# Patient Record
Sex: MALE | Race: WHITE | NOT HISPANIC OR LATINO | Employment: FULL TIME | URBAN - METROPOLITAN AREA
[De-identification: names, ages, dates, MRNs, and addresses within clinical notes are randomized per-mention and may not be internally consistent; named-entity substitution may affect disease eponyms.]

---

## 2017-01-02 PROBLEM — F41.1 GENERALIZED ANXIETY DISORDER: Status: ACTIVE | Noted: 2017-01-02

## 2017-01-24 ENCOUNTER — OFFICE VISIT (OUTPATIENT)
Dept: DERMATOLOGY | Facility: CLINIC | Age: 47
End: 2017-01-24
Payer: COMMERCIAL

## 2017-01-24 DIAGNOSIS — L64.9 MALE PATTERN ALOPECIA: ICD-10-CM

## 2017-01-24 DIAGNOSIS — L85.1 ACQUIRED PALMAR AND PLANTAR HYPERKERATOSIS: Primary | ICD-10-CM

## 2017-01-24 PROCEDURE — 1159F MED LIST DOCD IN RCRD: CPT | Mod: S$GLB,,, | Performed by: DERMATOLOGY

## 2017-01-24 PROCEDURE — 99999 PR PBB SHADOW E&M-EST. PATIENT-LVL I: CPT | Mod: PBBFAC,,, | Performed by: DERMATOLOGY

## 2017-01-24 PROCEDURE — 99214 OFFICE O/P EST MOD 30 MIN: CPT | Mod: S$GLB,,, | Performed by: DERMATOLOGY

## 2017-01-24 NOTE — PROGRESS NOTES
Subjective:       Patient ID:  Abhi Pompa is a 46 y.o. male who presents for   Chief Complaint   Patient presents with    Hair Loss    Follow-up    Lesion     HPI Comments: Pt here for follow up visit. Last seen on 3-3-2016.    Lesions on heel of both feet for several years, asymptomatic, not treated. He does routine foot exams every 6 months with Dr. Richey    Tinea versicolor using ketoconazole (NIZORAL) 2 % shampoo; Wash beard and trunk when flared, pt states much improved he is happy with results. He did not use the prescribed Diflucan     Hair loss for 6 months not treated  Washing hair daily with Suave shampoo   Maternal uncles and cousins with hair loss and thinning hair  He is unsure about paternal fhx   Diagnosed with Type 2 diabetes 3 years ago    History testosterone deficiency. Family history hair loss.   Testosterone injections, recently decreased dosage.     Significant weight loss last year.     Review of Systems   Skin: Positive for activity-related sunscreen use. Negative for itching, rash and daily sunscreen use.        Objective:    Physical Exam   Constitutional: He appears well-developed and well-nourished. He is obese.  No distress.   HENT:   Mouth/Throat: Lips normal.    Eyes: Lids are normal.  No conjunctival no injection.   Cardiovascular: There is no local extremity swelling and no dependent edema.     Neurological: He is alert and oriented to person, place, and time. He is not disoriented.   Psychiatric: He has a normal mood and affect.   Skin:   Areas Examined (abnormalities noted in diagram):   Scalp / Hair Palpated and Inspected  Head / Face Inspection Performed  Neck Inspection Performed  RLE Inspected  LLE Inspection Performed  Nails and Digits Inspection Performed                  Diagram Legend     Erythematous scaling macule/papule c/w actinic keratosis       Vascular papule c/w angioma      Pigmented verrucoid papule/plaque c/w seborrheic keratosis      Yellow  umbilicated papule c/w sebaceous hyperplasia      Irregularly shaped tan macule c/w lentigo     1-2 mm smooth white papules consistent with Milia      Movable subcutaneous cyst with punctum c/w epidermal inclusion cyst      Subcutaneous movable cyst c/w pilar cyst      Firm pink to brown papule c/w dermatofibroma      Pedunculated fleshy papule(s) c/w skin tag(s)      Evenly pigmented macule c/w junctional nevus     Mildly variegated pigmented, slightly irregular-bordered macule c/w mildly atypical nevus      Flesh colored to evenly pigmented papule c/w intradermal nevus       Pink pearly papule/plaque c/w basal cell carcinoma      Erythematous hyperkeratotic cursted plaque c/w SCC      Surgical scar with no sign of skin cancer recurrence      Open and closed comedones      Inflammatory papules and pustules      Verrucoid papule consistent consistent with wart     Erythematous eczematous patches and plaques     Dystrophic onycholytic nail with subungual debris c/w onychomycosis     Umbilicated papule    Erythematous-base heme-crusted tan verrucoid plaque consistent with inflamed seborrheic keratosis     Erythematous Silvery Scaling Plaque c/w Psoriasis     See annotation      Assessment / Plan:        Acquired  plantar hyperkeratosis  rec otc excipial 20% urea cream after soak- available OTC    Male pattern alopecia  Family history   Likely exacerbated/unmasked by hormonal therapies  Minoxidil 5% foam bid , apply 1/2 capful to scalp , avoid use on areas outside of scalp, warned that apparent increase shedding may occur initially and will treat for a minimum of 4 months prior to discontinuing.   Consider finasteride in future if ok with endocrine  Discussed the following potential side effects of finasteride : decreased libido, erectile and ejaculatory dysfunction, decreased volume of ejaculate, gynecomastia, myopathy and decreased PSA  Blood donation also contraindicated during treatment and for 6 months following  treatment.                  Return in about 6 months (around 7/24/2017).

## 2017-02-14 DIAGNOSIS — M25.512 ACUTE PAIN OF LEFT SHOULDER: Primary | ICD-10-CM

## 2017-02-16 ENCOUNTER — OFFICE VISIT (OUTPATIENT)
Dept: ORTHOPEDICS | Facility: CLINIC | Age: 47
End: 2017-02-16
Payer: COMMERCIAL

## 2017-02-16 ENCOUNTER — HOSPITAL ENCOUNTER (OUTPATIENT)
Dept: RADIOLOGY | Facility: HOSPITAL | Age: 47
Discharge: HOME | End: 2017-02-16
Attending: ORTHOPAEDIC SURGERY
Payer: COMMERCIAL

## 2017-02-16 VITALS — BODY MASS INDEX: 36.8 KG/M2 | HEIGHT: 66 IN | WEIGHT: 229 LBS

## 2017-02-16 DIAGNOSIS — M25.512 ACUTE PAIN OF LEFT SHOULDER: ICD-10-CM

## 2017-02-16 DIAGNOSIS — S40.022A CONTUSION SHOULDER/ARM, LEFT, INITIAL ENCOUNTER: Primary | ICD-10-CM

## 2017-02-16 DIAGNOSIS — S40.012A CONTUSION SHOULDER/ARM, LEFT, INITIAL ENCOUNTER: Primary | ICD-10-CM

## 2017-02-16 PROCEDURE — 73030 X-RAY EXAM OF SHOULDER: CPT | Mod: 26,LT,, | Performed by: RADIOLOGY

## 2017-02-16 PROCEDURE — 99999 PR PBB SHADOW E&M-EST. PATIENT-LVL II: CPT | Mod: PBBFAC,,, | Performed by: ORTHOPAEDIC SURGERY

## 2017-02-16 PROCEDURE — 99203 OFFICE O/P NEW LOW 30 MIN: CPT | Mod: S$GLB,,, | Performed by: ORTHOPAEDIC SURGERY

## 2017-02-16 PROCEDURE — 73030 X-RAY EXAM OF SHOULDER: CPT | Mod: TC,PO,LT
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
## 2017-02-16 NOTE — LETTER
February 16, 2017      Celeste Bergman MD  [address] [address]           [place] [facility] [address] [facility] [address] [place] [address]  Phone: [phone]          Patient: Abhi Pompa   MR Number: [id number]   YOB: 1970   Date of Visit: 2/16/2017       Dear Dr. Celeste Bergman:    Thank you for referring Abhi Pompa to me for evaluation. Attached you will find relevant portions of my assessment and plan of care.    If you have questions, please do not hesitate to call me. I look forward to following Abhi Pompa along with you.    Sincerely,    Jonathan Vela MD    Enclosure  CC:  No Recipients    If you would like to receive this communication electronically, please contact [email or web address] or [phone] to request more information on [employer] access.    For providers and/or their staff who would like to refer a patient to [facility], please contact us through our one-stop-shop provider referral line, [facility] , at [phone].    If you feel you have received this communication in error or would no longer like to receive these types of communications, please e-mail [email or web address]

## 2017-02-23 RX ORDER — SIMVASTATIN 10 MG/1
TABLET, FILM COATED ORAL
Qty: 30 TABLET | Refills: 0 | Status: SHIPPED | OUTPATIENT
Start: 2017-02-23 | End: 2017-04-26 | Stop reason: SDUPTHER

## 2017-02-23 RX ORDER — METFORMIN HYDROCHLORIDE 500 MG/1
TABLET, EXTENDED RELEASE ORAL
Qty: 60 TABLET | Refills: 0 | Status: SHIPPED | OUTPATIENT
Start: 2017-02-23 | End: 2017-04-13 | Stop reason: SDUPTHER

## 2017-03-14 ENCOUNTER — PATIENT MESSAGE (OUTPATIENT)
Dept: ENDOCRINOLOGY | Facility: CLINIC | Age: 47
End: 2017-03-14

## 2017-03-24 ENCOUNTER — LAB VISIT (OUTPATIENT)
Dept: LAB | Facility: HOSPITAL | Age: 47
End: 2017-03-24
Attending: INTERNAL MEDICINE
Payer: COMMERCIAL

## 2017-03-24 DIAGNOSIS — E29.1 MALE HYPOGONADISM: ICD-10-CM

## 2017-03-24 DIAGNOSIS — E11.9 TYPE 2 DIABETES MELLITUS WITHOUT COMPLICATION, WITHOUT LONG-TERM CURRENT USE OF INSULIN: ICD-10-CM

## 2017-03-24 DIAGNOSIS — E78.5 HYPERLIPIDEMIA, UNSPECIFIED HYPERLIPIDEMIA TYPE: ICD-10-CM

## 2017-03-24 LAB
ALBUMIN SERPL BCP-MCNC: 3.7 G/DL
ALP SERPL-CCNC: 62 U/L
ALT SERPL W/O P-5'-P-CCNC: 38 U/L
ANION GAP SERPL CALC-SCNC: 8 MMOL/L
AST SERPL-CCNC: 23 U/L
BASOPHILS # BLD AUTO: 0.02 K/UL
BASOPHILS NFR BLD: 0.3 %
BILIRUB SERPL-MCNC: 0.5 MG/DL
BUN SERPL-MCNC: 17 MG/DL
CALCIUM SERPL-MCNC: 8.5 MG/DL
CHLORIDE SERPL-SCNC: 110 MMOL/L
CHOLEST/HDLC SERPL: 5.1 {RATIO}
CO2 SERPL-SCNC: 23 MMOL/L
CREAT SERPL-MCNC: 0.8 MG/DL
DIFFERENTIAL METHOD: ABNORMAL
EOSINOPHIL # BLD AUTO: 0.2 K/UL
EOSINOPHIL NFR BLD: 3 %
ERYTHROCYTE [DISTWIDTH] IN BLOOD BY AUTOMATED COUNT: 12.7 %
EST. GFR  (AFRICAN AMERICAN): >60 ML/MIN/1.73 M^2
EST. GFR  (NON AFRICAN AMERICAN): >60 ML/MIN/1.73 M^2
ESTIMATED AVG GLUCOSE: 154 MG/DL
GLUCOSE SERPL-MCNC: 131 MG/DL
HBA1C MFR BLD HPLC: 7 %
HCT VFR BLD AUTO: 45.5 %
HDL/CHOLESTEROL RATIO: 19.7 %
HDLC SERPL-MCNC: 147 MG/DL
HDLC SERPL-MCNC: 29 MG/DL
HGB BLD-MCNC: 15.5 G/DL
LDLC SERPL CALC-MCNC: 81.2 MG/DL
LYMPHOCYTES # BLD AUTO: 2.1 K/UL
LYMPHOCYTES NFR BLD: 32.5 %
MCH RBC QN AUTO: 31.3 PG
MCHC RBC AUTO-ENTMCNC: 34.1 %
MCV RBC AUTO: 92 FL
MONOCYTES # BLD AUTO: 0.7 K/UL
MONOCYTES NFR BLD: 10.3 %
NEUTROPHILS # BLD AUTO: 3.4 K/UL
NEUTROPHILS NFR BLD: 53.7 %
NONHDLC SERPL-MCNC: 118 MG/DL
PLATELET # BLD AUTO: 288 K/UL
PMV BLD AUTO: 11.2 FL
POTASSIUM SERPL-SCNC: 4.4 MMOL/L
PROT SERPL-MCNC: 6.5 G/DL
RBC # BLD AUTO: 4.96 M/UL
SODIUM SERPL-SCNC: 141 MMOL/L
TESTOST SERPL-MCNC: 956 NG/DL
TRIGL SERPL-MCNC: 184 MG/DL
WBC # BLD AUTO: 6.4 K/UL

## 2017-03-24 PROCEDURE — 80061 LIPID PANEL: CPT

## 2017-03-24 PROCEDURE — 80053 COMPREHEN METABOLIC PANEL: CPT

## 2017-03-24 PROCEDURE — 84403 ASSAY OF TOTAL TESTOSTERONE: CPT

## 2017-03-24 PROCEDURE — 83036 HEMOGLOBIN GLYCOSYLATED A1C: CPT

## 2017-03-24 PROCEDURE — 85025 COMPLETE CBC W/AUTO DIFF WBC: CPT

## 2017-03-24 PROCEDURE — 36415 COLL VENOUS BLD VENIPUNCTURE: CPT | Mod: PO

## 2017-03-31 ENCOUNTER — OFFICE VISIT (OUTPATIENT)
Dept: ENDOCRINOLOGY | Facility: CLINIC | Age: 47
End: 2017-03-31
Payer: COMMERCIAL

## 2017-03-31 ENCOUNTER — PATIENT MESSAGE (OUTPATIENT)
Dept: ENDOCRINOLOGY | Facility: CLINIC | Age: 47
End: 2017-03-31

## 2017-03-31 ENCOUNTER — PATIENT MESSAGE (OUTPATIENT)
Dept: ORTHOPEDICS | Facility: CLINIC | Age: 47
End: 2017-03-31

## 2017-03-31 VITALS
DIASTOLIC BLOOD PRESSURE: 70 MMHG | HEART RATE: 75 BPM | WEIGHT: 230 LBS | HEIGHT: 66 IN | SYSTOLIC BLOOD PRESSURE: 112 MMHG | BODY MASS INDEX: 36.96 KG/M2

## 2017-03-31 DIAGNOSIS — F41.9 ANXIETY: ICD-10-CM

## 2017-03-31 DIAGNOSIS — E11.9 TYPE 2 DIABETES MELLITUS WITHOUT COMPLICATION, WITHOUT LONG-TERM CURRENT USE OF INSULIN: Primary | ICD-10-CM

## 2017-03-31 DIAGNOSIS — E29.1 MALE HYPOGONADISM: ICD-10-CM

## 2017-03-31 DIAGNOSIS — E78.5 HYPERLIPIDEMIA, UNSPECIFIED HYPERLIPIDEMIA TYPE: ICD-10-CM

## 2017-03-31 PROCEDURE — 99214 OFFICE O/P EST MOD 30 MIN: CPT | Mod: S$GLB,,, | Performed by: INTERNAL MEDICINE

## 2017-03-31 PROCEDURE — 99999 PR PBB SHADOW E&M-EST. PATIENT-LVL III: CPT | Mod: PBBFAC,,, | Performed by: INTERNAL MEDICINE

## 2017-03-31 PROCEDURE — 3045F PR MOST RECENT HEMOGLOBIN A1C LEVEL 7.0-9.0%: CPT | Mod: S$GLB,,, | Performed by: INTERNAL MEDICINE

## 2017-03-31 PROCEDURE — 3060F POS MICROALBUMINURIA REV: CPT | Mod: S$GLB,,, | Performed by: INTERNAL MEDICINE

## 2017-03-31 PROCEDURE — 1160F RVW MEDS BY RX/DR IN RCRD: CPT | Mod: S$GLB,,, | Performed by: INTERNAL MEDICINE

## 2017-03-31 RX ORDER — SERTRALINE HYDROCHLORIDE 100 MG/1
100 TABLET, FILM COATED ORAL DAILY
Refills: 1 | COMMUNITY
Start: 2017-03-16 | End: 2018-01-08

## 2017-03-31 RX ORDER — TESTOSTERONE CYPIONATE 1000 MG/10ML
50 INJECTION, SOLUTION INTRAMUSCULAR
Qty: 10 ML | Refills: 3 | Status: SHIPPED | OUTPATIENT
Start: 2017-03-31 | End: 2017-07-04 | Stop reason: SDUPTHER
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
## 2017-03-31 NOTE — MR AVS SNAPSHOT
[facility]  [address] [facility] [address]  [place] [address]  Phone: [phone]  Fax: [phone]                  Abhi Pompa   3/31/2017 8:30 AM   Office Visit    Description:  Male : 1970   Provider:  Kameron Richey DO   Department:  [facility]           Diagnoses this Visit        Comments    Type 2 diabetes mellitus without complication, without long-term current use of insulin    -  Primary     Hyperlipidemia, unspecified hyperlipidemia type         Male hypogonadism         Anxiety                To Do List           Goals (5 Years of Data)     None       These Medications        Disp Refills Start End    testosterone cypionate (DEPOTESTOTERONE CYPIONATE) 100 mg/mL injection 10 mL 3 3/31/2017     Inject 0.5 mLs (50 mg total) into the muscle every 14 (fourteen) days. - Intramuscular    Pharmacy: [facility]/pharmacy #[id number] - [place] - [address] Ph #: [phone]         [facility]     [facility] Nurse Care Line -  Assistance  Unless otherwise directed by your provider, please contact [facility], our nurse care line that is available for  assistance.     Registered nurses in the [facility] provide: appointment scheduling, clinical advisement, health education, and other advisory services.  Call: [phone] (toll free)               Medications           Message regarding Medications     Verify the changes and/or additions to your medication regime listed below are the same as discussed with your clinician today.  If any of these changes or additions are incorrect, please notify your healthcare provider.        CHANGE how you are taking these medications     Start Taking Instead of    testosterone cypionate (DEPOTESTOTERONE CYPIONATE) 100 mg/mL injection testosterone cypionate (DEPOTESTOTERONE CYPIONATE) 100 mg/mL injection    Dosage:  Inject 0.5 mLs (50 mg total) into the muscle every 14 (fourteen) days. Dosage:  Inject 1  "mL (100 mg total) into the muscle every 14 (fourteen) days.    Reason for Change:  Reorder            Verify that the below list of medications is an accurate representation of the medications you are currently taking.  If none reported, the list may be blank. If incorrect, please contact your healthcare provider. Carry this list with you in case of emergency.           Current Medications     calcium carbonate (CALCIUM ANTACID) 300 mg (750 mg) Chew Take by mouth.    fish oil-omega-3 fatty acids 300-1,000 mg capsule Take 1 capsule by mouth once daily.    FLAXSEED OIL ORAL Take 1 capsule by mouth once daily.    metformin (GLUCOPHAGE-XR) 500 MG 24 hr tablet Take 2 tablets (1000mg) po daily with breakfast    omeprazole (PRILOSEC) 40 MG capsule Take 40 mg by mouth once daily.    ONETOUCH DELICA LANCETS 33 gauge Misc TEST EVERY DAY    ranitidine (ZANTAC) 300 MG tablet Take 1 tablet (300 mg total) by mouth nightly.    sertraline (ZOLOFT) 100 MG tablet Take 100 mg by mouth once daily.    sertraline (ZOLOFT) 50 MG tablet 1/2 po daily x one week then 1 po daily    simvastatin (ZOCOR) 10 MG tablet TAKE 1 TABLET (10 MG TOTAL) BY MOUTH EVERY EVENING.    tamsulosin (FLOMAX) 0.4 mg Cp24 Take 1 capsule (0.4 mg total) by mouth daily as needed.    testosterone cypionate (DEPOTESTOTERONE CYPIONATE) 100 mg/mL injection Inject 0.5 mLs (50 mg total) into the muscle every 14 (fourteen) days.    VITAMIN B COMPLEX ORAL Take 1 tablet by mouth once daily.           Clinical Reference Information           Your Vitals Were     BP Pulse Height Weight BMI    112/70 (BP Method: Manual) 75 5' 6" (1.676 m) 104.3 kg (230 lb) 37.12 kg/m2      Blood Pressure          Most Recent Value    BP  112/70      Allergies as of 3/31/2017     No Known Allergies      Immunizations Administered on Date of Encounter - 3/31/2017     None      Orders Placed During Today's Visit     Future Labs/Procedures Expected by Expires    CBC auto differential  3/31/2017 " 5/30/2018    Comprehensive metabolic panel  3/31/2017 4/1/2018    Hemoglobin A1c  3/31/2017 4/1/2018    Lipid panel  3/31/2017 3/31/2018    Microalbumin/creatinine urine ratio  3/31/2017 4/1/2018    Testosterone  3/31/2017 5/30/2018      Language Assistance Services     ATTENTION: Language assistance services are available, free of charge. Please call [phone].      ATENCIÓN: Si habla español, tiene a madrid disposición servicios gratuitos de asistencia lingüística. Llame al [phone].     CHÚ Ý: N?u b?n nói Ti?ng Vi?t, có các d?ch v? h? tr? ngôn ng? mi?n phí dành cho b?n. G?i s? [phone].         [facility] complies with applicable Federal civil rights laws and does not discriminate on the basis of race, color, national origin, age, disability, or sex.

## 2017-04-06 ENCOUNTER — OFFICE VISIT (OUTPATIENT)
Dept: ORTHOPEDICS | Facility: CLINIC | Age: 47
End: 2017-04-06
Payer: COMMERCIAL

## 2017-04-06 ENCOUNTER — HOSPITAL ENCOUNTER (OUTPATIENT)
Dept: RADIOLOGY | Facility: HOSPITAL | Age: 47
Discharge: HOME | End: 2017-04-06
Attending: ORTHOPAEDIC SURGERY
Payer: COMMERCIAL

## 2017-04-06 VITALS — WEIGHT: 237 LBS | HEIGHT: 65 IN | BODY MASS INDEX: 39.49 KG/M2

## 2017-04-06 DIAGNOSIS — M79.644 THUMB PAIN, RIGHT: ICD-10-CM

## 2017-04-06 DIAGNOSIS — M79.644 THUMB PAIN, RIGHT: Primary | ICD-10-CM

## 2017-04-06 DIAGNOSIS — S63.104A THUMB DISLOCATION, RIGHT, INITIAL ENCOUNTER: Primary | ICD-10-CM

## 2017-04-06 DIAGNOSIS — S63.054A CMC (CARPOMETACARPAL JOINT) DISLOCATION, RIGHT, INITIAL ENCOUNTER: ICD-10-CM

## 2017-04-06 PROCEDURE — 99999 PR PBB SHADOW E&M-EST. PATIENT-LVL III: CPT | Mod: PBBFAC,,, | Performed by: ORTHOPAEDIC SURGERY

## 2017-04-06 PROCEDURE — 73140 X-RAY EXAM OF FINGER(S): CPT | Mod: 26,,, | Performed by: RADIOLOGY

## 2017-04-06 PROCEDURE — 99214 OFFICE O/P EST MOD 30 MIN: CPT | Mod: S$GLB,,, | Performed by: ORTHOPAEDIC SURGERY

## 2017-04-06 PROCEDURE — 1160F RVW MEDS BY RX/DR IN RCRD: CPT | Mod: S$GLB,,, | Performed by: ORTHOPAEDIC SURGERY

## 2017-04-06 PROCEDURE — 73140 X-RAY EXAM OF FINGER(S): CPT | Mod: TC,PO

## 2017-04-06 RX ORDER — HYDROCODONE BITARTRATE AND ACETAMINOPHEN 5; 325 MG/1; MG/1
1 TABLET ORAL
Qty: 30 TABLET | Refills: 0 | Status: ON HOLD | OUTPATIENT
Start: 2017-04-06 | End: 2017-04-11 | Stop reason: HOSPADM

## 2017-04-06 NOTE — MR AVS SNAPSHOT
[facility]  [address] [facility] [address]  [place] [address]  Phone: [phone]                  Abhi Pompa   2017 11:30 AM   Office Visit    Description:  Male : 1970   Provider:  Jonathan Vela MD   Department:  [facility]           Reason for Visit     Right Hand - Injury, Pain           Diagnoses this Visit        Comments    Thumb dislocation, right, initial encounter    -  Primary     Thumb pain, right         CMC (carpometacarpal joint) dislocation, right, initial encounter                To Do List           Future Appointments        Provider Department Dept Phone    2017 10:45 AM [facility] CT1 LIMIT [address] [facility] [phone]    4/10/2017 11:15 AM Jonathan Vela MD [facility] [phone]    2017 8:00 AM LAB, [place] [facility] [phone]    2017 8:20 AM URINE [facility] [phone]    9/15/2017 9:30 AM Kameron Richey DO [facility] [phone]      Goals (5 Years of Data)     None      Follow-Up and Disposition     Return for Test results.       These Medications        Disp Refills Start End    hydrocodone-acetaminophen 5-325mg (NORCO) 5-325 mg per tablet 30 tablet 0 2017     Take 1 tablet by mouth every 4 to 6 hours as needed for Pain. - Oral    Pharmacy: [facility]/pharmacy #[id number] - [place] - [address] Ph #: [phone]         [facility]     [facility] Care [facility] -  Assistance  Unless otherwise directed by your provider, please contact [facility], our nurse care line that is available for  assistance.     Registered nurses in the [facility] provide: appointment scheduling, clinical advisement, health education, and other advisory services.  Call: [phone] (toll free)               Medications           Message regarding Medications     Verify the changes and/or additions to your  medication regime listed below are the same as discussed with your clinician today.  If any of these changes or additions are incorrect, please notify your healthcare provider.        CHANGE how you are taking these medications     Start Taking Instead of    hydrocodone-acetaminophen 5-325mg (NORCO) 5-325 mg per tablet hydrocodone-acetaminophen 5-325mg (NORCO) 5-325 mg per tablet    Dosage:  Take 1 tablet by mouth every 4 to 6 hours as needed for Pain. Dosage:  Take 1 tablet by mouth every 4 (four) hours as needed for Pain.    Reason for Change:  Reorder            Verify that the below list of medications is an accurate representation of the medications you are currently taking.  If none reported, the list may be blank. If incorrect, please contact your healthcare provider. Carry this list with you in case of emergency.           Current Medications     calcium carbonate (CALCIUM ANTACID) 300 mg (750 mg) Chew Take by mouth.    fish oil-omega-3 fatty acids 300-1,000 mg capsule Take 1 capsule by mouth once daily.    FLAXSEED OIL ORAL Take 1 capsule by mouth once daily.    hydrocodone-acetaminophen 5-325mg (NORCO) 5-325 mg per tablet Take 1 tablet by mouth every 4 to 6 hours as needed for Pain.    metformin (GLUCOPHAGE-XR) 500 MG 24 hr tablet Take 2 tablets (1000mg) po daily with breakfast    omeprazole (PRILOSEC) 40 MG capsule Take 40 mg by mouth once daily.    ONETOUCH DELICA LANCETS 33 gauge Misc TEST EVERY DAY    ranitidine (ZANTAC) 300 MG tablet Take 1 tablet (300 mg total) by mouth nightly.    sertraline (ZOLOFT) 100 MG tablet Take 100 mg by mouth once daily.    sertraline (ZOLOFT) 50 MG tablet 1/2 po daily x one week then 1 po daily    simvastatin (ZOCOR) 10 MG tablet TAKE 1 TABLET (10 MG TOTAL) BY MOUTH EVERY EVENING.    tamsulosin (FLOMAX) 0.4 mg Cp24 Take 1 capsule (0.4 mg total) by mouth daily as needed.    testosterone cypionate (DEPOTESTOTERONE CYPIONATE) 100 mg/mL injection Inject 0.5 mLs (50 mg total)  "into the muscle every 14 (fourteen) days.    VITAMIN B COMPLEX ORAL Take 1 tablet by mouth once daily.           Clinical Reference Information           Your Vitals Were     Height Weight BMI          5' 5" (1.651 m) 107.5 kg (237 lb) 39.44 kg/m2        Allergies as of 4/6/2017     No Known Allergies      Immunizations Administered on Date of Encounter - 4/6/2017     None      Orders Placed During Today's Visit     Future Labs/Procedures Expected by Expires    CT Upper Extremity Wo Contrast Right  4/6/2017 4/6/2018      Language Assistance Services     ATTENTION: Language assistance services are available, free of charge. Please call [phone].      ATENCIÓN: Si habla español, tiene a madrid disposición servicios gratuitos de asistencia lingüística. Llame al [phone].     JACEK Ý: N?u b?n nói Ti?ng Vi?t, có các d?ch v? h? tr? ngôn ng? mi?n phí dành cho b?n. G?i s? [phone].         [facility] complies with applicable Federal civil rights laws and does not discriminate on the basis of race, color, national origin, age, disability, or sex.        "

## 2017-04-07 ENCOUNTER — HOSPITAL ENCOUNTER (OUTPATIENT)
Dept: RADIOLOGY | Facility: HOSPITAL | Age: 47
Discharge: HOME | End: 2017-04-07
Attending: ORTHOPAEDIC SURGERY
Payer: COMMERCIAL

## 2017-04-07 DIAGNOSIS — M79.644 THUMB PAIN, RIGHT: ICD-10-CM

## 2017-04-07 DIAGNOSIS — S63.104A THUMB DISLOCATION, RIGHT, INITIAL ENCOUNTER: ICD-10-CM

## 2017-04-07 DIAGNOSIS — S63.054A CMC (CARPOMETACARPAL JOINT) DISLOCATION, RIGHT, INITIAL ENCOUNTER: ICD-10-CM

## 2017-04-07 PROCEDURE — 73200 CT UPPER EXTREMITY W/O DYE: CPT | Mod: TC,PO,RT

## 2017-04-07 PROCEDURE — 73200 CT UPPER EXTREMITY W/O DYE: CPT | Mod: 26,RT,, | Performed by: RADIOLOGY
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
## 2017-04-10 ENCOUNTER — OFFICE VISIT (OUTPATIENT)
Dept: ORTHOPEDICS | Facility: CLINIC | Age: 47
End: 2017-04-10
Payer: COMMERCIAL

## 2017-04-10 VITALS — BODY MASS INDEX: 39.49 KG/M2 | HEIGHT: 65 IN | WEIGHT: 237 LBS

## 2017-04-10 DIAGNOSIS — S63.054D CMC (CARPOMETACARPAL JOINT) DISLOCATION, RIGHT, SUBSEQUENT ENCOUNTER: Primary | ICD-10-CM

## 2017-04-10 PROCEDURE — 1160F RVW MEDS BY RX/DR IN RCRD: CPT | Mod: S$GLB,,, | Performed by: ORTHOPAEDIC SURGERY

## 2017-04-10 PROCEDURE — 99214 OFFICE O/P EST MOD 30 MIN: CPT | Mod: 57,S$GLB,, | Performed by: ORTHOPAEDIC SURGERY

## 2017-04-10 PROCEDURE — 99999 PR PBB SHADOW E&M-EST. PATIENT-LVL II: CPT | Mod: PBBFAC,,, | Performed by: ORTHOPAEDIC SURGERY

## 2017-04-10 RX ORDER — MUPIROCIN 20 MG/G
1 OINTMENT TOPICAL
Status: CANCELLED | OUTPATIENT
Start: 2017-04-10

## 2017-04-10 NOTE — PROGRESS NOTES
"DATE: 4/10/2017  PATIENT: Abhi Pompa    Attending Physician: Jonathan Vela M.D.    HISTORY:  Abhi Pompa is a 46 y.o. male who returns for follow up evaluation of  his right first carpometacarpal joint fracture dislocation.  He did undergo the CT scan which confirmed persistent subluxation.  He now presents discuss further treatment recommendations.    PMH/PSH/FamHx/SocHx:  Reviewed and unchanged from prior visit    ROS:  Constitution: Negative for chills, fever, and sweats. Negative for unexplained weight loss.  HENT: Negative for headaches and blurry vision.   Cardiovascular: Negative for chest pain, irregular heartbeat, leg swelling and palpitations.   Respiratory: Negative for cough and shortness of breath.   Gastrointestinal: Negative for abdominal pain, heartburn, nausea and vomiting.   Genitourinary: Negative for bladder incontinence and dysuria.   Musculoskeletal: Negative for systemic arthritis, joint swelling, muscle weakness and myalgias.   Neurological: Negative for numbness.   Psychiatric/Behavioral: Negative for depression.   Endocrine: Negative for polyuria.   Hematologic/Lymphatic: Negative for bleeding disorders.  Skin: Negative for poor wound healing.       EXAM:  Ht 5' 5" (1.651 m)  Wt 107.5 kg (237 lb)  BMI 39.44 kg/m2  Healthy-appearing male in no acute distress.  There is mild swelling about the base of the right thumb. Skin is intact. There is pain with range of motion. Tenderness palpation at the base of the thumb. Skin is intact. Sensation is intact to the thumb.       IMAGING:   CT scan is personally reviewed and consistent with a report.  Fracture dislocation/subluxation of the first carpometacarpal joint of the right hand noted.    ASSESSMENT:  Fraire's fracture dislocation right thumb    PLAN:  The implications of the patient's evolution of symptoms and findings were explained to the patient in detail.  I recommended closed reduction percutaneous pinning versus ORIF " right first carpometacarpal fracture dislocation.The surgical procedure including potential risks and benefits was discussed in detail. Specific risks discussed included but were not limited to: infection, failure to relieve pain, malunion, nonunion, failure to relieve symptoms, recurrence, nerve injury resulting in permanent numbness or weakness, blood vessel damage, permanent stiffness, failure to achieve full joint mobility, permanent weakness, extensive and time consuming therapy, deep venous thrombosis and pulmonary embolism, and anesthesia related risks to be discussed with the anesthesiologist. All questions answered and the patient wishes to proceed with surgical treatment.  Informed consent obtained and paperwork signed.  Plan surgery for tomorrow as an outpatient procedure..          This note was dictated using voice recognition software and may contain grammatical errors

## 2017-04-11 ENCOUNTER — HOSPITAL ENCOUNTER (OUTPATIENT)
Facility: HOSPITAL | Age: 47
Discharge: HOME | End: 2017-04-11
Attending: ORTHOPAEDIC SURGERY | Admitting: ORTHOPAEDIC SURGERY
Payer: COMMERCIAL

## 2017-04-11 ENCOUNTER — SURGERY (OUTPATIENT)
Age: 47
End: 2017-04-11

## 2017-04-11 ENCOUNTER — ANESTHESIA (OUTPATIENT)
Dept: SURGERY | Facility: HOSPITAL | Age: 47
End: 2017-04-11
Payer: COMMERCIAL

## 2017-04-11 ENCOUNTER — ANESTHESIA EVENT (OUTPATIENT)
Dept: SURGERY | Facility: HOSPITAL | Age: 47
End: 2017-04-11
Payer: COMMERCIAL

## 2017-04-11 VITALS
OXYGEN SATURATION: 98 % | HEIGHT: 66 IN | BODY MASS INDEX: 36.16 KG/M2 | HEART RATE: 65 BPM | SYSTOLIC BLOOD PRESSURE: 114 MMHG | WEIGHT: 225 LBS | DIASTOLIC BLOOD PRESSURE: 73 MMHG | TEMPERATURE: 98 F | RESPIRATION RATE: 16 BRPM

## 2017-04-11 DIAGNOSIS — S63.054D CMC (CARPOMETACARPAL JOINT) DISLOCATION, RIGHT, SUBSEQUENT ENCOUNTER: ICD-10-CM

## 2017-04-11 PROBLEM — S63.056A CMC (CARPOMETACARPAL JOINT) DISLOCATION: Status: ACTIVE | Noted: 2017-04-11

## 2017-04-11 LAB — GLUCOSE SERPL-MCNC: 172 MG/DL (ref 70–110)

## 2017-04-11 PROCEDURE — 25000003 PHARM REV CODE 250: Mod: PO | Performed by: ANESTHESIOLOGY

## 2017-04-11 PROCEDURE — D9220A PRA ANESTHESIA: Mod: CRNA,,, | Performed by: NURSE ANESTHETIST, CERTIFIED REGISTERED

## 2017-04-11 PROCEDURE — 37000008 HC ANESTHESIA 1ST 15 MINUTES: Mod: PO | Performed by: ORTHOPAEDIC SURGERY

## 2017-04-11 PROCEDURE — 63600175 PHARM REV CODE 636 W HCPCS: Mod: PO | Performed by: ANESTHESIOLOGY

## 2017-04-11 PROCEDURE — 26650 TREAT THUMB FRACTURE: CPT | Mod: F5,,, | Performed by: ORTHOPAEDIC SURGERY

## 2017-04-11 PROCEDURE — 36000707: Mod: PO | Performed by: ORTHOPAEDIC SURGERY

## 2017-04-11 PROCEDURE — 63600175 PHARM REV CODE 636 W HCPCS: Mod: PO | Performed by: ORTHOPAEDIC SURGERY

## 2017-04-11 PROCEDURE — 63600175 PHARM REV CODE 636 W HCPCS: Mod: PO | Performed by: NURSE ANESTHETIST, CERTIFIED REGISTERED

## 2017-04-11 PROCEDURE — 36000706: Mod: PO | Performed by: ORTHOPAEDIC SURGERY

## 2017-04-11 PROCEDURE — 64415 NJX AA&/STRD BRCH PLXS IMG: CPT | Mod: 59,,, | Performed by: ANESTHESIOLOGY

## 2017-04-11 PROCEDURE — 25000003 PHARM REV CODE 250: Mod: PO | Performed by: NURSE ANESTHETIST, CERTIFIED REGISTERED

## 2017-04-11 PROCEDURE — 27200651 HC AIRWAY, LMA: Mod: PO | Performed by: NURSE ANESTHETIST, CERTIFIED REGISTERED

## 2017-04-11 PROCEDURE — 25000003 PHARM REV CODE 250: Mod: PO | Performed by: ORTHOPAEDIC SURGERY

## 2017-04-11 PROCEDURE — D9220A PRA ANESTHESIA: Mod: ANES,,, | Performed by: ANESTHESIOLOGY

## 2017-04-11 PROCEDURE — 37000009 HC ANESTHESIA EA ADD 15 MINS: Mod: PO | Performed by: ORTHOPAEDIC SURGERY

## 2017-04-11 PROCEDURE — C1769 GUIDE WIRE: HCPCS | Mod: PO | Performed by: ORTHOPAEDIC SURGERY

## 2017-04-11 PROCEDURE — 71000033 HC RECOVERY, INTIAL HOUR: Mod: PO | Performed by: ORTHOPAEDIC SURGERY

## 2017-04-11 PROCEDURE — 64415 NJX AA&/STRD BRCH PLXS IMG: CPT | Mod: PO | Performed by: ANESTHESIOLOGY

## 2017-04-11 PROCEDURE — 82962 GLUCOSE BLOOD TEST: CPT | Mod: PO | Performed by: ORTHOPAEDIC SURGERY

## 2017-04-11 DEVICE — K-WIRE .062: Type: IMPLANTABLE DEVICE | Site: HAND | Status: FUNCTIONAL

## 2017-04-11 RX ORDER — FENTANYL CITRATE 50 UG/ML
100 INJECTION, SOLUTION INTRAMUSCULAR; INTRAVENOUS ONCE
Status: COMPLETED | OUTPATIENT
Start: 2017-04-11 | End: 2017-04-11

## 2017-04-11 RX ORDER — KETAMINE HYDROCHLORIDE 100 MG/ML
INJECTION, SOLUTION INTRAMUSCULAR; INTRAVENOUS
Status: DISCONTINUED | OUTPATIENT
Start: 2017-04-11 | End: 2017-04-11

## 2017-04-11 RX ORDER — ONDANSETRON 2 MG/ML
4 INJECTION INTRAMUSCULAR; INTRAVENOUS ONCE
Status: DISCONTINUED | OUTPATIENT
Start: 2017-04-11 | End: 2017-04-11 | Stop reason: HOSPADM

## 2017-04-11 RX ORDER — HYDROCODONE BITARTRATE AND ACETAMINOPHEN 10; 325 MG/1; MG/1
1 TABLET ORAL EVERY 4 HOURS PRN
Qty: 40 TABLET | Refills: 0 | Status: SHIPPED | OUTPATIENT
Start: 2017-04-11 | End: 2017-04-24 | Stop reason: SDUPTHER

## 2017-04-11 RX ORDER — LIDOCAINE HYDROCHLORIDE 10 MG/ML
1 INJECTION, SOLUTION EPIDURAL; INFILTRATION; INTRACAUDAL; PERINEURAL ONCE
Status: COMPLETED | OUTPATIENT
Start: 2017-04-11 | End: 2017-04-11

## 2017-04-11 RX ORDER — HYDROCODONE BITARTRATE AND ACETAMINOPHEN 5; 325 MG/1; MG/1
1 TABLET ORAL EVERY 4 HOURS PRN
Status: DISCONTINUED | OUTPATIENT
Start: 2017-04-11 | End: 2017-04-11 | Stop reason: HOSPADM

## 2017-04-11 RX ORDER — FENTANYL CITRATE 50 UG/ML
25 INJECTION, SOLUTION INTRAMUSCULAR; INTRAVENOUS EVERY 5 MIN PRN
Status: DISCONTINUED | OUTPATIENT
Start: 2017-04-11 | End: 2017-04-11 | Stop reason: HOSPADM

## 2017-04-11 RX ORDER — LIDOCAINE HCL/PF 100 MG/5ML
SYRINGE (ML) INTRAVENOUS
Status: DISCONTINUED | OUTPATIENT
Start: 2017-04-11 | End: 2017-04-11

## 2017-04-11 RX ORDER — MIDAZOLAM HYDROCHLORIDE 5 MG/ML
2 INJECTION INTRAMUSCULAR; INTRAVENOUS ONCE
Status: COMPLETED | OUTPATIENT
Start: 2017-04-11 | End: 2017-04-11

## 2017-04-11 RX ORDER — ACETAMINOPHEN 10 MG/ML
INJECTION, SOLUTION INTRAVENOUS
Status: DISCONTINUED | OUTPATIENT
Start: 2017-04-11 | End: 2017-04-11

## 2017-04-11 RX ORDER — MIDAZOLAM HYDROCHLORIDE 1 MG/ML
INJECTION, SOLUTION INTRAMUSCULAR; INTRAVENOUS
Status: DISCONTINUED | OUTPATIENT
Start: 2017-04-11 | End: 2017-04-11

## 2017-04-11 RX ORDER — OXYCODONE HYDROCHLORIDE 5 MG/1
5 TABLET ORAL ONCE AS NEEDED
Status: DISCONTINUED | OUTPATIENT
Start: 2017-04-11 | End: 2017-04-11 | Stop reason: HOSPADM

## 2017-04-11 RX ORDER — FENTANYL CITRATE 50 UG/ML
INJECTION, SOLUTION INTRAMUSCULAR; INTRAVENOUS
Status: DISCONTINUED | OUTPATIENT
Start: 2017-04-11 | End: 2017-04-11

## 2017-04-11 RX ORDER — PROPOFOL 10 MG/ML
VIAL (ML) INTRAVENOUS
Status: DISCONTINUED | OUTPATIENT
Start: 2017-04-11 | End: 2017-04-11

## 2017-04-11 RX ORDER — MUPIROCIN 20 MG/G
1 OINTMENT TOPICAL
Status: DISCONTINUED | OUTPATIENT
Start: 2017-04-11 | End: 2017-04-11 | Stop reason: HOSPADM

## 2017-04-11 RX ORDER — ONDANSETRON 2 MG/ML
INJECTION INTRAMUSCULAR; INTRAVENOUS
Status: DISCONTINUED | OUTPATIENT
Start: 2017-04-11 | End: 2017-04-11

## 2017-04-11 RX ORDER — SODIUM CHLORIDE, SODIUM LACTATE, POTASSIUM CHLORIDE, CALCIUM CHLORIDE 600; 310; 30; 20 MG/100ML; MG/100ML; MG/100ML; MG/100ML
INJECTION, SOLUTION INTRAVENOUS CONTINUOUS
Status: DISCONTINUED | OUTPATIENT
Start: 2017-04-11 | End: 2017-04-11 | Stop reason: HOSPADM

## 2017-04-11 RX ADMIN — FENTANYL CITRATE 50 MCG: 50 INJECTION, SOLUTION INTRAMUSCULAR; INTRAVENOUS at 11:04

## 2017-04-11 RX ADMIN — FENTANYL CITRATE 100 MCG: 50 INJECTION INTRAMUSCULAR; INTRAVENOUS at 11:04

## 2017-04-11 RX ADMIN — LIDOCAINE HYDROCHLORIDE 100 MG: 20 INJECTION PARENTERAL at 11:04

## 2017-04-11 RX ADMIN — SODIUM CHLORIDE, SODIUM LACTATE, POTASSIUM CHLORIDE, AND CALCIUM CHLORIDE: .6; .31; .03; .02 INJECTION, SOLUTION INTRAVENOUS at 10:04

## 2017-04-11 RX ADMIN — SODIUM CHLORIDE, SODIUM LACTATE, POTASSIUM CHLORIDE, AND CALCIUM CHLORIDE: .6; .31; .03; .02 INJECTION, SOLUTION INTRAVENOUS at 11:04

## 2017-04-11 RX ADMIN — PROPOFOL 300 MG: 10 INJECTION, EMULSION INTRAVENOUS at 11:04

## 2017-04-11 RX ADMIN — ACETAMINOPHEN 1000 MG: 10 INJECTION, SOLUTION INTRAVENOUS at 11:04

## 2017-04-11 RX ADMIN — ONDANSETRON 4 MG: 2 INJECTION, SOLUTION INTRAMUSCULAR; INTRAVENOUS at 11:04

## 2017-04-11 RX ADMIN — MIDAZOLAM HYDROCHLORIDE 2 MG: 5 INJECTION, SOLUTION INTRAMUSCULAR; INTRAVENOUS at 11:04

## 2017-04-11 RX ADMIN — KETAMINE HYDROCHLORIDE 25 MG: 100 INJECTION, SOLUTION, CONCENTRATE INTRAMUSCULAR; INTRAVENOUS at 11:04

## 2017-04-11 RX ADMIN — DEXTROSE 2 G: 50 INJECTION, SOLUTION INTRAVENOUS at 11:04

## 2017-04-11 RX ADMIN — MIDAZOLAM HYDROCHLORIDE 1 MG: 1 INJECTION, SOLUTION INTRAMUSCULAR; INTRAVENOUS at 11:04

## 2017-04-11 RX ADMIN — LIDOCAINE HYDROCHLORIDE 1 MG: 10 INJECTION, SOLUTION EPIDURAL; INFILTRATION; INTRACAUDAL; PERINEURAL at 10:04

## 2017-04-11 NOTE — TRANSFER OF CARE
"Anesthesia Transfer of Care Note    Patient: Abhi Pompa    Procedure(s) Performed: Procedure(s) (LRB):  PINNING-CLOSED-HAND- 1st CMC fx dislocation (Right)    Patient location: PACU    Anesthesia Type: general    Transport from OR: Transported from OR on room air with adequate spontaneous ventilation    Post pain: adequate analgesia    Post assessment: no apparent anesthetic complications and tolerated procedure well    Post vital signs: stable    Level of consciousness: awake and sedated    Nausea/Vomiting: no nausea/vomiting    Complications: none          Last vitals:   Visit Vitals    /76 (BP Location: Left arm, Patient Position: Lying, BP Method: Automatic)    Pulse 69    Temp 36.5 °C (97.7 °F) (Skin)    Resp 20    Ht 5' 6" (1.676 m)    Wt 102.1 kg (225 lb)    BMI 36.32 kg/m2     "

## 2017-04-11 NOTE — INTERVAL H&P NOTE
The patient has been examined and the H&P has been reviewed:    I concur with the findings and no changes have occurred since H&P was written.    Anesthesia/Surgery risks, benefits and alternative options discussed and understood by patient/family.          Active Hospital Problems    Diagnosis  POA    CMC (carpometacarpal joint) dislocation [S63.056A]  Yes      Resolved Hospital Problems    Diagnosis Date Resolved POA   No resolved problems to display.

## 2017-04-11 NOTE — ANESTHESIA POSTPROCEDURE EVALUATION
"Anesthesia Post Evaluation    Patient: Abhi Pompa    Procedure(s) Performed: Procedure(s) (LRB):  PINNING-CLOSED-HAND- 1st CMC fx dislocation (Right)    Final Anesthesia Type: general  Patient location during evaluation: PACU  Patient participation: Yes- Able to Participate  Level of consciousness: awake and alert  Post-procedure vital signs: reviewed and stable  Pain management: adequate  Airway patency: patent  PONV status at discharge: No PONV  Anesthetic complications: no      Cardiovascular status: blood pressure returned to baseline and hemodynamically stable  Respiratory status: unassisted  Hydration status: euvolemic  Follow-up not needed.        Visit Vitals    BP 99/69 (BP Location: Left arm, Patient Position: Lying, BP Method: Automatic)    Pulse 63    Temp 36.6 °C (97.9 °F) (Temporal)    Resp 16    Ht 5' 6" (1.676 m)    Wt 102.1 kg (225 lb)    SpO2 (!) 93%    BMI 36.32 kg/m2       Pain/Moi Score: Pain Assessment Performed: Yes (4/11/2017 11:15 AM)  Presence of Pain: denies (4/11/2017 11:15 AM)  Pain Rating Prior to Med Admin: 6 (4/11/2017 11:08 AM)  Moi Score: 9 (4/11/2017 11:15 AM)      "

## 2017-04-11 NOTE — DISCHARGE INSTRUCTIONS
[facility]  [address] [facility] [place] [address]  [phone]                   Dr. Vela's Postoperative Instructions   for Shoulder and Upper Extremity Surgery                 Your Surgery Included:   Open               Arthroscopic [] Instability Repair         [] Diagnostic   [] Rotator Cuff Repair      [] Lysis of Adhesions / Manipulation [] Distal Clavicle Resection      [] Debridement [] Biceps Tenodesis              [] Labrum  [] Ant [] Post [] Sup [] Inferior          [] Rotator Cuff [] Subscap[] Articular [] Bursal           [] Articular Surface   [] Glenoid [] Humeral Head   [] Contracture Release      [] Labral Repair [] Superior/SLAP [] Anterior  [] Posterior [x] Fracture Fixation-1st carpometacarpal Fx-dislocation      [] Instability Repair/ Anterior Capsular Shift   [] Ant [] Post  [] AC Joint Reconstruction       [] Rotator Cuff Repair [] Joint Replacement      [] Subacromial Decompression /Acromioplasty             [] Hemiarthroplasty  [] Total Shoulder      [] Biceps Tenodesis      [] Biceps Tenotomy            [] Reverse Total Shoulder         [] Distal Clavicle Resection                [] Contracture/Capsular Release                    Call our office [phone] or [phone] if you experience any of the following or have urgent questions:       Excessive bleeding or pus like drainage at the incision site       Uncontrollable pain not relieved by pain medication       Excessive swelling or redness at the incision site       Fever above 101.5 degrees not controlled with Tylenol or Motrin       Shortness of Breath       Any foul odor or blistering from the surgery site        1.   Pain Management: A cold therapy cuff, pain medications, local injections, and in some cases, regional anesthesia injections are used to manage your post-operative pain. The decision to use each of these options is based on their risks and benefits.     Medications: You were given  one or more of the following medication prescriptions before leaving the hospital. Have the prescriptions filled at a pharmacy on your way home and follow the instructions on the bottles. If you need a refill, please call your pharmacy.      Narcotic Medication (usually Vicodin ES, Lortab, Percocet or Nucynta): Begin taking the medication before your shoulder starts to hurt. Some patients do not like to take any medication, but if you wait until your pain is severe before taking, you will be very uncomfortable for several hours waiting for the narcotic to work. Always take with food.     Nausea / Vomiting: If you develop post-operative nausea and vomiting, please contact the office and an anti-emetic, such as Zofran can be prescribed. Use this medication as directed.     Cold Therapy: You may have been sent home with a cold therapy unit and wrap for your shoulder. Fill with ice and water to the indicated fill line and use throughout the day for the first two days and then as needed to help relieve pain and control swelling. However, never place the cold pad directly against your skin. Place over the dressing or after the first dressing change over a T-shirt.     Regional Anesthesia Injections (Blocks): You may have been given a regional nerve block either before or after surgery. This may make your entire shoulder and upper extremity numb for 24-36 hours. You may have also had a catheter placed which will provide regional anesthesia for 48 hours. If after 36 hours (48 hours if you have had a catheter placed), you still do not have feeling in your shoulder, please contact the office.                   2.   Diet: Start with clear liquids and then eat a bland diet for the first day after surgery. Progress your diet as tolerated. Constipation may occur with Narcotic usage, contact our office if you are experiencing constipation.    3.   Activity: After you arrive at home, spend most of the first 24 hours resting in bed,  "on the couch, or in a reclining chair. Many patients feel more comfortable in a reclining chair or propped up in bed.  After the first 48 hours at home, slowly increase your activity level based upon your symptoms.    4.   Dressing Change: Do not remove the splint dressing until you are seen in clinic for your first post-operative visit.    5.   Showering: You may shower only if you can keep the splint dressing completely dry. A garbage bag, towels and rubber band work well.    6.   Shoulder Sling (with/without immobilizer strap): You may have been sent home with a sling/immobilizer. You may remove the sling when changing clothes or bathing. Make sure to wear the sling while sleeping unless instructed otherwise. You may remove at rest or for exercises.            [] You need to wear the sling/ immobilizer at all times except for dressing changes and               showers until your post-operative visit.          [] You may use the sling/immobilizer for comfort only and may remove it as tolerated once                 the "block" wears off.    7.  Shoulder Exercises: Begin these exercises the first day after surgery in order to help you                 regain your motion and strength. You may do the following marked exercises for 5            minutes at least 3-5 times/day:     [] Shoulder shrugs - Shrug your shoulders up and down.     [] Pendulums - Bend forward allowing your arm to hang down in front of you. Gently swing your arm side-to-side and front to back. Also, move your arm in a circular motion, both clockwise and counter-clockwise.                                                                                                                                [] Passive abduction - Have a family member gently lift your arm away from your body bringing your elbow up to the level of your shoulder.                                                                                                                     " [] Shoulder rotation - With your arm at your side, have a family member gently rotate your arm internally and externally.                                                                                                                    [] Scapular retractions - (Squeeze shoulder blades together): Squeeze shoulder blades together while slightly pulling them down (do not shrug your shoulders upward); You can perform 10-15 reps, several times throughout the day, when seated at your desk, driving in the car, etc.                                                                                                                       [] Pulley exercises - Put a towel or rope across the top of a door. Stand facing the edge of the door. With the aid of the towel/rope, use your good arm to gently pull your operative arm up above shoulder height.     [] Elbow motion - Straighten and bend your elbow with your arm at your side. Try to fully extend as well as touch your shoulder.     [] Ball squeezes - use tennis ball or soft (nerf) ball for  strengthening                                                                                                                  8.  Physical Therapy: Physical therapy is an essential component to your recovery from surgery. Your physical therapy plan will be discussed at your first post-operative visit.    9.Work Status: [] Out of work/gym until follow up appointment     [] May return to work light duty.     [] May return to work/gym without restrictions.      FIRST POSTOPERATIVE VISIT:  As scheduled. However, if you don't have a scheduled appointment or can't remember when it is, please contact the office.    Best wishes for a successful recovery!      Jonathan Vela MD

## 2017-04-11 NOTE — OP NOTE
DATE OF PROCEDURE:  04/11/2017.    PREOPERATIVE DIAGNOSIS:  Right first carpometacarpal fracture dislocation.    POSTOPERATIVE DIAGNOSIS:  Right first carpometacarpal fracture dislocation.    PROCEDURE PERFORMED:  Closed reduction and percutaneous pinning of right first   carpometacarpal fracture dislocation.    SURGEON:  Jonathan Vela M.D.    FIRST ASSISTANT:  EDWARD Estrada    ANESTHESIA:  General with a regional block.    ESTIMATED BLOOD LOSS:  Minimal.    FLUIDS:  Per Anesthesia record.    TOURNIQUET TIME:  None.    SPECIMENS:  None.    DRAINS:  None.    COMPLICATIONS:  None.    DESCRIPTION OF THE OPERATION:  After regional anesthesia was placed in the   holding area, the patient was brought to the Operating Room, placed supine on   the operating room table.  After successful induction of general anesthesia, the   splint was removed from the right upper extremity.  A tourniquet was placed   high on the right upper extremity.  Right upper extremity was then sterilely   prepped and draped in the usual fashion.  After appropriate timeout, fluoroscopy   was brought in, which confirmed the first carpometacarpal fracture dislocation   in the right hand.  There was a small avulsion off the ulnar border of the base   of the first metacarpal.  This was felt not to be able to hold any type of   fixation and it was elected to do a closed reduction and percutaneous pinning.    Under fluoroscopic guidance, using AP, lateral, and a hyperpronated thumb view,   the first carpometacarpal joint was held in a reduced position and two 1.6   Jorge wires were passed from the radial side of the base of the first   metacarpal, one into the base of the second metacarpal and a second into the   trapezium.  Fluoroscopic guidance confirmed adequate position of both pins as   well as reduction of the carpometacarpal joint in AP and lateral views.  Once   completed, final fluoroscopic views were obtained.  Pins were cut  short just   beneath the skin.  A sterile dressing was applied followed by a thumb spica   splint.  The patient was then successfully awoken from general anesthesia and   taken to Recovery Room in stable condition.  Sponge and needle counts were   reported as correct.  No complications.      ZIGGY  dd: 04/11/2017 12:13:26 (CDT)  td: 04/11/2017 14:04:22 (CDT)  Doc ID   #[id number]  Job ID #[id number]    CC:

## 2017-04-11 NOTE — IP AVS SNAPSHOT
[facility]  [address]  [place] [address]  Phone: [phone]           Patient Discharge Instructions   Our goal is to set you up for success. This packet includes information on your condition, medications, and your home care.  It will help you care for yourself to prevent having to return to the hospital.     Please ask your nurse if you have any questions.      There are many details to remember when preparing to leave the hospital. Here is what you will need to do:    1. Take your medicine. If you are prescribed medications, review your Medication List on the following pages. You may have new medications to  at the pharmacy and others that you'll need to stop taking. Review the instructions for how and when to take your medications. Talk with your doctor or nurses if you are unsure of what to do.     2. Go to your follow-up appointments. Specific follow-up information is listed in the following pages. Your may be contacted by a nurse or clinical provider about future appointments. Be sure we have all of the phone numbers to reach you. Please contact your provider's office if you are unable to make an appointment.     3. Watch for warning signs. Your doctor or nurse will give you detailed warning signs to watch for and when to call for assistance. These instructions may also include educational information about your condition. If you experience any of warning signs to your health, call your doctor.           [facility]  Unless otherwise directed by your provider, please   contact [facility], our nurse care line   that is available for 24/7 assistance.     [phone] (toll-free)     Registered nurses in the [facility]   provide: appointment scheduling, clinical advisement, health education, and other advisory services.                  ** Verify the list of medication(s) below is accurate and up to date. Carry this with you in case of  emergency. If your medications have changed, please notify your healthcare provider.             Medication List      START taking these medications        Additional Info                      hydrocodone-acetaminophen 10-325mg  mg Tab   Commonly known as:  NORCO   Quantity:  40 tablet   Refills:  0   Dose:  1 tablet   Replaces:  hydrocodone-acetaminophen 5-325mg 5-325 mg per tablet    Instructions:  Take 1 tablet by mouth every 4 (four) hours as needed for Pain.     Begin Date    AM    Noon    PM    Bedtime         CONTINUE taking these medications        Additional Info                      CALCIUM ANTACID 300 mg (750 mg) Chew   Refills:  0   Generic drug:  calcium carbonate    Instructions:  Take by mouth.     Begin Date    AM    Noon    PM    Bedtime       fish oil-omega-3 fatty acids 300-1,000 mg capsule   Refills:  0   Dose:  1 capsule    Instructions:  Take 1 capsule by mouth once daily.     Begin Date    AM    Noon    PM    Bedtime       FLAXSEED OIL ORAL   Refills:  0   Dose:  1 capsule    Instructions:  Take 1 capsule by mouth once daily.     Begin Date    AM    Noon    PM    Bedtime       metformin 500 MG 24 hr tablet   Commonly known as:  GLUCOPHAGE-XR   Quantity:  60 tablet   Refills:  0    Instructions:  Take 2 tablets (1000mg) po daily with breakfast     Begin Date    AM    Noon    PM    Bedtime       omeprazole 40 MG capsule   Commonly known as:  PRILOSEC   Refills:  11   Dose:  40 mg    Instructions:  Take 40 mg by mouth once daily.     Begin Date    AM    Noon    PM    Bedtime       ONETOUCH DELICA LANCETS 33 gauge Misc   Refills:  5   Generic drug:  lancets    Instructions:  TEST EVERY DAY     Begin Date    AM    Noon    PM    Bedtime       ranitidine 300 MG tablet   Commonly known as:  ZANTAC   Quantity:  30 tablet   Refills:  6   Dose:  300 mg    Instructions:  Take 1 tablet (300 mg total) by mouth nightly.     Begin Date    AM    Noon    PM    Bedtime       * sertraline 50 MG tablet    Commonly known as:  ZOLOFT   Quantity:  30 tablet   Refills:  0    Instructions:  1/2 po daily x one week then 1 po daily     Begin Date    AM    Noon    PM    Bedtime       * sertraline 100 MG tablet   Commonly known as:  ZOLOFT   Refills:  1   Dose:  100 mg    Instructions:  Take 100 mg by mouth once daily.     Begin Date    AM    Noon    PM    Bedtime       simvastatin 10 MG tablet   Commonly known as:  ZOCOR   Quantity:  30 tablet   Refills:  0    Instructions:  TAKE 1 TABLET (10 MG TOTAL) BY MOUTH EVERY EVENING.     Begin Date    AM    Noon    PM    Bedtime       tamsulosin 0.4 mg Cp24   Commonly known as:  FLOMAX   Quantity:  30 capsule   Refills:  1   Dose:  0.4 mg    Instructions:  Take 1 capsule (0.4 mg total) by mouth daily as needed.     Begin Date    AM    Noon    PM    Bedtime       testosterone cypionate 100 mg/mL injection   Commonly known as:  DEPOTESTOTERONE CYPIONATE   Quantity:  10 mL   Refills:  3   Dose:  50 mg    Instructions:  Inject 0.5 mLs (50 mg total) into the muscle every 14 (fourteen) days.     Begin Date    AM    Noon    PM    Bedtime       VITAMIN B COMPLEX ORAL   Refills:  0   Dose:  1 tablet    Instructions:  Take 1 tablet by mouth once daily.     Begin Date    AM    Noon    PM    Bedtime       * Notice:  This list has 2 medication(s) that are the same as other medications prescribed for you. Read the directions carefully, and ask your doctor or other care provider to review them with you.      STOP taking these medications     hydrocodone-acetaminophen 5-325mg 5-325 mg per tablet   Commonly known as:  NORCO   Replaced by:  hydrocodone-acetaminophen 10-325mg  mg Tab            Where to Get Your Medications      These medications were sent to [facility] [place], [place] - [address]  [address], [place] [address]     Phone:  [phone]     hydrocodone-acetaminophen 10-325mg  mg Tab                  Please bring to all follow up  appointments:    1. A copy of your discharge instructions.  2. All medicines you are currently taking in their original bottles.  3. Identification and insurance card.    Please arrive 15 minutes ahead of scheduled appointment time.    Please call 24 hours in advance if you must reschedule your appointment and/or time.        Your Scheduled Appointments     Apr 20, 2017  3:15 PM CDT   Post OP with Jonathan Vela MD   [place] [facility] ([facility] [place])    [address]  [place] [address]   [phone]            Sep 08, 2017  8:00 AM CDT   Fasting Lab with [facility], [place] [facility] ([facility] [place]    [address]  [place] [address]   [phone]            Sep 08, 2017  8:20 AM CDT   Urine with URINE   [facility] ([facility] [place])    [address] [place] [address]   [phone]            Sep 15, 2017  9:30 AM CDT   Established Patient with Kameron Richey DO   [place] [facility] ([facility] [place])    [address]  [place] [address]   [phone]              Follow-up Information     Follow up with Jonathan Vela MD. Schedule an appointment as soon as possible for a visit on 4/20/2017.    Specialties:  Sports Medicine, Orthopedic Surgery    Why:  For wound re-check    Contact information:    [address] [address] [facility] [address]  [phone]          Discharge Instructions     Future Orders    Call MD for:  difficulty breathing, headache or visual disturbances     Call MD for:  extreme fatigue     Call MD for:  hives     Call MD for:  persistent dizziness or light-headedness     Call MD for:  persistent nausea and vomiting     Call MD for:  redness, tenderness, or signs of infection (pain, swelling, redness, odor or green/yellow discharge around incision site)     Call MD for:  severe uncontrolled pain     Call MD for:  temperature >100.4     Diet general     Questions:    Total calories:      Fat  restriction, if any:      Protein restriction, if any:      Na restriction, if any:      Fluid restriction:      Additional restrictions:      Leave dressing on - Keep it clean, dry, and intact until clinic visit     Lifting restrictions     No driving, operating heavy equipment or signing legal documents while taking pain medication     Shower on day dressing removed (No bath)     [facility] [employer] [facility] FOR HOME USE     Questions:    Vendor:  Other (use comments) Comment - [facility]    Expected Date of Delivery:  4/11/2017    Expected Time of Delivery:          Discharge Instructions            [facility] [address]   [place] [address]  [phone]                   Dr. Vela's Postoperative Instructions   for Shoulder and Upper Extremity Surgery                 Your Surgery Included:   Open               Arthroscopic [] Instability Repair         [] Diagnostic   [] Rotator Cuff Repair      [] Lysis of Adhesions / Manipulation [] Distal Clavicle Resection      [] Debridement [] Biceps Tenodesis              [] Labrum  [] Ant [] Post [] Sup [] Inferior          [] Rotator Cuff [] Subscap[] Articular [] Bursal           [] Articular Surface   [] Glenoid [] Humeral Head   [] Contracture Release      [] Labral Repair [] Superior/SLAP [] Anterior  [] Posterior [x] Fracture Fixation-1st carpometacarpal Fx-dislocation      [] Instability Repair/ Anterior Capsular Shift   [] Ant [] Post  [] AC Joint Reconstruction       [] Rotator Cuff Repair [] Joint Replacement      [] Subacromial Decompression /Acromioplasty             [] Hemiarthroplasty  [] Total Shoulder      [] Biceps Tenodesis      [] Biceps Tenotomy            [] Reverse Total Shoulder         [] Distal Clavicle Resection                [] Contracture/Capsular Release                    Call our office [phone] or [phone] if you experience any of the following or have urgent questions:       Excessive bleeding or pus like  drainage at the incision site       Uncontrollable pain not relieved by pain medication       Excessive swelling or redness at the incision site       Fever above 101.5 degrees not controlled with Tylenol or Motrin       Shortness of Breath       Any foul odor or blistering from the surgery site        1.   Pain Management: A cold therapy cuff, pain medications, local injections, and in some cases, regional anesthesia injections are used to manage your post-operative pain. The decision to use each of these options is based on their risks and benefits.     Medications: You were given one or more of the following medication prescriptions before leaving the hospital. Have the prescriptions filled at a pharmacy on your way home and follow the instructions on the bottles. If you need a refill, please call your pharmacy.      Narcotic Medication (usually Vicodin ES, Lortab, Percocet or Nucynta): Begin taking the medication before your shoulder starts to hurt. Some patients do not like to take any medication, but if you wait until your pain is severe before taking, you will be very uncomfortable for several hours waiting for the narcotic to work. Always take with food.     Nausea / Vomiting: If you develop post-operative nausea and vomiting, please contact the office and an anti-emetic, such as Zofran can be prescribed. Use this medication as directed.     Cold Therapy: You may have been sent home with a cold therapy unit and wrap for your shoulder. Fill with ice and water to the indicated fill line and use throughout the day for the first two days and then as needed to help relieve pain and control swelling. However, never place the cold pad directly against your skin. Place over the dressing or after the first dressing change over a T-shirt.     Regional Anesthesia Injections (Blocks): You may have been given a regional nerve block either before or after surgery. This may make your entire shoulder and upper  "extremity numb for 24-36 hours. You may have also had a catheter placed which will provide regional anesthesia for 48 hours. If after 36 hours (48 hours if you have had a catheter placed), you still do not have feeling in your shoulder, please contact the office.                   2.   Diet: Start with clear liquids and then eat a bland diet for the first day after surgery. Progress your diet as tolerated. Constipation may occur with Narcotic usage, contact our office if you are experiencing constipation.    3.   Activity: After you arrive at home, spend most of the first 24 hours resting in bed, on the couch, or in a reclining chair. Many patients feel more comfortable in a reclining chair or propped up in bed.  After the first 48 hours at home, slowly increase your activity level based upon your symptoms.    4.   Dressing Change: Do not remove the splint dressing until you are seen in clinic for your first post-operative visit.    5.   Showering: You may shower only if you can keep the splint dressing completely dry. A garbage bag, towels and rubber band work well.    6.   Shoulder Sling (with/without immobilizer strap): You may have been sent home with a sling/immobilizer. You may remove the sling when changing clothes or bathing. Make sure to wear the sling while sleeping unless instructed otherwise. You may remove at rest or for exercises.            [] You need to wear the sling/ immobilizer at all times except for dressing changes and               showers until your post-operative visit.          [] You may use the sling/immobilizer for comfort only and may remove it as tolerated once                 the "block" wears off.    7.  Shoulder Exercises: Begin these exercises the first day after surgery in order to help you                 regain your motion and strength. You may do the following marked exercises for 5            minutes at least 3-5 times/day:     [] Shoulder shrugs - Shrug your shoulders up and " down.     [] Pendulums - Bend forward allowing your arm to hang down in front of you. Gently swing your arm side-to-side and front to back. Also, move your arm in a circular motion, both clockwise and counter-clockwise.                                                                                                                                [] Passive abduction - Have a family member gently lift your arm away from your body bringing your elbow up to the level of your shoulder.                                                                                                                     [] Shoulder rotation - With your arm at your side, have a family member gently rotate your arm internally and externally.                                                                                                                    [] Scapular retractions - (Squeeze shoulder blades together): Squeeze shoulder blades together while slightly pulling them down (do not shrug your shoulders upward); You can perform 10-15 reps, several times throughout the day, when seated at your desk, driving in the car, etc.                                                                                                                       [] Pulley exercises - Put a towel or rope across the top of a door. Stand facing the edge of the door. With the aid of the towel/rope, use your good arm to gently pull your operative arm up above shoulder height.     [] Elbow motion - Straighten and bend your elbow with your arm at your side. Try to fully extend as well as touch your shoulder.     [] Ball squeezes - use tennis ball or soft (nerf) ball for  strengthening                                                                                                                  8.  Physical Therapy: Physical therapy is an essential component to your recovery from surgery. Your physical therapy plan will be discussed at your first post-operative  "visit.    9.Work Status: [] Out of work/gym until follow up appointment     [] May return to work light duty.     [] May return to work/gym without restrictions.      FIRST POSTOPERATIVE VISIT:  As scheduled. However, if you don't have a scheduled appointment or can't remember when it is, please contact the office.    Best wishes for a successful recovery!      Jonathan Vela MD        Primary Diagnosis     Your primary diagnosis was:  Dislocation Of Wrist      Admission Information     Date & Time Provider Department CSN    4/11/2017  8:06 AM Jonathan Vela MD [facility] [id number]      Care Providers     Provider Role Specialty Primary office phone    Jonathan Vela MD Attending Provider Sports Medicine [phone]    Jonathan Vela MD Surgeon  Sports Medicine [phone]      Your Vitals Were     BP Pulse Temp Resp Height Weight    99/69 (BP Location: Left arm, Patient Position: Lying, BP Method: Automatic) 63 97.9 °F (36.6 °C) (Temporal) 16 5' 6" (1.676 m) 102.1 kg (225 lb)    SpO2 BMI             93% 36.32 kg/m2         Recent Lab Values        1/29/2016 6/11/2016 9/30/2016 3/24/2017                  7:32 AM  8:19 AM  7:31 AM  7:20 AM        A1C 7.8 (H) 7.2 (H) 6.4 (H) 7.0 (H)        Comment for A1C at  7:31 AM on 9/30/2016:  According to ADA guidelines, hemoglobin A1C <7.0% represents  optimal control in non-pregnant diabetic patients.  Different  metrics may apply to specific populations.   Standards of Medical Care in Diabetes - 2016.  For the purpose of screening for the presence of diabetes:  <5.7%     Consistent with the absence of diabetes  5.7-6.4%  Consistent with increasing risk for diabetes   (prediabetes)  >or=6.5%  Consistent with diabetes  Currently no consensus exists for use of hemoglobin A1C  for diagnosis of diabetes for children.      Comment for A1C at  7:20 AM on 3/24/2017:  According to ADA guidelines, hemoglobin A1C <7.0% represents  optimal control in " non-pregnant diabetic patients.  Different  metrics may apply to specific populations.   Standards of Medical Care in Diabetes - 2016.  For the purpose of screening for the presence of diabetes:  <5.7%     Consistent with the absence of diabetes  5.7-6.4%  Consistent with increasing risk for diabetes   (prediabetes)  >or=6.5%  Consistent with diabetes  Currently no consensus exists for use of hemoglobin A1C  for diagnosis of diabetes for children.        Allergies as of 4/11/2017     No Known Allergies      Advance Directives     An advance directive is a document which, in the event you are no longer able to make decisions for yourself, tells your healthcare team what kind of treatment you do or do not want to receive, or who you would like to make those decisions for you.  If you do not currently have an advance directive, [facility] encourages you to create one.  For more information call:  [phone]), [phone] ([phone]),  or log on to [email or web address].        Language Assistance Services     ATTENTION: Language assistance services are available, free of charge. Please call [phone].      ATENCIÓN: Si habla español, tiene a madrid disposición servicios gratuitos de asistencia lingüística. Llame al [phone].     CHÚ Ý: N?u b?n nói Ti?ng Vi?t, có các d?ch v? h? tr? ngôn ng? mi?n phí dành cho b?n. G?i s? [phone].         [facility] complies with applicable Federal civil rights laws and does not discriminate on the basis of race, color, national origin, age, disability, or sex.

## 2017-04-11 NOTE — BRIEF OP NOTE
[facility]  Brief Operative Note     SUMMARY     Surgery Date: 4/11/2017     Surgeon(s) and Role:     * Jonathan Vela MD - Primary    First Assistant: DERECK Antonio    Pre-op Diagnosis:  CMC (carpometacarpal joint) dislocation, right, subsequent encounter [S63.054D]    Post-op Diagnosis:  CMC (carpometacarpal joint) dislocation, right, subsequent encounter [S63.054D]    Procedure(s) (LRB):  PINNING-CLOSED-HAND- 1st CMC fx dislocation (Right)    Anesthesia: Choice    Description of the findings of the procedure: See dictated Op Note.    Findings/Key Components: See dictated Op Note.    Estimated Blood Loss: * No values recorded between 4/11/2017 12:00 AM and 4/11/2017 12:09 PM *         Specimens:   Specimen     None          Discharge Note    SUMMARY     Admit Date: 4/11/2017    Discharge Date and Time: No discharge date for patient encounter.    Attending Physician: Jonathan Vela MD     Discharge Provider: Jonathan Vela    Final Diagnosis: CMC (carpometacarpal joint) dislocation, right, subsequent encounter [S63.054D]    Outcome of Hospitalization, Treatment, Procedure, or Surgery:    Patient admitted for outpatient procedure, procedure tolerated well, patient discharged home.    Disposition: Home or Self Care    Follow Up/Patient Instructions:   Follow-up Information     Follow up with Jonathan Vela MD. Schedule an appointment as soon as possible for a visit on 4/20/2017.    Specialties:  Sports Medicine, Orthopedic Surgery    Why:  For wound re-check    Contact information:    [address] [place] [address]  [phone]            Medications:  Reconciled Home Medications:   Current Discharge Medication List      START taking these medications    Details   hydrocodone-acetaminophen 10-325mg (NORCO)  mg Tab Take 1 tablet by mouth every 4 (four) hours as needed for Pain.  Qty: 40 tablet, Refills: 0         CONTINUE these medications which have NOT CHANGED     Details   calcium carbonate (CALCIUM ANTACID) 300 mg (750 mg) Chew Take by mouth.      fish oil-omega-3 fatty acids 300-1,000 mg capsule Take 1 capsule by mouth once daily.      FLAXSEED OIL ORAL Take 1 capsule by mouth once daily.      metformin (GLUCOPHAGE-XR) 500 MG 24 hr tablet Take 2 tablets (1000mg) po daily with breakfast  Qty: 60 tablet, Refills: 0      omeprazole (PRILOSEC) 40 MG capsule Take 40 mg by mouth once daily.  Refills: 11      ONETOUCH DELICA LANCETS 33 gauge Misc TEST EVERY DAY  Refills: 5      ranitidine (ZANTAC) 300 MG tablet Take 1 tablet (300 mg total) by mouth nightly.  Qty: 30 tablet, Refills: 6    Associated Diagnoses: Heartburn      !! sertraline (ZOLOFT) 100 MG tablet Take 100 mg by mouth once daily.  Refills: 1      simvastatin (ZOCOR) 10 MG tablet TAKE 1 TABLET (10 MG TOTAL) BY MOUTH EVERY EVENING.  Qty: 30 tablet, Refills: 0      tamsulosin (FLOMAX) 0.4 mg Cp24 Take 1 capsule (0.4 mg total) by mouth daily as needed.  Qty: 30 capsule, Refills: 1      testosterone cypionate (DEPOTESTOTERONE CYPIONATE) 100 mg/mL injection Inject 0.5 mLs (50 mg total) into the muscle every 14 (fourteen) days.  Qty: 10 mL, Refills: 3      VITAMIN B COMPLEX ORAL Take 1 tablet by mouth once daily.      !! sertraline (ZOLOFT) 50 MG tablet 1/2 po daily x one week then 1 po daily  Qty: 30 tablet, Refills: 0       !! - Potential duplicate medications found. Please discuss with provider.      STOP taking these medications       hydrocodone-acetaminophen 5-325mg (NORCO) 5-325 mg per tablet Comments:   Reason for Stopping:               Discharge Procedure Orders  [employer] LARGE FOR HOME USE     Diet general     Shower on day dressing removed (No bath)     Ice to affected area     Lifting restrictions     No driving, operating heavy equipment or signing legal documents while taking pain medication     Call MD for:  temperature >100.4     Call MD for:  persistent nausea and vomiting     Call MD for:  severe  uncontrolled pain     Call MD for:  difficulty breathing, headache or visual disturbances     Call MD for:  redness, tenderness, or signs of infection (pain, swelling, redness, odor or green/yellow discharge around incision site)     Call MD for:  hives     Call MD for:  persistent dizziness or light-headedness     Call MD for:  extreme fatigue     Leave dressing on - Keep it clean, dry, and intact until clinic visit

## 2017-04-11 NOTE — ANESTHESIA PROCEDURE NOTES
Peripheral    Patient location during procedure: pre-op   Block not for primary anesthetic.  Reason for block: at surgeon's request and post-op pain management   Post-op Pain Location: Left hand pain  Start time: 4/11/2017 10:01 AM  Timeout: 4/11/2017 11:00 AM   End time: 4/11/2017 11:20 AM  Staffing  Anesthesiologist: AKASH YOUNGER  Performed by: anesthesiologist   Preanesthetic Checklist  Completed: patient identified, site marked, surgical consent, pre-op evaluation, timeout performed, IV checked, risks and benefits discussed and monitors and equipment checked  Peripheral Block  Patient position: supine  Prep: ChloraPrep  Patient monitoring: continuous pulse ox  Block type: supraclavicular  Laterality: right  Injection technique: single shot  Needle  Needle type: Short-bevel   Needle gauge: 22 G  Needle length: 3.5 in  Needle localization: ultrasound guidance  Catheter type: non-stimulating     Assessment  Injection assessment: negative aspiration, negative parasthesia and local visualized surrounding nerve  Paresthesia pain: none  Heart rate change: no  Slow fractionated injection: no  Medications:  Bolus administered: 30 mL of 0.25 bupivacaine  Additional Notes  Single shot supraclavicular block placed with 30 cc 0.25% bupivicaine with 8 mg Decadron using ultrasound guidance.  Pt tolerated well.

## 2017-04-11 NOTE — ANESTHESIA PREPROCEDURE EVALUATION
04/11/2017  Abhi Pompa is a 46 y.o., male.    [facility] Anesthesia Evaluation    I have reviewed the Patient Summary Reports.    I have reviewed the Nursing Notes.      Review of Systems  Anesthesia Hx:  No problems with previous Anesthesia    Pulmonary:   Sleep Apnea, CPAP    Hepatic/GI:   GERD, well controlled    Endocrine:   Diabetes, well controlled, type 2        Physical Exam  General:  Obesity                 Anesthesia Plan  Type of Anesthesia, risks & benefits discussed:  Anesthesia Type:  general  Patient's Preference:   Intra-op Monitoring Plan:   Intra-op Monitoring Plan Comments:   Post Op Pain Control Plan:   Post Op Pain Control Plan Comments:   Induction:   IV  Beta Blocker:  Patient is not currently on a Beta-Blocker (No further documentation required).       Informed Consent: Patient understands risks and agrees with Anesthesia plan.  Questions answered. Anesthesia consent signed with patient.  ASA Score: 2     Day of Surgery Review of History & Physical:    H&P update referred to the surgeon.         Ready For Surgery From Anesthesia Perspective.

## 2017-04-12 ENCOUNTER — PATIENT MESSAGE (OUTPATIENT)
Dept: ORTHOPEDICS | Facility: CLINIC | Age: 47
End: 2017-04-12

## 2017-04-13 RX ORDER — METFORMIN HYDROCHLORIDE 500 MG/1
TABLET, EXTENDED RELEASE ORAL
Qty: 60 TABLET | Refills: 0 | Status: SHIPPED | OUTPATIENT
Start: 2017-04-13 | End: 2017-06-12 | Stop reason: SDUPTHER

## 2017-04-19 DIAGNOSIS — M79.641 RIGHT HAND PAIN: Primary | ICD-10-CM

## 2017-04-20 ENCOUNTER — HOSPITAL ENCOUNTER (OUTPATIENT)
Dept: RADIOLOGY | Facility: HOSPITAL | Age: 47
Discharge: HOME | End: 2017-04-20
Attending: ORTHOPAEDIC SURGERY
Payer: COMMERCIAL

## 2017-04-20 ENCOUNTER — OFFICE VISIT (OUTPATIENT)
Dept: ORTHOPEDICS | Facility: CLINIC | Age: 47
End: 2017-04-20
Payer: COMMERCIAL

## 2017-04-20 VITALS — WEIGHT: 225 LBS | HEIGHT: 66 IN | BODY MASS INDEX: 36.16 KG/M2

## 2017-04-20 DIAGNOSIS — S63.054D CMC (CARPOMETACARPAL JOINT) DISLOCATION, RIGHT, SUBSEQUENT ENCOUNTER: Primary | ICD-10-CM

## 2017-04-20 DIAGNOSIS — M79.641 RIGHT HAND PAIN: ICD-10-CM

## 2017-04-20 PROCEDURE — 99024 POSTOP FOLLOW-UP VISIT: CPT | Mod: S$GLB,,, | Performed by: ORTHOPAEDIC SURGERY

## 2017-04-20 PROCEDURE — 73130 X-RAY EXAM OF HAND: CPT | Mod: 26,RT,, | Performed by: RADIOLOGY

## 2017-04-20 PROCEDURE — 99999 PR PBB SHADOW E&M-EST. PATIENT-LVL II: CPT | Mod: PBBFAC,,, | Performed by: ORTHOPAEDIC SURGERY

## 2017-04-20 PROCEDURE — 73130 X-RAY EXAM OF HAND: CPT | Mod: TC,PO,RT

## 2017-04-20 NOTE — PROGRESS NOTES
"DATE: 4/20/2017  PATIENT: Abhi Pompa    Attending Physician: Jonathan Vela M.D.    HISTORY:  Abhi Pompa is a 46 y.o. male who returns for follow up evaluation of  his right thumb.  He is status post closed reduction pinning of a first carpometacarpal fracture dislocation, 4/11/17.  His been in a splint.  He does note some pain and rates his pain at 5/10.  Denies any fevers or chills.    PMH/PSH/FamHx/SocHx:  Reviewed and unchanged from prior visit    ROS:  Constitution: Negative for chills, fever, and sweats. Negative for unexplained weight loss.  HENT: Negative for headaches and blurry vision.   Cardiovascular: Negative for chest pain, irregular heartbeat, leg swelling and palpitations.   Respiratory: Negative for cough and shortness of breath.   Gastrointestinal: Negative for abdominal pain, heartburn, nausea and vomiting.   Genitourinary: Negative for bladder incontinence and dysuria.   Musculoskeletal: Negative for systemic arthritis, joint swelling, muscle weakness and myalgias.   Neurological: Negative for numbness.   Psychiatric/Behavioral: Negative for depression.   Endocrine: Negative for polyuria.   Hematologic/Lymphatic: Negative for bleeding disorders.  Skin: Negative for poor wound healing.       EXAM:  Ht 5' 6" (1.676 m)  Wt 102.1 kg (225 lb)  BMI 36.32 kg/m2  Healthy-appearing male in no acute distress.  Splint is been removed.  Moderate swelling about the base of the thumb.  Pin sites clean and dry.  Range of motion thumb not tested sensation    IMAGING:   X-rays of the right thumb are performed and reviewed.  Status post closed reduction and pinning.  Carpometacarpal joint appears in satisfactory alignment.    ASSESSMENT:  Status post closed reduction and percutaneous pinning right first carpometacarpal fracture dislocation, 4/11/17.    PLAN:  The implications of the patient's evolution of symptoms and findings were explained to the patient in detail.  Susan is doing well " postoperatively.  Recommendations Place him in a short arm thumb spica cast for 4 weeks.  See him back in 4 weeks' time for reexam and x-rays out of the cast.    This note was dictated using voice recognition software and may contain grammatical errors

## 2017-04-23 ENCOUNTER — PATIENT MESSAGE (OUTPATIENT)
Dept: ORTHOPEDICS | Facility: CLINIC | Age: 47
End: 2017-04-23

## 2017-04-23 DIAGNOSIS — M79.641 RIGHT HAND PAIN: Primary | ICD-10-CM

## 2017-04-24 RX ORDER — HYDROCODONE BITARTRATE AND ACETAMINOPHEN 10; 325 MG/1; MG/1
1 TABLET ORAL EVERY 4 HOURS PRN
Qty: 40 TABLET | Refills: 0 | Status: SHIPPED | OUTPATIENT
Start: 2017-04-24 | End: 2017-05-26

## 2017-04-24 NOTE — TELEPHONE ENCOUNTER
Refill request. Last refill 4/11/17. 40 tablets. Sx 4/11/17 right hand pinning 1st [facility] fx

## 2017-04-26 ENCOUNTER — TELEPHONE (OUTPATIENT)
Dept: ORTHOPEDICS | Facility: CLINIC | Age: 47
End: 2017-04-26

## 2017-04-26 NOTE — TELEPHONE ENCOUNTER
Attempted to contact pt, no answer.Left voicemail informing pt to call the clinic if he would like an appointment.

## 2017-04-27 RX ORDER — SIMVASTATIN 10 MG/1
TABLET, FILM COATED ORAL
Qty: 30 TABLET | Refills: 12 | Status: SHIPPED | OUTPATIENT
Start: 2017-04-27 | End: 2018-07-15 | Stop reason: SDUPTHER

## 2017-05-19 DIAGNOSIS — M79.641 RIGHT HAND PAIN: Primary | ICD-10-CM

## 2017-05-25 ENCOUNTER — OFFICE VISIT (OUTPATIENT)
Dept: ORTHOPEDICS | Facility: CLINIC | Age: 47
End: 2017-05-25
Payer: COMMERCIAL

## 2017-05-25 ENCOUNTER — HOSPITAL ENCOUNTER (OUTPATIENT)
Dept: RADIOLOGY | Facility: HOSPITAL | Age: 47
Discharge: HOME | End: 2017-05-25
Attending: ORTHOPAEDIC SURGERY
Payer: COMMERCIAL

## 2017-05-25 DIAGNOSIS — S63.054D CMC (CARPOMETACARPAL JOINT) DISLOCATION, RIGHT, SUBSEQUENT ENCOUNTER: Primary | ICD-10-CM

## 2017-05-25 DIAGNOSIS — M79.641 RIGHT HAND PAIN: ICD-10-CM

## 2017-05-25 PROCEDURE — 73130 X-RAY EXAM OF HAND: CPT | Mod: 26,RT,, | Performed by: RADIOLOGY

## 2017-05-25 PROCEDURE — 99024 POSTOP FOLLOW-UP VISIT: CPT | Mod: S$GLB,,, | Performed by: ORTHOPAEDIC SURGERY

## 2017-05-25 PROCEDURE — 97760 ORTHOTIC MGMT&TRAING 1ST ENC: CPT | Mod: S$GLB,,, | Performed by: ORTHOPAEDIC SURGERY

## 2017-05-25 PROCEDURE — 73130 X-RAY EXAM OF HAND: CPT | Mod: TC,PO,RT

## 2017-05-25 PROCEDURE — 99999 PR PBB SHADOW E&M-EST. PATIENT-LVL I: CPT | Mod: PBBFAC,,, | Performed by: ORTHOPAEDIC SURGERY

## 2017-05-25 NOTE — PROGRESS NOTES
DATE: 5/25/2017  PATIENT: Abhi Pompa    Attending Physician: Jonathan Vela M.D.    HISTORY:  Abhi Pompa is a 46 y.o. male who returns for follow up evaluation of  his right first carpometacarpal fracture dislocation.  He underwent closed reduction.  Today's pinning on 4/11/17.  He presents today for x-ray examination out of the cast.  He denies any significant pain.  Pain is reported at 0/10 today.    PMH/PSH/FamHx/SocHx:  Reviewed and unchanged from prior visit    ROS:  Constitution: Negative for chills, fever, and sweats. Negative for unexplained weight loss.  HENT: Negative for headaches and blurry vision.   Cardiovascular: Negative for chest pain, irregular heartbeat, leg swelling and palpitations.   Respiratory: Negative for cough and shortness of breath.   Gastrointestinal: Negative for abdominal pain, heartburn, nausea and vomiting.   Genitourinary: Negative for bladder incontinence and dysuria.   Musculoskeletal: Negative for systemic arthritis, joint swelling, muscle weakness and myalgias.   Neurological: Negative for numbness.   Psychiatric/Behavioral: Negative for depression.   Endocrine: Negative for polyuria.   Hematologic/Lymphatic: Negative for bleeding disorders.  Skin: Negative for poor wound healing.       EXAM:  There were no vitals taken for this visit.  Healthy-appearing male in no acute distress.   Consitiutional: Well developed, well nourished male in no acute distress.  HEENT: Normocephalic, atraumatic.   Neck: Supple.  Chest: Breath sounds equal.  Cor: Regular, rate and rhythm.   Abdomen: Soft, nontender, nondistended. Without rebound or guarding.  Neuro: Alert, oriented x3. Nonfocal.   Rectal/GYN: Deferred.   Exam initial right hand reveals the cast is been removed.  Around the distal pain there appears to be some granulation tissue.  No signs of infection.  Sensation is intact to the thumb.  Range of motion thumb is limited.    IMAGING:   X-rays of the right thumb and  hand show a reduced first carpometacarpal joint.  2 pins in acceptable position.    ASSESSMENT:  Status post closed reduction and percutaneous pinning right first carpometacarpal fracture dislocation, 4/11/17.    PLAN:  The implications of the patient's evolution of symptoms and findings were explained to the patient in detail. I recommended pin removal next week.  He is placed in a thumb spica brace which he'll use nearly full time. Jayne Garrett and I, performed a custom orthotic /brace adjustment, fitting and training with the patient. The patient demonstrated understanding and proper care. This was performed for 15 minutes. Surgical procedure, risk and benefits were explained in detail.  All questions answered and informed consent obtained and paperwork signed.  Follow-up next week for pin removal.          This note was dictated using voice recognition software and may contain grammatical errors

## 2017-05-26 DIAGNOSIS — S63.054D CMC (CARPOMETACARPAL JOINT) DISLOCATION, RIGHT, SUBSEQUENT ENCOUNTER: Primary | ICD-10-CM

## 2017-05-26 RX ORDER — MUPIROCIN 20 MG/G
1 OINTMENT TOPICAL
Status: CANCELLED | OUTPATIENT
Start: 2017-05-26

## 2017-05-29 ENCOUNTER — ANESTHESIA EVENT (OUTPATIENT)
Dept: SURGERY | Facility: HOSPITAL | Age: 47
End: 2017-05-29
Payer: COMMERCIAL

## 2017-05-30 ENCOUNTER — HOSPITAL ENCOUNTER (OUTPATIENT)
Facility: HOSPITAL | Age: 47
Discharge: HOME | End: 2017-05-30
Attending: ORTHOPAEDIC SURGERY | Admitting: ORTHOPAEDIC SURGERY
Payer: COMMERCIAL

## 2017-05-30 ENCOUNTER — ANESTHESIA (OUTPATIENT)
Dept: SURGERY | Facility: HOSPITAL | Age: 47
End: 2017-05-30
Payer: COMMERCIAL

## 2017-05-30 ENCOUNTER — SURGERY (OUTPATIENT)
Age: 47
End: 2017-05-30

## 2017-05-30 DIAGNOSIS — L03.113 CELLULITIS OF RIGHT HAND: ICD-10-CM

## 2017-05-30 DIAGNOSIS — Z96.9 RETAINED ORTHOPEDIC HARDWARE: Primary | ICD-10-CM

## 2017-05-30 DIAGNOSIS — S63.054D CMC (CARPOMETACARPAL JOINT) DISLOCATION, RIGHT, SUBSEQUENT ENCOUNTER: ICD-10-CM

## 2017-05-30 LAB
GLUCOSE SERPL-MCNC: 209 MG/DL (ref 70–110)
GRAM STN SPEC: NORMAL
GRAM STN SPEC: NORMAL

## 2017-05-30 PROCEDURE — 63600175 PHARM REV CODE 636 W HCPCS: Mod: PO | Performed by: ORTHOPAEDIC SURGERY

## 2017-05-30 PROCEDURE — 25000003 PHARM REV CODE 250: Mod: PO | Performed by: ANESTHESIOLOGY

## 2017-05-30 PROCEDURE — 36000707: Mod: PO | Performed by: ORTHOPAEDIC SURGERY

## 2017-05-30 PROCEDURE — 63600175 PHARM REV CODE 636 W HCPCS: Mod: PO | Performed by: NURSE ANESTHETIST, CERTIFIED REGISTERED

## 2017-05-30 PROCEDURE — 36000706: Mod: PO | Performed by: ORTHOPAEDIC SURGERY

## 2017-05-30 PROCEDURE — 71000033 HC RECOVERY, INTIAL HOUR: Mod: PO | Performed by: ORTHOPAEDIC SURGERY

## 2017-05-30 PROCEDURE — D9220A PRA ANESTHESIA: Mod: ANES,,, | Performed by: ANESTHESIOLOGY

## 2017-05-30 PROCEDURE — 87070 CULTURE OTHR SPECIMN AEROBIC: CPT

## 2017-05-30 PROCEDURE — 25000003 PHARM REV CODE 250: Mod: PO | Performed by: ORTHOPAEDIC SURGERY

## 2017-05-30 PROCEDURE — 37000008 HC ANESTHESIA 1ST 15 MINUTES: Mod: PO | Performed by: ORTHOPAEDIC SURGERY

## 2017-05-30 PROCEDURE — 87205 SMEAR GRAM STAIN: CPT

## 2017-05-30 PROCEDURE — 10180 I&D COMPLEX PO WOUND INFCTJ: CPT | Mod: 78,RT,, | Performed by: ORTHOPAEDIC SURGERY

## 2017-05-30 PROCEDURE — 37000009 HC ANESTHESIA EA ADD 15 MINS: Mod: PO | Performed by: ORTHOPAEDIC SURGERY

## 2017-05-30 PROCEDURE — 87102 FUNGUS ISOLATION CULTURE: CPT

## 2017-05-30 PROCEDURE — 20670 REMOVAL IMPLANT SUPERFICIAL: CPT | Mod: 78,51,, | Performed by: ORTHOPAEDIC SURGERY

## 2017-05-30 PROCEDURE — 25000003 PHARM REV CODE 250: Mod: PO | Performed by: NURSE ANESTHETIST, CERTIFIED REGISTERED

## 2017-05-30 PROCEDURE — 63600175 PHARM REV CODE 636 W HCPCS: Mod: PO | Performed by: ANESTHESIOLOGY

## 2017-05-30 PROCEDURE — D9220A PRA ANESTHESIA: Mod: CRNA,,, | Performed by: NURSE ANESTHETIST, CERTIFIED REGISTERED

## 2017-05-30 RX ORDER — FENTANYL CITRATE 50 UG/ML
25 INJECTION, SOLUTION INTRAMUSCULAR; INTRAVENOUS EVERY 5 MIN PRN
Status: DISCONTINUED | OUTPATIENT
Start: 2017-05-30 | End: 2017-05-30 | Stop reason: HOSPADM

## 2017-05-30 RX ORDER — ONDANSETRON 2 MG/ML
INJECTION INTRAMUSCULAR; INTRAVENOUS
Status: DISCONTINUED | OUTPATIENT
Start: 2017-05-30 | End: 2017-05-30

## 2017-05-30 RX ORDER — MUPIROCIN 20 MG/G
1 OINTMENT TOPICAL
Status: COMPLETED | OUTPATIENT
Start: 2017-05-30 | End: 2017-05-30

## 2017-05-30 RX ORDER — MIDAZOLAM HYDROCHLORIDE 1 MG/ML
INJECTION, SOLUTION INTRAMUSCULAR; INTRAVENOUS
Status: DISCONTINUED | OUTPATIENT
Start: 2017-05-30 | End: 2017-05-30

## 2017-05-30 RX ORDER — BACITRACIN 50000 [IU]/1
INJECTION, POWDER, FOR SOLUTION INTRAMUSCULAR
Status: DISCONTINUED | OUTPATIENT
Start: 2017-05-30 | End: 2017-05-30 | Stop reason: HOSPADM

## 2017-05-30 RX ORDER — OXYCODONE AND ACETAMINOPHEN 10; 325 MG/1; MG/1
1 TABLET ORAL
Qty: 40 TABLET | Refills: 0 | Status: SHIPPED | OUTPATIENT
Start: 2017-05-30 | End: 2017-06-05 | Stop reason: DRUGHIGH

## 2017-05-30 RX ORDER — SODIUM CHLORIDE, SODIUM LACTATE, POTASSIUM CHLORIDE, CALCIUM CHLORIDE 600; 310; 30; 20 MG/100ML; MG/100ML; MG/100ML; MG/100ML
INJECTION, SOLUTION INTRAVENOUS CONTINUOUS
Status: DISCONTINUED | OUTPATIENT
Start: 2017-05-30 | End: 2017-05-30 | Stop reason: HOSPADM

## 2017-05-30 RX ORDER — FENTANYL CITRATE 50 UG/ML
INJECTION, SOLUTION INTRAMUSCULAR; INTRAVENOUS
Status: DISCONTINUED | OUTPATIENT
Start: 2017-05-30 | End: 2017-05-30

## 2017-05-30 RX ORDER — LIDOCAINE HYDROCHLORIDE 5 MG/ML
INJECTION, SOLUTION INFILTRATION; INTRAVENOUS
Status: DISCONTINUED | OUTPATIENT
Start: 2017-05-30 | End: 2017-05-30

## 2017-05-30 RX ORDER — CEPHALEXIN 500 MG/1
500 CAPSULE ORAL EVERY 6 HOURS
Qty: 28 CAPSULE | Refills: 0 | Status: SHIPPED | OUTPATIENT
Start: 2017-05-30 | End: 2017-06-06

## 2017-05-30 RX ORDER — LIDOCAINE HCL/PF 100 MG/5ML
SYRINGE (ML) INTRAVENOUS
Status: DISCONTINUED | OUTPATIENT
Start: 2017-05-30 | End: 2017-05-30

## 2017-05-30 RX ORDER — OXYCODONE HYDROCHLORIDE 5 MG/1
5 TABLET ORAL ONCE AS NEEDED
Status: COMPLETED | OUTPATIENT
Start: 2017-05-30 | End: 2017-05-30

## 2017-05-30 RX ORDER — KETAMINE HYDROCHLORIDE 100 MG/ML
INJECTION, SOLUTION INTRAMUSCULAR; INTRAVENOUS
Status: DISCONTINUED | OUTPATIENT
Start: 2017-05-30 | End: 2017-05-30

## 2017-05-30 RX ORDER — OXYCODONE HYDROCHLORIDE 5 MG/1
10 TABLET ORAL EVERY 4 HOURS PRN
Status: DISCONTINUED | OUTPATIENT
Start: 2017-05-30 | End: 2017-05-30 | Stop reason: HOSPADM

## 2017-05-30 RX ORDER — PROPOFOL 10 MG/ML
VIAL (ML) INTRAVENOUS CONTINUOUS PRN
Status: DISCONTINUED | OUTPATIENT
Start: 2017-05-30 | End: 2017-05-30

## 2017-05-30 RX ORDER — PROPOFOL 10 MG/ML
VIAL (ML) INTRAVENOUS
Status: DISCONTINUED | OUTPATIENT
Start: 2017-05-30 | End: 2017-05-30

## 2017-05-30 RX ORDER — OXYCODONE HYDROCHLORIDE 5 MG/1
5 TABLET ORAL EVERY 4 HOURS PRN
Status: DISCONTINUED | OUTPATIENT
Start: 2017-05-30 | End: 2017-05-30 | Stop reason: HOSPADM

## 2017-05-30 RX ORDER — LIDOCAINE HYDROCHLORIDE 10 MG/ML
1 INJECTION, SOLUTION EPIDURAL; INFILTRATION; INTRACAUDAL; PERINEURAL ONCE
Status: DISCONTINUED | OUTPATIENT
Start: 2017-05-30 | End: 2017-05-30 | Stop reason: HOSPADM

## 2017-05-30 RX ADMIN — SODIUM CHLORIDE, SODIUM LACTATE, POTASSIUM CHLORIDE, AND CALCIUM CHLORIDE: .6; .31; .03; .02 INJECTION, SOLUTION INTRAVENOUS at 06:05

## 2017-05-30 RX ADMIN — OXYCODONE HYDROCHLORIDE 5 MG: 5 TABLET ORAL at 07:05

## 2017-05-30 RX ADMIN — DEXTROSE 2 G: 50 INJECTION, SOLUTION INTRAVENOUS at 07:05

## 2017-05-30 RX ADMIN — LIDOCAINE HYDROCHLORIDE 50 MG: 20 INJECTION PARENTERAL at 07:05

## 2017-05-30 RX ADMIN — ONDANSETRON 4 MG: 2 INJECTION, SOLUTION INTRAMUSCULAR; INTRAVENOUS at 07:05

## 2017-05-30 RX ADMIN — MIDAZOLAM HYDROCHLORIDE 2 MG: 1 INJECTION, SOLUTION INTRAMUSCULAR; INTRAVENOUS at 07:05

## 2017-05-30 RX ADMIN — PROPOFOL 25 MG: 10 INJECTION, EMULSION INTRAVENOUS at 07:05

## 2017-05-30 RX ADMIN — BACITRACIN 50000 UNITS: 5000 INJECTION, POWDER, FOR SOLUTION INTRAMUSCULAR at 07:05

## 2017-05-30 RX ADMIN — FENTANYL CITRATE 25 MCG: 50 INJECTION INTRAMUSCULAR; INTRAVENOUS at 08:05

## 2017-05-30 RX ADMIN — PROPOFOL 100 MCG/KG/MIN: 10 INJECTION, EMULSION INTRAVENOUS at 07:05

## 2017-05-30 RX ADMIN — MUPIROCIN 1 G: 20 OINTMENT TOPICAL at 06:05

## 2017-05-30 RX ADMIN — KETAMINE HYDROCHLORIDE 25 MG: 100 INJECTION, SOLUTION, CONCENTRATE INTRAMUSCULAR; INTRAVENOUS at 07:05

## 2017-05-30 RX ADMIN — FENTANYL CITRATE 100 MCG: 50 INJECTION, SOLUTION INTRAMUSCULAR; INTRAVENOUS at 07:05

## 2017-05-30 RX ADMIN — LIDOCAINE HYDROCHLORIDE 50 ML: 5 INJECTION, SOLUTION INFILTRATION; INTRAVENOUS at 07:05

## 2017-05-30 NOTE — BRIEF OP NOTE
[facility]  Brief Operative Note     SUMMARY     Surgery Date: 5/30/2017     Surgeon(s) and Role:     * Jonathan Vela MD - Primary    First Assistant: DERECK Antonio    Pre-op Diagnosis:  CMC (carpometacarpal joint) dislocation, right, subsequent encounter [S63.054D]    Post-op Diagnosis:  CMC (carpometacarpal joint) dislocation, right, subsequent encounter [S63.054D]    Procedure(s) (LRB):  REMOVAL-HARDWARE-HAND (Right)  INCISION AND DRAINAGE (I&D), HAND (Right)  With layered wound closure right hand  Anesthesia: Alex Block    Description of the findings of the procedure: See dictated Op Note.    Findings/Key Components: See dictated Op Note.    Estimated Blood Loss: * No values recorded between 5/30/2017  7:18 AM and 5/30/2017  7:44 AM *         Specimens:   Specimen (12h ago through future)    None          Discharge Note    SUMMARY     Admit Date: 5/30/2017    Discharge Date and Time: No discharge date for patient encounter.    Attending Physician: Jonathan Vela MD     Discharge Provider: Jonathan Vela    Final Diagnosis: CMC (carpometacarpal joint) dislocation, right, subsequent encounter [S63.054D]    Outcome of Hospitalization, Treatment, Procedure, or Surgery:    Patient admitted for outpatient procedure, procedure tolerated well, patient discharged home.    Disposition: Home or Self Care    Follow Up/Patient Instructions:   Follow-up Information     Jonathan Vela MD. Schedule an appointment as soon as possible for a visit on 6/5/2017.    Specialties:  Sports Medicine, Orthopedic Surgery  Why:  For suture removal, For wound re-check  Contact information:  [address] [place] [address]  [phone]                   Medications:  Reconciled Home Medications:   Current Discharge Medication List      START taking these medications    Details   cephALEXin (KEFLEX) 500 MG capsule Take 1 capsule (500 mg total) by mouth every 6 (six) hours.  Qty: 28 capsule, Refills: 0       oxycodone-acetaminophen (PERCOCET)  mg per tablet Take 1 tablet by mouth every 4 to 6 hours as needed for Pain.  Qty: 40 tablet, Refills: 0         CONTINUE these medications which have NOT CHANGED    Details   calcium carbonate (CALCIUM ANTACID) 300 mg (750 mg) Chew Take by mouth.      metformin (GLUCOPHAGE-XR) 500 MG 24 hr tablet TAKE 2 TABLETS BY MOUTH EVERY DAY WITH BREAKFAST  Qty: 60 tablet, Refills: 0      omeprazole (PRILOSEC) 40 MG capsule Take 40 mg by mouth once daily.  Refills: 11      ranitidine (ZANTAC) 300 MG tablet Take 1 tablet (300 mg total) by mouth nightly.  Qty: 30 tablet, Refills: 6    Associated Diagnoses: Heartburn      !! sertraline (ZOLOFT) 100 MG tablet Take 100 mg by mouth once daily.  Refills: 1      !! sertraline (ZOLOFT) 50 MG tablet 1/2 po daily x one week then 1 po daily  Qty: 30 tablet, Refills: 0      simvastatin (ZOCOR) 10 MG tablet TAKE 1 TABLET (10 MG TOTAL) BY MOUTH EVERY EVENING.  Qty: 30 tablet, Refills: 12      tamsulosin (FLOMAX) 0.4 mg Cp24 Take 1 capsule (0.4 mg total) by mouth daily as needed.  Qty: 30 capsule, Refills: 1      testosterone cypionate (DEPOTESTOTERONE CYPIONATE) 100 mg/mL injection Inject 0.5 mLs (50 mg total) into the muscle every 14 (fourteen) days.  Qty: 10 mL, Refills: 3      VITAMIN B COMPLEX ORAL Take 1 tablet by mouth once daily.      fish oil-omega-3 fatty acids 300-1,000 mg capsule Take 1 capsule by mouth once daily.      FLAXSEED OIL ORAL Take 1 capsule by mouth once daily.      ONETOUCH DELICA LANCETS 33 gauge Misc TEST EVERY DAY  Refills: 5       !! - Potential duplicate medications found. Please discuss with provider.          Discharge Procedure Orders  Diet general     Shower on day dressing removed (No bath)     Ice to affected area     Lifting restrictions     No driving, operating heavy equipment or signing legal documents while taking pain medication     Call MD for:  temperature >100.4     Call MD for:  persistent nausea and  vomiting     Call MD for:  severe uncontrolled pain     Call MD for:  difficulty breathing, headache or visual disturbances     Call MD for:  redness, tenderness, or signs of infection (pain, swelling, redness, odor or green/yellow discharge around incision site)     Call MD for:  hives     Call MD for:  persistent dizziness or light-headedness     Call MD for:  extreme fatigue     Remove dressing in 48 hours

## 2017-05-30 NOTE — OP NOTE
DATE OF PROCEDURE:  05/30/2017    PREOPERATIVE DIAGNOSES:  1.  Retained hardware, right carpometacarpal joint.  2.  Cellulitis, right hand.    POSTOPERATIVE DIAGNOSES:  1.  Retained hardware, right carpometacarpal joint.  2.  Cellulitis, right hand.    PROCEDURE:  Irrigation and debridement with hardware removal and layered wound   closure, right hand.    SURGEON:  Jonathan Vela M.D.    FIRST ASSISTANT:  DERECK Estrada.    ANESTHESIA:  Alex block.    ESTIMATED BLOOD LOSS:  Minimal.    FLUIDS:  Per Anesthesia record.    TOURNIQUET TIME:  28 minutes.    SPECIMENS:  Culture from right hand wound.    DRAINS:  None.    COMPLICATIONS:  None.    DESCRIPTION OF THE OPERATION:  The patient was brought to the operating room,   placed supine on the operating room table.  After successful induction of a Enterprise   block with tourniquet inflated to 250 mmHg, the right hand was sterilely   prepped and draped in the usual fashion.  After appropriate timeout, the   procedure was begun.  Inspection revealed that there was an area of skin   breakdown over the distal pin with granulation tissue and surrounding   cellulitis.  Because of this, it was elected to perform an elliptical incision   around both pin sites to remove the granulation tissue with the open wound skin.    Skin and subcutaneous tissues were gently excised taking care to avoid any   superficial veins and sensory branches of the radial nerve.  The pins were then   exposed and easily removed.  Mini C-arm fluoroscopy was then brought in to   confirm adequate pin removal.  The first carpometacarpal joint was determined to   be stable with stress placed across it.  At this point, cultures were taken of   the wound and antibiotics were given.  Hemostasis was obtained with the   electrocautery.  At this point, the wound was irrigated with 1000 mL of   bacitracin-containing antibiotic.  Gentle dorsal and volar flaps were raised to   allow for primary closure.   Subcutaneous tissues were then closed using 3-0   Vicryl in simple interrupted fashion.  Skin was closed using 3-0 Prolene in   running subcuticular fashion.  Steri-Strips were applied to the incision,   followed by a sterile dressing.  A removable thumb spica orthosis, which the   patient had brought, was then reapplied.  The tourniquet was then deflated.  The   patient was then transferred from the operating room table to a stretcher and   taken to the recovery room in stable condition.  Sponge and needle counts were   reported as correct.  No complications.      JULIA/FRANKLIN  dd: 05/30/2017 08:06:43 (CDT)  td: 05/30/2017 08:50:34 (CDT)  Doc ID   #[id number]  Job ID #[id number]    CC:

## 2017-05-30 NOTE — ANESTHESIA POSTPROCEDURE EVALUATION
"Anesthesia Post Evaluation    Patient: Abhi Pompa    Procedure(s) Performed: Procedure(s) (LRB):  REMOVAL-HARDWARE-HAND (Right)  INCISION AND DRAINAGE (I&D), HAND (Right)    Final Anesthesia Type: regional  Patient location during evaluation: PACU  Patient participation: Yes- Able to Participate  Level of consciousness: awake and alert and oriented  Post-procedure vital signs: reviewed and stable  Pain management: adequate  Airway patency: patent  PONV status at discharge: No PONV  Anesthetic complications: no      Cardiovascular status: hemodynamically stable and blood pressure returned to baseline  Respiratory status: unassisted, spontaneous ventilation and room air  Hydration status: euvolemic  Follow-up not needed.        Visit Vitals  /83   Pulse 65   Temp 36.5 °C (97.7 °F) (Temporal)   Resp 18   Ht 5' 6" (1.676 m)   Wt 99.8 kg (220 lb)   SpO2 97%   BMI 35.51 kg/m²       Pain/Moi Score: Pain Assessment Performed: Yes (5/30/2017  8:29 AM)  Presence of Pain: complains of pain/discomfort (5/30/2017  7:45 AM)  Pain Rating Prior to Med Admin: 5 (5/30/2017  8:20 AM)  Pain Rating Post Med Admin: 3 (tolerable) (5/30/2017  8:29 AM)  Moi Score: 10 (5/30/2017  8:29 AM)      "

## 2017-05-30 NOTE — DISCHARGE INSTRUCTIONS
[facility]  [address] [place] [address]  [phone]                   Dr. Vela's Postoperative Instructions   for Shoulder Surgery                 Your Surgery Included:   Open               Arthroscopic [] Instability Repair         [] Diagnostic   [] Rotator Cuff Repair      [] Lysis of Adhesions / Manipulation [] Distal Clavicle Resection      [] Debridement [] Biceps Tenodesis              [] Labrum  [] Ant [] Post [] Sup [] Inferior          [] Rotator Cuff [] Subscap[] Articular [] Bursal           [] Articular Surface   [] Glenoid [] Humeral Head   [x] Irrigation and Debridement and Pin Removal Right Hand      [] Labral Repair [] Superior/SLAP [] Anterior  [] Posterior [] Fracture Fixation      [] Instability Repair/ Anterior Capsular Shift   [] Ant [] Post  [] AC Joint Reconstruction       [] Rotator Cuff Repair [] Joint Replacement      [] Subacromial Decompression /Acromioplasty             [] Hemiarthroplasty  [] Total Shoulder      [] Biceps Tenodesis      [] Biceps Tenotomy            [] Reverse Total Shoulder         [] Distal Clavicle Resection                [] Contracture/Capsular Release                    Call our office [phone] or [phone] if you experience any of the following or have urgent questions:       Excessive bleeding or pus like drainage at the incision site       Uncontrollable pain not relieved by pain medication       Excessive swelling or redness at the incision site       Fever above 101.5 degrees not controlled with Tylenol or Motrin       Shortness of Breath       Any foul odor or blistering from the surgery site        1.   Pain Management: A cold therapy cuff, pain medications, local injections, and in some cases, regional anesthesia injections are used to manage your post-operative pain. The decision to use each of these options is based on their risks and benefits.     Medications: You were given one or more of the  following medication prescriptions before leaving the hospital. Have the prescriptions filled at a pharmacy on your way home and follow the instructions on the bottles. If you need a refill, please call your pharmacy.      Narcotic Medication (usually Vicodin ES, Lortab, Percocet or Nucynta): Begin taking the medication before your shoulder starts to hurt. Some patients do not like to take any medication, but if you wait until your pain is severe before taking, you will be very uncomfortable for several hours waiting for the narcotic to work. Always take with food.     Nausea / Vomiting: If you develop post-operative nausea and vomiting, please contact the office and an anti-emetic, such as Zofran can be prescribed. Use this medication as directed.     Cold Therapy: You may have been sent home with a cold therapy unit and wrap. Fill with ice and water to the indicated fill line and use throughout the day for the first two days and then as needed to help relieve pain and control swelling. However, never place the cold pad directly against your skin. Place over the dressing or after the first dressing change over a T-shirt.     Regional Anesthesia Injections (Blocks): You may have been given a regional nerve block either before or after surgery. This may make your entire shoulder and upper extremity numb for 24-36 hours. You may have also had a catheter placed which will provide regional anesthesia for 48 hours. If after 36 hours (48 hours if you have had a catheter placed), you still do not have feeling in your shoulder, please contact the office.                   2.   Diet: Start with clear liquids and then eat a bland diet for the first day after surgery. Progress your diet as tolerated. Constipation may occur with Narcotic usage, contact our office if you are experiencing constipation.    3.   Activity: After you arrive at home, spend most of the first 24 hours resting in bed, on the couch, or in a reclining  "chair. Many patients feel more comfortable in a reclining chair or propped up in bed.  After the first 48 hours at home, slowly increase your activity level based upon your symptoms.    4.   Dressing Change: Remove the dressing on the 2nd post-operative day unless specifically instructed not to do so. It is normal for some blood to be seen on the dressings. It is also normal for you to see apparent bruising on the skin around your hand when you remove the dressing. Please place a bandaid over each portal site (i.e. over each suture). If present, leave the steri-strip tape across the incisions. If they fall off, it is OK, but do not peel them off. Let them fall off on their own. If you are concerned by the drainage or the appearance of your shoulder, please call the office.    5.   Showering: You may shower after the first dressing change if the wound is clean and dry. Please place a sheet of Saran wrap over the incisions to keep them dry. It is OK if a small amount of water gets on the incisions. However, do not let the wound soak/submerge in water until the sutures are removed.     6.   Shoulder Sling (with/without immobilizer strap): You may have been sent home with a sling/immobilizer. You may remove the sling when changing clothes or bathing. Make sure to wear the sling while sleeping unless instructed otherwise. You may remove at rest or for exercises.            [] You need to wear the sling/ immobilizer at all times except for dressing changes and               showers until your post-operative visit.          [] You may use the sling/immobilizer for comfort only and may remove it as tolerated once                 the "block" wears off.    7.  Shoulder Exercises: Begin these exercises the first day after surgery in order to help you                 regain your motion and strength. You may do the following marked exercises for 5            minutes at least 3-5 times/day:     [] Shoulder shrugs - Shrug your " shoulders up and down.     [] Pendulums - Bend forward allowing your arm to hang down in front of you. Gently swing your arm side-to-side and front to back. Also, move your arm in a circular motion, both clockwise and counter-clockwise.                                                                                                                                [] Passive abduction - Have a family member gently lift your arm away from your body bringing your elbow up to the level of your shoulder.                                                                                                                     [] Shoulder rotation - With your arm at your side, have a family member gently rotate your arm internally and externally.                                                                                                                    [] Scapular retractions - (Squeeze shoulder blades together): Squeeze shoulder blades together while slightly pulling them down (do not shrug your shoulders upward); You can perform 10-15 reps, several times throughout the day, when seated at your desk, driving in the car, etc.                                                                                                                       [] Pulley exercises - Put a towel or rope across the top of a door. Stand facing the edge of the door. With the aid of the towel/rope, use your good arm to gently pull your operative arm up above shoulder height.     [] Elbow motion - Straighten and bend your elbow with your arm at your side. Try to fully extend as well as touch your shoulder.     [] Ball squeezes - use tennis ball or soft (nerf) ball for  strengthening                                                                                                                  8.  Physical Therapy: Physical therapy is an essential component to your recovery from surgery. Your physical therapy plan will be discussed at your first  post-operative visit.    9.Work Status: [x] Out of work/gym until follow up appointment     [] May return to work light duty.     [] May return to work/gym without restrictions.      FIRST POSTOPERATIVE VISIT:  As scheduled. However, if you don't have a scheduled appointment or can't remember when it is, please contact the office.    Best wishes for a successful recovery!      Jonathan Vela MD

## 2017-05-30 NOTE — PLAN OF CARE
Patient tolerating oral liquids without difficulty. No apparent s&s of distress noted at this time, no complaints voiced at this time. Discharge instructions reviewed with patient/sister with good verbal feedback received. Patient ready for discharge

## 2017-05-30 NOTE — ANESTHESIA PREPROCEDURE EVALUATION
05/30/2017  Abhi Pompa is a 46 y.o., male.    Anesthesia Evaluation      I have reviewed the Medications.     Review of Systems  Anesthesia Hx:  No problems with previous Anesthesia   Social:  Non-Smoker, No Alcohol Use    Cardiovascular:   hyperlipidemia    Pulmonary:   Sleep Apnea    Renal/:  Renal/ Normal     Hepatic/GI:   GERD    Neurological:  Neurology Normal    Endocrine:   Diabetes    Psych:   anxiety          Physical Exam  General:  Obesity    Airway/Jaw/Neck:  Airway Findings: Mouth Opening: Normal Tongue: Normal  General Airway Assessment: Adult, Average  Mallampati: II  Jaw/Neck Findings:  Neck ROM: Normal ROM       Chest/Lungs:  Chest/Lungs Findings: Clear to auscultation, Normal Respiratory Rate     Heart/Vascular:  Heart Findings: Rate: Normal  Rhythm: Regular Rhythm  Sounds: Normal  Heart murmur: negative       Mental Status:  Mental Status Findings:  Cooperative, Alert and Oriented         Anesthesia Plan  Type of Anesthesia, risks & benefits discussed:  Anesthesia Type:  regional  Patient's Preference: chelo block  Intra-op Monitoring Plan:   Intra-op Monitoring Plan Comments:   Post Op Pain Control Plan:   Post Op Pain Control Plan Comments:   Induction:    Beta Blocker:  Patient is not currently on a Beta-Blocker (No further documentation required).       Informed Consent: Patient understands risks and agrees with Anesthesia plan.  Questions answered. Anesthesia consent signed with patient.  ASA Score: 2     Day of Surgery Review of History & Physical:            Ready For Surgery From Anesthesia Perspective.

## 2017-05-30 NOTE — TRANSFER OF CARE
"Anesthesia Transfer of Care Note    Patient: Abhi Pompa    Procedure(s) Performed: Procedure(s) (LRB):  REMOVAL-HARDWARE-HAND (Right)  INCISION AND DRAINAGE (I&D), HAND (Right)    Patient location: PACU    Anesthesia Type: MAC    Transport from OR: Transported from OR on room air with adequate spontaneous ventilation    Post pain: adequate analgesia    Post assessment: no apparent anesthetic complications    Post vital signs: stable    Level of consciousness: awake    Nausea/Vomiting: no nausea/vomiting    Complications: none    Transfer of care protocol was followed      Last vitals:   Visit Vitals  /67 (BP Location: Left arm, Patient Position: Sitting, BP Method: Automatic)   Pulse 69   Temp 36.9 °C (98.4 °F) (Skin)   Resp 18   Ht 5' 6" (1.676 m)   Wt 99.8 kg (220 lb)   SpO2 97%   BMI 35.51 kg/m²     "

## 2017-05-30 NOTE — PROGRESS NOTES
Dr Vela at  changing pt post op appt from Thursday to Monday next week: appt time 0815. Pt verbalized understanding.

## 2017-05-30 NOTE — OR NURSING
Unable to start IV left hand x1 attempt pre op for chelo block. Dr. Cazares cancels order and changes anesthesia plan from chelo block to neuromuscular block due to right hand swollen, red, and warm to touch pre op. Dr. Vela notified.

## 2017-05-31 VITALS
BODY MASS INDEX: 35.36 KG/M2 | TEMPERATURE: 98 F | OXYGEN SATURATION: 97 % | WEIGHT: 220 LBS | HEART RATE: 65 BPM | SYSTOLIC BLOOD PRESSURE: 114 MMHG | HEIGHT: 66 IN | DIASTOLIC BLOOD PRESSURE: 83 MMHG | RESPIRATION RATE: 18 BRPM

## 2017-06-01 LAB — BACTERIA SPEC AEROBE CULT: NORMAL

## 2017-06-02 DIAGNOSIS — Z98.890 S/P HARDWARE REMOVAL: Primary | ICD-10-CM

## 2017-06-05 ENCOUNTER — OFFICE VISIT (OUTPATIENT)
Dept: ORTHOPEDICS | Facility: CLINIC | Age: 47
End: 2017-06-05
Payer: COMMERCIAL

## 2017-06-05 ENCOUNTER — HOSPITAL ENCOUNTER (OUTPATIENT)
Dept: RADIOLOGY | Facility: HOSPITAL | Age: 47
Discharge: HOME | End: 2017-06-05
Attending: ORTHOPAEDIC SURGERY
Payer: COMMERCIAL

## 2017-06-05 VITALS — WEIGHT: 220 LBS | BODY MASS INDEX: 35.36 KG/M2 | HEIGHT: 66 IN

## 2017-06-05 DIAGNOSIS — Z98.890 S/P HARDWARE REMOVAL: Primary | ICD-10-CM

## 2017-06-05 DIAGNOSIS — Z98.890 S/P HARDWARE REMOVAL: ICD-10-CM

## 2017-06-05 DIAGNOSIS — S63.054D CMC (CARPOMETACARPAL JOINT) DISLOCATION, RIGHT, SUBSEQUENT ENCOUNTER: ICD-10-CM

## 2017-06-05 PROCEDURE — 73130 X-RAY EXAM OF HAND: CPT | Mod: TC,PO,RT

## 2017-06-05 PROCEDURE — 73130 X-RAY EXAM OF HAND: CPT | Mod: 26,RT,, | Performed by: RADIOLOGY

## 2017-06-05 PROCEDURE — 99024 POSTOP FOLLOW-UP VISIT: CPT | Mod: S$GLB,,, | Performed by: ORTHOPAEDIC SURGERY

## 2017-06-05 PROCEDURE — 99999 PR PBB SHADOW E&M-EST. PATIENT-LVL III: CPT | Mod: PBBFAC,,, | Performed by: ORTHOPAEDIC SURGERY

## 2017-06-05 RX ORDER — OXYCODONE AND ACETAMINOPHEN 5; 325 MG/1; MG/1
1 TABLET ORAL EVERY 4 HOURS PRN
Status: CANCELLED | OUTPATIENT
Start: 2017-06-05

## 2017-06-05 RX ORDER — OXYCODONE AND ACETAMINOPHEN 5; 325 MG/1; MG/1
1 TABLET ORAL EVERY 4 HOURS PRN
Qty: 40 TABLET | Refills: 0 | Status: SHIPPED | OUTPATIENT
Start: 2017-06-05 | End: 2017-06-20 | Stop reason: SDUPTHER

## 2017-06-05 NOTE — PROGRESS NOTES
"DATE: 6/5/2017  PATIENT: Abhi Pompa    Attending Physician: Jonathan Vela M.D.    HISTORY:  Abhi Pompa is a 46 y.o. male who returns for follow up evaluation of  his right hand.  He is status post irrigation debridement and hardware removal of a right carpometacarpal fracture dislocation, 5/30/17.  He underwent closed reduction pinning on 4/11/17.  He is to take the Keflex postoperatively.  He denies any chills.  Denies any drainage from the wound.  Pain is reported at 5/10 today    PMH/PSH/FamHx/SocHx:  Reviewed and unchanged from prior visit    ROS:  Constitution: Negative for chills, fever, and sweats. Negative for unexplained weight loss.  HENT: Negative for headaches and blurry vision.   Cardiovascular: Negative for chest pain, irregular heartbeat, leg swelling and palpitations.   Respiratory: Negative for cough and shortness of breath.   Gastrointestinal: Negative for abdominal pain, heartburn, nausea and vomiting.   Genitourinary: Negative for bladder incontinence and dysuria.   Musculoskeletal: Negative for systemic arthritis, joint swelling, muscle weakness and myalgias.   Neurological: Negative for numbness.   Psychiatric/Behavioral: Negative for depression.   Endocrine: Negative for polyuria.   Hematologic/Lymphatic: Negative for bleeding disorders.  Skin: Negative for poor wound healing.       EXAM:  Ht 5' 6" (1.676 m)   Wt 99.8 kg (220 lb)   BMI 35.51 kg/m²   Healthy-appearing male in no acute distress.  Examination of right thumb reveals the incision is clean and dry.  Sensation is intact to the radial, ulnar and dorsal border of the thumb.  Range of motion thumb at the carpometacarpal joint does show limitation.  Mild pain with range of motion.  No gross instability noted    IMAGING:   X-rays of the right hand first carpometacarpal joint show that the carpometacarpal joint is reduced.  Pins have been removed.    ASSESSMENT:  Status post revision debridement and pin removal right " carpometacarpal joint, 5/30/17.  Status post closed reduction and percutaneous pinning right first carpometacarpal fracture dislocation, 4/11/17.    PLAN:  The implications of the patient's evolution of symptoms and findings were explained to the patient in detail. I have explained to Gennaro that the x-rays do look good.  Sutures are removed today.  We'll start formal therapy for range of motion strengthening.  I will see him back in 4 week's time for reexam and x-rays of the right first carpometacarpal joint.        This note was dictated using voice recognition software and may contain grammatical errors

## 2017-06-12 RX ORDER — METFORMIN HYDROCHLORIDE 500 MG/1
TABLET, EXTENDED RELEASE ORAL
Qty: 60 TABLET | Refills: 6 | Status: SHIPPED | OUTPATIENT
Start: 2017-06-12 | End: 2017-09-15 | Stop reason: SDUPTHER

## 2017-06-14 ENCOUNTER — CLINICAL SUPPORT (OUTPATIENT)
Dept: REHABILITATION | Facility: HOSPITAL | Age: 47
End: 2017-06-14
Attending: ORTHOPAEDIC SURGERY
Payer: COMMERCIAL

## 2017-06-14 DIAGNOSIS — M79.644 PAIN IN FINGER OF RIGHT HAND: Primary | ICD-10-CM

## 2017-06-14 DIAGNOSIS — M25.60 DECREASED RANGE OF MOTION: ICD-10-CM

## 2017-06-14 DIAGNOSIS — Z74.09 DECREASED FUNCTIONAL MOBILITY AND ENDURANCE: ICD-10-CM

## 2017-06-14 DIAGNOSIS — M25.441 SWELLING OF FINGER JOINT OF RIGHT HAND: ICD-10-CM

## 2017-06-14 PROCEDURE — G8984 CARRY CURRENT STATUS: HCPCS | Mod: CK,PN

## 2017-06-14 PROCEDURE — 97165 OT EVAL LOW COMPLEX 30 MIN: CPT | Mod: PN

## 2017-06-14 PROCEDURE — 97140 MANUAL THERAPY 1/> REGIONS: CPT | Mod: 59,PN

## 2017-06-14 PROCEDURE — G8985 CARRY GOAL STATUS: HCPCS | Mod: CJ,PN

## 2017-06-14 PROCEDURE — 97110 THERAPEUTIC EXERCISES: CPT | Mod: 59,PN
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
## 2017-06-15 PROBLEM — M25.441 SWELLING OF FINGER JOINT OF RIGHT HAND: Status: ACTIVE | Noted: 2017-06-15

## 2017-06-15 PROBLEM — M79.644 PAIN IN FINGER OF RIGHT HAND: Status: ACTIVE | Noted: 2017-06-15

## 2017-06-15 PROBLEM — Z74.09 DECREASED FUNCTIONAL MOBILITY AND ENDURANCE: Status: ACTIVE | Noted: 2017-06-15
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
## 2017-06-20 ENCOUNTER — CLINICAL SUPPORT (OUTPATIENT)
Dept: REHABILITATION | Facility: HOSPITAL | Age: 47
End: 2017-06-20
Attending: ORTHOPAEDIC SURGERY
Payer: COMMERCIAL

## 2017-06-20 ENCOUNTER — PATIENT MESSAGE (OUTPATIENT)
Dept: ORTHOPEDICS | Facility: CLINIC | Age: 47
End: 2017-06-20

## 2017-06-20 DIAGNOSIS — Z98.890 S/P HARDWARE REMOVAL: Primary | ICD-10-CM

## 2017-06-20 DIAGNOSIS — M25.60 DECREASED RANGE OF MOTION: Primary | ICD-10-CM

## 2017-06-20 DIAGNOSIS — M79.644 PAIN IN FINGER OF RIGHT HAND: ICD-10-CM

## 2017-06-20 DIAGNOSIS — Z74.09 DECREASED FUNCTIONAL MOBILITY AND ENDURANCE: ICD-10-CM

## 2017-06-20 DIAGNOSIS — M25.441 SWELLING OF FINGER JOINT OF RIGHT HAND: ICD-10-CM

## 2017-06-20 PROCEDURE — 97530 THERAPEUTIC ACTIVITIES: CPT | Mod: 59,PN

## 2017-06-20 PROCEDURE — 97018 PARAFFIN BATH THERAPY: CPT | Mod: PN

## 2017-06-20 PROCEDURE — 97110 THERAPEUTIC EXERCISES: CPT | Mod: PN

## 2017-06-20 PROCEDURE — 97140 MANUAL THERAPY 1/> REGIONS: CPT | Mod: PN

## 2017-06-20 RX ORDER — OXYCODONE AND ACETAMINOPHEN 5; 325 MG/1; MG/1
1 TABLET ORAL EVERY 4 HOURS PRN
Qty: 40 TABLET | Refills: 0 | Status: SHIPPED | OUTPATIENT
Start: 2017-06-20 | End: 2017-07-19 | Stop reason: SDUPTHER
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
## 2017-06-22 ENCOUNTER — CLINICAL SUPPORT (OUTPATIENT)
Dept: REHABILITATION | Facility: HOSPITAL | Age: 47
End: 2017-06-22
Attending: ORTHOPAEDIC SURGERY
Payer: COMMERCIAL

## 2017-06-22 DIAGNOSIS — M79.644 PAIN IN FINGER OF RIGHT HAND: ICD-10-CM

## 2017-06-22 DIAGNOSIS — M25.60 DECREASED RANGE OF MOTION: Primary | ICD-10-CM

## 2017-06-22 DIAGNOSIS — M25.441 SWELLING OF FINGER JOINT OF RIGHT HAND: ICD-10-CM

## 2017-06-22 DIAGNOSIS — Z74.09 DECREASED FUNCTIONAL MOBILITY AND ENDURANCE: ICD-10-CM

## 2017-06-22 PROCEDURE — 97110 THERAPEUTIC EXERCISES: CPT | Mod: PN

## 2017-06-22 PROCEDURE — 97140 MANUAL THERAPY 1/> REGIONS: CPT | Mod: PN

## 2017-06-22 PROCEDURE — 97530 THERAPEUTIC ACTIVITIES: CPT | Mod: PN
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
## 2017-06-26 ENCOUNTER — CLINICAL SUPPORT (OUTPATIENT)
Dept: REHABILITATION | Facility: HOSPITAL | Age: 47
End: 2017-06-26
Attending: ORTHOPAEDIC SURGERY
Payer: COMMERCIAL

## 2017-06-26 DIAGNOSIS — Z74.09 DECREASED FUNCTIONAL MOBILITY AND ENDURANCE: ICD-10-CM

## 2017-06-26 DIAGNOSIS — M25.441 SWELLING OF FINGER JOINT OF RIGHT HAND: ICD-10-CM

## 2017-06-26 DIAGNOSIS — M79.644 PAIN IN FINGER OF RIGHT HAND: Primary | ICD-10-CM

## 2017-06-26 DIAGNOSIS — M25.60 DECREASED RANGE OF MOTION: ICD-10-CM

## 2017-06-26 PROCEDURE — 97110 THERAPEUTIC EXERCISES: CPT | Mod: PN

## 2017-06-26 PROCEDURE — 97140 MANUAL THERAPY 1/> REGIONS: CPT | Mod: PN

## 2017-06-26 PROCEDURE — 97018 PARAFFIN BATH THERAPY: CPT | Mod: PN

## 2017-06-26 PROCEDURE — 97530 THERAPEUTIC ACTIVITIES: CPT | Mod: PN
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
## 2017-06-29 ENCOUNTER — CLINICAL SUPPORT (OUTPATIENT)
Dept: REHABILITATION | Facility: HOSPITAL | Age: 47
End: 2017-06-29
Attending: ORTHOPAEDIC SURGERY
Payer: COMMERCIAL

## 2017-06-29 DIAGNOSIS — M25.60 DECREASED RANGE OF MOTION: Primary | ICD-10-CM

## 2017-06-29 DIAGNOSIS — Z74.09 DECREASED FUNCTIONAL MOBILITY AND ENDURANCE: ICD-10-CM

## 2017-06-29 DIAGNOSIS — M79.644 PAIN IN FINGER OF RIGHT HAND: ICD-10-CM

## 2017-06-29 DIAGNOSIS — M25.441 SWELLING OF FINGER JOINT OF RIGHT HAND: ICD-10-CM

## 2017-06-29 PROCEDURE — 97110 THERAPEUTIC EXERCISES: CPT | Mod: PN

## 2017-06-29 PROCEDURE — 97530 THERAPEUTIC ACTIVITIES: CPT | Mod: 59,PN

## 2017-06-29 PROCEDURE — 97018 PARAFFIN BATH THERAPY: CPT | Mod: PN
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
## 2017-07-03 ENCOUNTER — CLINICAL SUPPORT (OUTPATIENT)
Dept: REHABILITATION | Facility: HOSPITAL | Age: 47
End: 2017-07-03
Attending: ORTHOPAEDIC SURGERY
Payer: COMMERCIAL

## 2017-07-03 DIAGNOSIS — M25.441 SWELLING OF FINGER JOINT OF RIGHT HAND: ICD-10-CM

## 2017-07-03 DIAGNOSIS — M25.60 DECREASED RANGE OF MOTION: Primary | ICD-10-CM

## 2017-07-03 DIAGNOSIS — Z74.09 DECREASED FUNCTIONAL MOBILITY AND ENDURANCE: ICD-10-CM

## 2017-07-03 DIAGNOSIS — M79.644 PAIN IN FINGER OF RIGHT HAND: ICD-10-CM

## 2017-07-03 PROCEDURE — 97018 PARAFFIN BATH THERAPY: CPT | Mod: PN

## 2017-07-03 PROCEDURE — 97110 THERAPEUTIC EXERCISES: CPT | Mod: PN

## 2017-07-03 PROCEDURE — 97530 THERAPEUTIC ACTIVITIES: CPT | Mod: PN
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
## 2017-07-04 LAB — FUNGUS SPEC CULT: NORMAL

## 2017-07-05 ENCOUNTER — CLINICAL SUPPORT (OUTPATIENT)
Dept: REHABILITATION | Facility: HOSPITAL | Age: 47
End: 2017-07-05
Attending: ORTHOPAEDIC SURGERY
Payer: COMMERCIAL

## 2017-07-05 DIAGNOSIS — M79.644 PAIN IN FINGER OF RIGHT HAND: ICD-10-CM

## 2017-07-05 DIAGNOSIS — M25.441 SWELLING OF FINGER JOINT OF RIGHT HAND: ICD-10-CM

## 2017-07-05 DIAGNOSIS — Z74.09 DECREASED FUNCTIONAL MOBILITY AND ENDURANCE: ICD-10-CM

## 2017-07-05 DIAGNOSIS — M25.60 DECREASED RANGE OF MOTION: Primary | ICD-10-CM

## 2017-07-05 PROCEDURE — 97530 THERAPEUTIC ACTIVITIES: CPT | Mod: PN

## 2017-07-05 PROCEDURE — 97110 THERAPEUTIC EXERCISES: CPT | Mod: PN

## 2017-07-05 PROCEDURE — 97018 PARAFFIN BATH THERAPY: CPT | Mod: PN

## 2017-07-05 PROCEDURE — 97035 APP MDLTY 1+ULTRASOUND EA 15: CPT | Mod: PN

## 2017-07-05 PROCEDURE — 97140 MANUAL THERAPY 1/> REGIONS: CPT | Mod: PN

## 2017-07-05 RX ORDER — TESTOSTERONE CYPIONATE 1000 MG/10ML
INJECTION, SOLUTION INTRAMUSCULAR
Qty: 10 ML | Refills: 2 | Status: SHIPPED | OUTPATIENT
Start: 2017-07-05 | End: 2018-04-11 | Stop reason: SDUPTHER

## 2017-07-06 DIAGNOSIS — Z98.890 S/P HARDWARE REMOVAL: Primary | ICD-10-CM
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
## 2017-07-10 ENCOUNTER — HOSPITAL ENCOUNTER (OUTPATIENT)
Dept: RADIOLOGY | Facility: HOSPITAL | Age: 47
Discharge: HOME | End: 2017-07-10
Attending: ORTHOPAEDIC SURGERY
Payer: COMMERCIAL

## 2017-07-10 ENCOUNTER — OFFICE VISIT (OUTPATIENT)
Dept: ORTHOPEDICS | Facility: CLINIC | Age: 47
End: 2017-07-10
Payer: COMMERCIAL

## 2017-07-10 VITALS — BODY MASS INDEX: 35.36 KG/M2 | HEIGHT: 66 IN | WEIGHT: 220 LBS

## 2017-07-10 DIAGNOSIS — Z98.890 S/P HARDWARE REMOVAL: ICD-10-CM

## 2017-07-10 DIAGNOSIS — S63.054D CMC (CARPOMETACARPAL JOINT) DISLOCATION, RIGHT, SUBSEQUENT ENCOUNTER: ICD-10-CM

## 2017-07-10 DIAGNOSIS — Z98.890 S/P HARDWARE REMOVAL: Primary | ICD-10-CM

## 2017-07-10 PROCEDURE — 99999 PR PBB SHADOW E&M-EST. PATIENT-LVL II: CPT | Mod: PBBFAC,,, | Performed by: ORTHOPAEDIC SURGERY

## 2017-07-10 PROCEDURE — 99024 POSTOP FOLLOW-UP VISIT: CPT | Mod: S$GLB,,, | Performed by: ORTHOPAEDIC SURGERY

## 2017-07-10 PROCEDURE — 73130 X-RAY EXAM OF HAND: CPT | Mod: TC,PO,RT

## 2017-07-10 PROCEDURE — 73130 X-RAY EXAM OF HAND: CPT | Mod: 26,RT,, | Performed by: RADIOLOGY

## 2017-07-11 ENCOUNTER — CLINICAL SUPPORT (OUTPATIENT)
Dept: REHABILITATION | Facility: HOSPITAL | Age: 47
End: 2017-07-11
Attending: ORTHOPAEDIC SURGERY
Payer: COMMERCIAL

## 2017-07-11 DIAGNOSIS — M79.644 PAIN IN FINGER OF RIGHT HAND: ICD-10-CM

## 2017-07-11 DIAGNOSIS — M25.60 DECREASED RANGE OF MOTION: Primary | ICD-10-CM

## 2017-07-11 DIAGNOSIS — M25.441 SWELLING OF FINGER JOINT OF RIGHT HAND: ICD-10-CM

## 2017-07-11 DIAGNOSIS — Z74.09 DECREASED FUNCTIONAL MOBILITY AND ENDURANCE: ICD-10-CM

## 2017-07-11 PROCEDURE — 97018 PARAFFIN BATH THERAPY: CPT | Mod: PN

## 2017-07-11 PROCEDURE — 97110 THERAPEUTIC EXERCISES: CPT | Mod: PN

## 2017-07-11 PROCEDURE — 97530 THERAPEUTIC ACTIVITIES: CPT | Mod: PN

## 2017-07-11 PROCEDURE — 97035 APP MDLTY 1+ULTRASOUND EA 15: CPT | Mod: PN

## 2017-07-11 PROCEDURE — 97140 MANUAL THERAPY 1/> REGIONS: CPT | Mod: PN

## 2017-07-11 NOTE — PROGRESS NOTES
"DATE: 7/10/2017  PATIENT: Abhi Pompa    Attending Physician: Jonathan Vela M.D.    HISTORY:  Abhi Pompa is a 46 y.o. male who returns for follow up evaluation of  his right thumb.  He is status post pin removal, 5/30/17 and closed reduction and percutaneous pinning of a first carpometacarpal fracture dislocation 4/11/17.  He has been going to hand therapy does note improvement in his motion.  He still notes some pain which is rated at 5/10 but less than at his last visit.  He presents for routine follow-up exam.    PMH/PSH/FamHx/SocHx:  Reviewed and unchanged from prior visit    ROS:  Constitution: Negative for chills, fever, and sweats. Negative for unexplained weight loss.  HENT: Negative for headaches and blurry vision.   Cardiovascular: Negative for chest pain, irregular heartbeat, leg swelling and palpitations.   Respiratory: Negative for cough and shortness of breath.   Gastrointestinal: Negative for abdominal pain, heartburn, nausea and vomiting.   Genitourinary: Negative for bladder incontinence and dysuria.   Musculoskeletal: Negative for systemic arthritis, joint swelling, muscle weakness and myalgias.   Neurological: Negative for numbness.   Psychiatric/Behavioral: Negative for depression.   Endocrine: Negative for polyuria.   Hematologic/Lymphatic: Negative for bleeding disorders.  Skin: Negative for poor wound healing.       EXAM:  Ht 5' 6" (1.676 m)   Wt 99.8 kg (220 lb)   BMI 35.51 kg/m²   Healthy-appearing male in no acute distress.  Examination of the right thumb reveals the swelling is decreased.  Range of motion especially to abduction is much improved.  There is mild discomfort with circumduction maneuvers at the carpometacarpal joint.  Sensation is intact.  No kolton crepitus identified.    IMAGING:   X-rays of the right hand with attention to the first carpometacarpal joint show that the joint remains reduced.  Early degenerative changes noted.    ASSESSMENT:  Status post " revision debridement and pin removal right carpometacarpal joint, 5/30/17.  Status post closed reduction and percutaneous pinning right first carpometacarpal fracture dislocation, 4/11/17.    PLAN:  The implications of the patient's evolution of symptoms and findings were explained to the patient in detail.  Overall, prefers improving.  He will continue with hand therapy to work on motion strength and endurance.  He will start to wean off the Percocet and ibuprofen.  Follow-up in 4 weeks' time for reexam.  No x-rays needed at the next visit.        This note was dictated using voice recognition software and may contain grammatical errors
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
## 2017-07-13 ENCOUNTER — CLINICAL SUPPORT (OUTPATIENT)
Dept: REHABILITATION | Facility: HOSPITAL | Age: 47
End: 2017-07-13
Attending: ORTHOPAEDIC SURGERY
Payer: COMMERCIAL

## 2017-07-13 DIAGNOSIS — M25.441 SWELLING OF FINGER JOINT OF RIGHT HAND: ICD-10-CM

## 2017-07-13 DIAGNOSIS — Z74.09 DECREASED FUNCTIONAL MOBILITY AND ENDURANCE: ICD-10-CM

## 2017-07-13 DIAGNOSIS — M25.60 DECREASED RANGE OF MOTION: Primary | ICD-10-CM

## 2017-07-13 DIAGNOSIS — M79.644 PAIN IN FINGER OF RIGHT HAND: ICD-10-CM

## 2017-07-13 PROCEDURE — 97110 THERAPEUTIC EXERCISES: CPT | Mod: PN

## 2017-07-13 PROCEDURE — 97530 THERAPEUTIC ACTIVITIES: CPT | Mod: 59,PN

## 2017-07-13 PROCEDURE — 97018 PARAFFIN BATH THERAPY: CPT | Mod: PN
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
## 2017-07-19 DIAGNOSIS — Z98.890 S/P HARDWARE REMOVAL: ICD-10-CM

## 2017-07-19 RX ORDER — OXYCODONE AND ACETAMINOPHEN 5; 325 MG/1; MG/1
1 TABLET ORAL EVERY 4 HOURS PRN
Qty: 40 TABLET | Refills: 0 | Status: SHIPPED | OUTPATIENT
Start: 2017-07-19 | End: 2017-09-15

## 2017-07-20 ENCOUNTER — CLINICAL SUPPORT (OUTPATIENT)
Dept: REHABILITATION | Facility: HOSPITAL | Age: 47
End: 2017-07-20
Attending: ORTHOPAEDIC SURGERY
Payer: COMMERCIAL

## 2017-07-20 DIAGNOSIS — M25.60 DECREASED RANGE OF MOTION: Primary | ICD-10-CM

## 2017-07-20 DIAGNOSIS — M79.644 PAIN IN FINGER OF RIGHT HAND: ICD-10-CM

## 2017-07-20 DIAGNOSIS — M25.441 SWELLING OF FINGER JOINT OF RIGHT HAND: ICD-10-CM

## 2017-07-20 DIAGNOSIS — R29.898 DECREASED GRIP STRENGTH OF RIGHT HAND: ICD-10-CM

## 2017-07-20 DIAGNOSIS — Z74.09 DECREASED FUNCTIONAL MOBILITY AND ENDURANCE: ICD-10-CM

## 2017-07-20 PROCEDURE — 97110 THERAPEUTIC EXERCISES: CPT | Mod: PN

## 2017-07-20 PROCEDURE — 97018 PARAFFIN BATH THERAPY: CPT | Mod: PN

## 2017-07-20 PROCEDURE — 97140 MANUAL THERAPY 1/> REGIONS: CPT | Mod: PN

## 2017-07-20 PROCEDURE — 97530 THERAPEUTIC ACTIVITIES: CPT | Mod: PN

## 2017-07-20 PROCEDURE — 97035 APP MDLTY 1+ULTRASOUND EA 15: CPT | Mod: PN
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
## 2017-07-24 ENCOUNTER — CLINICAL SUPPORT (OUTPATIENT)
Dept: REHABILITATION | Facility: HOSPITAL | Age: 47
End: 2017-07-24
Attending: ORTHOPAEDIC SURGERY
Payer: COMMERCIAL

## 2017-07-24 DIAGNOSIS — M25.60 DECREASED RANGE OF MOTION: Primary | ICD-10-CM

## 2017-07-24 DIAGNOSIS — R29.898 DECREASED GRIP STRENGTH OF RIGHT HAND: ICD-10-CM

## 2017-07-24 DIAGNOSIS — M25.441 SWELLING OF FINGER JOINT OF RIGHT HAND: ICD-10-CM

## 2017-07-24 DIAGNOSIS — Z74.09 DECREASED FUNCTIONAL MOBILITY AND ENDURANCE: ICD-10-CM

## 2017-07-24 DIAGNOSIS — M79.644 PAIN IN FINGER OF RIGHT HAND: ICD-10-CM

## 2017-07-24 PROCEDURE — 97530 THERAPEUTIC ACTIVITIES: CPT | Mod: 59,PN

## 2017-07-24 PROCEDURE — 97110 THERAPEUTIC EXERCISES: CPT | Mod: PN

## 2017-07-24 PROCEDURE — 97035 APP MDLTY 1+ULTRASOUND EA 15: CPT | Mod: PN

## 2017-07-24 PROCEDURE — 97018 PARAFFIN BATH THERAPY: CPT | Mod: PN
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
## 2017-07-27 ENCOUNTER — CLINICAL SUPPORT (OUTPATIENT)
Dept: REHABILITATION | Facility: HOSPITAL | Age: 47
End: 2017-07-27
Attending: ORTHOPAEDIC SURGERY
Payer: COMMERCIAL

## 2017-07-27 DIAGNOSIS — Z74.09 DECREASED FUNCTIONAL MOBILITY AND ENDURANCE: ICD-10-CM

## 2017-07-27 DIAGNOSIS — R29.898 DECREASED GRIP STRENGTH OF RIGHT HAND: ICD-10-CM

## 2017-07-27 DIAGNOSIS — M79.644 PAIN IN FINGER OF RIGHT HAND: ICD-10-CM

## 2017-07-27 DIAGNOSIS — M25.60 DECREASED RANGE OF MOTION: Primary | ICD-10-CM

## 2017-07-27 DIAGNOSIS — M25.441 SWELLING OF FINGER JOINT OF RIGHT HAND: ICD-10-CM

## 2017-07-27 PROCEDURE — 97018 PARAFFIN BATH THERAPY: CPT | Mod: PN

## 2017-07-27 PROCEDURE — 97110 THERAPEUTIC EXERCISES: CPT | Mod: PN

## 2017-07-27 PROCEDURE — 97035 APP MDLTY 1+ULTRASOUND EA 15: CPT | Mod: PN

## 2017-07-27 PROCEDURE — 97530 THERAPEUTIC ACTIVITIES: CPT | Mod: 59,PN
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
## 2017-08-01 ENCOUNTER — CLINICAL SUPPORT (OUTPATIENT)
Dept: REHABILITATION | Facility: HOSPITAL | Age: 47
End: 2017-08-01
Attending: ORTHOPAEDIC SURGERY
Payer: COMMERCIAL

## 2017-08-01 DIAGNOSIS — M25.441 SWELLING OF FINGER JOINT OF RIGHT HAND: ICD-10-CM

## 2017-08-01 DIAGNOSIS — M25.60 DECREASED RANGE OF MOTION: Primary | ICD-10-CM

## 2017-08-01 DIAGNOSIS — M79.644 PAIN IN FINGER OF RIGHT HAND: ICD-10-CM

## 2017-08-01 DIAGNOSIS — Z74.09 DECREASED FUNCTIONAL MOBILITY AND ENDURANCE: ICD-10-CM

## 2017-08-01 DIAGNOSIS — R29.898 DECREASED GRIP STRENGTH OF RIGHT HAND: ICD-10-CM

## 2017-08-01 PROCEDURE — 97018 PARAFFIN BATH THERAPY: CPT | Mod: PN

## 2017-08-01 PROCEDURE — 97110 THERAPEUTIC EXERCISES: CPT | Mod: PN

## 2017-08-01 PROCEDURE — 97530 THERAPEUTIC ACTIVITIES: CPT | Mod: PN

## 2017-08-01 PROCEDURE — 97140 MANUAL THERAPY 1/> REGIONS: CPT | Mod: PN
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
## 2017-08-03 ENCOUNTER — CLINICAL SUPPORT (OUTPATIENT)
Dept: REHABILITATION | Facility: HOSPITAL | Age: 47
End: 2017-08-03
Attending: ORTHOPAEDIC SURGERY
Payer: COMMERCIAL

## 2017-08-03 DIAGNOSIS — Z74.09 DECREASED FUNCTIONAL MOBILITY AND ENDURANCE: ICD-10-CM

## 2017-08-03 DIAGNOSIS — M25.60 DECREASED RANGE OF MOTION: Primary | ICD-10-CM

## 2017-08-03 DIAGNOSIS — R29.898 DECREASED GRIP STRENGTH OF RIGHT HAND: ICD-10-CM

## 2017-08-03 DIAGNOSIS — M25.441 SWELLING OF FINGER JOINT OF RIGHT HAND: ICD-10-CM

## 2017-08-03 DIAGNOSIS — M79.644 PAIN IN FINGER OF RIGHT HAND: ICD-10-CM

## 2017-08-03 PROCEDURE — 97140 MANUAL THERAPY 1/> REGIONS: CPT | Mod: PN

## 2017-08-03 PROCEDURE — 97110 THERAPEUTIC EXERCISES: CPT | Mod: PN

## 2017-08-03 PROCEDURE — 97530 THERAPEUTIC ACTIVITIES: CPT | Mod: PN

## 2017-08-03 PROCEDURE — 97035 APP MDLTY 1+ULTRASOUND EA 15: CPT | Mod: PN
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
## 2017-08-07 ENCOUNTER — OFFICE VISIT (OUTPATIENT)
Dept: ORTHOPEDICS | Facility: CLINIC | Age: 47
End: 2017-08-07
Payer: COMMERCIAL

## 2017-08-07 VITALS — WEIGHT: 220 LBS | BODY MASS INDEX: 35.36 KG/M2 | HEIGHT: 66 IN

## 2017-08-07 DIAGNOSIS — S63.054D CMC (CARPOMETACARPAL JOINT) DISLOCATION, RIGHT, SUBSEQUENT ENCOUNTER: ICD-10-CM

## 2017-08-07 DIAGNOSIS — Z98.890 S/P HARDWARE REMOVAL: Primary | ICD-10-CM

## 2017-08-07 PROCEDURE — 99999 PR PBB SHADOW E&M-EST. PATIENT-LVL II: CPT | Mod: PBBFAC,,, | Performed by: ORTHOPAEDIC SURGERY

## 2017-08-07 PROCEDURE — 99213 OFFICE O/P EST LOW 20 MIN: CPT | Mod: S$GLB,,, | Performed by: ORTHOPAEDIC SURGERY

## 2017-08-07 PROCEDURE — 3008F BODY MASS INDEX DOCD: CPT | Mod: S$GLB,,, | Performed by: ORTHOPAEDIC SURGERY

## 2017-08-08 ENCOUNTER — CLINICAL SUPPORT (OUTPATIENT)
Dept: REHABILITATION | Facility: HOSPITAL | Age: 47
End: 2017-08-08
Attending: ORTHOPAEDIC SURGERY
Payer: COMMERCIAL

## 2017-08-08 DIAGNOSIS — M79.644 PAIN IN FINGER OF RIGHT HAND: ICD-10-CM

## 2017-08-08 DIAGNOSIS — M25.441 SWELLING OF FINGER JOINT OF RIGHT HAND: ICD-10-CM

## 2017-08-08 DIAGNOSIS — Z74.09 DECREASED FUNCTIONAL MOBILITY AND ENDURANCE: ICD-10-CM

## 2017-08-08 DIAGNOSIS — R29.898 DECREASED GRIP STRENGTH OF RIGHT HAND: ICD-10-CM

## 2017-08-08 DIAGNOSIS — M25.60 DECREASED RANGE OF MOTION: Primary | ICD-10-CM

## 2017-08-08 PROCEDURE — 97530 THERAPEUTIC ACTIVITIES: CPT | Mod: PN

## 2017-08-08 PROCEDURE — 97018 PARAFFIN BATH THERAPY: CPT | Mod: PN

## 2017-08-08 PROCEDURE — 97110 THERAPEUTIC EXERCISES: CPT | Mod: PN

## 2017-08-08 PROCEDURE — 97140 MANUAL THERAPY 1/> REGIONS: CPT | Mod: PN

## 2017-08-08 PROCEDURE — 97035 APP MDLTY 1+ULTRASOUND EA 15: CPT | Mod: PN

## 2017-08-08 NOTE — PROGRESS NOTES
"DATE: 8/7/2017  PATIENT: Abhi Pompa    Attending Physician: Jonathan Vela M.D.    HISTORY:  Abhi Pompa is a 46 y.o. male who returns for follow up evaluation of  his right thumb.  He is status post closed reduction of her convenience pinning of a right first carpometacarpal thumb dislocation, 4/11/17 with subsequent pin removal 5/30/17.  He has been going to hand therapy and does note significant improvement in his motion as well as decreasing his pain.  Pain is reported at 1/10 currently.  He continues to go to therapy and reports good progress.    PMH/PSH/FamHx/SocHx:  Reviewed and unchanged from prior visit    ROS:  Constitution: Negative for chills, fever, and sweats. Negative for unexplained weight loss.  HENT: Negative for headaches and blurry vision.   Cardiovascular: Negative for chest pain, irregular heartbeat, leg swelling and palpitations.   Respiratory: Negative for cough and shortness of breath.   Gastrointestinal: Negative for abdominal pain, heartburn, nausea and vomiting.   Genitourinary: Negative for bladder incontinence and dysuria.   Musculoskeletal: Negative for systemic arthritis, joint swelling, muscle weakness and myalgias.   Neurological: Negative for numbness.   Psychiatric/Behavioral: Negative for depression.   Endocrine: Negative for polyuria.   Hematologic/Lymphatic: Negative for bleeding disorders.  Skin: Negative for poor wound healing.       EXAM:  Ht 5' 6" (1.676 m)   Wt 99.8 kg (220 lb)   BMI 35.51 kg/m²   Healthy-appearing male in no acute distress.  Examination of the right thumb reveals the incision is healed.  There is mild scar formation.  Range of motion of the thumb is full to flexion, extension, abduction and adduction.   strength is normal.  Pinch strength appears normal as well.  Sensation intact the thumb.    IMAGING:   No x-rays are performed today.    ASSESSMENT:  Status post revision debridement and pin removal right carpometacarpal joint, " 5/30/17.  Status post closed reduction and percutaneous pinning right first carpometacarpal fracture dislocation, 4/11/17.    PLAN:  The implications of the patient's evolution of symptoms and findings were explained to the patient in detail.  Gennaro is doing much better.  He is improved significantly since last visit.  Pain is less.  Activities may be performed as tolerated.  He'll complete his course of therapy.  Should he have further problems I'll see him back.  Otherwise, follow-up as needed          This note was dictated using voice recognition software and may contain grammatical errors
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
## 2017-08-24 ENCOUNTER — CLINICAL SUPPORT (OUTPATIENT)
Dept: REHABILITATION | Facility: HOSPITAL | Age: 47
End: 2017-08-24
Attending: ORTHOPAEDIC SURGERY
Payer: COMMERCIAL

## 2017-08-24 DIAGNOSIS — M25.60 DECREASED RANGE OF MOTION: Primary | ICD-10-CM

## 2017-08-24 DIAGNOSIS — R29.898 DECREASED GRIP STRENGTH OF RIGHT HAND: ICD-10-CM

## 2017-08-24 DIAGNOSIS — M79.644 PAIN IN FINGER OF RIGHT HAND: ICD-10-CM

## 2017-08-24 DIAGNOSIS — M25.441 SWELLING OF FINGER JOINT OF RIGHT HAND: ICD-10-CM

## 2017-08-24 DIAGNOSIS — Z74.09 DECREASED FUNCTIONAL MOBILITY AND ENDURANCE: ICD-10-CM

## 2017-08-24 PROCEDURE — 97035 APP MDLTY 1+ULTRASOUND EA 15: CPT | Mod: PN

## 2017-08-24 PROCEDURE — 97018 PARAFFIN BATH THERAPY: CPT | Mod: PN

## 2017-08-24 PROCEDURE — 97110 THERAPEUTIC EXERCISES: CPT | Mod: PN

## 2017-08-24 PROCEDURE — 97530 THERAPEUTIC ACTIVITIES: CPT | Mod: 59,PN
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
## 2017-08-28 ENCOUNTER — PATIENT MESSAGE (OUTPATIENT)
Dept: GASTROENTEROLOGY | Facility: CLINIC | Age: 47
End: 2017-08-28

## 2017-09-08 ENCOUNTER — LAB VISIT (OUTPATIENT)
Dept: LAB | Facility: HOSPITAL | Age: 47
End: 2017-09-08
Attending: INTERNAL MEDICINE
Payer: COMMERCIAL

## 2017-09-08 DIAGNOSIS — E78.5 HYPERLIPIDEMIA, UNSPECIFIED HYPERLIPIDEMIA TYPE: ICD-10-CM

## 2017-09-08 DIAGNOSIS — E11.9 TYPE 2 DIABETES MELLITUS WITHOUT COMPLICATION, WITHOUT LONG-TERM CURRENT USE OF INSULIN: ICD-10-CM

## 2017-09-08 DIAGNOSIS — F41.9 ANXIETY: ICD-10-CM

## 2017-09-08 DIAGNOSIS — E29.1 MALE HYPOGONADISM: ICD-10-CM

## 2017-09-08 LAB
ALBUMIN SERPL BCP-MCNC: 3.9 G/DL
ALP SERPL-CCNC: 86 U/L
ALT SERPL W/O P-5'-P-CCNC: 46 U/L
ANION GAP SERPL CALC-SCNC: 10 MMOL/L
AST SERPL-CCNC: 20 U/L
BASOPHILS # BLD AUTO: 0.03 K/UL
BASOPHILS NFR BLD: 0.5 %
BILIRUB SERPL-MCNC: 0.5 MG/DL
BUN SERPL-MCNC: 25 MG/DL
CALCIUM SERPL-MCNC: 9.2 MG/DL
CHLORIDE SERPL-SCNC: 106 MMOL/L
CHOLEST SERPL-MCNC: 194 MG/DL
CHOLEST/HDLC SERPL: 5.9 {RATIO}
CO2 SERPL-SCNC: 22 MMOL/L
CREAT SERPL-MCNC: 1 MG/DL
DIFFERENTIAL METHOD: ABNORMAL
EOSINOPHIL # BLD AUTO: 0.2 K/UL
EOSINOPHIL NFR BLD: 2.5 %
ERYTHROCYTE [DISTWIDTH] IN BLOOD BY AUTOMATED COUNT: 12.7 %
EST. GFR  (AFRICAN AMERICAN): >60 ML/MIN/1.73 M^2
EST. GFR  (NON AFRICAN AMERICAN): >60 ML/MIN/1.73 M^2
ESTIMATED AVG GLUCOSE: 154 MG/DL
GLUCOSE SERPL-MCNC: 169 MG/DL
HBA1C MFR BLD HPLC: 7 %
HCT VFR BLD AUTO: 45.8 %
HDLC SERPL-MCNC: 33 MG/DL
HDLC SERPL: 17 %
HGB BLD-MCNC: 15.6 G/DL
LDLC SERPL CALC-MCNC: 112.4 MG/DL
LYMPHOCYTES # BLD AUTO: 1.9 K/UL
LYMPHOCYTES NFR BLD: 29.3 %
MCH RBC QN AUTO: 31.1 PG
MCHC RBC AUTO-ENTMCNC: 34.1 G/DL
MCV RBC AUTO: 91 FL
MONOCYTES # BLD AUTO: 0.8 K/UL
MONOCYTES NFR BLD: 13.2 %
NEUTROPHILS # BLD AUTO: 3.4 K/UL
NEUTROPHILS NFR BLD: 54.2 %
NONHDLC SERPL-MCNC: 161 MG/DL
PLATELET # BLD AUTO: 321 K/UL
PMV BLD AUTO: 11 FL
POTASSIUM SERPL-SCNC: 4.1 MMOL/L
PROT SERPL-MCNC: 6.9 G/DL
RBC # BLD AUTO: 5.02 M/UL
SODIUM SERPL-SCNC: 138 MMOL/L
TESTOST SERPL-MCNC: 220 NG/DL
TRIGL SERPL-MCNC: 243 MG/DL
WBC # BLD AUTO: 6.34 K/UL

## 2017-09-08 PROCEDURE — 84403 ASSAY OF TOTAL TESTOSTERONE: CPT

## 2017-09-08 PROCEDURE — 83036 HEMOGLOBIN GLYCOSYLATED A1C: CPT

## 2017-09-08 PROCEDURE — 36415 COLL VENOUS BLD VENIPUNCTURE: CPT | Mod: PO

## 2017-09-08 PROCEDURE — 80053 COMPREHEN METABOLIC PANEL: CPT

## 2017-09-08 PROCEDURE — 85025 COMPLETE CBC W/AUTO DIFF WBC: CPT

## 2017-09-08 PROCEDURE — 80061 LIPID PANEL: CPT

## 2017-09-14 ENCOUNTER — CLINICAL SUPPORT (OUTPATIENT)
Dept: REHABILITATION | Facility: HOSPITAL | Age: 47
End: 2017-09-14
Attending: ORTHOPAEDIC SURGERY
Payer: COMMERCIAL

## 2017-09-14 DIAGNOSIS — M25.60 DECREASED RANGE OF MOTION: Primary | ICD-10-CM

## 2017-09-14 DIAGNOSIS — M79.644 PAIN IN FINGER OF RIGHT HAND: ICD-10-CM

## 2017-09-14 DIAGNOSIS — R29.898 DECREASED GRIP STRENGTH OF RIGHT HAND: ICD-10-CM

## 2017-09-14 DIAGNOSIS — Z74.09 DECREASED FUNCTIONAL MOBILITY AND ENDURANCE: ICD-10-CM

## 2017-09-14 DIAGNOSIS — M25.441 SWELLING OF FINGER JOINT OF RIGHT HAND: ICD-10-CM

## 2017-09-14 PROCEDURE — 97140 MANUAL THERAPY 1/> REGIONS: CPT | Mod: PN

## 2017-09-14 PROCEDURE — 97530 THERAPEUTIC ACTIVITIES: CPT | Mod: PN

## 2017-09-14 PROCEDURE — 97035 APP MDLTY 1+ULTRASOUND EA 15: CPT | Mod: PN

## 2017-09-14 PROCEDURE — 97018 PARAFFIN BATH THERAPY: CPT | Mod: PN

## 2017-09-14 PROCEDURE — 97110 THERAPEUTIC EXERCISES: CPT | Mod: PN
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
## 2017-09-15 ENCOUNTER — OFFICE VISIT (OUTPATIENT)
Dept: ENDOCRINOLOGY | Facility: CLINIC | Age: 47
End: 2017-09-15
Payer: COMMERCIAL

## 2017-09-15 VITALS
WEIGHT: 239.19 LBS | DIASTOLIC BLOOD PRESSURE: 80 MMHG | HEART RATE: 79 BPM | BODY MASS INDEX: 38.44 KG/M2 | SYSTOLIC BLOOD PRESSURE: 120 MMHG | HEIGHT: 66 IN

## 2017-09-15 DIAGNOSIS — E11.69 HYPERLIPIDEMIA ASSOCIATED WITH TYPE 2 DIABETES MELLITUS: ICD-10-CM

## 2017-09-15 DIAGNOSIS — E29.1 MALE HYPOGONADISM: ICD-10-CM

## 2017-09-15 DIAGNOSIS — E78.5 HYPERLIPIDEMIA ASSOCIATED WITH TYPE 2 DIABETES MELLITUS: ICD-10-CM

## 2017-09-15 DIAGNOSIS — E11.9 TYPE 2 DIABETES MELLITUS WITHOUT COMPLICATION, WITHOUT LONG-TERM CURRENT USE OF INSULIN: Primary | ICD-10-CM

## 2017-09-15 PROCEDURE — 99214 OFFICE O/P EST MOD 30 MIN: CPT | Mod: S$GLB,,, | Performed by: INTERNAL MEDICINE

## 2017-09-15 PROCEDURE — 3045F PR MOST RECENT HEMOGLOBIN A1C LEVEL 7.0-9.0%: CPT | Mod: S$GLB,,, | Performed by: INTERNAL MEDICINE

## 2017-09-15 PROCEDURE — 3008F BODY MASS INDEX DOCD: CPT | Mod: S$GLB,,, | Performed by: INTERNAL MEDICINE

## 2017-09-15 PROCEDURE — 99999 PR PBB SHADOW E&M-EST. PATIENT-LVL II: CPT | Mod: PBBFAC,,, | Performed by: INTERNAL MEDICINE

## 2017-09-15 RX ORDER — MINOXIDIL 2 %
1 SOLUTION, NON-ORAL TOPICAL 2 TIMES DAILY
COMMUNITY

## 2017-09-15 RX ORDER — METFORMIN HYDROCHLORIDE 500 MG/1
1000 TABLET, EXTENDED RELEASE ORAL 2 TIMES DAILY WITH MEALS
Qty: 120 TABLET | Refills: 6 | Status: SHIPPED | OUTPATIENT
Start: 2017-09-15 | End: 2018-02-28 | Stop reason: SDUPTHER

## 2017-09-15 RX ORDER — ACETAMINOPHEN, DIPHENHYDRAMINE HCL, PHENYLEPHRINE HCL 325; 25; 5 MG/1; MG/1; MG/1
10 TABLET ORAL NIGHTLY
COMMUNITY

## 2017-09-15 NOTE — PROGRESS NOTES
CHIEF COMPLAINT: DM2  46 year old being seen as a f/u. Diagnosed with diabetes 2013. Off actos and now on metformin XR. On testosterone 50 mg Q 2weeks. He is fatigued. He does use melatonin for sleep. Increasing exercise. Zoloft being changed to prozac.         PAST MEDICAL HISTORY/PAST SURGICAL HISTORY:  Reviewed in [facility]    SOCIAL HISTORY: No tobacco. Social alcohol. Desk Job-     FAMILY HISTORY:  + DM 2. No known thyroid disease.     MEDICATIONS/ALLERGIES: The patient's MedCard has been updated and reviewed.      ROS:   Constitutional: weight increased   Eyes: No recent visual changes  ENT: No dysphagia  Cardiovascular: Denies current anginal symptoms  Respiratory: Denies current respiratory difficulty  Gastrointestinal: No N/V  GenitoUrinary - No dysuria  Skin: No new skin rash  Neurologic: No focal neurologic complaints  Remainder ROS negative        PE:    GENERAL: Well developed, well nourished.  NECK: Supple, trachea midline, No palpable nodules  CHEST: Resp even and unlabored, CTA bilateral.  CARDIAC: RRR, S1, S2 heard, no murmurs, rubs, S3, or S4  FEET: Normal monofilament. No cuts or abrasions. 2 + pedal pulses.     Results for ALBA NARAYANAN (MRN [id number]) as of 9/15/2017 10:04   Ref. Range 9/8/2017 07:07 9/8/2017 07:30   Sodium Latest Ref Range: 136 - 145 mmol/L  138   Potassium Latest Ref Range: 3.5 - 5.1 mmol/L  4.1   Chloride Latest Ref Range: 95 - 110 mmol/L  106   CO2 Latest Ref Range: 23 - 29 mmol/L  22 (L)   Anion Gap Latest Ref Range: 8 - 16 mmol/L  10   BUN, Bld Latest Ref Range: 6 - 20 mg/dL  25 (H)   Creatinine Latest Ref Range: 0.5 - 1.4 mg/dL  1.0   eGFR if non African American Latest Ref Range: >60 mL/min/1.73 m^2  >60.0   eGFR if African American Latest Ref Range: >60 mL/min/1.73 m^2  >60.0   Glucose Latest Ref Range: 70 - 110 mg/dL  169 (H)   Calcium Latest Ref Range: 8.7 - 10.5 mg/dL  9.2   Alkaline Phosphatase Latest Ref Range: 55 - 135 U/L  86   Total Protein Latest Ref Range: 6.0  - 8.4 g/dL  6.9   Albumin Latest Ref Range: 3.5 - 5.2 g/dL  3.9   Total Bilirubin Latest Ref Range: 0.1 - 1.0 mg/dL  0.5   AST Latest Ref Range: 10 - 40 U/L  20   ALT Latest Ref Range: 10 - 44 U/L  46 (H)   Triglycerides Latest Ref Range: 30 - 150 mg/dL  243 (H)   Cholesterol Latest Ref Range: 120 - 199 mg/dL  194   HDL Latest Ref Range: 40 - 75 mg/dL  33 (L)   LDL Cholesterol Latest Ref Range: 63.0 - 159.0 mg/dL  112.4   Total Cholesterol/HDL Ratio Latest Ref Range: 2.0 - 5.0   5.9 (H)   Hemoglobin A1C Latest Ref Range: 4.0 - 5.6 %  7.0 (H)   Estimated Avg Glucose Latest Ref Range: 68 - 131 mg/dL  154 (H)   Testosterone, Total Latest Ref Range: 195.0 - 1138.0 ng/dL  220   Microalbum.,U,Random Latest Units: ug/mL 7.0    Microalb Creat Ratio Latest Ref Range: 0.0 - 30.0 ug/mg 4.1              ASSESSMENT/PLAN:  1. DM 2- No known complications. WIll increase metformin.     2. Hyperlipidemia- Continue statin. Tolerating well. Monitor diet as Trig increased    3. Male Hypogonadism- Can continue current therapy. Will recheck labs 1 week after injection. The recent labs may have been done when the injection was due.     FOLLOWUP  3 months- A1c, testosterone- do 1 week after injection  F/U 6 months with Fasting CMP, CBC, A1c, FLP, testosterone

## 2017-09-28 ENCOUNTER — CLINICAL SUPPORT (OUTPATIENT)
Dept: REHABILITATION | Facility: HOSPITAL | Age: 47
End: 2017-09-28
Attending: ORTHOPAEDIC SURGERY
Payer: COMMERCIAL

## 2017-09-28 DIAGNOSIS — Z74.09 DECREASED FUNCTIONAL MOBILITY AND ENDURANCE: ICD-10-CM

## 2017-09-28 DIAGNOSIS — M25.60 DECREASED RANGE OF MOTION: ICD-10-CM

## 2017-09-28 DIAGNOSIS — M25.441 SWELLING OF FINGER JOINT OF RIGHT HAND: ICD-10-CM

## 2017-09-28 DIAGNOSIS — R29.898 DECREASED GRIP STRENGTH OF RIGHT HAND: Primary | ICD-10-CM

## 2017-09-28 DIAGNOSIS — M79.644 PAIN IN FINGER OF RIGHT HAND: ICD-10-CM

## 2017-09-28 PROCEDURE — 97140 MANUAL THERAPY 1/> REGIONS: CPT | Mod: PN

## 2017-09-28 PROCEDURE — 97035 APP MDLTY 1+ULTRASOUND EA 15: CPT | Mod: PN

## 2017-09-28 PROCEDURE — 97530 THERAPEUTIC ACTIVITIES: CPT | Mod: 59,PN

## 2017-09-28 PROCEDURE — 97110 THERAPEUTIC EXERCISES: CPT | Mod: PN
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
## 2017-10-10 ENCOUNTER — CLINICAL SUPPORT (OUTPATIENT)
Dept: REHABILITATION | Facility: HOSPITAL | Age: 47
End: 2017-10-10
Attending: ORTHOPAEDIC SURGERY
Payer: COMMERCIAL

## 2017-10-10 DIAGNOSIS — Z74.09 DECREASED FUNCTIONAL MOBILITY AND ENDURANCE: ICD-10-CM

## 2017-10-10 DIAGNOSIS — M25.441 SWELLING OF FINGER JOINT OF RIGHT HAND: ICD-10-CM

## 2017-10-10 DIAGNOSIS — M79.644 PAIN IN FINGER OF RIGHT HAND: ICD-10-CM

## 2017-10-10 DIAGNOSIS — M25.60 DECREASED RANGE OF MOTION: Primary | ICD-10-CM

## 2017-10-10 DIAGNOSIS — R29.898 DECREASED GRIP STRENGTH OF RIGHT HAND: ICD-10-CM

## 2017-10-10 PROCEDURE — 97035 APP MDLTY 1+ULTRASOUND EA 15: CPT | Mod: PN

## 2017-10-10 PROCEDURE — 97110 THERAPEUTIC EXERCISES: CPT | Mod: PN

## 2017-10-10 PROCEDURE — 97530 THERAPEUTIC ACTIVITIES: CPT | Mod: 59,PN
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
## 2017-10-18 ENCOUNTER — OFFICE VISIT (OUTPATIENT)
Dept: OPTOMETRY | Facility: CLINIC | Age: 47
End: 2017-10-18
Payer: COMMERCIAL

## 2017-10-18 DIAGNOSIS — H52.4 MYOPIA WITH ASTIGMATISM AND PRESBYOPIA, BILATERAL: ICD-10-CM

## 2017-10-18 DIAGNOSIS — E11.9 TYPE 2 DIABETES MELLITUS WITHOUT RETINOPATHY: Primary | ICD-10-CM

## 2017-10-18 DIAGNOSIS — H52.13 MYOPIA WITH ASTIGMATISM AND PRESBYOPIA, BILATERAL: ICD-10-CM

## 2017-10-18 DIAGNOSIS — H25.13 NUCLEAR SCLEROSIS OF BOTH EYES: ICD-10-CM

## 2017-10-18 DIAGNOSIS — H52.203 MYOPIA WITH ASTIGMATISM AND PRESBYOPIA, BILATERAL: ICD-10-CM

## 2017-10-18 PROCEDURE — 92014 COMPRE OPH EXAM EST PT 1/>: CPT | Mod: S$GLB,,, | Performed by: OPTOMETRIST

## 2017-10-18 PROCEDURE — 99999 PR PBB SHADOW E&M-EST. PATIENT-LVL II: CPT | Mod: PBBFAC,,, | Performed by: OPTOMETRIST

## 2017-10-18 RX ORDER — FLUOXETINE 10 MG/1
20 CAPSULE ORAL DAILY
COMMUNITY
End: 2018-06-28

## 2017-10-18 NOTE — PROGRESS NOTES
HPI     Routine diabetic eye exam--dle--11/16  Diabetic eye exam  Pt complains of blurred vision at near. Wearing otc reading glasses. No   eye pain. No floaters or flashes.    Hemoglobin A1C       Date                     Value               Ref Range             Status                09/08/2017               7.0 (H)             4.0 - 5.6 %           Final              Comment:    According to ADA guidelines, hemoglobin A1c <7.0% represents  optimal   control in non-pregnant diabetic patients. Different  metrics may apply to   specific patient populations.   Standards of Medical Care in   Diabetes-2016.  For the purpose of screening for the presence of   diabetes:  <5.7%     Consistent with the absence of diabetes  5.7-6.4%    Consistent with increasing risk for diabetes   (prediabetes)  >or=6.5%    Consistent with diabetes  Currently, no consensus exists for use of   hemoglobin A1c  for diagnosis of diabetes for children.  This Hemoglobin   A1c assay has significant interference with fetal   hemoglobin   (HbF).   The results are invalid for patients with abnormal amounts of   HbF,     including those with known Hereditary Persistence   of Fetal Hemoglobin.   Heterozygous hemoglobin variants (HbAS, HbAC,   HbAD, HbAE, HbA2) do not   significantly interfere with this assay;   however, presence of multiple   variants in a sample may impact the %   interference.         03/24/2017               7.0 (H)             4.5 - 6.2 %           Final              Comment:    According to ADA guidelines, hemoglobin A1C <7.0% represents  optimal   control in non-pregnant diabetic patients.  Different  metrics may apply   to specific populations.   Standards of Medical Care in Diabetes -   2016.  For the purpose of screening for the presence of diabetes:  <5.7%       Consistent with the absence of diabetes  5.7-6.4%  Consistent with   increasing risk for diabetes   (prediabetes)  >or=6.5%  Consistent with   diabetes  Currently no  consensus exists for use of hemoglobin A1C  for   diagnosis of diabetes for children.         09/30/2016               6.4 (H)             4.5 - 6.2 %           Final              Comment:    According to ADA guidelines, hemoglobin A1C <7.0% represents  optimal   control in non-pregnant diabetic patients.  Different  metrics may apply   to specific populations.   Standards of Medical Care in Diabetes -   2016.  For the purpose of screening for the presence of diabetes:  <5.7%       Consistent with the absence of diabetes  5.7-6.4%  Consistent with   increasing risk for diabetes   (prediabetes)  >or=6.5%  Consistent with   diabetes  Currently no consensus exists for use of hemoglobin A1C  for   diagnosis of diabetes for children.    ----------      Last edited by Adonay Azevedo, OD on 10/18/2017  2:44 PM. (History)              Assessment /Plan     For exam results, see Encounter Report.    Type 2 diabetes mellitus without retinopathy    Nuclear sclerosis of both eyes    Myopia with astigmatism and presbyopia, bilateral      1. No diabetic retinopathy, no csme. Return in 1 year for dilated eye exam.  2. Educated pt on presence of cataracts and effects on vision. No surgery at this time. Recheck in one year.  3. No Spec Rx given. Different lens options discussed with patient. RTC 1 year full exam.

## 2017-11-07 ENCOUNTER — CLINICAL SUPPORT (OUTPATIENT)
Dept: REHABILITATION | Facility: HOSPITAL | Age: 47
End: 2017-11-07
Attending: ORTHOPAEDIC SURGERY
Payer: COMMERCIAL

## 2017-11-07 DIAGNOSIS — R29.898 DECREASED GRIP STRENGTH OF RIGHT HAND: ICD-10-CM

## 2017-11-07 DIAGNOSIS — Z74.09 DECREASED FUNCTIONAL MOBILITY AND ENDURANCE: ICD-10-CM

## 2017-11-07 DIAGNOSIS — M25.441 SWELLING OF FINGER JOINT OF RIGHT HAND: ICD-10-CM

## 2017-11-07 DIAGNOSIS — M25.60 DECREASED RANGE OF MOTION: Primary | ICD-10-CM

## 2017-11-07 DIAGNOSIS — M79.644 PAIN IN FINGER OF RIGHT HAND: ICD-10-CM

## 2017-11-07 PROCEDURE — 97035 APP MDLTY 1+ULTRASOUND EA 15: CPT | Mod: PN

## 2017-11-07 PROCEDURE — 97018 PARAFFIN BATH THERAPY: CPT | Mod: PN

## 2017-11-07 PROCEDURE — 97110 THERAPEUTIC EXERCISES: CPT | Mod: PN
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
## 2017-11-21 ENCOUNTER — CLINICAL SUPPORT (OUTPATIENT)
Dept: REHABILITATION | Facility: HOSPITAL | Age: 47
End: 2017-11-21
Attending: ORTHOPAEDIC SURGERY
Payer: COMMERCIAL

## 2017-11-21 DIAGNOSIS — M25.441 SWELLING OF FINGER JOINT OF RIGHT HAND: ICD-10-CM

## 2017-11-21 DIAGNOSIS — Z74.09 DECREASED FUNCTIONAL MOBILITY AND ENDURANCE: ICD-10-CM

## 2017-11-21 DIAGNOSIS — M25.60 DECREASED RANGE OF MOTION: Primary | ICD-10-CM

## 2017-11-21 DIAGNOSIS — R29.898 DECREASED GRIP STRENGTH OF RIGHT HAND: ICD-10-CM

## 2017-11-21 DIAGNOSIS — M79.644 PAIN IN FINGER OF RIGHT HAND: ICD-10-CM

## 2017-11-21 PROCEDURE — 97018 PARAFFIN BATH THERAPY: CPT | Mod: PN

## 2017-11-21 PROCEDURE — 97110 THERAPEUTIC EXERCISES: CPT | Mod: PN

## 2017-11-21 PROCEDURE — 97035 APP MDLTY 1+ULTRASOUND EA 15: CPT | Mod: PN
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
## 2017-12-05 ENCOUNTER — CLINICAL SUPPORT (OUTPATIENT)
Dept: REHABILITATION | Facility: HOSPITAL | Age: 47
End: 2017-12-05
Attending: ORTHOPAEDIC SURGERY
Payer: COMMERCIAL

## 2017-12-05 PROCEDURE — 97110 THERAPEUTIC EXERCISES: CPT | Mod: PN

## 2017-12-05 PROCEDURE — 97140 MANUAL THERAPY 1/> REGIONS: CPT | Mod: PN

## 2017-12-05 PROCEDURE — 97035 APP MDLTY 1+ULTRASOUND EA 15: CPT | Mod: PN
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
## 2017-12-11 ENCOUNTER — PATIENT MESSAGE (OUTPATIENT)
Dept: ENDOCRINOLOGY | Facility: CLINIC | Age: 47
End: 2017-12-11

## 2017-12-12 DIAGNOSIS — E11.8 TYPE 2 DIABETES MELLITUS WITH COMPLICATION, WITHOUT LONG-TERM CURRENT USE OF INSULIN: Primary | ICD-10-CM

## 2017-12-21 ENCOUNTER — DOCUMENTATION ONLY (OUTPATIENT)
Dept: REHABILITATION | Facility: HOSPITAL | Age: 47
End: 2017-12-21

## 2017-12-27 ENCOUNTER — CLINICAL SUPPORT (OUTPATIENT)
Dept: REHABILITATION | Facility: HOSPITAL | Age: 47
End: 2017-12-27
Attending: ORTHOPAEDIC SURGERY
Payer: COMMERCIAL

## 2017-12-27 ENCOUNTER — CLINICAL SUPPORT (OUTPATIENT)
Dept: DIABETES | Facility: CLINIC | Age: 47
End: 2017-12-27
Payer: COMMERCIAL

## 2017-12-27 DIAGNOSIS — Z74.09 DECREASED FUNCTIONAL MOBILITY AND ENDURANCE: ICD-10-CM

## 2017-12-27 DIAGNOSIS — M79.644 PAIN IN FINGER OF RIGHT HAND: ICD-10-CM

## 2017-12-27 DIAGNOSIS — M25.60 DECREASED RANGE OF MOTION: Primary | ICD-10-CM

## 2017-12-27 DIAGNOSIS — E11.9 DIABETES MELLITUS WITHOUT COMPLICATION: Primary | ICD-10-CM

## 2017-12-27 DIAGNOSIS — M25.441 SWELLING OF FINGER JOINT OF RIGHT HAND: ICD-10-CM

## 2017-12-27 DIAGNOSIS — R29.898 DECREASED GRIP STRENGTH OF RIGHT HAND: ICD-10-CM

## 2017-12-27 PROCEDURE — G0108 DIAB MANAGE TRN  PER INDIV: HCPCS | Mod: S$GLB,,, | Performed by: DIETITIAN, REGISTERED

## 2017-12-27 PROCEDURE — 97530 THERAPEUTIC ACTIVITIES: CPT | Mod: PN

## 2017-12-27 PROCEDURE — 99999 PR PBB SHADOW E&M-EST. PATIENT-LVL I: CPT | Mod: PBBFAC,,,

## 2017-12-27 PROCEDURE — 97110 THERAPEUTIC EXERCISES: CPT | Mod: PN

## 2017-12-27 PROCEDURE — 97035 APP MDLTY 1+ULTRASOUND EA 15: CPT | Mod: PN

## 2017-12-27 PROCEDURE — G8985 CARRY GOAL STATUS: HCPCS | Mod: CJ,PN

## 2017-12-27 PROCEDURE — G8986 CARRY D/C STATUS: HCPCS | Mod: CI,PN

## 2017-12-27 PROCEDURE — G8984 CARRY CURRENT STATUS: HCPCS | Mod: CI,PN

## 2017-12-27 PROCEDURE — 97140 MANUAL THERAPY 1/> REGIONS: CPT | Mod: PN
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
## 2017-12-28 ENCOUNTER — PATIENT MESSAGE (OUTPATIENT)
Dept: ENDOCRINOLOGY | Facility: CLINIC | Age: 47
End: 2017-12-28

## 2017-12-28 VITALS — HEIGHT: 66 IN | WEIGHT: 243.38 LBS | BODY MASS INDEX: 39.12 KG/M2

## 2017-12-28 NOTE — PROGRESS NOTES
Diabetes Education  Author: Leslie Ford RD  Date: 12/28/2017    Diabetes Education Visit  Diabetes Education Record Assessment/Progress: Comprehensive/Group    Diabetes Type  Diabetes Type : Type II    Diabetes History  Diabetes Diagnosis: 1-3 years    Nutrition  Meal Planning: eats out often, snacks between meal, 3 meals per day (Has been trying to follow a low carb diet and increase muscle mass, has been restricting his carbs)  What type of sweetener do you use?: none  What type of beverages do you drink?: water  Meal Plan 24 Hour Recall - Breakfast: Premier protein shake (drinks 1-2), coffee (no sweetener, fat free half and half)  Meal Plan 24 Hour Recall - Lunch: hamburer (no buns), french fries, water OR raad, spring rolls  Meal Plan 24 Hour Recall - Dinner: canned greens  Meal Plan 24 Hour Recall - Snack: sugar free pudding, sugar free jello, HN cheerios    Monitoring   Monitoring: Other (One Touch )  Self Monitoring : Checks every other day  Blood Glucose Logs: No (Self reports glucose  before lunch/dinner, rare checking before breakfast reveals glucose 150-180)    Exercise   Exercise Type: walking, use exercise equipment (Resistance training twice a week, walks 40289 steps daily (in 2 intervals at work))  Intensity: Moderate  Frequency: 3-5 Times per week  Duration: 1 hour    Current Diabetes Treatment   Current Treatment: Oral Medication (metformin 1000 mg twice daily)    Social History  Preferred Learning Method: Face to Face  Primary Support: Self  Occupation: Works at [employer]  Smoking Status: Never a Smoker  Alcohol Use: Daily (2 glasses of wine most evenings)    Barriers to Change  Barriers to Change: None  Learning Challenges : None    Readiness to Learn   Readiness to Learn : Eager    Cultural Influences  Cultural Influences: None    Diabetes Education Assessment/Progress  Nutrition (Incorporating nutritional management into one's lifestyle): Discussion, Written Materials Provided,  Demonstrates Understanding/Competency (verbalizes/demonstrates), Comprehends Key Points.  Pt has been following a very low CHO diet over the past week (les than 15 gm CHO most meals) as he thought this would benefit his desire to increase muscle mass.  Discussed importance of balance eating at all meals and snacks--in agreement for him to follow low CHO diet and patient verbalizes understanding.  Will work to have 30 grams CHO at meals and if snacking will limit CHO to 15 gm.  Pt to add lean protein and unsaturated fat to meals.     Physical Activity (incorporating physical activity into one's lifestyle): Discussion, Comprehends Key Points.  Pt states that his focus is on weight training, but explained importance of adequate aerobic exercise as well for health.  Pt will make an effort to consistently get walking in daily.    Medications (states correct name, dose, onset, peak, duration, side effects & timing of meds): Discussion, Comprehends Key Points    Monitoring (monitoring blood glucose/other parameters & using results): Discussion, Written Materials Provided.  Pt encouraged to start checking glucose once daily at various times of the day before meals or before bedtime to get better understanding of glucose patterns.  Pt strongly encouraged to bring glucose logs to each MD visit.      Acute Complications (preventing, detecting, and treating acute complications): Discussion    Clinical (diabetes and other pertinent medical history): Discussion.  History of hyperlipidemia    Behavioral (readiness for change, lifestyle practices, self-care behaviors): Discussion, Demonstrates Understanding/Competency (verbalizes/demonstrates)    Goals  Patient has selected/evaluated goals during today's session: Yes, selected  Healthy Eating: Set (Trying to balance meals with CHO, add 30 grams to each meal and limit CHO in snacks to 15-20 gm)  Start Date: 12/27/17  Target Date: 06/30/18  Monitoring:  (Will check at various times  before meals/bedtime to better determine glucose patterns)  Start Date: 12/27/17  Target Date: 06/30/18    Diabetes Self-Management Support Plan  Exercise/Nutrition:  (Has a  at the gym)    Diabetes Care Plan/Intervention  Education Plan/Intervention: Individual Follow-Up DSMT, Endocrine Provider Visit Set Up (Appt with Kaiden set for March 2018, will see DM education again in June 2018.  Labs set for March 2018.  Pt will start balancing meals with CHO, lean protein, and unsaturated fat)    Diabetes Meal Plan  Carbohydrate Per Meal: 30-45g  Carbohydrate Per Snack : 15-20g  Fat: unsaturated fat  Protein: lean protein    Education Units of Time   Time Spent: 60 min      Health Maintenance Topics with due status: Not Due       Topic Last Completion Date    Pneumococcal PPSV23 (Medium Risk) 01/22/2016    TETANUS VACCINE 09/15/2016    Lipid Panel 09/08/2017    Hemoglobin A1c 09/08/2017    Urine Microalbumin 09/08/2017    Foot Exam 09/15/2017    Eye Exam 10/18/2017     Health Maintenance Due   Topic Date Due    Influenza Vaccine  08/01/2017

## 2018-01-02 ENCOUNTER — PATIENT MESSAGE (OUTPATIENT)
Dept: ORTHOPEDICS | Facility: CLINIC | Age: 48
End: 2018-01-02

## 2018-01-02 DIAGNOSIS — Z98.890 S/P HARDWARE REMOVAL: Primary | ICD-10-CM

## 2018-01-08 ENCOUNTER — OFFICE VISIT (OUTPATIENT)
Dept: GASTROENTEROLOGY | Facility: CLINIC | Age: 48
End: 2018-01-08
Payer: COMMERCIAL

## 2018-01-08 VITALS
DIASTOLIC BLOOD PRESSURE: 66 MMHG | HEIGHT: 66 IN | SYSTOLIC BLOOD PRESSURE: 108 MMHG | HEART RATE: 78 BPM | WEIGHT: 241 LBS | BODY MASS INDEX: 38.73 KG/M2

## 2018-01-08 DIAGNOSIS — Z51.81 ENCOUNTER FOR MONITORING LONG-TERM PROTON PUMP INHIBITOR THERAPY: ICD-10-CM

## 2018-01-08 DIAGNOSIS — K22.0 ACHALASIA: ICD-10-CM

## 2018-01-08 DIAGNOSIS — K21.00 GASTROESOPHAGEAL REFLUX DISEASE WITH ESOPHAGITIS: Primary | ICD-10-CM

## 2018-01-08 DIAGNOSIS — R13.19 INTERMITTENT DYSPHAGIA: ICD-10-CM

## 2018-01-08 DIAGNOSIS — Z79.899 ENCOUNTER FOR MONITORING LONG-TERM PROTON PUMP INHIBITOR THERAPY: ICD-10-CM

## 2018-01-08 DIAGNOSIS — Z86.19 HISTORY OF HELICOBACTER PYLORI INFECTION: ICD-10-CM

## 2018-01-08 DIAGNOSIS — Z98.890 HISTORY OF ESOPHAGEAL SURGERY: ICD-10-CM

## 2018-01-08 PROCEDURE — 99214 OFFICE O/P EST MOD 30 MIN: CPT | Mod: S$GLB,,, | Performed by: NURSE PRACTITIONER

## 2018-01-08 PROCEDURE — 99999 PR PBB SHADOW E&M-EST. PATIENT-LVL IV: CPT | Mod: PBBFAC,,, | Performed by: NURSE PRACTITIONER

## 2018-01-08 RX ORDER — OMEPRAZOLE 40 MG/1
40 CAPSULE, DELAYED RELEASE ORAL
Qty: 30 CAPSULE | Refills: 5 | Status: SHIPPED | OUTPATIENT
Start: 2018-01-08 | End: 2018-07-16 | Stop reason: SDUPTHER

## 2018-01-08 NOTE — PATIENT INSTRUCTIONS

## 2018-01-08 NOTE — LETTER
January 9, 2018        Luis A Le MD  [address] [facility] [address]             [place] - G[facility]enter[facility]  [address]  [facility] [address]  Phone: [phone]   Patient: Abhi Pompa   MR Number: [id number]   YOB: 1970   Date of Visit: 1/8/2018       Dear Dr. Le:    Thank you for referring Abhi Pompa to me for evaluation. Below are the relevant portions of my assessment and plan of care.        1. Gastroesophageal reflux disease with esophagitis    2. Achalasia    3. History of esophageal surgery    4. History of Helicobacter pylori infection    5. Encounter for monitoring long-term proton pump inhibitor therapy          Gastroesophageal reflux disease with esophagitis  -     omeprazole (PRILOSEC) 40 MG capsule; Take 1 capsule (40 mg total) by mouth before breakfast.  Dispense: 30 capsule; Refill: 5  -     ranitidine (ZANTAC) 300 MG tablet; Take 1 tablet (300 mg total) by mouth nightly.  Dispense: 30 tablet; Refill: 5    Achalasia    History of esophageal surgery    History of Helicobacter pylori infection    Encounter for monitoring long-term proton pump inhibitor therapy  -     Vitamin B12; Future; Expected date: 01/08/2018  -     Magnesium; Future; Expected date: 01/08/2018  -     Hepatic function panel; Future; Expected date: 01/08/2018      Prior visit info:  Gastroesophageal reflux disease with esophagitis    Pharyngoesophageal dysphagia    Dyspepsia  - symptom has resolved    History of Helicobacter pylori infection  - H. pylori celio test was negative in December 2015 performed with EGD    Gastroesophageal reflux disease, esophagitis presence not specified  - CONTINUE omeprazole (PRILOSEC) 40 MG capsule; Take 1 capsule (40 mg total) by mouth once daily, take in the morning before breakfast, discussed about possible long term use of medication and discuss risk with taking PPI's long term, and to take calcium and vitamin d supplements, pt  verbalized understanding  -discussed the different types of medications used to treat reflux and how to use them, antacids can be used PRN for breakthrough heartburn symptoms,& PPI's work to block acid production and are taken daily, patient verbalized understanding.  -Educated patient on lifestyle modifications to help control reflux including: avoid large meals, avoid eating within 2-3 hours of bedtime (avoid late night eating & lying down soon after eating), elevate head of bed if nocturnal symptoms are present, & weight loss.   -Educated to avoid known foods which trigger reflux symptoms & to minimize/avoid high-fat foods, chocolate, caffeine, citrus, alcohol, & tomato products.  -Advised to avoid/limit use of NSAID's, since they can cause GI upset, bleeding, and/or ulcers. If needed, take with food.  - CONTINUE ranitidine (ZANTAC) 300 MG tablet; Take 1 tablet (300 mg total) by mouth every evening.  Dispense: 30 tablet; Refill: 11    Return in about 1 month (around 2/8/2018), or if symptoms worsen or fail to improve.      If you have questions, please do not hesitate to call me. I look forward to following Abhi along with you.    Sincerely,      Teri Kirk, KARYNA           CC  No Recipients

## 2018-01-08 NOTE — PROGRESS NOTES
Subjective:       Patient ID: Abhi Pompa is a 47 y.o. male Body mass index is 38.9 kg/m².    Chief Complaint: Follow-up (reflux returning)  This patient is established with myself and Dr. Dunbar.    Patient is here for follow-up. Since last visit had heller myotomy surgery for achalasia (done by Dr. Le). Patient reports significant improvement in symptoms after the surgery. Here for follow-up for GERD reporting that he ran out of the prescription medication (prilosec) and has been using OTC zantac 300 mg once daily.      Gastroesophageal Reflux   He complains of belching (not more than usual), coughing (occasional at night), dysphagia (occasional about once a week; occurs only with cold drinks, chicken and doughy foods; significantly improved since surgery) and heartburn (occasional about 1-2 times a week). He reports no abdominal pain, no chest pain, no choking, no early satiety, no globus sensation (resolved), no hoarse voice, no nausea, no sore throat or no water brash. This is a chronic (for several years) problem. The problem occurs occasionally (occurs about 1-2 times a week). The problem has been gradually worsening (since out of prilosec). The heartburn duration is more than one hour. The heartburn is located in the substernum. The heartburn is of moderate intensity. The heartburn wakes him from sleep. The heartburn changes with position. The symptoms are aggravated by lying down and stress (tomato products, red foods, lying on left side). Associated symptoms include weight loss (trying to lose weight; seeing dietician and doing weight training; lost about 8 lbs over the past 60 days). Pertinent negatives include no fatigue or melena. Risk factors include ETOH use, caffeine use, obesity and smoking/tobacco exposure (reports NSAIDs use often; former chewing tobacco user; one glass of wine a night). He has tried a histamine-2 antagonist, an antacid, a PPI, a diet change and ETOH  reduction (zantac 300 mg once daily, PAST: carafate two capsules BID, prilosec 40 mg- helped, needs refill) for the symptoms. The treatment provided moderate relief. Past procedures include an EGD (see below) and esophageal manometry. Past invasive treatments include gastroplication (heller with fundoplication).     Review of Systems   Constitutional: Positive for weight loss (trying to lose weight; seeing dietician and doing weight training; lost about 8 lbs over the past 60 days). Negative for appetite change, chills, diaphoresis, fatigue, fever and unexpected weight change.   HENT: Positive for trouble swallowing. Negative for drooling, hoarse voice, mouth sores, sore throat and voice change.    Respiratory: Positive for cough (occasional at night). Negative for choking, chest tightness and shortness of breath.    Cardiovascular: Negative for chest pain and palpitations.   Gastrointestinal: Positive for dysphagia (occasional about once a week; occurs only with cold drinks, chicken and doughy foods; significantly improved since surgery) and heartburn (occasional about 1-2 times a week). Negative for abdominal distention, abdominal pain, anal bleeding, blood in stool, constipation, diarrhea, melena, nausea, rectal pain and vomiting.   Neurological: Negative for dizziness and weakness.       Past Medical History:   Diagnosis Date    Achalasia     Arthritis     right ankle    Diabetes mellitus     taking lisinopril preventive to protect the kidneys, no HTN    Dyslipidemia     pt denies    Dysphagia     Former tobacco use     chew    GERD (gastroesophageal reflux disease)     H. pylori infection     Hypogonadism in male     MELLY (obstructive sleep apnea)     uses cpap     Past Surgical History:   Procedure Laterality Date    ANKLE SURGERY Right     HAND SURGERY Right     laparoscopic Heller myotomy      MYOTOMY  07/2016    heller- achalasia surgery    TONSILLECTOMY      UPPER GASTROINTESTINAL ENDOSCOPY   12/2015    WISDOM TOOTH EXTRACTION       Family History   Problem Relation Age of Onset    Diabetes Mother     Allergies Mother     Diabetes Maternal Aunt     Allergic rhinitis Sister     Angioedema Neg Hx     Asthma Neg Hx     Eczema Neg Hx     Immunodeficiency Neg Hx     Urticaria Neg Hx      Wt Readings from Last 10 Encounters:   01/08/18 109.3 kg (241 lb)   12/27/17 110.4 kg (243 lb 6.2 oz)   09/15/17 108.5 kg (239 lb 3.2 oz)   08/07/17 99.8 kg (220 lb)   07/10/17 99.8 kg (220 lb)   06/05/17 99.8 kg (220 lb)   05/26/17 99.8 kg (220 lb)   04/20/17 102.1 kg (225 lb)   04/10/17 102.1 kg (225 lb)   04/10/17 107.5 kg (237 lb)     Lab Results   Component Value Date    WBC 6.34 09/08/2017    HGB 15.6 09/08/2017    HCT 45.8 09/08/2017    MCV 91 09/08/2017     09/08/2017     CMP  Sodium   Date Value Ref Range Status   09/08/2017 138 136 - 145 mmol/L Final     Potassium   Date Value Ref Range Status   09/08/2017 4.1 3.5 - 5.1 mmol/L Final     Chloride   Date Value Ref Range Status   09/08/2017 106 95 - 110 mmol/L Final     CO2   Date Value Ref Range Status   09/08/2017 22 (L) 23 - 29 mmol/L Final     Glucose   Date Value Ref Range Status   09/08/2017 169 (H) 70 - 110 mg/dL Final     BUN, Bld   Date Value Ref Range Status   09/08/2017 25 (H) 6 - 20 mg/dL Final     Creatinine   Date Value Ref Range Status   09/08/2017 1.0 0.5 - 1.4 mg/dL Final     Calcium   Date Value Ref Range Status   09/08/2017 9.2 8.7 - 10.5 mg/dL Final     Total Protein   Date Value Ref Range Status   09/08/2017 6.9 6.0 - 8.4 g/dL Final     Albumin   Date Value Ref Range Status   09/08/2017 3.9 3.5 - 5.2 g/dL Final     Total Bilirubin   Date Value Ref Range Status   09/08/2017 0.5 0.1 - 1.0 mg/dL Final     Comment:     For infants and newborns, interpretation of results should be based  on gestational age, weight and in agreement with clinical  observations.  Premature Infant recommended reference ranges:  Up to 24  "hours.............<8.0 mg/dL  Up to 48 hours............<12.0 mg/dL  3-5 days..................<15.0 mg/dL  6-29 days.................<15.0 mg/dL       Alkaline Phosphatase   Date Value Ref Range Status   09/08/2017 86 55 - 135 U/L Final     AST   Date Value Ref Range Status   09/08/2017 20 10 - 40 U/L Final     ALT   Date Value Ref Range Status   09/08/2017 46 (H) 10 - 44 U/L Final     Anion Gap   Date Value Ref Range Status   09/08/2017 10 8 - 16 mmol/L Final     eGFR if    Date Value Ref Range Status   09/08/2017 >60.0 >60 mL/min/1.73 m^2 Final     eGFR if non    Date Value Ref Range Status   09/08/2017 >60.0 >60 mL/min/1.73 m^2 Final     Comment:     Calculation used to obtain the estimated glomerular filtration  rate (eGFR) is the CKD-EPI equation. Since race is unknown   in our information system, the eGFR values for   -American and Non--American patients are given   for each creatinine result.       Lab Results   Component Value Date    TSH 1.193 01/29/2016     Reviewed prior medical records including radiology report of 2/2/16 esophagram; 2/26/16 esophageal manometry; & endoscopy history (see surgical history).    12/11/2015 EGD was reviewed and procedure report states:   " Impression: - Normal oropharynx.  - Reflux esophagitis. Biopsied.  - Normal esophagus.  - Z-line regular, 40 cm from the incisors.  - Normal cardia.  - Normal stomach. Biopsied.  - Normal examined duodenum.  - No endoscopic esophageal abnormality to explain   patient's dysphagia. Esophagus dilated, 51 Fr and 54   Fr.  Recommendation: - Discharge patient to home.  - Await pathology results.  - Follow an antireflux regimen.  - Continue present medications.  - Use Prilosec (omeprazole) 40 mg PO daily.  - Use Zantac (ranitidine) 300 mg PO daily.  - Add sucralfate tablets 2 gram PO BID for 2 weeks,   [facility] and HS.  - Call the G.I. clinic in 2 weeks for reports (if   you haven't heard from us " "sooner) [phone].  - Return to GI clinic in 6-8 weeks.  - If no improvement, do an upper GI series. ".  Biopsy results:   "DISTAL ESOPHAGUS, BIOPSY:  -Squamous mucosa with mild reactive changes.  -No significant inflammation.  -No intraepithelial eosinophils."  Objective:      Physical Exam   Constitutional: He is oriented to person, place, and time. He appears well-developed and well-nourished. No distress.   HENT:   Mouth/Throat: Oropharynx is clear and moist and mucous membranes are normal. No oral lesions. No oropharyngeal exudate.   Neck: Full passive range of motion without pain. Neck supple. No tracheal deviation present.   Cardiovascular: Normal rate and regular rhythm.    Pulmonary/Chest: Effort normal and breath sounds normal. No respiratory distress. He has no wheezes. He has no rhonchi. He has no rales. He exhibits no tenderness.   Abdominal: Soft. Normal appearance and bowel sounds are normal. He exhibits no distension, no abdominal bruit and no mass. There is no tenderness. There is no rigidity, no rebound, no guarding, no CVA tenderness, no tenderness at McBurney's point and negative Vera's sign. No hernia.   Lymphadenopathy:     He has no cervical adenopathy.   Neurological: He is alert and oriented to person, place, and time.   Skin: Skin is warm and dry. No rash noted. He is not diaphoretic. No erythema. No pallor.   Non-jaundiced   Psychiatric: He has a normal mood and affect. His behavior is normal.   Vitals reviewed.      Assessment:       1. Gastroesophageal reflux disease with esophagitis    2. Achalasia    3. History of esophageal surgery    4. History of Helicobacter pylori infection    5. Encounter for monitoring long-term proton pump inhibitor therapy    6. Intermittent dysphagia        Plan:       Gastroesophageal reflux disease with esophagitis & History of Helicobacter pylori infection  -   RESTART  omeprazole (PRILOSEC) 40 MG capsule; Take 1 capsule (40 mg total) by mouth before " breakfast.  Dispense: 30 capsule; Refill: 5, discussed about possible long term use of medication and discuss risk with taking PPI's long term, and to take calcium and vitamin d supplements, pt verbalized understanding  -discussed the different types of medications used to treat reflux and how to use them, antacids can be used PRN for breakthrough heartburn symptoms,& PPI's work to block acid production and are taken daily; discussed about trying a different PPI if omeprazole was not helping; patient verbalized understanding and would like to restart omeprazole.  -continue lifestyle modifications to help control reflux including: avoid large meals, avoid eating within 2-3 hours of bedtime (avoid late night eating & lying down soon after eating), elevate head of bed if nocturnal symptoms are present, & weight loss.   - avoid known foods which trigger reflux symptoms & to minimize/avoid high-fat foods, chocolate, caffeine, citrus, alcohol, & tomato products.  - avoid/limit use of NSAID's, since they can cause GI upset, bleeding, and/or ulcers. If needed, take with food.  -   REFILL  ranitidine (ZANTAC) 300 MG tablet; Take 1 tablet (300 mg total) by mouth nightly.  Dispense: 30 tablet; Refill: 5  - schedule EGD, discussed procedure with patient, patient verbalized understanding but declined EGD; patient reports he would like to restart omeprazole and if symptoms persist, he will follow-up for continued evaluation and management  - if symptoms persist, recommend further testing to include EGD and h pylori testing    Intermittent dysphagia, Achalasia, & History of esophageal surgery  - schedule EGD, discussed procedure with patient, patient verbalized understanding but declined EGD; patient reports he would like to restart omeprazole and if symptoms persist, he will follow-up for continued evaluation and management    Encounter for monitoring long-term proton pump inhibitor therapy  -     Vitamin B12; Future; Expected  date: 01/08/2018  -     Magnesium; Future; Expected date: 01/08/2018  -     Hepatic function panel; Future; Expected date: 01/08/2018  - discussed with patient about long term use of reflux medications and the risk of using these medications long term. Some research studies (not all) have shown decrease absorption of calcium when taking PPI's and H2 blockers, but those same research studies showed that adequate dietary (milk and cheese) and/or supplement of calcium and vitamin d supplement induces enough acid secretion for calcium absorption. Also, discussed about the risk vs benefit of untreated GERD such as hernandez's esophagus.  - recommend annual monitoring with blood work to include CMP, CBC, vitamin B12, and magnesium.    Return in about 1 month (around 2/8/2018), or if symptoms worsen or fail to improve.    If no improvement in symptoms or symptoms worsen, call/follow-up at clinic or go to ER

## 2018-01-30 ENCOUNTER — CLINICAL SUPPORT (OUTPATIENT)
Dept: REHABILITATION | Facility: HOSPITAL | Age: 48
End: 2018-01-30
Attending: ORTHOPAEDIC SURGERY
Payer: COMMERCIAL

## 2018-01-30 DIAGNOSIS — M25.60 DECREASED RANGE OF MOTION: ICD-10-CM

## 2018-01-30 DIAGNOSIS — M79.644 PAIN IN FINGER OF RIGHT HAND: ICD-10-CM

## 2018-01-30 DIAGNOSIS — M25.441 SWELLING OF FINGER JOINT OF RIGHT HAND: Primary | ICD-10-CM

## 2018-01-30 PROCEDURE — 97035 APP MDLTY 1+ULTRASOUND EA 15: CPT | Mod: PN

## 2018-01-30 PROCEDURE — 97140 MANUAL THERAPY 1/> REGIONS: CPT | Mod: PN
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
## 2018-02-23 ENCOUNTER — LAB VISIT (OUTPATIENT)
Dept: LAB | Facility: HOSPITAL | Age: 48
End: 2018-02-23
Attending: INTERNAL MEDICINE
Payer: COMMERCIAL

## 2018-02-23 DIAGNOSIS — E78.5 HYPERLIPIDEMIA ASSOCIATED WITH TYPE 2 DIABETES MELLITUS: ICD-10-CM

## 2018-02-23 DIAGNOSIS — E29.1 MALE HYPOGONADISM: ICD-10-CM

## 2018-02-23 DIAGNOSIS — E11.69 HYPERLIPIDEMIA ASSOCIATED WITH TYPE 2 DIABETES MELLITUS: ICD-10-CM

## 2018-02-23 DIAGNOSIS — E11.9 TYPE 2 DIABETES MELLITUS WITHOUT COMPLICATION, WITHOUT LONG-TERM CURRENT USE OF INSULIN: ICD-10-CM

## 2018-02-23 LAB
ALBUMIN SERPL BCP-MCNC: 3.9 G/DL
ALP SERPL-CCNC: 97 U/L
ALT SERPL W/O P-5'-P-CCNC: 53 U/L
ANION GAP SERPL CALC-SCNC: 6 MMOL/L
AST SERPL-CCNC: 24 U/L
BASOPHILS # BLD AUTO: 0.06 K/UL
BASOPHILS NFR BLD: 0.9 %
BILIRUB SERPL-MCNC: 0.7 MG/DL
BUN SERPL-MCNC: 17 MG/DL
CALCIUM SERPL-MCNC: 9.3 MG/DL
CHLORIDE SERPL-SCNC: 104 MMOL/L
CHOLEST SERPL-MCNC: 217 MG/DL
CHOLEST/HDLC SERPL: 5.7 {RATIO}
CO2 SERPL-SCNC: 25 MMOL/L
CREAT SERPL-MCNC: 1 MG/DL
DIFFERENTIAL METHOD: NORMAL
EOSINOPHIL # BLD AUTO: 0.1 K/UL
EOSINOPHIL NFR BLD: 1.9 %
ERYTHROCYTE [DISTWIDTH] IN BLOOD BY AUTOMATED COUNT: 12.6 %
EST. GFR  (AFRICAN AMERICAN): >60 ML/MIN/1.73 M^2
EST. GFR  (NON AFRICAN AMERICAN): >60 ML/MIN/1.73 M^2
ESTIMATED AVG GLUCOSE: 194 MG/DL
GLUCOSE SERPL-MCNC: 279 MG/DL
HBA1C MFR BLD HPLC: 8.4 %
HCT VFR BLD AUTO: 44 %
HDLC SERPL-MCNC: 38 MG/DL
HDLC SERPL: 17.5 %
HGB BLD-MCNC: 15 G/DL
IMM GRANULOCYTES # BLD AUTO: 0.03 K/UL
IMM GRANULOCYTES NFR BLD AUTO: 0.4 %
LDLC SERPL CALC-MCNC: 136.6 MG/DL
LYMPHOCYTES # BLD AUTO: 1.8 K/UL
LYMPHOCYTES NFR BLD: 26.1 %
MCH RBC QN AUTO: 30.2 PG
MCHC RBC AUTO-ENTMCNC: 34.1 G/DL
MCV RBC AUTO: 89 FL
MONOCYTES # BLD AUTO: 0.7 K/UL
MONOCYTES NFR BLD: 10.5 %
NEUTROPHILS # BLD AUTO: 4.2 K/UL
NEUTROPHILS NFR BLD: 60.2 %
NONHDLC SERPL-MCNC: 179 MG/DL
NRBC BLD-RTO: 0 /100 WBC
PLATELET # BLD AUTO: 308 K/UL
PMV BLD AUTO: 11 FL
POTASSIUM SERPL-SCNC: 4.2 MMOL/L
PROT SERPL-MCNC: 7 G/DL
RBC # BLD AUTO: 4.96 M/UL
SODIUM SERPL-SCNC: 135 MMOL/L
TESTOST SERPL-MCNC: 202 NG/DL
TRIGL SERPL-MCNC: 212 MG/DL
WBC # BLD AUTO: 6.97 K/UL

## 2018-02-23 PROCEDURE — 84403 ASSAY OF TOTAL TESTOSTERONE: CPT

## 2018-02-23 PROCEDURE — 36415 COLL VENOUS BLD VENIPUNCTURE: CPT | Mod: PO

## 2018-02-23 PROCEDURE — 80061 LIPID PANEL: CPT

## 2018-02-23 PROCEDURE — 85025 COMPLETE CBC W/AUTO DIFF WBC: CPT

## 2018-02-23 PROCEDURE — 80053 COMPREHEN METABOLIC PANEL: CPT

## 2018-02-23 PROCEDURE — 83036 HEMOGLOBIN GLYCOSYLATED A1C: CPT

## 2018-02-28 RX ORDER — METFORMIN HYDROCHLORIDE 500 MG/1
1000 TABLET, EXTENDED RELEASE ORAL 2 TIMES DAILY WITH MEALS
Qty: 360 TABLET | Refills: 3 | Status: SHIPPED | OUTPATIENT
Start: 2018-02-28 | End: 2019-04-06 | Stop reason: SDUPTHER

## 2018-03-02 ENCOUNTER — OFFICE VISIT (OUTPATIENT)
Dept: ENDOCRINOLOGY | Facility: CLINIC | Age: 48
End: 2018-03-02
Payer: COMMERCIAL

## 2018-03-02 ENCOUNTER — PATIENT MESSAGE (OUTPATIENT)
Dept: ENDOCRINOLOGY | Facility: CLINIC | Age: 48
End: 2018-03-02

## 2018-03-02 VITALS
HEART RATE: 86 BPM | SYSTOLIC BLOOD PRESSURE: 122 MMHG | HEIGHT: 66 IN | BODY MASS INDEX: 38.6 KG/M2 | WEIGHT: 240.19 LBS | DIASTOLIC BLOOD PRESSURE: 62 MMHG

## 2018-03-02 DIAGNOSIS — E11.9 TYPE 2 DIABETES MELLITUS TREATED WITHOUT INSULIN: Primary | ICD-10-CM

## 2018-03-02 DIAGNOSIS — E29.1 MALE HYPOGONADISM: ICD-10-CM

## 2018-03-02 DIAGNOSIS — E11.69 HYPERLIPIDEMIA ASSOCIATED WITH TYPE 2 DIABETES MELLITUS: ICD-10-CM

## 2018-03-02 DIAGNOSIS — E78.5 HYPERLIPIDEMIA ASSOCIATED WITH TYPE 2 DIABETES MELLITUS: ICD-10-CM

## 2018-03-02 PROCEDURE — 99999 PR PBB SHADOW E&M-EST. PATIENT-LVL III: CPT | Mod: PBBFAC,,, | Performed by: INTERNAL MEDICINE

## 2018-03-02 PROCEDURE — 99214 OFFICE O/P EST MOD 30 MIN: CPT | Mod: S$GLB,,, | Performed by: INTERNAL MEDICINE

## 2018-03-02 RX ORDER — FLUOXETINE HYDROCHLORIDE 20 MG/1
20 CAPSULE ORAL EVERY MORNING
Refills: 1 | COMMUNITY
Start: 2018-01-21 | End: 2020-09-18

## 2018-03-02 NOTE — PROGRESS NOTES
CHIEF COMPLAINT: DM2  47 year old being seen as a f/u. Diagnosed with diabetes 2013. Off actos and now on metformin XR 1000 BID. On testosterone 50 mg Q 2weeks. Working with a dietician. Has been exercising. Has been under a lot of stress at home. Has been seeing DM Education. Had an eye exam in oct. No paresthesias.         PAST MEDICAL HISTORY/PAST SURGICAL HISTORY:  Reviewed in [facility]    SOCIAL HISTORY: No tobacco. Social alcohol. Desk Job-     FAMILY HISTORY:  + DM 2. No known thyroid disease.     MEDICATIONS/ALLERGIES: The patient's MedCard has been updated and reviewed.      ROS:   Constitutional: weight increased   Eyes: No recent visual changes  ENT: No dysphagia  Cardiovascular: Denies current anginal symptoms  Respiratory: Denies current respiratory difficulty  Gastrointestinal: No N/V  GenitoUrinary - No dysuria  Skin: No new skin rash  Neurologic: No focal neurologic complaints  Remainder ROS negative        PE:    GENERAL: Well developed, well nourished.  NECK: Supple, trachea midline, No palpable nodules  CHEST: Resp even and unlabored, CTA bilateral.  CARDIAC: RRR, S1, S2 heard, no murmurs, rubs, S3, or S4  FEET: Normal monofilament. No cuts or abrasions. 2 + pedal pulses.       Results for ALBA NARAYANAN (MRN [id number]) as of 3/2/2018 10:44   Ref. Range 2/23/2018 08:34   Sodium Latest Ref Range: 136 - 145 mmol/L 135 (L)   Potassium Latest Ref Range: 3.5 - 5.1 mmol/L 4.2   Chloride Latest Ref Range: 95 - 110 mmol/L 104   CO2 Latest Ref Range: 23 - 29 mmol/L 25   Anion Gap Latest Ref Range: 8 - 16 mmol/L 6 (L)   BUN, Bld Latest Ref Range: 6 - 20 mg/dL 17   Creatinine Latest Ref Range: 0.5 - 1.4 mg/dL 1.0   eGFR if non African American Latest Ref Range: >60 mL/min/1.73 m^2 >60.0   eGFR if African American Latest Ref Range: >60 mL/min/1.73 m^2 >60.0   Glucose Latest Ref Range: 70 - 110 mg/dL 279 (H)   Calcium Latest Ref Range: 8.7 - 10.5 mg/dL 9.3   Alkaline Phosphatase Latest Ref Range: 55 - 135 U/L 97    Total Protein Latest Ref Range: 6.0 - 8.4 g/dL 7.0   Albumin Latest Ref Range: 3.5 - 5.2 g/dL 3.9   Total Bilirubin Latest Ref Range: 0.1 - 1.0 mg/dL 0.7   AST Latest Ref Range: 10 - 40 U/L 24   ALT Latest Ref Range: 10 - 44 U/L 53 (H)   Triglycerides Latest Ref Range: 30 - 150 mg/dL 212 (H)   Cholesterol Latest Ref Range: 120 - 199 mg/dL 217 (H)   HDL Latest Ref Range: 40 - 75 mg/dL 38 (L)   LDL Cholesterol Latest Ref Range: 63.0 - 159.0 mg/dL 136.6   Total Cholesterol/HDL Ratio Latest Ref Range: 2.0 - 5.0  5.7 (H)   Hemoglobin A1C Latest Ref Range: 4.0 - 5.6 % 8.4 (H)   Estimated Avg Glucose Latest Ref Range: 68 - 131 mg/dL 194 (H)   Testosterone, Total Latest Ref Range: 195.0 - 1138.0 ng/dL 202         ASSESSMENT/PLAN:  1. DM 2- No known complications. Has seen DM Education. With increase in A1c discussed addition of another therapy. Discussed options that do not cause weight gain- Dpp4, GLP-1, SGLT2. Prefers SGLT 2. Discussed s/e- Mycotic infections, DKA, etc. He will call to see what is covered.     2. Hyperlipidemia- Continue statin. Tolerating well. Monitor diet as Trig increased    3. Male Hypogonadism- Can continue current therapy. He would like to try half the dose but give it weekly.     FOLLOWUP  F/U 4 months with Fasting CMP, CBC, A1c, FLP, testosterone

## 2018-03-06 ENCOUNTER — PATIENT MESSAGE (OUTPATIENT)
Dept: ENDOCRINOLOGY | Facility: CLINIC | Age: 48
End: 2018-03-06

## 2018-03-07 ENCOUNTER — TELEPHONE (OUTPATIENT)
Dept: ENDOCRINOLOGY | Facility: CLINIC | Age: 48
End: 2018-03-07

## 2018-03-09 ENCOUNTER — PATIENT MESSAGE (OUTPATIENT)
Dept: ENDOCRINOLOGY | Facility: CLINIC | Age: 48
End: 2018-03-09

## 2018-03-14 ENCOUNTER — TELEPHONE (OUTPATIENT)
Dept: ENDOCRINOLOGY | Facility: CLINIC | Age: 48
End: 2018-03-14

## 2018-03-23 ENCOUNTER — TELEPHONE (OUTPATIENT)
Dept: OPHTHALMOLOGY | Facility: CLINIC | Age: 48
End: 2018-03-23

## 2018-03-24 ENCOUNTER — OFFICE VISIT (OUTPATIENT)
Dept: OPTOMETRY | Facility: CLINIC | Age: 48
End: 2018-03-24
Payer: COMMERCIAL

## 2018-03-24 DIAGNOSIS — E11.9 TYPE 2 DIABETES MELLITUS WITHOUT RETINOPATHY: Primary | ICD-10-CM

## 2018-03-24 DIAGNOSIS — H52.4 BILATERAL PRESBYOPIA: ICD-10-CM

## 2018-03-24 PROCEDURE — 92012 INTRM OPH EXAM EST PATIENT: CPT | Mod: S$GLB,,, | Performed by: OPTOMETRIST

## 2018-03-24 PROCEDURE — 99999 PR PBB SHADOW E&M-EST. PATIENT-LVL II: CPT | Mod: PBBFAC,,, | Performed by: OPTOMETRIST

## 2018-03-27 ENCOUNTER — CLINICAL SUPPORT (OUTPATIENT)
Dept: REHABILITATION | Facility: HOSPITAL | Age: 48
End: 2018-03-27
Attending: ORTHOPAEDIC SURGERY
Payer: COMMERCIAL

## 2018-03-27 DIAGNOSIS — M79.644 PAIN IN FINGER OF RIGHT HAND: Primary | ICD-10-CM

## 2018-03-27 PROCEDURE — 97035 APP MDLTY 1+ULTRASOUND EA 15: CPT | Mod: PN
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
## 2018-04-11 RX ORDER — TESTOSTERONE CYPIONATE 1000 MG/10ML
INJECTION, SOLUTION INTRAMUSCULAR
Qty: 10 ML | Refills: 3 | Status: SHIPPED | OUTPATIENT
Start: 2018-04-11 | End: 2018-04-11 | Stop reason: SDUPTHER

## 2018-04-11 RX ORDER — TESTOSTERONE CYPIONATE 1000 MG/10ML
100 INJECTION, SOLUTION INTRAMUSCULAR
Qty: 10 ML | Refills: 3 | Status: SHIPPED | OUTPATIENT
Start: 2018-04-11 | End: 2018-10-14 | Stop reason: SDUPTHER

## 2018-04-13 ENCOUNTER — TELEPHONE (OUTPATIENT)
Dept: ENDOCRINOLOGY | Facility: CLINIC | Age: 48
End: 2018-04-13

## 2018-04-13 NOTE — TELEPHONE ENCOUNTER
Pt advised PA done via [email or web address] for testosterone injections.  PA approved thru 4/13/19.  Call if any questions.

## 2018-04-24 ENCOUNTER — CLINICAL SUPPORT (OUTPATIENT)
Dept: REHABILITATION | Facility: HOSPITAL | Age: 48
End: 2018-04-24
Attending: ORTHOPAEDIC SURGERY
Payer: COMMERCIAL

## 2018-04-24 DIAGNOSIS — M25.60 DECREASED RANGE OF MOTION: ICD-10-CM

## 2018-04-24 DIAGNOSIS — M79.644 PAIN IN FINGER OF RIGHT HAND: Primary | ICD-10-CM

## 2018-04-24 DIAGNOSIS — L90.5 SCAR ADHERENT: ICD-10-CM

## 2018-04-24 PROCEDURE — 97035 APP MDLTY 1+ULTRASOUND EA 15: CPT | Mod: PN

## 2018-04-24 PROCEDURE — 97110 THERAPEUTIC EXERCISES: CPT | Mod: PN

## 2018-04-24 PROCEDURE — 97018 PARAFFIN BATH THERAPY: CPT | Mod: PN
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
## 2018-05-14 ENCOUNTER — PATIENT MESSAGE (OUTPATIENT)
Dept: ENDOCRINOLOGY | Facility: CLINIC | Age: 48
End: 2018-05-14

## 2018-05-29 ENCOUNTER — CLINICAL SUPPORT (OUTPATIENT)
Dept: REHABILITATION | Facility: HOSPITAL | Age: 48
End: 2018-05-29
Attending: ORTHOPAEDIC SURGERY
Payer: COMMERCIAL

## 2018-05-29 DIAGNOSIS — L90.5 SCAR ADHERENT: ICD-10-CM

## 2018-05-29 DIAGNOSIS — M79.644 PAIN IN FINGER OF RIGHT HAND: Primary | ICD-10-CM

## 2018-05-29 PROCEDURE — 97035 APP MDLTY 1+ULTRASOUND EA 15: CPT | Mod: PN
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
## 2018-06-06 RX ORDER — EMPAGLIFLOZIN 10 MG/1
TABLET, FILM COATED ORAL
Qty: 30 TABLET | Refills: 3 | Status: SHIPPED | OUTPATIENT
Start: 2018-06-06 | End: 2018-12-05 | Stop reason: SDUPTHER

## 2018-06-07 ENCOUNTER — CLINICAL SUPPORT (OUTPATIENT)
Dept: DIABETES | Facility: CLINIC | Age: 48
End: 2018-06-07
Payer: COMMERCIAL

## 2018-06-07 VITALS — WEIGHT: 234.38 LBS | HEIGHT: 66 IN | BODY MASS INDEX: 37.67 KG/M2

## 2018-06-07 DIAGNOSIS — E11.9 DIABETES MELLITUS WITHOUT COMPLICATION: Primary | ICD-10-CM

## 2018-06-07 PROCEDURE — 99999 PR PBB SHADOW E&M-EST. PATIENT-LVL I: CPT | Mod: PBBFAC,,,

## 2018-06-07 PROCEDURE — G0108 DIAB MANAGE TRN  PER INDIV: HCPCS | Mod: S$GLB,,, | Performed by: DIETITIAN, REGISTERED

## 2018-06-07 NOTE — PROGRESS NOTES
Diabetes Education  Author: Leslie Ford RD  Date: 6/7/2018    Diabetes Education Visit  Diabetes Education Record Assessment/Progress: Post Program/Follow-up    Diabetes Type  Diabetes Type : Type II      Patient has improved eating habits since last visit.  Decreased portions of CHO at meals.  However, he does struggle with trying to avoid carbs at some meals causing increased hunger and more snacking later. Much of appt spent on balanced eating to try to spread out carbs throughout the day--currently patient is eating majority of carbs morning to noon and avoiding the rest of the day.  Not conducive to early morning workouts so encouraged patient discontinue mid morning snack of banana and choose a low/no carb snack instead.  He can then add his banana to evening meal (usually meat and vegetables).      Nutrition  Meal Planning: water, 3 meals per day, diet drinks, eats out often, snacks between meal  What type of sweetener do you use?: none  What type of beverages do you drink?: water, diet soda/tea  Meal Plan 24 Hour Recall - Breakfast: At work in cafeteria: 2 chicken biscuits, 1-2 cups coffee (with half & half only)  Meal Plan 24 Hour Recall - Lunch: gumbo OR cheeseburger (no bun) with few french fries OR Ukrainian food; drinks water  Meal Plan 24 Hour Recall - Dinner: can of soup (wedding soup or chicken noodle), diet rite or tab OR 3 pieces fried chicken  Meal Plan 24 Hour Recall - Snack: banana, sf pudding, trail mix    Monitoring   Monitoring: Other  Self Monitoring : checking 1-2 times daily at various times, before meals, after meals, etc  Blood Glucose Logs: Yes, glucose levels much improved since starting Jardiance in March 2018  Per glucometerpre meal glucose levels ranging,    Breakfast:  mg/dL   Lunch: 108-150 mg/dL   Dinner:  mg/dL    Do you use a personal glucose monitor?: No  In the last month, how often have you had a low blood sugar reaction?: never  Can you tell when your  blood sugar is too high?: no    Exercise   Exercise Type: walking, use exercise equipment, swimming (Walks 10K steps daily, weight trains 4 times a week, swims 2 times a week)  Intensity: High  Frequency: Daily  Duration: 45 min    Current Diabetes Treatment   Current Treatment: Oral Medication: metformin XR 1000mg BID, Jardiance 10mg--takes all diabetic medications before dinner (2000mg metformin + jardiance)    Social History  Preferred Learning Method: Face to Face  Primary Support: Self  Occupation: Works at [employer] plant  Smoking Status: Ex Smoker  Alcohol Use: Rarely     Barriers to Change  Barriers to Change: None  Learning Challenges : None    Readiness to Learn   Readiness to Learn : Eager     Diabetes Education Assessment/Progress  Diabetes Disease Process (diabetes disease process and treatment options): Discussion, Comprehends Key Points.  Patient has Hgb A1c scheduled next week.  Glucose logs indicate improvement from last A1c of 8.4% (2/23/18). Will await results.  Discussed only taking pre meal glucose readings--goal of  mg/dL before meals.      Nutrition (Incorporating nutritional management into one's lifestyle): Discussion, Written Materials Provided, Demonstrates Understanding/Competency (verbalizes/demonstrates).  Reviewed current meals--discussed adding a small amount of carb (fruit) to evening meal since he generally avoids carbs at dinner.  Asked patient to choose a low/no carb snack mid morning instead of typical banana.  Patient will decrease CHO at breakfast by removing 1/2 of biscuit on 2nd chicken biscuit.  Good intake of water.  Patient limiting diet sodas to one 12 fl oz can daily and chooses caffeine free varieties.      Physical Activity (incorporating physical activity into one's lifestyle): Discussion, Comprehends Key Points.  Patient heavily focusing on weight training at this time, needs reminder not to forget to include aerobic exercise regularly in addition to weight  training.      Monitoring (monitoring blood glucose/other parameters & using results): Discussion, Demonstrates Understanding/Competency (verbalizes/demonstrates).  Suggested patient can limit glucose testing to once daily (before meals) but patient likes testing more frequently.      Behavioral (readiness for change, lifestyle practices, self-care behaviors): Discussion, Demonstrates Understanding/Competency (verbalizes/demonstrates): Long discussion on maintenance of lifestyle modifications--avoiding drastic approaches to improving health.  Patient does state he is having increased family stress at this time-pt states he may be getting a divorce.    Goals  Patient has selected/evaluated goals during today's session: Yes, evaluated  Healthy Eating: % Met (Has decreased portions of CHO at meals, continues to avoid carbs at some meals)  Met Percentage : 75%  Monitoring: % Met  Met Percentage : 100%    Diabetes Self-Management Support Plan  Diabetes Learning: DM websites  Exercise/Nutrition: websites  Stress Management: friends, family  Medication: pharmacy  Review Status: Patient has selected and agrees to support plan., Patient was provided Diabetes Self-Management Support Plan document that includes support options.    Diabetes Care Plan/Intervention  Education Plan/Intervention:  Has follow up in Endocrinology 6/28/18.  Would like to follow up with nutrition for compliance/adherence to lifestyle changes every 6 months or in between Endocrine provider visits--will await results of A1c and frequency of follow up visits with Endocrinology.  Patient has phone number or can send [facility] message to schedule follow up appt when ready.       Education Units of Time   Time Spent: 45 min    Health Maintenance was reviewed today with patient. Discussed with patient importance of routine eye exams, foot exams/foot care, blood work (i.e.: A1c, microalbumin, and lipid), dental visits, yearly flu vaccine, and pneumonia vaccine  as indicated by PCP. Patient verbalized understanding.     Health Maintenance Topics with due status: Not Due       Topic Last Completion Date    Pneumococcal PPSV23 (Medium Risk) 01/22/2016    TETANUS VACCINE 09/15/2016    Influenza Vaccine 10/03/2016    Urine Microalbumin 09/08/2017    Foot Exam 09/15/2017    Lipid Panel 02/23/2018    Hemoglobin A1c 02/23/2018    Low Dose Statin 03/24/2018    Eye Exam 03/24/2018     There are no preventive care reminders to display for this patient.

## 2018-06-15 ENCOUNTER — LAB VISIT (OUTPATIENT)
Dept: LAB | Facility: HOSPITAL | Age: 48
End: 2018-06-15
Attending: INTERNAL MEDICINE
Payer: COMMERCIAL

## 2018-06-15 DIAGNOSIS — E29.1 MALE HYPOGONADISM: ICD-10-CM

## 2018-06-15 DIAGNOSIS — E78.5 HYPERLIPIDEMIA ASSOCIATED WITH TYPE 2 DIABETES MELLITUS: ICD-10-CM

## 2018-06-15 DIAGNOSIS — E11.69 HYPERLIPIDEMIA ASSOCIATED WITH TYPE 2 DIABETES MELLITUS: ICD-10-CM

## 2018-06-15 DIAGNOSIS — E11.9 TYPE 2 DIABETES MELLITUS TREATED WITHOUT INSULIN: ICD-10-CM

## 2018-06-15 LAB
ALBUMIN SERPL BCP-MCNC: 4.3 G/DL
ALP SERPL-CCNC: 72 U/L
ALT SERPL W/O P-5'-P-CCNC: 40 U/L
ANION GAP SERPL CALC-SCNC: 13 MMOL/L
AST SERPL-CCNC: 25 U/L
BASOPHILS # BLD AUTO: 0.05 K/UL
BASOPHILS NFR BLD: 0.6 %
BILIRUB SERPL-MCNC: 0.9 MG/DL
BUN SERPL-MCNC: 16 MG/DL
CALCIUM SERPL-MCNC: 9.6 MG/DL
CHLORIDE SERPL-SCNC: 105 MMOL/L
CHOLEST SERPL-MCNC: 140 MG/DL
CHOLEST/HDLC SERPL: 4.4 {RATIO}
CO2 SERPL-SCNC: 21 MMOL/L
CREAT SERPL-MCNC: 1 MG/DL
DIFFERENTIAL METHOD: NORMAL
EOSINOPHIL # BLD AUTO: 0.2 K/UL
EOSINOPHIL NFR BLD: 2.1 %
ERYTHROCYTE [DISTWIDTH] IN BLOOD BY AUTOMATED COUNT: 12.4 %
EST. GFR  (AFRICAN AMERICAN): >60 ML/MIN/1.73 M^2
EST. GFR  (NON AFRICAN AMERICAN): >60 ML/MIN/1.73 M^2
ESTIMATED AVG GLUCOSE: 143 MG/DL
GLUCOSE SERPL-MCNC: 128 MG/DL
HBA1C MFR BLD HPLC: 6.6 %
HCT VFR BLD AUTO: 53.2 %
HDLC SERPL-MCNC: 32 MG/DL
HDLC SERPL: 22.9 %
HGB BLD-MCNC: 17.2 G/DL
IMM GRANULOCYTES # BLD AUTO: 0.02 K/UL
IMM GRANULOCYTES NFR BLD AUTO: 0.3 %
LDLC SERPL CALC-MCNC: 79.6 MG/DL
LYMPHOCYTES # BLD AUTO: 1.7 K/UL
LYMPHOCYTES NFR BLD: 21.7 %
MCH RBC QN AUTO: 29.9 PG
MCHC RBC AUTO-ENTMCNC: 32.3 G/DL
MCV RBC AUTO: 92 FL
MONOCYTES # BLD AUTO: 0.9 K/UL
MONOCYTES NFR BLD: 10.7 %
NEUTROPHILS # BLD AUTO: 5.1 K/UL
NEUTROPHILS NFR BLD: 64.6 %
NONHDLC SERPL-MCNC: 108 MG/DL
NRBC BLD-RTO: 0 /100 WBC
PLATELET # BLD AUTO: 310 K/UL
PMV BLD AUTO: 11.1 FL
POTASSIUM SERPL-SCNC: 4.4 MMOL/L
PROT SERPL-MCNC: 7.1 G/DL
RBC # BLD AUTO: 5.76 M/UL
SODIUM SERPL-SCNC: 139 MMOL/L
TESTOST SERPL-MCNC: 352 NG/DL
TRIGL SERPL-MCNC: 142 MG/DL
WBC # BLD AUTO: 7.93 K/UL

## 2018-06-15 PROCEDURE — 80053 COMPREHEN METABOLIC PANEL: CPT

## 2018-06-15 PROCEDURE — 83036 HEMOGLOBIN GLYCOSYLATED A1C: CPT

## 2018-06-15 PROCEDURE — 85025 COMPLETE CBC W/AUTO DIFF WBC: CPT

## 2018-06-15 PROCEDURE — 36415 COLL VENOUS BLD VENIPUNCTURE: CPT | Mod: PO

## 2018-06-15 PROCEDURE — 84403 ASSAY OF TOTAL TESTOSTERONE: CPT

## 2018-06-15 PROCEDURE — 80061 LIPID PANEL: CPT

## 2018-06-26 ENCOUNTER — CLINICAL SUPPORT (OUTPATIENT)
Dept: REHABILITATION | Facility: HOSPITAL | Age: 48
End: 2018-06-26
Attending: ORTHOPAEDIC SURGERY
Payer: COMMERCIAL

## 2018-06-26 DIAGNOSIS — M25.441 SWELLING OF FINGER JOINT OF RIGHT HAND: Primary | ICD-10-CM

## 2018-06-26 DIAGNOSIS — Z74.09 DECREASED FUNCTIONAL MOBILITY AND ENDURANCE: ICD-10-CM

## 2018-06-26 DIAGNOSIS — R29.898 DECREASED GRIP STRENGTH OF RIGHT HAND: ICD-10-CM

## 2018-06-26 DIAGNOSIS — M79.644 PAIN IN FINGER OF RIGHT HAND: ICD-10-CM

## 2018-06-26 DIAGNOSIS — M25.60 DECREASED RANGE OF MOTION: ICD-10-CM

## 2018-06-26 DIAGNOSIS — L90.5 SCAR ADHERENT: ICD-10-CM

## 2018-06-26 PROCEDURE — 97035 APP MDLTY 1+ULTRASOUND EA 15: CPT | Mod: PN

## 2018-06-26 PROCEDURE — 97018 PARAFFIN BATH THERAPY: CPT | Mod: PN
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
## 2018-06-28 ENCOUNTER — OFFICE VISIT (OUTPATIENT)
Dept: ENDOCRINOLOGY | Facility: CLINIC | Age: 48
End: 2018-06-28
Payer: COMMERCIAL

## 2018-06-28 VITALS
BODY MASS INDEX: 37.14 KG/M2 | WEIGHT: 236.63 LBS | DIASTOLIC BLOOD PRESSURE: 70 MMHG | HEART RATE: 77 BPM | SYSTOLIC BLOOD PRESSURE: 110 MMHG | HEIGHT: 67 IN

## 2018-06-28 DIAGNOSIS — E11.9 TYPE 2 DIABETES MELLITUS TREATED WITHOUT INSULIN: Primary | ICD-10-CM

## 2018-06-28 DIAGNOSIS — E29.1 MALE HYPOGONADISM: ICD-10-CM

## 2018-06-28 DIAGNOSIS — E78.5 HYPERLIPIDEMIA, UNSPECIFIED HYPERLIPIDEMIA TYPE: ICD-10-CM

## 2018-06-28 PROCEDURE — 99999 PR PBB SHADOW E&M-EST. PATIENT-LVL III: CPT | Mod: PBBFAC,,, | Performed by: INTERNAL MEDICINE

## 2018-06-28 PROCEDURE — 3008F BODY MASS INDEX DOCD: CPT | Mod: CPTII,S$GLB,, | Performed by: INTERNAL MEDICINE

## 2018-06-28 PROCEDURE — 99214 OFFICE O/P EST MOD 30 MIN: CPT | Mod: S$GLB,,, | Performed by: INTERNAL MEDICINE

## 2018-06-28 PROCEDURE — 3044F HG A1C LEVEL LT 7.0%: CPT | Mod: CPTII,S$GLB,, | Performed by: INTERNAL MEDICINE

## 2018-06-28 NOTE — PROGRESS NOTES
CHIEF COMPLAINT: DM2  47 year old being seen as a f/u. Diagnosed with diabetes 2013. Off actos and now on metformin XR 1000 BID and jardiance. On testosterone 50 mg Q 2weeks. Has had decreased stress. Working with Dm education. Last eye exam in March. NO Paresthesias.         PAST MEDICAL HISTORY/PAST SURGICAL HISTORY:  Reviewed in [facility]    SOCIAL HISTORY: No tobacco. Social alcohol. Desk Job-     FAMILY HISTORY:  + DM 2. No known thyroid disease.     MEDICATIONS/ALLERGIES: The patient's MedCard has been updated and reviewed.      ROS:   Constitutional: weight stable  Eyes: No recent visual changes  ENT: No dysphagia  Cardiovascular: Denies current anginal symptoms  Respiratory: Denies current respiratory difficulty  Gastrointestinal: No N/V  GenitoUrinary - No dysuria  Skin: No new skin rash  Neurologic: No focal neurologic complaints  Remainder ROS negative        PE:    GENERAL: Well developed, well nourished.  NECK: Supple, trachea midline, No palpable nodules  CHEST: Resp even and unlabored, CTA bilateral.  CARDIAC: RRR, S1, S2 heard, no murmurs, rubs, S3, or S4  FEET: Normal monofilament. No cuts or abrasions. 2 + pedal pulses.       Results for ALBA NARAYANAN (MRN [id number]) as of 6/28/2018 08:39   Ref. Range 6/15/2018 07:47   Sodium Latest Ref Range: 136 - 145 mmol/L 139   Potassium Latest Ref Range: 3.5 - 5.1 mmol/L 4.4   Chloride Latest Ref Range: 95 - 110 mmol/L 105   CO2 Latest Ref Range: 23 - 29 mmol/L 21 (L)   Anion Gap Latest Ref Range: 8 - 16 mmol/L 13   BUN, Bld Latest Ref Range: 6 - 20 mg/dL 16   Creatinine Latest Ref Range: 0.5 - 1.4 mg/dL 1.0   eGFR if non African American Latest Ref Range: >60 mL/min/1.73 m^2 >60.0   eGFR if African American Latest Ref Range: >60 mL/min/1.73 m^2 >60.0   Glucose Latest Ref Range: 70 - 110 mg/dL 128 (H)   Calcium Latest Ref Range: 8.7 - 10.5 mg/dL 9.6   Alkaline Phosphatase Latest Ref Range: 55 - 135 U/L 72   Total Protein Latest Ref Range: 6.0 - 8.4 g/dL 7.1    Albumin Latest Ref Range: 3.5 - 5.2 g/dL 4.3   Total Bilirubin Latest Ref Range: 0.1 - 1.0 mg/dL 0.9   AST Latest Ref Range: 10 - 40 U/L 25   ALT Latest Ref Range: 10 - 44 U/L 40   Triglycerides Latest Ref Range: 30 - 150 mg/dL 142   Cholesterol Latest Ref Range: 120 - 199 mg/dL 140   HDL Latest Ref Range: 40 - 75 mg/dL 32 (L)   LDL Cholesterol Latest Ref Range: 63.0 - 159.0 mg/dL 79.6   Total Cholesterol/HDL Ratio Latest Ref Range: 2.0 - 5.0  4.4   Hemoglobin A1C Latest Ref Range: 4.0 - 5.6 % 6.6 (H)   Estimated Avg Glucose Latest Ref Range: 68 - 131 mg/dL 143 (H)   Testosterone, Total Latest Ref Range: 195.0 - 1138.0 ng/dL 352       ASSESSMENT/PLAN:  1. DM 2- No known complications. Has seen DM Education. On metformin and Jardiance. Continue current Tx. Up to date with eye exam.     2. Hyperlipidemia- Continue statin. Tolerating well. Monitor diet as Trig increased    3. Male Hypogonadism- Can continue current therapy. Doing testosterone weekly which he states helps.     FOLLOWUP  F/U 6 months with Fasting CMP, CBC, A1c, testosterone, Urine micro

## 2018-07-16 DIAGNOSIS — K21.00 GASTROESOPHAGEAL REFLUX DISEASE WITH ESOPHAGITIS: ICD-10-CM

## 2018-07-16 RX ORDER — SIMVASTATIN 10 MG/1
TABLET, FILM COATED ORAL
Qty: 30 TABLET | Refills: 8 | Status: SHIPPED | OUTPATIENT
Start: 2018-07-16 | End: 2019-04-15 | Stop reason: SDUPTHER

## 2018-07-16 RX ORDER — OMEPRAZOLE 40 MG/1
40 CAPSULE, DELAYED RELEASE ORAL
Qty: 30 CAPSULE | Refills: 2 | Status: SHIPPED | OUTPATIENT
Start: 2018-07-16 | End: 2018-10-14 | Stop reason: SDUPTHER

## 2018-07-31 ENCOUNTER — CLINICAL SUPPORT (OUTPATIENT)
Dept: REHABILITATION | Facility: HOSPITAL | Age: 48
End: 2018-07-31
Attending: ORTHOPAEDIC SURGERY
Payer: COMMERCIAL

## 2018-07-31 DIAGNOSIS — L90.5 SCAR ADHERENT: ICD-10-CM

## 2018-07-31 DIAGNOSIS — M25.441 SWELLING OF FINGER JOINT OF RIGHT HAND: Primary | ICD-10-CM

## 2018-07-31 DIAGNOSIS — Z74.09 DECREASED FUNCTIONAL MOBILITY AND ENDURANCE: ICD-10-CM

## 2018-07-31 DIAGNOSIS — M25.60 DECREASED RANGE OF MOTION: ICD-10-CM

## 2018-07-31 DIAGNOSIS — R29.898 DECREASED GRIP STRENGTH OF RIGHT HAND: ICD-10-CM

## 2018-07-31 PROCEDURE — 97110 THERAPEUTIC EXERCISES: CPT | Mod: PN

## 2018-07-31 PROCEDURE — 97035 APP MDLTY 1+ULTRASOUND EA 15: CPT | Mod: PN
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
## 2018-08-27 ENCOUNTER — PATIENT MESSAGE (OUTPATIENT)
Dept: ENDOCRINOLOGY | Facility: CLINIC | Age: 48
End: 2018-08-27

## 2018-08-27 DIAGNOSIS — R30.0 DYSURIA: Primary | ICD-10-CM

## 2018-08-28 ENCOUNTER — CLINICAL SUPPORT (OUTPATIENT)
Dept: REHABILITATION | Facility: HOSPITAL | Age: 48
End: 2018-08-28
Attending: ORTHOPAEDIC SURGERY
Payer: COMMERCIAL

## 2018-08-28 ENCOUNTER — LAB VISIT (OUTPATIENT)
Dept: LAB | Facility: HOSPITAL | Age: 48
End: 2018-08-28
Attending: INTERNAL MEDICINE
Payer: COMMERCIAL

## 2018-08-28 DIAGNOSIS — R30.0 DYSURIA: ICD-10-CM

## 2018-08-28 DIAGNOSIS — M25.441 SWELLING OF FINGER JOINT OF RIGHT HAND: Primary | ICD-10-CM

## 2018-08-28 DIAGNOSIS — L90.5 SCAR ADHERENT: ICD-10-CM

## 2018-08-28 DIAGNOSIS — M79.644 PAIN IN FINGER OF RIGHT HAND: ICD-10-CM

## 2018-08-28 LAB
BACTERIA #/AREA URNS HPF: NORMAL /HPF
BILIRUB UR QL STRIP: NEGATIVE
CLARITY UR: CLEAR
COLOR UR: YELLOW
GLUCOSE UR QL STRIP: ABNORMAL
HGB UR QL STRIP: NEGATIVE
KETONES UR QL STRIP: NEGATIVE
LEUKOCYTE ESTERASE UR QL STRIP: NEGATIVE
MICROSCOPIC COMMENT: NORMAL
NITRITE UR QL STRIP: NEGATIVE
PH UR STRIP: 6 [PH] (ref 5–8)
PROT UR QL STRIP: NEGATIVE
SP GR UR STRIP: 1.02 (ref 1–1.03)
URN SPEC COLLECT METH UR: ABNORMAL
WBC #/AREA URNS HPF: 1 /HPF (ref 0–5)
YEAST URNS QL MICRO: NORMAL

## 2018-08-28 PROCEDURE — 81000 URINALYSIS NONAUTO W/SCOPE: CPT | Mod: PO

## 2018-08-28 PROCEDURE — 97110 THERAPEUTIC EXERCISES: CPT | Mod: PN

## 2018-08-28 PROCEDURE — 97035 APP MDLTY 1+ULTRASOUND EA 15: CPT | Mod: PN
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
## 2018-08-29 ENCOUNTER — TELEPHONE (OUTPATIENT)
Dept: ENDOCRINOLOGY | Facility: CLINIC | Age: 48
End: 2018-08-29

## 2018-08-29 RX ORDER — NITROFURANTOIN 25; 75 MG/1; MG/1
100 CAPSULE ORAL 2 TIMES DAILY
Qty: 14 CAPSULE | Refills: 0 | Status: SHIPPED | OUTPATIENT
Start: 2018-08-29 | End: 2018-09-05

## 2018-08-29 NOTE — TELEPHONE ENCOUNTER
Let him know UA did not show significant bacteria. There are rare. Since symptomatic and on SGLT 2, we can treat.   Take macrobid 100 BID x 7 days  If continues to occur may need to stop SGLT 2

## 2018-08-30 ENCOUNTER — PATIENT MESSAGE (OUTPATIENT)
Dept: ENDOCRINOLOGY | Facility: CLINIC | Age: 48
End: 2018-08-30

## 2018-09-25 ENCOUNTER — CLINICAL SUPPORT (OUTPATIENT)
Dept: REHABILITATION | Facility: HOSPITAL | Age: 48
End: 2018-09-25
Attending: ORTHOPAEDIC SURGERY
Payer: COMMERCIAL

## 2018-09-25 DIAGNOSIS — M25.60 DECREASED RANGE OF MOTION: ICD-10-CM

## 2018-09-25 DIAGNOSIS — M25.441 SWELLING OF FINGER JOINT OF RIGHT HAND: ICD-10-CM

## 2018-09-25 DIAGNOSIS — Z74.09 DECREASED FUNCTIONAL MOBILITY AND ENDURANCE: Primary | ICD-10-CM

## 2018-09-25 DIAGNOSIS — L90.5 SCAR ADHERENT: ICD-10-CM

## 2018-09-25 DIAGNOSIS — R29.898 DECREASED GRIP STRENGTH OF RIGHT HAND: ICD-10-CM

## 2018-09-25 DIAGNOSIS — M79.644 PAIN IN FINGER OF RIGHT HAND: ICD-10-CM

## 2018-09-25 PROCEDURE — 97018 PARAFFIN BATH THERAPY: CPT | Mod: PN

## 2018-09-25 PROCEDURE — 97035 APP MDLTY 1+ULTRASOUND EA 15: CPT | Mod: PN
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
## 2018-10-02 ENCOUNTER — PATIENT MESSAGE (OUTPATIENT)
Dept: ENDOCRINOLOGY | Facility: CLINIC | Age: 48
End: 2018-10-02

## 2018-10-14 ENCOUNTER — PATIENT MESSAGE (OUTPATIENT)
Dept: ENDOCRINOLOGY | Facility: CLINIC | Age: 48
End: 2018-10-14

## 2018-10-14 DIAGNOSIS — K21.00 GASTROESOPHAGEAL REFLUX DISEASE WITH ESOPHAGITIS: ICD-10-CM

## 2018-10-15 ENCOUNTER — PATIENT MESSAGE (OUTPATIENT)
Dept: ENDOCRINOLOGY | Facility: CLINIC | Age: 48
End: 2018-10-15

## 2018-10-15 RX ORDER — OMEPRAZOLE 40 MG/1
40 CAPSULE, DELAYED RELEASE ORAL
Qty: 30 CAPSULE | Refills: 2 | Status: SHIPPED | OUTPATIENT
Start: 2018-10-15 | End: 2019-01-07 | Stop reason: SDUPTHER

## 2018-10-15 RX ORDER — TESTOSTERONE CYPIONATE 1000 MG/10ML
INJECTION, SOLUTION INTRAMUSCULAR
Qty: 10 ML | Refills: 3 | Status: SHIPPED | OUTPATIENT
Start: 2018-10-15 | End: 2018-12-20 | Stop reason: SDUPTHER

## 2018-10-25 ENCOUNTER — CLINICAL SUPPORT (OUTPATIENT)
Dept: REHABILITATION | Facility: HOSPITAL | Age: 48
End: 2018-10-25
Attending: ORTHOPAEDIC SURGERY
Payer: COMMERCIAL

## 2018-10-25 DIAGNOSIS — M79.644 PAIN IN FINGER OF RIGHT HAND: Primary | ICD-10-CM

## 2018-10-25 PROCEDURE — 97018 PARAFFIN BATH THERAPY: CPT | Mod: PN

## 2018-10-25 PROCEDURE — 97035 APP MDLTY 1+ULTRASOUND EA 15: CPT | Mod: PN
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
## 2018-10-30 ENCOUNTER — PATIENT MESSAGE (OUTPATIENT)
Dept: GASTROENTEROLOGY | Facility: CLINIC | Age: 48
End: 2018-10-30

## 2018-10-30 DIAGNOSIS — K21.00 GASTROESOPHAGEAL REFLUX DISEASE WITH ESOPHAGITIS: ICD-10-CM

## 2018-10-30 DIAGNOSIS — K22.0 ACHALASIA: Primary | ICD-10-CM

## 2018-10-30 DIAGNOSIS — R13.19 INTERMITTENT DYSPHAGIA: ICD-10-CM

## 2018-10-30 DIAGNOSIS — Z98.890 HISTORY OF ESOPHAGEAL SURGERY: ICD-10-CM

## 2018-10-31 ENCOUNTER — TELEPHONE (OUTPATIENT)
Dept: GASTROENTEROLOGY | Facility: CLINIC | Age: 48
End: 2018-10-31

## 2018-10-31 ENCOUNTER — PATIENT MESSAGE (OUTPATIENT)
Dept: ENDOCRINOLOGY | Facility: CLINIC | Age: 48
End: 2018-10-31

## 2018-10-31 NOTE — TELEPHONE ENCOUNTER
Spoke with Pt and reviewed . MR pink sheet is complete. Told pt we will contact him when we are ready to schedule his NP appt.

## 2018-11-14 ENCOUNTER — PATIENT MESSAGE (OUTPATIENT)
Dept: ENDOCRINOLOGY | Facility: CLINIC | Age: 48
End: 2018-11-14

## 2018-11-23 ENCOUNTER — TELEPHONE (OUTPATIENT)
Dept: GASTROENTEROLOGY | Facility: CLINIC | Age: 48
End: 2018-11-23

## 2018-11-23 NOTE — TELEPHONE ENCOUNTER
Called pt and scheduled NP appointment for 1/11/18 at 11 am. Discussed with pt that Dr. Watkins is a consultant who will send them back to their general GI provider once their symptoms improved and plan of care is established. Pt was told the logistics of the appointment (arrival time, length of visit, total time spent at facility, and Aleta Aguiar NP role). Pt was also told that Dr. Watkins will review their previous workup but may order additional test and perform her own procedures and that this may require an overnight stay at a local hot to complete the workup.

## 2018-11-27 ENCOUNTER — CLINICAL SUPPORT (OUTPATIENT)
Dept: REHABILITATION | Facility: HOSPITAL | Age: 48
End: 2018-11-27
Attending: ORTHOPAEDIC SURGERY
Payer: COMMERCIAL

## 2018-11-27 DIAGNOSIS — M25.441 SWELLING OF FINGER JOINT OF RIGHT HAND: ICD-10-CM

## 2018-11-27 DIAGNOSIS — M79.644 PAIN IN FINGER OF RIGHT HAND: Primary | ICD-10-CM

## 2018-11-27 DIAGNOSIS — R29.898 DECREASED GRIP STRENGTH OF RIGHT HAND: ICD-10-CM

## 2018-11-27 DIAGNOSIS — L90.5 SCAR ADHERENT: ICD-10-CM

## 2018-11-27 PROCEDURE — 97018 PARAFFIN BATH THERAPY: CPT | Mod: PN

## 2018-11-27 PROCEDURE — 97035 APP MDLTY 1+ULTRASOUND EA 15: CPT | Mod: PN
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
## 2018-12-02 ENCOUNTER — PATIENT MESSAGE (OUTPATIENT)
Dept: ENDOCRINOLOGY | Facility: CLINIC | Age: 48
End: 2018-12-02

## 2018-12-06 RX ORDER — EMPAGLIFLOZIN 10 MG/1
TABLET, FILM COATED ORAL
Qty: 30 TABLET | Refills: 11 | Status: SHIPPED | OUTPATIENT
Start: 2018-12-06 | End: 2019-12-23 | Stop reason: SDUPTHER

## 2018-12-11 ENCOUNTER — PATIENT MESSAGE (OUTPATIENT)
Dept: ENDOCRINOLOGY | Facility: CLINIC | Age: 48
End: 2018-12-11

## 2018-12-11 ENCOUNTER — PATIENT MESSAGE (OUTPATIENT)
Dept: GASTROENTEROLOGY | Facility: CLINIC | Age: 48
End: 2018-12-11

## 2018-12-17 ENCOUNTER — LAB VISIT (OUTPATIENT)
Dept: LAB | Facility: HOSPITAL | Age: 48
End: 2018-12-17
Attending: INTERNAL MEDICINE
Payer: COMMERCIAL

## 2018-12-17 ENCOUNTER — PATIENT MESSAGE (OUTPATIENT)
Dept: GASTROENTEROLOGY | Facility: CLINIC | Age: 48
End: 2018-12-17

## 2018-12-17 DIAGNOSIS — E29.1 MALE HYPOGONADISM: ICD-10-CM

## 2018-12-17 DIAGNOSIS — E11.9 TYPE 2 DIABETES MELLITUS TREATED WITHOUT INSULIN: ICD-10-CM

## 2018-12-17 LAB
ALBUMIN SERPL BCP-MCNC: 4.2 G/DL
ALP SERPL-CCNC: 77 U/L
ALT SERPL W/O P-5'-P-CCNC: 33 U/L
ANION GAP SERPL CALC-SCNC: 7 MMOL/L
AST SERPL-CCNC: 22 U/L
BASOPHILS # BLD AUTO: 0.05 K/UL
BASOPHILS NFR BLD: 0.7 %
BILIRUB SERPL-MCNC: 1.2 MG/DL
BUN SERPL-MCNC: 17 MG/DL
CALCIUM SERPL-MCNC: 9.4 MG/DL
CHLORIDE SERPL-SCNC: 102 MMOL/L
CO2 SERPL-SCNC: 26 MMOL/L
CREAT SERPL-MCNC: 0.9 MG/DL
DIFFERENTIAL METHOD: NORMAL
EOSINOPHIL # BLD AUTO: 0.2 K/UL
EOSINOPHIL NFR BLD: 2 %
ERYTHROCYTE [DISTWIDTH] IN BLOOD BY AUTOMATED COUNT: 12.4 %
EST. GFR  (AFRICAN AMERICAN): >60 ML/MIN/1.73 M^2
EST. GFR  (NON AFRICAN AMERICAN): >60 ML/MIN/1.73 M^2
ESTIMATED AVG GLUCOSE: 143 MG/DL
GLUCOSE SERPL-MCNC: 130 MG/DL
HBA1C MFR BLD HPLC: 6.6 %
HCT VFR BLD AUTO: 53 %
HGB BLD-MCNC: 17.5 G/DL
IMM GRANULOCYTES # BLD AUTO: 0.02 K/UL
IMM GRANULOCYTES NFR BLD AUTO: 0.3 %
LYMPHOCYTES # BLD AUTO: 1.7 K/UL
LYMPHOCYTES NFR BLD: 21.6 %
MCH RBC QN AUTO: 29.9 PG
MCHC RBC AUTO-ENTMCNC: 33 G/DL
MCV RBC AUTO: 90 FL
MONOCYTES # BLD AUTO: 0.9 K/UL
MONOCYTES NFR BLD: 11.6 %
NEUTROPHILS # BLD AUTO: 4.9 K/UL
NEUTROPHILS NFR BLD: 63.8 %
NRBC BLD-RTO: 0 /100 WBC
PLATELET # BLD AUTO: 297 K/UL
PMV BLD AUTO: 11.2 FL
POTASSIUM SERPL-SCNC: 4.1 MMOL/L
PROT SERPL-MCNC: 7.2 G/DL
RBC # BLD AUTO: 5.86 M/UL
SODIUM SERPL-SCNC: 135 MMOL/L
TESTOST SERPL-MCNC: 159 NG/DL
WBC # BLD AUTO: 7.67 K/UL

## 2018-12-17 PROCEDURE — 83036 HEMOGLOBIN GLYCOSYLATED A1C: CPT

## 2018-12-17 PROCEDURE — 80053 COMPREHEN METABOLIC PANEL: CPT

## 2018-12-17 PROCEDURE — 84403 ASSAY OF TOTAL TESTOSTERONE: CPT

## 2018-12-17 PROCEDURE — 36415 COLL VENOUS BLD VENIPUNCTURE: CPT | Mod: PO

## 2018-12-17 PROCEDURE — 85025 COMPLETE CBC W/AUTO DIFF WBC: CPT

## 2018-12-18 ENCOUNTER — CLINICAL SUPPORT (OUTPATIENT)
Dept: REHABILITATION | Facility: HOSPITAL | Age: 48
End: 2018-12-18
Attending: ORTHOPAEDIC SURGERY
Payer: COMMERCIAL

## 2018-12-18 DIAGNOSIS — L90.5 SCAR ADHERENT: ICD-10-CM

## 2018-12-18 DIAGNOSIS — M79.644 PAIN IN FINGER OF RIGHT HAND: Primary | ICD-10-CM

## 2018-12-18 PROCEDURE — 97018 PARAFFIN BATH THERAPY: CPT | Mod: PN

## 2018-12-18 PROCEDURE — 97035 APP MDLTY 1+ULTRASOUND EA 15: CPT | Mod: PN
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
## 2018-12-20 ENCOUNTER — OFFICE VISIT (OUTPATIENT)
Dept: ENDOCRINOLOGY | Facility: CLINIC | Age: 48
End: 2018-12-20
Payer: COMMERCIAL

## 2018-12-20 VITALS
DIASTOLIC BLOOD PRESSURE: 60 MMHG | BODY MASS INDEX: 37.57 KG/M2 | HEIGHT: 66 IN | SYSTOLIC BLOOD PRESSURE: 104 MMHG | WEIGHT: 233.81 LBS | HEART RATE: 63 BPM

## 2018-12-20 DIAGNOSIS — E29.1 MALE HYPOGONADISM: ICD-10-CM

## 2018-12-20 DIAGNOSIS — E78.5 HYPERLIPIDEMIA ASSOCIATED WITH TYPE 2 DIABETES MELLITUS: ICD-10-CM

## 2018-12-20 DIAGNOSIS — E11.69 HYPERLIPIDEMIA ASSOCIATED WITH TYPE 2 DIABETES MELLITUS: ICD-10-CM

## 2018-12-20 DIAGNOSIS — E11.9 TYPE 2 DIABETES MELLITUS TREATED WITHOUT INSULIN: Primary | ICD-10-CM

## 2018-12-20 PROCEDURE — 3008F BODY MASS INDEX DOCD: CPT | Mod: CPTII,S$GLB,, | Performed by: INTERNAL MEDICINE

## 2018-12-20 PROCEDURE — 99214 OFFICE O/P EST MOD 30 MIN: CPT | Mod: S$GLB,,, | Performed by: INTERNAL MEDICINE

## 2018-12-20 PROCEDURE — 3044F HG A1C LEVEL LT 7.0%: CPT | Mod: CPTII,S$GLB,, | Performed by: INTERNAL MEDICINE

## 2018-12-20 PROCEDURE — 99999 PR PBB SHADOW E&M-EST. PATIENT-LVL III: CPT | Mod: PBBFAC,,, | Performed by: INTERNAL MEDICINE

## 2018-12-20 RX ORDER — TESTOSTERONE CYPIONATE 1000 MG/10ML
100 INJECTION, SOLUTION INTRAMUSCULAR WEEKLY
Qty: 10 ML | Refills: 3 | Status: SHIPPED | OUTPATIENT
Start: 2018-12-20 | End: 2019-06-24 | Stop reason: SDUPTHER

## 2018-12-20 NOTE — PROGRESS NOTES
CHIEF COMPLAINT: DM2  47 year old being seen as a f/u. Diagnosed with diabetes 2013. Off actos. On metformin XR 1000 BID and jardiance. On testosterone 50 mg weekly. Tolerating statin. No paresthesias. Has not had flu vaccine.         PAST MEDICAL HISTORY/PAST SURGICAL HISTORY:  Reviewed in [facility]    SOCIAL HISTORY: No tobacco. Social alcohol. Desk Job-     FAMILY HISTORY:  + DM 2. No known thyroid disease.     MEDICATIONS/ALLERGIES: The patient's MedCard has been updated and reviewed.      ROS:   Constitutional: weight stable  Eyes: No recent visual changes  ENT: No dysphagia  Cardiovascular: Denies current anginal symptoms  Respiratory: Denies current respiratory difficulty  Gastrointestinal: No N/V  GenitoUrinary - No dysuria  Skin: No new skin rash  Neurologic: No focal neurologic complaints  Remainder ROS negative        PE:    GENERAL: Well developed, well nourished.  NECK: Supple, trachea midline, No palpable nodules  CHEST: Resp even and unlabored, CTA bilateral.  CARDIAC: RRR, S1, S2 heard, no murmurs, rubs, S3, or S4  FEET: Normal monofilament. No cuts or abrasions. 2 + pedal pulses.     Results for ITZ NARAYANAN (MRN [id number]) as of 12/20/2018 10:22   Ref. Range 12/17/2018 08:04   Sodium Latest Ref Range: 136 - 145 mmol/L 135 (L)   Potassium Latest Ref Range: 3.5 - 5.1 mmol/L 4.1   Chloride Latest Ref Range: 95 - 110 mmol/L 102   CO2 Latest Ref Range: 23 - 29 mmol/L 26   Anion Gap Latest Ref Range: 8 - 16 mmol/L 7 (L)   BUN, Bld Latest Ref Range: 6 - 20 mg/dL 17   Creatinine Latest Ref Range: 0.5 - 1.4 mg/dL 0.9   eGFR if non African American Latest Ref Range: >60 mL/min/1.73 m^2 >60.0   eGFR if African American Latest Ref Range: >60 mL/min/1.73 m^2 >60.0   Glucose Latest Ref Range: 70 - 110 mg/dL 130 (H)   Calcium Latest Ref Range: 8.7 - 10.5 mg/dL 9.4   Alkaline Phosphatase Latest Ref Range: 55 - 135 U/L 77   Total Protein Latest Ref Range: 6.0 - 8.4 g/dL 7.2   Albumin Latest Ref Range: 3.5 - 5.2 g/dL 4.2    Total Bilirubin Latest Ref Range: 0.1 - 1.0 mg/dL 1.2 (H)   AST Latest Ref Range: 10 - 40 U/L 22   ALT Latest Ref Range: 10 - 44 U/L 33   Hemoglobin A1C Latest Ref Range: 4.0 - 5.6 % 6.6 (H)   Estimated Avg Glucose Latest Ref Range: 68 - 131 mg/dL 143 (H)   Testosterone, Total Latest Ref Range: 304 - 1227 ng/dL 159 (L)           ASSESSMENT/PLAN:  1. DM 2- No known complications. Has seen DM Education. On metformin and Jardiance. Continue current Tx. Up to date with eye exam. Discussed getting flu vaccine which he states he will get today.     2. Hyperlipidemia- Continue statin. Tolerating well. Monitor diet as Trig increased    3. Male Hypogonadism- increase testosterone to 100 mg weekly.      FOLLOWUP  F/U 6 months with Fasting CMP, FLP, CBC, A1c, testosterone

## 2019-01-07 DIAGNOSIS — K21.00 GASTROESOPHAGEAL REFLUX DISEASE WITH ESOPHAGITIS: ICD-10-CM

## 2019-01-07 RX ORDER — OMEPRAZOLE 40 MG/1
40 CAPSULE, DELAYED RELEASE ORAL
Qty: 90 CAPSULE | Refills: 1 | Status: SHIPPED | OUTPATIENT
Start: 2019-01-07 | End: 2019-12-03 | Stop reason: SDUPTHER

## 2019-01-08 ENCOUNTER — PATIENT MESSAGE (OUTPATIENT)
Dept: GASTROENTEROLOGY | Facility: CLINIC | Age: 49
End: 2019-01-08

## 2019-01-11 ENCOUNTER — OFFICE VISIT (OUTPATIENT)
Dept: GASTROENTEROLOGY | Facility: CLINIC | Age: 49
End: 2019-01-11
Payer: COMMERCIAL

## 2019-01-11 ENCOUNTER — LAB VISIT (OUTPATIENT)
Dept: LAB | Facility: HOSPITAL | Age: 49
End: 2019-01-11
Payer: COMMERCIAL

## 2019-01-11 VITALS
BODY MASS INDEX: 37.65 KG/M2 | SYSTOLIC BLOOD PRESSURE: 116 MMHG | WEIGHT: 239.88 LBS | HEART RATE: 79 BPM | HEIGHT: 67 IN | DIASTOLIC BLOOD PRESSURE: 78 MMHG

## 2019-01-11 DIAGNOSIS — R17 SERUM TOTAL BILIRUBIN ELEVATED: ICD-10-CM

## 2019-01-11 DIAGNOSIS — R19.7 DIARRHEA, UNSPECIFIED TYPE: ICD-10-CM

## 2019-01-11 DIAGNOSIS — R13.19 ESOPHAGEAL DYSPHAGIA: Primary | ICD-10-CM

## 2019-01-11 DIAGNOSIS — R74.01 ELEVATED ALT MEASUREMENT: ICD-10-CM

## 2019-01-11 LAB
ALBUMIN SERPL BCP-MCNC: 4.1 G/DL
ALP SERPL-CCNC: 76 U/L
ALT SERPL W/O P-5'-P-CCNC: 30 U/L
AST SERPL-CCNC: 18 U/L
BILIRUB DIRECT SERPL-MCNC: 0.2 MG/DL
BILIRUB DIRECT SERPL-MCNC: 0.2 MG/DL
BILIRUB SERPL-MCNC: 0.7 MG/DL
FERRITIN SERPL-MCNC: 95 NG/ML
HBV CORE AB SERPL QL IA: NEGATIVE
HBV SURFACE AG SERPL QL IA: NEGATIVE
HCV AB SERPL QL IA: NEGATIVE
HEPATITIS A ANTIBODY, IGG: NEGATIVE
IGA SERPL-MCNC: 281 MG/DL
PROT SERPL-MCNC: 7.4 G/DL

## 2019-01-11 PROCEDURE — 99214 PR OFFICE/OUTPT VISIT, EST, LEVL IV, 30-39 MIN: ICD-10-PCS | Mod: S$GLB,,, | Performed by: NURSE PRACTITIONER

## 2019-01-11 PROCEDURE — 87340 HEPATITIS B SURFACE AG IA: CPT

## 2019-01-11 PROCEDURE — 3008F BODY MASS INDEX DOCD: CPT | Mod: CPTII,S$GLB,, | Performed by: NURSE PRACTITIONER

## 2019-01-11 PROCEDURE — 86704 HEP B CORE ANTIBODY TOTAL: CPT

## 2019-01-11 PROCEDURE — 3008F PR BODY MASS INDEX (BMI) DOCUMENTED: ICD-10-PCS | Mod: CPTII,S$GLB,, | Performed by: NURSE PRACTITIONER

## 2019-01-11 PROCEDURE — 99999 PR PBB SHADOW E&M-EST. PATIENT-LVL V: ICD-10-PCS | Mod: PBBFAC,,, | Performed by: NURSE PRACTITIONER

## 2019-01-11 PROCEDURE — 36415 COLL VENOUS BLD VENIPUNCTURE: CPT

## 2019-01-11 PROCEDURE — 86706 HEP B SURFACE ANTIBODY: CPT

## 2019-01-11 PROCEDURE — 86803 HEPATITIS C AB TEST: CPT

## 2019-01-11 PROCEDURE — 99999 PR PBB SHADOW E&M-EST. PATIENT-LVL V: CPT | Mod: PBBFAC,,, | Performed by: NURSE PRACTITIONER

## 2019-01-11 PROCEDURE — 82728 ASSAY OF FERRITIN: CPT

## 2019-01-11 PROCEDURE — 99214 OFFICE O/P EST MOD 30 MIN: CPT | Mod: S$GLB,,, | Performed by: NURSE PRACTITIONER

## 2019-01-11 PROCEDURE — 83516 IMMUNOASSAY NONANTIBODY: CPT

## 2019-01-11 PROCEDURE — 80076 HEPATIC FUNCTION PANEL: CPT

## 2019-01-11 PROCEDURE — 82784 ASSAY IGA/IGD/IGG/IGM EACH: CPT

## 2019-01-11 PROCEDURE — 86790 VIRUS ANTIBODY NOS: CPT

## 2019-01-11 RX ORDER — INSULIN PUMP SYRINGE, 3 ML
EACH MISCELLANEOUS
COMMUNITY
Start: 2015-04-10 | End: 2023-03-16 | Stop reason: ALTCHOICE

## 2019-01-11 RX ORDER — CHOLESTYRAMINE 4 G/9G
4 POWDER, FOR SUSPENSION ORAL DAILY
Qty: 30 PACKET | Refills: 11 | Status: SHIPPED | OUTPATIENT
Start: 2019-01-11 | End: 2023-06-19

## 2019-01-11 RX ORDER — IBUPROFEN 200 MG
200 TABLET ORAL
COMMUNITY
End: 2019-12-17

## 2019-01-11 NOTE — LETTER
January 15, 2019        Teri Kirk, [facility]  [address]  [facility] [address]             [facility]  [address] [facility] [address]  Phone: [phone]  Fax: [phone]   Patient: Abhi Pompa   MR Number: [id number]   YOB: 1970   Date of Visit: 1/11/2019       Dear Dr. Kirk:    Thank you for referring Abhi Pompa to me for evaluation. Attached you will find relevant portions of my assessment and plan of care.    If you have questions, please do not hesitate to call me. I look forward to following Abhi Pompa along with you.    Sincerely,                  CC  No Recipients    Enclosure

## 2019-01-11 NOTE — PROGRESS NOTES
[facility] Consultation Note    Reason for Consult:    Chief Complaint   Patient presents with    Heartburn    Dysphagia    Diarrhea    Elevated Hepatic Enzymes         PCP:   Yelena Vidal   [address] / [place] [address]    Referring MD:  Jadiel Ba  [address]  [place] [address]      HPI:  Abhi Pompa is a 48 y.o. male with a PMH of PMH of anxiety, HLD, DM 2, low testosterone, MELLY, s/p heller myotomy referred to motility clinic for second opinion regarding the following problems:    GERD. Well controlled with prilosec 40 mg daily, zantac 300 mg HS.     Dysphagia.  Reports difficulty swallowing.  Onset of symptoms:15 yrs ago.  Problems with solids:mostly  Problems with liquids:sometimes  Choking while eating:yes  Coughing while eating: yes  Location: upper-mid esopahgus  Frequency:  With every meal prior to myotomy. Periodically since.  Improves with:resolved with heller myotomy in 2016 ; previously better with washing with warm water. Minimal temp improvement with dilation x 1, no improvement with 2nd dilation.  History of food impactions requiring ED visit:no  History of allergies: meds  History of seasonal allergies:no  History of food allergies:no  History of eczema.:no    Achalasia Eckardt Score pre myotomy  Dysphagia  (none (0), occasional (1), daily (2), with every meal (3)):3   Regurgitation (none (0), occasional (1), daily (2), with every meal (3)): 0  Chest pain (none (0), occasional (1), daily (2), several times per day (3)): 3  Weight loss (kg) (0 (0), <5 (1), 5-10 (2), >10 (3)): 0  Total Eckardt Score(the final score is the sum of four components (0-12)): 6    Achalasia Eckardt Score post myotomy  Dysphagia  (none (0), occasional (1), daily (2), with every meal (3)): 1  Regurgitation (none (0), occasional (1), daily (2), with every meal (3)): 0  Chest pain (none (0), occasional (1), daily (2), several times per day (3)):0   Weight  loss (kg) (0 (0), <5 (1), 5-10 (2), >10 (3)): 0  Total Eckardt Score(the final score is the sum of four components (0-12)): 1       DM2.  BS running 140-160.  A1c 6.6. Followed by endocrinology. On metformin 2000 mg daily x 5 yrs. On jordiance 10 mg daily.    Diarhea. Loose stools associated with metformin use. Several bristol type 6-7 BM/day. Usually in the morning.    Some improvement with fiber supplement, imodium PRN,  fiber rich diet, eating yogurt. Fiber causes bloating  No problems with diarrhea if skipping dose of metformin  No nocturnal stools.  No incontinence.    History of h.pylori infection. In 2015. Treated with triple therapy.     Anxiety. Depression. Well controlled with Prozac 20 mg daily. Followed by psychology and psychiatry. Practices meditation     Insomnia. Some improvement with melatonin 10 mg HS. MELLY. Sleeps with CPAP. Awakes a lot throughout the night to urinate due to jardiance.    Elevated T. Bili. H/o elevated ALT. Pt reports since 1990s.       Denies  nausea, vomiting, early satiety, abdominal pain, bloating, constipation, BRBPR, melena, weight loss,.       Total visit time was 90 minutes, more than 50% of which was spent in face-to-face counseling with patient regarding symptoms, diagnostic results, prognosis, risks and benefits of treatment options, instructions for management, importance of compliance with chosen treatment options, risk factor reduction, stress reduction, coping strategies.      Previous Studies:   Esophagram 2/2/16: Type II achalasia. diminished primary peristaltic wave w/ prominent tertiary contractions and no definite anatomic or mucosal abnormalities. Some delayed emptying of barium.   Esophageal manometry 2/26/18: Type II achalasia. IRP elevated 40, 100% simultaneous contractions and pan esophageal pressurization. DCI and DL not calculated.  EGD 12/11/15: reflux esophagitis (mild reactive changes). Nl stomach (). Nl duodenum. Esophagus dilated with 51 and 54 fr  Suzi.     Labs:   12/17/18: A1c high 6.6, CBC nl. CMP t. bili high 1.2  2/23/18: CMP NA low 135, ALT high 53   2016: TSH nL    Relevant surgeries:  Heller myotomy (2016)    ROS:  ROS   Constitutional: No fevers, no chills, no night sweats, no weight loss  ENT: No congestion, no rhinorrhea, no chronic sinus problems  CV: No chest pain, no palpitations  Pulm: No cough, no shortness of breath  Ophtho: No blurry vision, no eye redness  GI: see HPI  Derm: No rash  Heme: No lymphadenopathy, no bruising  MSK: No joint pain, no joint swelling, no Raynauds  : No dysuria, no frequent urination, no blood in urine  Endo: No hot or cold intolerance  Neuro: No dizziness, no syncope, no seizure  Psych: + anxiety, + depression        Medical History:   Past Medical History:   Diagnosis Date    Achalasia     Arthritis     right ankle    Cataract     Diabetes mellitus     taking lisinopril preventive to protect the kidneys, no HTN    Dyslipidemia     pt denies    Dysphagia     Former tobacco use     chew    GERD (gastroesophageal reflux disease)     H. pylori infection     Hypogonadism in male     MELLY (obstructive sleep apnea)     uses cpap        Surgical History:   Past Surgical History:   Procedure Laterality Date    ANKLE SURGERY Right     ESOPHAGOGASTRODUODENOSCOPY (EGD) N/A 12/11/2015    [facility]    HAND SURGERY Right     INCISION AND DRAINAGE (I&D), HAND Right 5/30/2017    [facility] OR    laparoscopic Heller myotomy      MANOMETRY-ESOPHAGEAL N/A 2/25/2016    Performed by Wong [facility]ti [facility] at [facility] ENDO (4TH FLR)    MYOTOMY  07/2016    heller- achalasia surgery    [id number] N/A 7/22/2016    Performed by Luis A Le MD at [facility] OR 2ND FLR    PINNING-CLOSED-HAND- 1st CMC fx dislocation Right 4/11/2017    [facility] OR    REMOVAL-HARDWARE-HAND Right 5/30/2017     [facility]    TONSILLECTOMY      UPPER GASTROINTESTINAL ENDOSCOPY  12/2015    WISDOM TOOTH EXTRACTION          Family History:   Family History   Problem Relation Age of Onset    Diabetes Mother     Allergies Mother     Lupus Mother     Diabetes Maternal Aunt     Allergic rhinitis Sister     Angioedema Neg Hx     Asthma Neg Hx     Eczema Neg Hx     Immunodeficiency Neg Hx     Urticaria Neg Hx     Amblyopia Neg Hx     Blindness Neg Hx     Cancer Neg Hx     Cataracts Neg Hx     Glaucoma Neg Hx     Hypertension Neg Hx     Macular degeneration Neg Hx     Retinal detachment Neg Hx     Strabismus Neg Hx     Stroke Neg Hx     Thyroid disease Neg Hx     Celiac disease Neg Hx     Cirrhosis Neg Hx     Colon cancer Neg Hx     Colon polyps Neg Hx     Crohn's disease Neg Hx     Cystic fibrosis Neg Hx     Esophageal cancer Neg Hx     Hemochromatosis Neg Hx     Inflammatory bowel disease Neg Hx     Irritable bowel syndrome Neg Hx     Liver cancer Neg Hx     Liver disease Neg Hx     Rectal cancer Neg Hx     Stomach cancer Neg Hx     Ulcerative colitis Neg Hx     Harvey's disease Neg Hx     Lymphoma Neg Hx     Tuberculosis Neg Hx     Scleroderma Neg Hx     Rheum arthritis Neg Hx     Multiple sclerosis Neg Hx     Melanoma Neg Hx     Psoriasis Neg Hx     Skin cancer Neg Hx         Social History:   Social History     Socioeconomic History    Marital status:      Spouse name: None    Number of children: None    Years of education: None    Highest education level: None   Social Needs    Financial resource strain: None    Food insecurity - worry: None    Food insecurity - inability: None    Transportation needs - medical: None    Transportation needs - non-medical: None   Occupational History    None   Tobacco Use    Smoking status: Never Smoker    Smokeless tobacco: Former User     Types: Chew   Substance and Sexual Activity     Alcohol use: Yes     Alcohol/week: 4.2 oz     Types: 7 Glasses of wine per week     Comment: 1 glass of red wine every night    Drug use: No    Sexual activity: Yes     Partners: Female   Other Topics Concern    None   Social History Narrative    None        Review of patient's allergies indicates:   Allergen Reactions    Vit e-nonoxynol 9-aloe vera        Current Outpatient Medications   Medication Sig Dispense Refill    blood sugar diagnostic Strp 1 each by Misc.(Non-Drug; Combo Route) route 2 (two) times daily. Strips for One Touch Verio Flex Meter 200 strip 3    fish oil-omega-3 fatty acids 300-1,000 mg capsule Take 1 capsule by mouth once daily.      FLAXSEED OIL ORAL Take 1 capsule by mouth once daily.      FLUoxetine (PROZAC) 20 MG capsule Take 20 mg by mouth every morning.  1    JARDIANCE 10 mg Tab TAKE 1 TABLET BY MOUTH ONCE DAILY 30 tablet 11    melatonin 10 mg Tab Take 10 mg by mouth nightly.      metFORMIN (GLUCOPHAGE-XR) 500 MG 24 hr tablet Take 2 tablets (1,000 mg total) by mouth 2 (two) times daily with meals. 360 tablet 3    minoxidil (ROGAINE) 2 % external solution Apply 1 application topically 2 (two) times daily.      omeprazole (PRILOSEC) 40 MG capsule Take 1 capsule (40 mg total) by mouth before breakfast. 90 capsule 1    ONETOUCH DELICA LANCETS 33 gauge Misc TEST EVERY  each 5    ranitidine (ZANTAC) 300 MG tablet Take 1 tablet (300 mg total) by mouth nightly. 90 tablet 1    simvastatin (ZOCOR) 10 MG tablet TAKE 1 TABLET (10 MG TOTAL) BY MOUTH EVERY EVENING. 30 tablet 8    testosterone cypionate (DEPOTESTOTERONE CYPIONATE) 100 mg/mL injection Inject 1 mL (100 mg total) into the muscle once a week. 10 mL 3    VITAMIN B COMPLEX ORAL Take 1 tablet by mouth once daily.      blood sugar diagnostic (ONETOUCH ULTRA BLUE TEST STRIP) Strp TEST EVERY DAY      blood-glucose meter (ONETOUCH ULTRA2) kit       cholestyramine (QUESTRAN) 4 gram packet Take 1 packet (4 g total) by  "mouth once daily. 30 packet 11    ibuprofen (ADVIL,MOTRIN) 200 MG tablet Take 200 mg by mouth.      sildenafil (REVATIO) 20 mg Tab Take up to 5 tabs PO QD prn 30 tablet 0     No current facility-administered medications for this visit.         Objective Findings:  Vital Signs:  /78   Pulse 79   Ht 5' 7" (1.702 m)   Wt 108.8 kg (239 lb 13.8 oz)   BMI 37.57 kg/m²   Body mass index is 37.57 kg/m².    Physical Exam:  General appearance: alert, cooperative, no distress  HENT: Normocephalic, atraumatic, neck symmetrical, no nasal discharge  Eyes: conjunctivae/corneas clear, PERRL, EOM's intact  Lungs: clear to auscultation bilaterally, no dullness to percussion bilaterally  Heart: regular rate and rhythm without rub; no displacement of the PMI  Abdomen: soft, non-tender; bowel sounds normoactive; no organomegaly  Extremities: extremities symmetric; no clubbing, cyanosis, or edema  Integument: Skin color, texture, turgor normal; no rashes; hair distrubution normal  Neurologic: Alert and oriented X 3, normal strength, normal coordination and gait  Psychiatric: no pressured speech; normal affect; no evidence of impaired cognition    Labs:  Lab Results   Component Value Date    WBC 7.67 12/17/2018    HGB 17.5 12/17/2018    HCT 53.0 12/17/2018    MCV 90 12/17/2018     12/17/2018     Lab Results   Component Value Date    FERRITIN 95 01/11/2019     Lab Results   Component Value Date     (L) 12/17/2018    K 4.1 12/17/2018     12/17/2018    CO2 26 12/17/2018     (H) 12/17/2018    BUN 17 12/17/2018    CREATININE 0.9 12/17/2018    CALCIUM 9.4 12/17/2018    PROT 7.4 01/11/2019    ALBUMIN 4.1 01/11/2019    BILITOT 0.7 01/11/2019    ALKPHOS 76 01/11/2019    AST 18 01/11/2019    ALT 30 01/11/2019     Lab Results   Component Value Date    TSH 1.193 01/29/2016     No results found for: SEDRATE  No results found for: CRP  Lab Results   Component Value Date    HGBA1C 6.6 (H) 12/17/2018 "           Assessment and Plan:  Abhi Pompa is a 48 y.o. male with a PMH of PMH of anxiety, HLD, DM 2, low testosterone, MELLY, s/p heller myotomy referred to motility clinic for second opinion regarding the following problems:      GERD. Well controlled with prilosec 40 mg daily, zantac 300 mg HS.     Dysphagia.  esophagram and esophageal manometry with Achalasia type II.  Significant improvement after heller myotomy in 2016. Achalasia Eckardt score pre myotomy 6/12, Achalasia Eckardt score post myotomy 1/12.   Dysphagia  (none (0), occasional (1), daily (2), with every meal (3)):3   -Discussed pathophysiology, presentation and treatment of achalasia.    DM2.  BS running 140-160.  A1c 6.6.  On metformin 2000 mg daily x 5 yrs. On jordiance 10 mg daily. Followed by endocrinology.    Diarhea. Since starting metformin 5 years ago.  Some improvement with fiber supplement (causes bloating), imodium PRN,  fiber rich diet, eating yogurt.   -Check TTG/IgA  -Try questran daily    History of h.pylori infection. In 2015. Treated with triple therapy.     Anxiety. Depression. Well controlled with Prozac 20 mg daily.  Practices meditation Followed by psychology and psychiatry.    Insomnia. Some improvement with melatonin 10 mg HS. MELLY. Sleeps with CPAP. Awakes a lot throughout the night to urinate due to jardiance.    Elevated T. Bili. H/o elevated ALT. Pt reports since 1990s.   -Check labs  -Check abdominal ultrasound.  -Referral to hepatology.    Follow-up in about 1 year (around 1/11/2020) for Motility/achalasia w/ JAMEL Almaraz. F/U via [employer] if available.     1. Esophageal dysphagia    2. Serum total bilirubin elevated    3. Elevated ALT measurement    4. Diarrhea, unspecified type          Order summary:  Orders Placed This Encounter    US Abdomen Complete    US Abdomen Complete    Hepatitis B Surface Antibody, Qual/Quant    Hepatitis B surface antigen    Hepatitis B core antibody, total    Hepatitis C  antibody    Hepatitis A antibody, IgG    Ferritin    Bilirubin, direct    Hepatic function panel    TISSUE TRANSGLUTAMINASE (TTG), IGA    IgA    Ambulatory Referral to Hepatology    cholestyramine (QUESTRAN) 4 gram packet         Thank you so much for allowing me to participate in the care of Abhi Pompa          SUSANNE Moore, FNP-C    I have personally reviewed history, performed physical exam, and educated the patient.  I have reviewed and agree with today's findings and the care plan outlined by Aleta Watson NP.     Nel Watkins MD

## 2019-01-11 NOTE — PATIENT INSTRUCTIONS
Take questran once daily for diarrhea.     Labs and an abdominal ultrasound have been ordered to evaluate the elevated bilirubin and liver enzymes. We have also referred you to the hepatologist for further evaluation.

## 2019-01-14 LAB
HBV SURFACE AB SER QL IA: NEGATIVE
HBV SURFACE AB SERPL IA-ACNC: 3 MIU/ML
TTG IGA SER-ACNC: 10 UNITS

## 2019-01-21 ENCOUNTER — DOCUMENTATION ONLY (OUTPATIENT)
Dept: TRANSPLANT | Facility: CLINIC | Age: 49
End: 2019-01-21

## 2019-01-21 NOTE — NURSING
Pt records reviewed.   Pt will be referred to Hepatology.  Serum total bilirubin elevated  Elevated ALT measurement  Initial referral received  from the [facility]que.   Referring Provider/diagnosis  Aleta Watson NP  Referral letter sent to patient.

## 2019-01-21 NOTE — LETTER
January 21, 2019    Aleta Watson, JAMEL  [address] [facility] [address]      Dear Dr. Watson    Patient: Abhi Pompa   MR Number: [id number]   YOB: 1970     Thank you for the referral of Abhi Pompa to the [facility] program. An initial appointment will be scheduled for your patient with one of our Hepatologists.      Thank you again for your trust in our program.  If there is anything we can do for you or your staff, please feel free to contact us.        Sincerely,        [facility]  [facility] [place] [address]  [phone]

## 2019-01-21 NOTE — LETTER
January 21, 2019    Gennaro Pompa  [address] [facility] [address]      Dear Gennaro Pompa:    Your doctor has referred you to the [facility]. We are sending this letter to remind you to make an appointment with us to complete the referral process.     Please call us at [phone] to schedule an appointment. We look forward to seeing you soon.     If you received a call and have been scheduled, please disregard this letter.       Sincerely,        [facility]   [address] [place] [id number]  [phone]

## 2019-01-23 ENCOUNTER — TELEPHONE (OUTPATIENT)
Dept: TRANSPLANT | Facility: CLINIC | Age: 49
End: 2019-01-23

## 2019-01-23 NOTE — TELEPHONE ENCOUNTER
----- Message from Saray Azevedo sent at 1/23/2019  3:59 PM CST -----  Regarding: REFERRAL  All, Mr. Pompa states that he and the referring Professional voided this referral.  ----- Message -----  From: Teri Kapoor LPN  Sent: 1/21/2019   3:20 PM  To: Hepatology Scheduling    Pt records reviewed.   Pt will be referred to Hepatology.  Serum total bilirubin elevated  Elevated ALT measurement  Initial referral received  from the workque.   Referring Provider/diagnosis  Aleta Watson NP  Referral letter sent to patient.

## 2019-01-23 NOTE — TELEPHONE ENCOUNTER
----- Message from Aleta Watson NP sent at 1/23/2019  4:45 PM CST -----  Regarding: RE: REFERRAL  I did not void his referral. We recommend he see hepatology as far as we are concerned.      Thanks,  JAMEL River  ----- Message -----  From: Saray Azevedo  Sent: 1/23/2019   3:59 PM  To: Aleta Watson NP, [facility] Referral Pool  Subject: REFERRAL                                         All, Mr. Pompa states that he and the referring Professional voided this referral.  ----- Message -----  From: Teri Kapoor LPN  Sent: 1/21/2019   3:20 PM  To: Hepatology Scheduling    Pt records reviewed.   Pt will be referred to Hepatology.  Serum total bilirubin elevated  Elevated ALT measurement  Initial referral received  from the workque.   Referring Provider/diagnosis  Aleta Watson NP  Referral letter sent to patient.

## 2019-01-24 ENCOUNTER — TELEPHONE (OUTPATIENT)
Dept: TRANSPLANT | Facility: CLINIC | Age: 49
End: 2019-01-24

## 2019-01-24 ENCOUNTER — TELEPHONE (OUTPATIENT)
Dept: GASTROENTEROLOGY | Facility: CLINIC | Age: 49
End: 2019-01-24

## 2019-01-24 NOTE — TELEPHONE ENCOUNTER
----- Message from Aleta Watson NP sent at 1/24/2019  9:45 AM CST -----  Regarding: FW: REFERRAL  VALERIE Flores NP  ----- Message -----  From: Saray Azevedo  Sent: 1/24/2019   8:45 AM  To: Aleta Watson NP, [facility] Referral Pool  Subject: RE: REFERRAL                                     All, I just s/w Gennaro and he declined scheduling a Hep appointment.  ----- Message -----  From: Aleta Watson NP  Sent: 1/23/2019   4:45 PM  To: Saray Azevedo, [facility] Referral Pool  Subject: RE: REFERRAL                                     I did not void his referral. We recommend he see hepatology as far as we are concerned.      Thanks,  JAMEL River  ----- Message -----  From: Saray Azevedo  Sent: 1/23/2019   3:59 PM  To: Aleta Watson NP, [facility] Liver Referral Pool  Subject: REFERRAL                                         All, Mr. Pompa states that he and the referring Professional voided this referral.  ----- Message -----  From: Teri Kapoor LPN  Sent: 1/21/2019   3:20 PM  To: Hepatology Scheduling    Pt records reviewed.   Pt will be referred to Hepatology.  Serum total bilirubin elevated  Elevated ALT measurement  Initial referral received  from the workque.   Referring Provider/diagnosis  Aleta Watson NP  Referral letter sent to patient.

## 2019-01-24 NOTE — TELEPHONE ENCOUNTER
The referral to hepatology was placed by EARLE Watson NP. I agree with management plan including, abdominal ultrasound and referral as placed by EARLE Watson NP for further evaluation. If patient does not want to see hepatology, recommend seeing PCP for liver findings.  Thanks,  RAD

## 2019-01-24 NOTE — TELEPHONE ENCOUNTER
----- Message from Saray Azevedo sent at 1/24/2019  8:45 AM CST -----  Regarding: RE: REFERRAL  All, I just s/w Gennaro and he declined scheduling a Hep appointment.  ----- Message -----  From: Aleta Watson NP  Sent: 1/23/2019   4:45 PM  To: Saray Powell Karlos, [facility] Referral Pool  Subject: RE: REFERRAL                                     I did not void his referral. We recommend he see hepatology as far as we are concerned.      Thanks,  JAMEL River  ----- Message -----  From: Saray  Karlos  Sent: 1/23/2019   3:59 PM  To: Aleta Watson NP, [facility] Liver Referral Pool  Subject: REFERRAL                                         All, Mr. Pompa states that he and the referring Professional voided this referral.  ----- Message -----  From: Teri Kapoor LPN  Sent: 1/21/2019   3:20 PM  To: Hepatology Scheduling    Pt records reviewed.   Pt will be referred to Hepatology.  Serum total bilirubin elevated  Elevated ALT measurement  Initial referral received  from the workque.   Referring Provider/diagnosis  Aleta Watson NP  Referral letter sent to patient.

## 2019-03-11 ENCOUNTER — PATIENT MESSAGE (OUTPATIENT)
Dept: ENDOCRINOLOGY | Facility: CLINIC | Age: 49
End: 2019-03-11

## 2019-03-11 DIAGNOSIS — E11.9 TYPE 2 DIABETES MELLITUS WITHOUT COMPLICATION, WITHOUT LONG-TERM CURRENT USE OF INSULIN: Primary | ICD-10-CM

## 2019-03-12 ENCOUNTER — PATIENT MESSAGE (OUTPATIENT)
Dept: ENDOCRINOLOGY | Facility: CLINIC | Age: 49
End: 2019-03-12

## 2019-03-12 DIAGNOSIS — E29.1 MALE HYPOGONADISM: Primary | ICD-10-CM

## 2019-03-12 DIAGNOSIS — E11.9 TYPE 2 DIABETES MELLITUS WITHOUT COMPLICATION, UNSPECIFIED WHETHER LONG TERM INSULIN USE: ICD-10-CM

## 2019-03-18 ENCOUNTER — PATIENT MESSAGE (OUTPATIENT)
Dept: ENDOCRINOLOGY | Facility: CLINIC | Age: 49
End: 2019-03-18

## 2019-03-18 ENCOUNTER — LAB VISIT (OUTPATIENT)
Dept: LAB | Facility: HOSPITAL | Age: 49
End: 2019-03-18
Attending: INTERNAL MEDICINE
Payer: COMMERCIAL

## 2019-03-18 DIAGNOSIS — E11.9 TYPE 2 DIABETES MELLITUS TREATED WITHOUT INSULIN: ICD-10-CM

## 2019-03-18 DIAGNOSIS — E11.9 TYPE 2 DIABETES MELLITUS WITHOUT COMPLICATION, UNSPECIFIED WHETHER LONG TERM INSULIN USE: ICD-10-CM

## 2019-03-18 DIAGNOSIS — E11.69 HYPERLIPIDEMIA ASSOCIATED WITH TYPE 2 DIABETES MELLITUS: ICD-10-CM

## 2019-03-18 DIAGNOSIS — E78.5 HYPERLIPIDEMIA ASSOCIATED WITH TYPE 2 DIABETES MELLITUS: ICD-10-CM

## 2019-03-18 DIAGNOSIS — E29.1 MALE HYPOGONADISM: ICD-10-CM

## 2019-03-18 LAB
CHOLEST SERPL-MCNC: 181 MG/DL
CHOLEST/HDLC SERPL: 5 {RATIO}
ESTIMATED AVG GLUCOSE: 143 MG/DL
ESTIMATED AVG GLUCOSE: 143 MG/DL
HBA1C MFR BLD HPLC: 6.6 %
HBA1C MFR BLD HPLC: 6.6 %
HDLC SERPL-MCNC: 36 MG/DL
HDLC SERPL: 19.9 %
LDLC SERPL CALC-MCNC: 109 MG/DL
NONHDLC SERPL-MCNC: 145 MG/DL
TESTOST SERPL-MCNC: 283 NG/DL
TRIGL SERPL-MCNC: 180 MG/DL

## 2019-03-18 PROCEDURE — 80061 LIPID PANEL: CPT

## 2019-03-18 PROCEDURE — 84403 ASSAY OF TOTAL TESTOSTERONE: CPT

## 2019-03-18 PROCEDURE — 36415 COLL VENOUS BLD VENIPUNCTURE: CPT | Mod: PO

## 2019-03-18 PROCEDURE — 83036 HEMOGLOBIN GLYCOSYLATED A1C: CPT | Mod: 91

## 2019-03-29 ENCOUNTER — CLINICAL SUPPORT (OUTPATIENT)
Dept: DIABETES | Facility: CLINIC | Age: 49
End: 2019-03-29
Payer: COMMERCIAL

## 2019-03-29 VITALS — WEIGHT: 233 LBS | HEIGHT: 66 IN | BODY MASS INDEX: 37.45 KG/M2

## 2019-03-29 DIAGNOSIS — E11.9 TYPE 2 DIABETES MELLITUS WITHOUT RETINOPATHY: Primary | ICD-10-CM

## 2019-03-29 PROCEDURE — 99999 PR PBB SHADOW E&M-EST. PATIENT-LVL I: ICD-10-PCS | Mod: PBBFAC,,,

## 2019-03-29 PROCEDURE — G0108 PR DIAB MANAGE TRN  PER INDIV: ICD-10-PCS | Mod: S$GLB,,, | Performed by: DIETITIAN, REGISTERED

## 2019-03-29 PROCEDURE — G0108 DIAB MANAGE TRN  PER INDIV: HCPCS | Mod: S$GLB,,, | Performed by: DIETITIAN, REGISTERED

## 2019-03-29 PROCEDURE — 99999 PR PBB SHADOW E&M-EST. PATIENT-LVL I: CPT | Mod: PBBFAC,,,

## 2019-03-29 NOTE — PROGRESS NOTES
Diabetes Education  Author: Leslie Ford RD  Date: 3/29/2019    Diabetes Care Management Summary  Diabetes Education Record Assessment/Progress: MNT Follow-up  Current Diabetes Risk Level: Low     Patient returns for diabetes education follow up--prefers to come every 6 months for review so that he better manages his diabetes.  Hgb A1c stable at 6.6%.  Intentional weight loss of 6-7 lbs since Jan 2019 noted.  Continues to power lift 2 days a week for multiple hours, has backed off on aerobic exercise due to the weather but plans to resume more aerobic exercise now that weather is improving.  Reports less energy working out--does not eat a snack prior to work out in mid afternoon and he does take an early lunch around 11am.  Will add complex CHO plus protein snack (fruit/PB or cheese) on afternoons before working out to see if this improves his energy levels.      Diabetes Type  Diabetes Type : Type II    Diabetes History  Diabetes Diagnosis: 1-3 years  Current Treatment: Oral Medication(metformin 1000 mg BID, jardiance 10 mg daily)  Reviewed Problem List with Patient: No    Health Maintenance was reviewed today with patient. Discussed with patient importance of routine eye exams, foot exams/foot care, blood work (i.e.: A1c, microalbumin, and lipid), dental visits, yearly flu vaccine, and pneumonia vaccine as indicated by PCP. Patient verbalized understanding.     Health Maintenance Topics with due status: Not Due       Topic Last Completion Date    TETANUS VACCINE 09/15/2016    Urine Microalbumin 12/17/2018    Foot Exam 12/20/2018    Low Dose Statin 01/11/2019    Lipid Panel 03/18/2019    Hemoglobin A1c 03/18/2019     Health Maintenance Due   Topic Date Due    Influenza Vaccine  08/01/2018    Eye Exam  03/24/2019     Nutrition  Meal Planning: water, 3 meals per day, snacks between meal, eats out often  What type of sweetener do you use?: none  What type of beverages do you drink?: water  Meal Plan 24 Hour  Recall - Breakfast: shake with peanut butter  Meal Plan 24 Hour Recall - Lunch: hamburger, fries, water or diet drink  Meal Plan 24 Hour Recall - Dinner: soup OR deli meat sandwich OR beans  Meal Plan 24 Hour Recall - Snack: banana    Monitoring   Monitoring: One Touch Verio IQ(Just recieved a new glucomter from insurance--Verio Flex)  Self Monitoring : checking 1-2 times at various times--30 minutes after meals, before meals, post workout, pre workout  Blood Glucose Logs: No  Do you use a personal continuous glucose monitor?: No(Interested in Freestyle Amena)    Exercise   Exercise Type: use exercise equipment, swimming, walking, bike riding(Power lifting--2 days a week for 2-3 hours, has backed off on aerobic exercise (walking, swimming), bike riding) but plans to restart )  Intensity: High  Frequency: 3-5 Times per week  Duration: > 1 hour    Current Diabetes Treatment   Current Treatment: Oral Medication(metformin 1000 mg BID, jardiance 10 mg daily)    Social History  Preferred Learning Method: Face to Face  Primary Support: Self  Educational Level: College Graduate  Occupation: [employer]  Smoking Status: Never a Smoker  Alcohol Use: Daily(Glass of red wine nightly)    Barriers to Change  Barriers to Change: None  Learning Challenges : None    Readiness to Learn   Readiness to Learn : Eager    Cultural Influences  Cultural Influences: None    Diabetes Education Assessment/Progress  Diabetes Disease Process (diabetes disease process and treatment options): Discussion, Comprehends Key Points.  Hgb A1c stable at 6.6%.  Patient states that his glucose at home ranging from  Discussed goal glucose readings before meals--patient checking at sporadic times to see how his exercise affects his glucose.  Long discussion on glucsoe response to exercise as it is situational and glucose will be highly variable depending on types of exercises being done.      Nutrition (Incorporating nutritional management into one's lifestyle):  Discussion, Demonstrates Understanding/Competency (verbalizes/demonstrates), Instructed.  Patient overall doing well with meals--typically eats whatever he wants at lunch and those meals he tends to overeat CHO.  Discussed adding a snack prior to working out as patient pushing himself to work out hard in afternoon and eats an early lunch at 10:30A-11AM.   Discussed snack options.  Recommended avoiding prepackaged snacks and instead eating a piece of fruit with protein source (cheese, PB, etc).  Was drinking high caffeine drinks prior to exercise but did not like the way it made him feel so he states he has stopped these.      Physical Activity (incorporating physical activity into one's lifestyle): Discussion, Instructed, Demonstrates Understanding/Competency (verbalizes/demonstrates).  Plans to resume aerobic exercise now that weather has improved.     Monitoring (monitoring blood glucose/other parameters & using results): Discussion, Comprehends Key Points.  Discussed more consistency with times he is checking glucose-recommend checking fasting or before meals or at bedtime unless he is symptomatic to rule out hypoglycemia during/after exercise.      Behavioral (readiness for change, lifestyle practices, self-care behaviors): Discussion, Comprehends Key Points.  Will add a complex CHO plus protein snack prior to exercise.  Will restart aerobic exercise to goal of 30 minutes at least 5 days a week in addition to his weightlifting.    Goals  Patient has selected/evaluated goals during today's session: No    Diabetes Care Plan/Intervention  Education Plan/Intervention:   1.  Complex CHO plus protein snack prior to workouts.    2.  Decrease portions of CHO when dining out at fast food locations.  3.  Resume aerobic exercise to goal of 30 minutes at least 5 days a week.    4.  Patient interested in personal CGMS device--Ok with Dr. Richey and will check insurance coverage for Freestyle Amena by sending Rx to patient's  preferred pharmacy.    5.  6 month follow up for diabetes education per patient request--appt made for 10/18/19.         Today's Self-Management Care Plan was developed with the patient's input and is based on barriers identified during today's assessment.    The long and short-term goals in the care plan were written with the patient/caregiver's input. The patient has agreed to work toward these goals to improve his overall diabetes control.      The patient received a copy of today's self-management plan and verbalized understanding of the care plan, goals, and all of today's instructions.      The patient was encouraged to communicate with his physician and care team regarding his condition(s) and treatment.  I provided the patient with my contact information today and encouraged him to contact me via phone or patient portal as needed.     Education Units of Time   Time Spent: 45 min

## 2019-03-29 NOTE — TELEPHONE ENCOUNTER
Pt saw Jenn in Education this morning.  He'd like to see if the 14 day Amena would be covered.  Rx sent to [facility].

## 2019-04-07 RX ORDER — METFORMIN HYDROCHLORIDE 500 MG/1
1000 TABLET, EXTENDED RELEASE ORAL 2 TIMES DAILY WITH MEALS
Qty: 360 TABLET | Refills: 3 | Status: SHIPPED | OUTPATIENT
Start: 2019-04-07 | End: 2020-05-22

## 2019-04-11 ENCOUNTER — OFFICE VISIT (OUTPATIENT)
Dept: OPTOMETRY | Facility: CLINIC | Age: 49
End: 2019-04-11
Payer: COMMERCIAL

## 2019-04-11 DIAGNOSIS — E11.9 TYPE 2 DIABETES MELLITUS WITHOUT RETINOPATHY: Primary | ICD-10-CM

## 2019-04-11 DIAGNOSIS — H52.4 BILATERAL PRESBYOPIA: ICD-10-CM

## 2019-04-11 DIAGNOSIS — H04.123 BILATERAL DRY EYES: ICD-10-CM

## 2019-04-11 PROCEDURE — 99999 PR PBB SHADOW E&M-EST. PATIENT-LVL II: CPT | Mod: PBBFAC,,, | Performed by: OPTOMETRIST

## 2019-04-11 PROCEDURE — 92014 COMPRE OPH EXAM EST PT 1/>: CPT | Mod: S$GLB,,, | Performed by: OPTOMETRIST

## 2019-04-11 PROCEDURE — 99999 PR PBB SHADOW E&M-EST. PATIENT-LVL II: ICD-10-PCS | Mod: PBBFAC,,, | Performed by: OPTOMETRIST

## 2019-04-11 PROCEDURE — 92014 PR EYE EXAM, EST PATIENT,COMPREHESV: ICD-10-PCS | Mod: S$GLB,,, | Performed by: OPTOMETRIST

## 2019-04-11 NOTE — PROGRESS NOTES
HPI     DLS- 3/24/18     Pt is here for routine diabetic eye exam. Pt states he is constantly on   the computer for work and notices some straining in his va. Denies eye   pain. Pt also states at the end of the day he notices his eyes are very   tired and at times blurry. Denies flashes or floaters. No gtts. Pt states   diabetes is controlled with meds.     Hemoglobin A1C       Date                     Value               Ref Range             Status                03/18/2019               6.6 (H)             4.0 - 5.6 %           Final                 03/18/2019               6.6 (H)             4.0 - 5.6 %           Final                 12/17/2018               6.6 (H)             4.0 - 5.6 %           Final                  Last edited by Og Edmonds on 4/11/2019  1:20 PM. (History)            Assessment /Plan     For exam results, see Encounter Report.    Type 2 diabetes mellitus without retinopathy    Bilateral dry eyes    Bilateral presbyopia      1. No diabetic retinopathy, no csme. Return in 1 year for dilated eye exam.  2. Recommend artificial tears. 1 drop as needed per day. Chronicity of disease and treatment discussed.  3. Cont with OTC readers.

## 2019-04-15 RX ORDER — SIMVASTATIN 10 MG/1
10 TABLET, FILM COATED ORAL NIGHTLY
Qty: 30 TABLET | Refills: 11 | Status: SHIPPED | OUTPATIENT
Start: 2019-04-15 | End: 2020-06-29 | Stop reason: SDUPTHER

## 2019-04-26 ENCOUNTER — TELEPHONE (OUTPATIENT)
Dept: ENDOCRINOLOGY | Facility: CLINIC | Age: 49
End: 2019-04-26

## 2019-04-29 ENCOUNTER — TELEPHONE (OUTPATIENT)
Dept: ENDOCRINOLOGY | Facility: CLINIC | Age: 49
End: 2019-04-29

## 2019-05-01 ENCOUNTER — TELEPHONE (OUTPATIENT)
Dept: ENDOCRINOLOGY | Facility: CLINIC | Age: 49
End: 2019-05-01

## 2019-05-01 NOTE — TELEPHONE ENCOUNTER
Received fax from [facility] that testosterone 100mg/ml prior auth was approved from 3/30/19 through 4/28/2020  Left voicemail informing pt

## 2019-05-11 ENCOUNTER — PATIENT MESSAGE (OUTPATIENT)
Dept: OPTOMETRY | Facility: CLINIC | Age: 49
End: 2019-05-11

## 2019-06-19 ENCOUNTER — PATIENT MESSAGE (OUTPATIENT)
Dept: GASTROENTEROLOGY | Facility: CLINIC | Age: 49
End: 2019-06-19

## 2019-06-24 RX ORDER — TESTOSTERONE CYPIONATE 1000 MG/10ML
INJECTION, SOLUTION INTRAMUSCULAR
Qty: 10 ML | Refills: 4 | Status: SHIPPED | OUTPATIENT
Start: 2019-06-24 | End: 2019-07-14 | Stop reason: SDUPTHER

## 2019-07-14 ENCOUNTER — PATIENT MESSAGE (OUTPATIENT)
Dept: OPTOMETRY | Facility: CLINIC | Age: 49
End: 2019-07-14

## 2019-07-15 RX ORDER — TESTOSTERONE CYPIONATE 1000 MG/10ML
INJECTION, SOLUTION INTRAMUSCULAR
Qty: 10 ML | Refills: 3 | Status: SHIPPED | OUTPATIENT
Start: 2019-07-15 | End: 2019-09-11

## 2019-09-04 ENCOUNTER — LAB VISIT (OUTPATIENT)
Dept: LAB | Facility: HOSPITAL | Age: 49
End: 2019-09-04
Attending: INTERNAL MEDICINE
Payer: COMMERCIAL

## 2019-09-04 DIAGNOSIS — E29.1 MALE HYPOGONADISM: ICD-10-CM

## 2019-09-04 DIAGNOSIS — E11.9 TYPE 2 DIABETES MELLITUS TREATED WITHOUT INSULIN: ICD-10-CM

## 2019-09-04 LAB
ALBUMIN SERPL BCP-MCNC: 4.2 G/DL (ref 3.5–5.2)
ALP SERPL-CCNC: 85 U/L (ref 55–135)
ALT SERPL W/O P-5'-P-CCNC: 46 U/L (ref 10–44)
ANION GAP SERPL CALC-SCNC: 9 MMOL/L (ref 8–16)
AST SERPL-CCNC: 25 U/L (ref 10–40)
BASOPHILS # BLD AUTO: 0.07 K/UL (ref 0–0.2)
BASOPHILS NFR BLD: 1.2 % (ref 0–1.9)
BILIRUB SERPL-MCNC: 0.7 MG/DL (ref 0.1–1)
BUN SERPL-MCNC: 20 MG/DL (ref 6–20)
CALCIUM SERPL-MCNC: 9.1 MG/DL (ref 8.7–10.5)
CHLORIDE SERPL-SCNC: 107 MMOL/L (ref 95–110)
CO2 SERPL-SCNC: 22 MMOL/L (ref 23–29)
CREAT SERPL-MCNC: 0.8 MG/DL (ref 0.5–1.4)
DIFFERENTIAL METHOD: NORMAL
EOSINOPHIL # BLD AUTO: 0.1 K/UL (ref 0–0.5)
EOSINOPHIL NFR BLD: 2.1 % (ref 0–8)
ERYTHROCYTE [DISTWIDTH] IN BLOOD BY AUTOMATED COUNT: 12.6 % (ref 11.5–14.5)
EST. GFR  (AFRICAN AMERICAN): >60 ML/MIN/1.73 M^2
EST. GFR  (NON AFRICAN AMERICAN): >60 ML/MIN/1.73 M^2
ESTIMATED AVG GLUCOSE: 146 MG/DL (ref 68–131)
GLUCOSE SERPL-MCNC: 125 MG/DL (ref 70–110)
HBA1C MFR BLD HPLC: 6.7 % (ref 4–5.6)
HCT VFR BLD AUTO: 52.5 % (ref 40–54)
HGB BLD-MCNC: 17 G/DL (ref 14–18)
IMM GRANULOCYTES # BLD AUTO: 0.01 K/UL (ref 0–0.04)
IMM GRANULOCYTES NFR BLD AUTO: 0.2 % (ref 0–0.5)
LYMPHOCYTES # BLD AUTO: 1.9 K/UL (ref 1–4.8)
LYMPHOCYTES NFR BLD: 32 % (ref 18–48)
MCH RBC QN AUTO: 30.7 PG (ref 27–31)
MCHC RBC AUTO-ENTMCNC: 32.4 G/DL (ref 32–36)
MCV RBC AUTO: 95 FL (ref 82–98)
MONOCYTES # BLD AUTO: 0.7 K/UL (ref 0.3–1)
MONOCYTES NFR BLD: 12.3 % (ref 4–15)
NEUTROPHILS # BLD AUTO: 3.1 K/UL (ref 1.8–7.7)
NEUTROPHILS NFR BLD: 52.2 % (ref 38–73)
NRBC BLD-RTO: 0 /100 WBC
PLATELET # BLD AUTO: 344 K/UL (ref 150–350)
PMV BLD AUTO: 10.7 FL (ref 9.2–12.9)
POTASSIUM SERPL-SCNC: 4.4 MMOL/L (ref 3.5–5.1)
PROT SERPL-MCNC: 6.8 G/DL (ref 6–8.4)
RBC # BLD AUTO: 5.53 M/UL (ref 4.6–6.2)
SODIUM SERPL-SCNC: 138 MMOL/L (ref 136–145)
TESTOST SERPL-MCNC: 144 NG/DL (ref 304–1227)
WBC # BLD AUTO: 5.84 K/UL (ref 3.9–12.7)

## 2019-09-04 PROCEDURE — 84403 ASSAY OF TOTAL TESTOSTERONE: CPT

## 2019-09-04 PROCEDURE — 36415 COLL VENOUS BLD VENIPUNCTURE: CPT | Mod: PO

## 2019-09-04 PROCEDURE — 85025 COMPLETE CBC W/AUTO DIFF WBC: CPT

## 2019-09-04 PROCEDURE — 83036 HEMOGLOBIN GLYCOSYLATED A1C: CPT

## 2019-09-04 PROCEDURE — 80053 COMPREHEN METABOLIC PANEL: CPT

## 2019-09-11 ENCOUNTER — TELEPHONE (OUTPATIENT)
Dept: ENDOCRINOLOGY | Facility: CLINIC | Age: 49
End: 2019-09-11

## 2019-09-11 ENCOUNTER — OFFICE VISIT (OUTPATIENT)
Dept: ENDOCRINOLOGY | Facility: CLINIC | Age: 49
End: 2019-09-11
Payer: COMMERCIAL

## 2019-09-11 ENCOUNTER — PATIENT MESSAGE (OUTPATIENT)
Dept: ENDOCRINOLOGY | Facility: CLINIC | Age: 49
End: 2019-09-11

## 2019-09-11 VITALS
SYSTOLIC BLOOD PRESSURE: 104 MMHG | BODY MASS INDEX: 35.91 KG/M2 | HEIGHT: 67 IN | DIASTOLIC BLOOD PRESSURE: 60 MMHG | WEIGHT: 228.81 LBS | HEART RATE: 88 BPM

## 2019-09-11 DIAGNOSIS — E11.69 HYPERLIPIDEMIA ASSOCIATED WITH TYPE 2 DIABETES MELLITUS: ICD-10-CM

## 2019-09-11 DIAGNOSIS — E78.5 HYPERLIPIDEMIA ASSOCIATED WITH TYPE 2 DIABETES MELLITUS: ICD-10-CM

## 2019-09-11 DIAGNOSIS — E11.9 TYPE 2 DIABETES MELLITUS WITHOUT COMPLICATION, UNSPECIFIED WHETHER LONG TERM INSULIN USE: ICD-10-CM

## 2019-09-11 DIAGNOSIS — E29.1 MALE HYPOGONADISM: Primary | ICD-10-CM

## 2019-09-11 PROCEDURE — 3008F BODY MASS INDEX DOCD: CPT | Mod: CPTII,S$GLB,, | Performed by: INTERNAL MEDICINE

## 2019-09-11 PROCEDURE — 3044F HG A1C LEVEL LT 7.0%: CPT | Mod: CPTII,S$GLB,, | Performed by: INTERNAL MEDICINE

## 2019-09-11 PROCEDURE — 99214 PR OFFICE/OUTPT VISIT, EST, LEVL IV, 30-39 MIN: ICD-10-PCS | Mod: S$GLB,,, | Performed by: INTERNAL MEDICINE

## 2019-09-11 PROCEDURE — 3008F PR BODY MASS INDEX (BMI) DOCUMENTED: ICD-10-PCS | Mod: CPTII,S$GLB,, | Performed by: INTERNAL MEDICINE

## 2019-09-11 PROCEDURE — 3044F PR MOST RECENT HEMOGLOBIN A1C LEVEL <7.0%: ICD-10-PCS | Mod: CPTII,S$GLB,, | Performed by: INTERNAL MEDICINE

## 2019-09-11 PROCEDURE — 99999 PR PBB SHADOW E&M-EST. PATIENT-LVL III: ICD-10-PCS | Mod: PBBFAC,,, | Performed by: INTERNAL MEDICINE

## 2019-09-11 PROCEDURE — 99214 OFFICE O/P EST MOD 30 MIN: CPT | Mod: S$GLB,,, | Performed by: INTERNAL MEDICINE

## 2019-09-11 PROCEDURE — 99999 PR PBB SHADOW E&M-EST. PATIENT-LVL III: CPT | Mod: PBBFAC,,, | Performed by: INTERNAL MEDICINE

## 2019-09-11 RX ORDER — IBUPROFEN AND FAMOTIDINE 26.6; 8 MG/1; MG/1
1 TABLET ORAL 3 TIMES DAILY
COMMUNITY
End: 2021-03-03

## 2019-09-11 RX ORDER — TESTOSTERONE CYPIONATE 200 MG/ML
200 INJECTION, SOLUTION INTRAMUSCULAR WEEKLY
Qty: 10 ML | Refills: 4 | Status: SHIPPED | OUTPATIENT
Start: 2019-09-11 | End: 2020-04-01

## 2019-09-11 RX ORDER — TESTOSTERONE CYPIONATE 1000 MG/10ML
200 INJECTION, SOLUTION INTRAMUSCULAR WEEKLY
Qty: 10 ML | Refills: 4 | Status: SHIPPED | OUTPATIENT
Start: 2019-09-11 | End: 2019-09-11

## 2019-09-11 NOTE — TELEPHONE ENCOUNTER
Added to pt list per report:  Amitriptyline 1%/Meloxicam 0.09%/Lidocaine Hcl 2%/ Prilocaine 2%   APPLY 2 GRAMS QID

## 2019-09-11 NOTE — PROGRESS NOTES
CHIEF COMPLAINT: DM2  48 year old being seen as a f/u. Diagnosed with diabetes 2013. Off actos. On metformin XR 1000 BID and jardiance. On testosterone 100 mg weekly. Tolerating statin. No paresthesias. Up to date with eye exam. Swims, weight lifting and karate as exercise.       PAST MEDICAL HISTORY/PAST SURGICAL HISTORY:  Reviewed in [facility]    SOCIAL HISTORY: No tobacco. Social alcohol. Desk Job-     FAMILY HISTORY:  + DM 2. No known thyroid disease.     MEDICATIONS/ALLERGIES: The patient's MedCard has been updated and reviewed.      ROS:   Constitutional: weight decreased  Eyes: No recent visual changes  ENT: No dysphagia  Cardiovascular: Denies current anginal symptoms  Respiratory: Denies current respiratory difficulty  Gastrointestinal: No N/V  GenitoUrinary - No dysuria  Skin: No new skin rash  Neurologic: No focal neurologic complaints  Remainder ROS negative        PE:    GENERAL: Well developed, well nourished.  NECK: Supple, trachea midline, No palpable nodules  CHEST: Resp even and unlabored, CTA bilateral.  CARDIAC: RRR, S1, S2 heard, no murmurs, rubs, S3, or S4  FEET: Normal monofilament. No cuts or abrasions. 2 + pedal pulses.     Results for ITZ NARAYANAN (MRN [id number]) as of 9/11/2019 08:42   Ref. Range 9/4/2019 07:21   Sodium Latest Ref Range: 136 - 145 mmol/L 138   Potassium Latest Ref Range: 3.5 - 5.1 mmol/L 4.4   Chloride Latest Ref Range: 95 - 110 mmol/L 107   CO2 Latest Ref Range: 23 - 29 mmol/L 22 (L)   Anion Gap Latest Ref Range: 8 - 16 mmol/L 9   BUN, Bld Latest Ref Range: 6 - 20 mg/dL 20   Creatinine Latest Ref Range: 0.5 - 1.4 mg/dL 0.8   eGFR if non African American Latest Ref Range: >60 mL/min/1.73 m^2 >60.0   eGFR if African American Latest Ref Range: >60 mL/min/1.73 m^2 >60.0   Glucose Latest Ref Range: 70 - 110 mg/dL 125 (H)   Calcium Latest Ref Range: 8.7 - 10.5 mg/dL 9.1   Alkaline Phosphatase Latest Ref Range: 55 - 135 U/L 85   PROTEIN TOTAL Latest Ref Range: 6.0 - 8.4 g/dL 6.8    Albumin Latest Ref Range: 3.5 - 5.2 g/dL 4.2   BILIRUBIN TOTAL Latest Ref Range: 0.1 - 1.0 mg/dL 0.7   AST Latest Ref Range: 10 - 40 U/L 25   ALT Latest Ref Range: 10 - 44 U/L 46 (H)   Hemoglobin A1C External Latest Ref Range: 4.0 - 5.6 % 6.7 (H)   Estimated Avg Glucose Latest Ref Range: 68 - 131 mg/dL 146 (H)   Testosterone, Total Latest Ref Range: 304 - 1227 ng/dL 144 (L)         ASSESSMENT/PLAN:  1. DM 2- No known complications. Has seen DM Education. On metformin and Jardiance. Continue current Tx. Up to date with eye exam. Discussed getting flu vaccine which he states he will get today.     2. Hyperlipidemia- Continue statin. Tolerating well. Monitor diet as Trig increased    3. Male Hypogonadism- increase testosterone to 200 mg weekly. Discussed risks of testosterone. States adherence    FOLLOWUP  F/U 6 months with Fasting CMP, FLP, CBC, A1c, testosterone, PSA

## 2019-09-17 ENCOUNTER — TELEPHONE (OUTPATIENT)
Dept: ENDOCRINOLOGY | Facility: CLINIC | Age: 49
End: 2019-09-17

## 2019-09-17 NOTE — TELEPHONE ENCOUNTER
PA submitted through [facility] for testosterone injection KEY [id number].

## 2019-10-04 ENCOUNTER — TELEPHONE (OUTPATIENT)
Dept: ENDOCRINOLOGY | Facility: CLINIC | Age: 49
End: 2019-10-04

## 2019-10-04 NOTE — TELEPHONE ENCOUNTER
Received fax from [facility] approval of change in therapy to dep-testosterone   Valid from 09/022019 - 10/01/2020

## 2019-10-18 ENCOUNTER — CLINICAL SUPPORT (OUTPATIENT)
Dept: DIABETES | Facility: CLINIC | Age: 49
End: 2019-10-18
Payer: COMMERCIAL

## 2019-10-18 VITALS — BODY MASS INDEX: 36.75 KG/M2 | WEIGHT: 234.13 LBS | HEIGHT: 67 IN

## 2019-10-18 DIAGNOSIS — E11.9 TYPE 2 DIABETES MELLITUS WITHOUT RETINOPATHY: Primary | ICD-10-CM

## 2019-10-18 PROCEDURE — 99999 PR PBB SHADOW E&M-EST. PATIENT-LVL I: CPT | Mod: PBBFAC,,,

## 2019-10-18 PROCEDURE — G0108 DIAB MANAGE TRN  PER INDIV: HCPCS | Mod: S$GLB,,, | Performed by: DIETITIAN, REGISTERED

## 2019-10-18 PROCEDURE — 99999 PR PBB SHADOW E&M-EST. PATIENT-LVL I: ICD-10-PCS | Mod: PBBFAC,,,

## 2019-10-18 PROCEDURE — G0108 PR DIAB MANAGE TRN  PER INDIV: ICD-10-PCS | Mod: S$GLB,,, | Performed by: DIETITIAN, REGISTERED

## 2019-10-18 NOTE — PROGRESS NOTES
Diabetes Education  Author: Leslie Ford RD  Date: 10/18/2019    Diabetes Care Management Summary  Diabetes Education Record Assessment/Progress: MNT Follow-up  Current Diabetes Risk Level: Low     Diabetes Type  Diabetes Type : Type II    Diabetes History  Current Treatment: Oral Medication(metformin 1000 mg BID, jardiance 10 mg)  Reviewed Problem List with Patient: Yes    Health Maintenance was reviewed today with patient. Discussed with patient importance of routine eye exams, foot exams/foot care, blood work (i.e.: A1c, microalbumin, and lipid), dental visits, yearly flu vaccine, and pneumonia vaccine as indicated by PCP. Patient verbalized understanding.     Health Maintenance Topics with due status: Not Due       Topic Last Completion Date    TETANUS VACCINE 09/15/2016    Foot Exam 12/20/2018    Lipid Panel 03/18/2019    Eye Exam 04/11/2019    Hemoglobin A1c 09/04/2019    Low Dose Statin 09/11/2019     Health Maintenance Due   Topic Date Due    Urine Microalbumin  12/17/2019     Nutrition  Meal Planning: water, 3 meals per day, artificial sweeteners, eats out seldom, snacks between meal  What type of beverages do you drink?: water, milk, other (see comments)(2% chocolate milk (8 oz) after long workouts, sugar free flavored packets for water bottles)    Monitoring   Monitoring: Other(Unknown brand received from insurance--states glucometer syncs with phone but that he has had difficulty with test strips and contacted )  Self Monitoring : checking rarely, < once a week  Blood Glucose Logs: No  Do you use a personal continuous glucose monitor?: Yes  What kind of glucose monitor do you use?: Freestyle Amena Flash(Has personal Amena Flash, started use approx April 2019, however it falls off with [employer] arts so has not been wearing.  Provided adhesive information--SimPatch etc --as patient likes to wear CGMS occasionally to look at glucose trends)  In the last month, how often have you had a low  "blood sugar reaction?: never  Can you tell when your blood sugar is too high?: no    Exercise   Exercise Type: use exercise equipment, bike riding(Powerlifting 2X/wk, Martial arts 2X/wk, biking/swimming 2X/wk)  Intensity: High  Frequency: Daily  Duration: > 1 hour    Current Diabetes Treatment   Current Treatment: Oral Medication(metformin 1000 mg BID, jardiance 10 mg)    Social History  Preferred Learning Method: Face to Face  Primary Support: Self  Smoking Status: Never a Smoker  Alcohol Use: Weekly    Barriers to Change  Barriers to Change: None  Learning Challenges : None    Readiness to Learn   Readiness to Learn : Eager    Cultural Influences  Cultural Influences: None    Diabetes Education Assessment/Progress  Diabetes Disease Process (diabetes disease process and treatment options): Discussion, Comprehends Key Points.  Hgb A1c stable--6.7% on last labs (Sept 2019).      Nutrition (Incorporating nutritional management into one's lifestyle): Discussion, Instructed, Demonstrates Understanding/Competency (verbalizes/demonstrates).  Re-reviewed typical meal patterns.  Eats well majority of the time--does report once a week having a "cheat meal" with fried foods or cheeseburger and stomach bothers him after eating these high fat meals.  Prepares many meals at home--tries to portion his foods to adequate amounts.  Still only eating a small piece of fruit before 2+ hour exercise session--encouraged to consider adding a lean protein source with complex CHO prior to exercise.      Physical Activity (incorporating physical activity into one's lifestyle): Discussion, Comprehends Key Points. Patient is a power --has strenuous exercise regimen.  Has added aerobic exercise (biking/swimming) a couple of days a week.      Medications (states correct name, dose, onset, peak, duration, side effects & timing of meds): Discussion, Comprehends Key Points.  Patient is compliant with oral diabetic medication as prescribed.  "     Monitoring (monitoring blood glucose/other parameters & using results): Discussion, Comprehends Key Points.  Patient encouraged to continue to check glucose periodically or wear personal Amena as patient feels this gives him a better understanding of how food and exercise affect his glucose levels.      Goals  Patient has selected/evaluated goals during today's session: Yes, selected  Healthy Eating: Set(Will work on adding protein to pre exercise snacks)  Start Date: 10/18/19  Target Date: 07/10/20  Physical Activity: In Progress    Diabetes Care Plan/Intervention  Education Plan/Intervention: Individual Follow-Up DSMT  1.  Per patient request--would like to follow up in between seeing Endocrinology (next appt Endo is 4/1/20).  Scheduled DM education follow up for July 2020.    2.  Doing well with exercising and finding ways to incorporate both aerobic and resistance training.  3.  Weight gain of 5 lbs over past month--however patient started martial arts and states his clothes are fitting looser so believes this is muscle gain.   4.  Encouraged to add a protein to complex CHO for pre workout snacks due to duration of workouts.  5.  Has personal CGMS (Freestyle Amena)--will use periodically due to cost of sensors but does like knowing his glucose patterns.  Encouraged to check blood glucose with regular glucometer as needed.      Diabetes Meal Plan  Restrictions: Restricted Carbohydrate  Carbohydrate Per Meal: 45-60g  Carbohydrate Per Snack : 15-20g    Today's Self-Management Care Plan was developed with the patient's input and is based on barriers identified during today's assessment.    The long and short-term goals in the care plan were written with the patient/caregiver's input. The patient has agreed to work toward these goals to improve his overall diabetes control.      The patient received a copy of today's self-management plan and verbalized understanding of the care plan, goals, and all of today's  instructions.      The patient was encouraged to communicate with his physician and care team regarding his condition(s) and treatment.  I provided the patient with my contact information today and encouraged him to contact me via phone or patient portal as needed.     Education Units of Time   Time Spent: 30 min

## 2019-12-03 ENCOUNTER — TELEPHONE (OUTPATIENT)
Dept: GASTROENTEROLOGY | Facility: CLINIC | Age: 49
End: 2019-12-03

## 2019-12-03 DIAGNOSIS — K21.00 GASTROESOPHAGEAL REFLUX DISEASE WITH ESOPHAGITIS: ICD-10-CM

## 2019-12-03 RX ORDER — OMEPRAZOLE 40 MG/1
CAPSULE, DELAYED RELEASE ORAL
Qty: 90 CAPSULE | Refills: 0 | Status: SHIPPED | OUTPATIENT
Start: 2019-12-03 | End: 2020-03-03

## 2019-12-03 NOTE — TELEPHONE ENCOUNTER
Please inform the patient that I refilled the prescription for zantac and omeprazole and patient is due for a follow-up visit (last seen a year ago) for continued evaluation and management.  Thanks  RAD

## 2019-12-20 PROBLEM — M79.644 PAIN IN FINGER OF RIGHT HAND: Status: RESOLVED | Noted: 2017-06-15 | Resolved: 2019-12-20

## 2019-12-20 PROBLEM — M25.60 DECREASED RANGE OF MOTION: Status: RESOLVED | Noted: 2017-06-14 | Resolved: 2019-12-20

## 2019-12-20 PROBLEM — M25.441 SWELLING OF FINGER JOINT OF RIGHT HAND: Status: RESOLVED | Noted: 2017-06-15 | Resolved: 2019-12-20

## 2019-12-20 PROBLEM — L03.113 CELLULITIS OF RIGHT HAND: Status: RESOLVED | Noted: 2017-05-30 | Resolved: 2019-12-20

## 2019-12-20 PROBLEM — R29.898 DECREASED GRIP STRENGTH OF RIGHT HAND: Status: RESOLVED | Noted: 2017-07-20 | Resolved: 2019-12-20

## 2020-03-03 ENCOUNTER — TELEPHONE (OUTPATIENT)
Dept: GASTROENTEROLOGY | Facility: CLINIC | Age: 50
End: 2020-03-03

## 2020-03-03 DIAGNOSIS — K21.00 GASTROESOPHAGEAL REFLUX DISEASE WITH ESOPHAGITIS: ICD-10-CM

## 2020-03-03 RX ORDER — OMEPRAZOLE 40 MG/1
CAPSULE, DELAYED RELEASE ORAL
Qty: 90 CAPSULE | Refills: 0 | Status: SHIPPED | OUTPATIENT
Start: 2020-03-03 | End: 2020-06-01

## 2020-03-03 NOTE — TELEPHONE ENCOUNTER
Please inform the patient that I refilled the prescription for zantac and omeprazole and patient is due for a follow-up visit (last seen a year ago) for continued evaluation and management. Similar message sent in 12/2019  Thanks  RAD

## 2020-03-19 ENCOUNTER — PATIENT MESSAGE (OUTPATIENT)
Dept: ENDOCRINOLOGY | Facility: CLINIC | Age: 50
End: 2020-03-19

## 2020-03-20 ENCOUNTER — PATIENT MESSAGE (OUTPATIENT)
Dept: ENDOCRINOLOGY | Facility: CLINIC | Age: 50
End: 2020-03-20

## 2020-03-24 ENCOUNTER — TELEPHONE (OUTPATIENT)
Dept: ENDOCRINOLOGY | Facility: CLINIC | Age: 50
End: 2020-03-24

## 2020-03-24 NOTE — TELEPHONE ENCOUNTER
Spoke with pt. Cancelled labs for tomorrow due to risk for Covid 19   Pt will see Dr. Richey on 3/30/20 at 3:30pm Virtual Visit and determine at that point if labs are needed

## 2020-03-24 NOTE — TELEPHONE ENCOUNTER
----- Message from Monica Pate sent at 3/24/2020 11:57 AM CDT -----  Please do not respond to this message. Due to concerns associated with Covid19 the Laboratory is seeking to reschedule outpatient labs between 3/21 - 4/21 that have been ordered prior to 3/19. Abhi Pompa is scheduled for labs on 3/25 at the [place] lab. Please reschedule this patients labs if you believe it is safe to postpone.

## 2020-03-30 ENCOUNTER — OFFICE VISIT (OUTPATIENT)
Dept: ENDOCRINOLOGY | Facility: CLINIC | Age: 50
End: 2020-03-30
Payer: COMMERCIAL

## 2020-03-30 DIAGNOSIS — E11.9 TYPE 2 DIABETES MELLITUS WITHOUT COMPLICATION, UNSPECIFIED WHETHER LONG TERM INSULIN USE: ICD-10-CM

## 2020-03-30 DIAGNOSIS — E78.5 HYPERLIPIDEMIA, UNSPECIFIED HYPERLIPIDEMIA TYPE: ICD-10-CM

## 2020-03-30 DIAGNOSIS — E29.1 MALE HYPOGONADISM: Primary | ICD-10-CM

## 2020-03-30 PROCEDURE — 99214 PR OFFICE/OUTPT VISIT, EST, LEVL IV, 30-39 MIN: ICD-10-PCS | Mod: 95,,, | Performed by: INTERNAL MEDICINE

## 2020-03-30 PROCEDURE — 99214 OFFICE O/P EST MOD 30 MIN: CPT | Mod: 95,,, | Performed by: INTERNAL MEDICINE

## 2020-03-30 NOTE — PROGRESS NOTES
The patient location is: Home-[place]  Visit type: Virtual visit with synchronous audio and video-COVID  Each patient to whom he or she provides medical services by telemedicine is:  (1) informed of the relationship between the physician and patient and the respective role of any other health care provider with respect to management of the patient; and (2) notified that he or she may decline to receive medical services by telemedicine and may withdraw from such care at any time.      CHIEF COMPLAINT: DM2  49 year old being seen as a f/u. Diagnosed with diabetes 2013. Off actos. On metformin XR 1000 BID and jardiance. On testosterone 200 mg weekly. He is swimming daily. States that bg have been controlled. No N/V. Working from home.         PAST MEDICAL HISTORY/PAST SURGICAL HISTORY:  Reviewed in [employer]    SOCIAL HISTORY: No tobacco. Social alcohol. Desk Job-     FAMILY HISTORY:  + DM 2. No known thyroid disease.     MEDICATIONS/ALLERGIES: The patient's MedCard has been updated and reviewed.      ROS:   Constitutional: weight decreased per patient.   Eyes: No recent visual changes  ENT: No dysphagia  Cardiovascular: Denies current anginal symptoms  Respiratory: Denies current respiratory difficulty  Gastrointestinal: Occasional diarrhea with metformin.   GenitoUrinary - No dysuria  Skin: No new skin rash  Neurologic: No focal neurologic complaints  Remainder ROS negative        PE:  Virtual Visit        ASSESSMENT/PLAN:  1. DM 2- No known complications. Has seen DM Education. On metformin and Jardiance. Continue current Tx. He has still been exercising at home.     2. Hyperlipidemia- Continue statin. Tolerating well.Has been watching diet closely.     3. Male Hypogonadism- Symptomatically doing well. Will continue current Tx.     FOLLOWUP  F/U 6 months with 8 AM CMP,CBC, A1c, testosterone, PSA

## 2020-04-01 DIAGNOSIS — E29.1 MALE HYPOGONADISM: Primary | ICD-10-CM

## 2020-04-01 RX ORDER — TESTOSTERONE CYPIONATE 200 MG/ML
INJECTION, SOLUTION INTRAMUSCULAR
Qty: 10 ML | Refills: 4 | Status: SHIPPED | OUTPATIENT
Start: 2020-04-01 | End: 2020-10-05

## 2020-04-01 RX ORDER — FLASH GLUCOSE SENSOR
KIT MISCELLANEOUS
Qty: 2 KIT | Refills: 11 | Status: SHIPPED | OUTPATIENT
Start: 2020-04-01 | End: 2022-01-03

## 2020-05-22 RX ORDER — METFORMIN HYDROCHLORIDE 500 MG/1
1000 TABLET, EXTENDED RELEASE ORAL 2 TIMES DAILY WITH MEALS
Qty: 360 TABLET | Refills: 3 | Status: SHIPPED | OUTPATIENT
Start: 2020-05-22 | End: 2021-06-25

## 2020-05-31 ENCOUNTER — TELEPHONE (OUTPATIENT)
Dept: GASTROENTEROLOGY | Facility: CLINIC | Age: 50
End: 2020-05-31

## 2020-05-31 DIAGNOSIS — K21.00 GASTROESOPHAGEAL REFLUX DISEASE WITH ESOPHAGITIS: ICD-10-CM

## 2020-06-01 RX ORDER — OMEPRAZOLE 40 MG/1
CAPSULE, DELAYED RELEASE ORAL
Qty: 90 CAPSULE | Refills: 0 | Status: SHIPPED | OUTPATIENT
Start: 2020-06-01 | End: 2020-09-18 | Stop reason: SDUPTHER

## 2020-06-01 NOTE — TELEPHONE ENCOUNTER
Please inform the patient that I refilled the prescription for omeprazole and patient is OVERdue for a follow-up visit (last seen in 2018) for continued evaluation and management. Similar message sent in 12/2019 & 3/2020.  Please offer patient virtual visit (telemedicine visit) option. If patient declines virtual visit (telemedicine visit) option, we can still see patient in clinic.  Thanks,

## 2020-06-29 RX ORDER — SIMVASTATIN 10 MG/1
10 TABLET, FILM COATED ORAL NIGHTLY
Qty: 30 TABLET | Refills: 11 | Status: SHIPPED | OUTPATIENT
Start: 2020-06-29 | End: 2021-05-10

## 2020-07-03 ENCOUNTER — PATIENT MESSAGE (OUTPATIENT)
Dept: ENDOCRINOLOGY | Facility: CLINIC | Age: 50
End: 2020-07-03

## 2020-07-07 ENCOUNTER — PATIENT MESSAGE (OUTPATIENT)
Dept: ENDOCRINOLOGY | Facility: CLINIC | Age: 50
End: 2020-07-07

## 2020-07-10 ENCOUNTER — LAB VISIT (OUTPATIENT)
Dept: LAB | Facility: HOSPITAL | Age: 50
End: 2020-07-10
Attending: INTERNAL MEDICINE
Payer: COMMERCIAL

## 2020-07-10 ENCOUNTER — CLINICAL SUPPORT (OUTPATIENT)
Dept: DIABETES | Facility: CLINIC | Age: 50
End: 2020-07-10
Payer: COMMERCIAL

## 2020-07-10 VITALS — HEIGHT: 67 IN | WEIGHT: 231.69 LBS | BODY MASS INDEX: 36.36 KG/M2

## 2020-07-10 DIAGNOSIS — Z72.51 HIGH RISK SEXUAL BEHAVIOR, UNSPECIFIED TYPE: ICD-10-CM

## 2020-07-10 DIAGNOSIS — E11.9 TYPE 2 DIABETES MELLITUS WITHOUT RETINOPATHY: Primary | ICD-10-CM

## 2020-07-10 DIAGNOSIS — E11.9 TYPE 2 DIABETES MELLITUS WITHOUT COMPLICATION, UNSPECIFIED WHETHER LONG TERM INSULIN USE: ICD-10-CM

## 2020-07-10 DIAGNOSIS — E11.9 TYPE 2 DIABETES MELLITUS WITHOUT RETINOPATHY: ICD-10-CM

## 2020-07-10 DIAGNOSIS — E11.9 DIABETES MELLITUS WITHOUT COMPLICATION: Primary | ICD-10-CM

## 2020-07-10 DIAGNOSIS — E29.1 MALE HYPOGONADISM: ICD-10-CM

## 2020-07-10 LAB
ALBUMIN SERPL BCP-MCNC: 4.2 G/DL (ref 3.5–5.2)
ALP SERPL-CCNC: 68 U/L (ref 55–135)
ALT SERPL W/O P-5'-P-CCNC: 35 U/L (ref 10–44)
ANION GAP SERPL CALC-SCNC: 8 MMOL/L (ref 8–16)
AST SERPL-CCNC: 20 U/L (ref 10–40)
BASOPHILS # BLD AUTO: 0.07 K/UL (ref 0–0.2)
BASOPHILS NFR BLD: 1.1 % (ref 0–1.9)
BILIRUB SERPL-MCNC: 0.9 MG/DL (ref 0.1–1)
BUN SERPL-MCNC: 13 MG/DL (ref 6–20)
CALCIUM SERPL-MCNC: 9.4 MG/DL (ref 8.7–10.5)
CHLORIDE SERPL-SCNC: 100 MMOL/L (ref 95–110)
CHOLEST SERPL-MCNC: 191 MG/DL (ref 120–199)
CHOLEST/HDLC SERPL: 7.1 {RATIO} (ref 2–5)
CO2 SERPL-SCNC: 27 MMOL/L (ref 23–29)
COMPLEXED PSA SERPL-MCNC: 0.82 NG/ML (ref 0–4)
CREAT SERPL-MCNC: 1.1 MG/DL (ref 0.5–1.4)
DIFFERENTIAL METHOD: ABNORMAL
EOSINOPHIL # BLD AUTO: 0.1 K/UL (ref 0–0.5)
EOSINOPHIL NFR BLD: 1.7 % (ref 0–8)
ERYTHROCYTE [DISTWIDTH] IN BLOOD BY AUTOMATED COUNT: 13 % (ref 11.5–14.5)
EST. GFR  (AFRICAN AMERICAN): >60 ML/MIN/1.73 M^2
EST. GFR  (NON AFRICAN AMERICAN): >60 ML/MIN/1.73 M^2
ESTIMATED AVG GLUCOSE: 169 MG/DL (ref 68–131)
GLUCOSE SERPL-MCNC: 154 MG/DL (ref 70–110)
HBA1C MFR BLD HPLC: 7.5 % (ref 4–5.6)
HCT VFR BLD AUTO: 56.4 % (ref 40–54)
HDLC SERPL-MCNC: 27 MG/DL (ref 40–75)
HDLC SERPL: 14.1 % (ref 20–50)
HGB BLD-MCNC: 18.3 G/DL (ref 14–18)
IMM GRANULOCYTES # BLD AUTO: 0.02 K/UL (ref 0–0.04)
IMM GRANULOCYTES NFR BLD AUTO: 0.3 % (ref 0–0.5)
LDLC SERPL CALC-MCNC: 123.8 MG/DL (ref 63–159)
LYMPHOCYTES # BLD AUTO: 1.5 K/UL (ref 1–4.8)
LYMPHOCYTES NFR BLD: 22.2 % (ref 18–48)
MCH RBC QN AUTO: 31.7 PG (ref 27–31)
MCHC RBC AUTO-ENTMCNC: 32.4 G/DL (ref 32–36)
MCV RBC AUTO: 98 FL (ref 82–98)
MONOCYTES # BLD AUTO: 0.9 K/UL (ref 0.3–1)
MONOCYTES NFR BLD: 12.8 % (ref 4–15)
NEUTROPHILS # BLD AUTO: 4.1 K/UL (ref 1.8–7.7)
NEUTROPHILS NFR BLD: 61.9 % (ref 38–73)
NONHDLC SERPL-MCNC: 164 MG/DL
NRBC BLD-RTO: 0 /100 WBC
PLATELET # BLD AUTO: 295 K/UL (ref 150–350)
PMV BLD AUTO: 10.7 FL (ref 9.2–12.9)
POTASSIUM SERPL-SCNC: 4.6 MMOL/L (ref 3.5–5.1)
PROT SERPL-MCNC: 7 G/DL (ref 6–8.4)
RBC # BLD AUTO: 5.77 M/UL (ref 4.6–6.2)
SODIUM SERPL-SCNC: 135 MMOL/L (ref 136–145)
TESTOST SERPL-MCNC: 1045 NG/DL (ref 304–1227)
TRIGL SERPL-MCNC: 201 MG/DL (ref 30–150)
WBC # BLD AUTO: 6.62 K/UL (ref 3.9–12.7)

## 2020-07-10 PROCEDURE — G0108 PR DIAB MANAGE TRN  PER INDIV: ICD-10-PCS | Mod: S$GLB,,, | Performed by: DIETITIAN, REGISTERED

## 2020-07-10 PROCEDURE — 85025 COMPLETE CBC W/AUTO DIFF WBC: CPT

## 2020-07-10 PROCEDURE — G0108 DIAB MANAGE TRN  PER INDIV: HCPCS | Mod: S$GLB,,, | Performed by: DIETITIAN, REGISTERED

## 2020-07-10 PROCEDURE — 36415 COLL VENOUS BLD VENIPUNCTURE: CPT | Mod: PO

## 2020-07-10 PROCEDURE — 99999 PR PBB SHADOW E&M-EST. PATIENT-LVL I: CPT | Mod: PBBFAC,,, | Performed by: DIETITIAN, REGISTERED

## 2020-07-10 PROCEDURE — 80053 COMPREHEN METABOLIC PANEL: CPT

## 2020-07-10 PROCEDURE — 99999 PR PBB SHADOW E&M-EST. PATIENT-LVL I: ICD-10-PCS | Mod: PBBFAC,,, | Performed by: DIETITIAN, REGISTERED

## 2020-07-10 PROCEDURE — 83036 HEMOGLOBIN GLYCOSYLATED A1C: CPT

## 2020-07-10 PROCEDURE — 87491 CHLMYD TRACH DNA AMP PROBE: CPT

## 2020-07-10 PROCEDURE — 86592 SYPHILIS TEST NON-TREP QUAL: CPT

## 2020-07-10 PROCEDURE — 84153 ASSAY OF PSA TOTAL: CPT

## 2020-07-10 PROCEDURE — 80074 ACUTE HEPATITIS PANEL: CPT

## 2020-07-10 PROCEDURE — 80061 LIPID PANEL: CPT

## 2020-07-10 PROCEDURE — 84403 ASSAY OF TOTAL TESTOSTERONE: CPT

## 2020-07-10 PROCEDURE — 86703 HIV-1/HIV-2 1 RESULT ANTBDY: CPT

## 2020-07-10 NOTE — PROGRESS NOTES
"Diabetes Education  Author: Leslie Ford RD, CDE  Date: 7/10/2020    Diabetes Care Management Summary  Diabetes Education Record Assessment/Progress: Post Program/Follow-up  Current Diabetes Risk Level: Low     Patient arrives for 6 month follow up with diabetes education--requests follow up q 6 months as he feels this keeps him under better control of his diet and exercise plan.     Diabetes Type  Diabetes Type : Type II    Diabetes History  Current Treatment: Oral Medication(metformin 1000 mg BID, Jardiance 100 mg)    Health Maintenance was reviewed today with patient. Discussed with patient importance of routine eye exams, foot exams/foot care, blood work (i.e.: A1c, microalbumin, and lipid), dental visits, yearly flu vaccine, and pneumonia vaccine as indicated by PCP. Patient verbalized understanding.     Health Maintenance Topics with due status: Not Due       Topic Last Completion Date    TETANUS VACCINE 09/15/2016    Influenza Vaccine 09/09/2019    Low Dose Statin 06/29/2020     Health Maintenance Due   Topic Date Due    HIV Screening  10/24/1987    Hemoglobin A1c  03/04/2020    Lipid Panel  03/18/2020    Eye Exam  04/11/2020    Urine Microalbumin  09/04/2020    Foot Exam  09/09/2020     Nutrition  Meal Planning: 3 meals per day, water, snacks between meal(Dines out 2-3 times a week and will "cheat" and eat what he wants.  Snacks pre and post workout on complex carb/protein as his workout is intense)  What type of beverages do you drink?: water, milk  Meal Plan 24 Hour Recall - Breakfast: chicken biscuit, protein shake  Meal Plan 24 Hour Recall - Lunch: low carb tortila with rotisserie chicken & champion, sugar free Aurelio Aid  Meal Plan 24 Hour Recall - Dinner: salmon with chuckie de roberts, tortilla chips, wine, water  Meal Plan 24 Hour Recall - Snack: PB and banana, chocolate milk (8-16 oz uses as post workout recovery)    Monitoring   Blood Glucose Logs: No  Do you use a personal continuous glucose " "monitor?: Yes(Self reports glucose has been 140-150 mg/dL fasting, usually 100 mg/dL before lunch/dinner)  What kind of glucose monitor do you use?: Freestyle Amena Flash(Uses periodically to monitor glucose to make sure glucose levels controlled.  Did not bring Amena reader to be downloaded.)  In the last month, how often have you had a low blood sugar reaction?: never  Can you tell when your blood sugar is too high?: no    Exercise   Exercise Type: swimming, use exercise equipment, exercise class  Intensity: High  Frequency: 3-5 Times per week  Duration: > 1 hour    Current Diabetes Treatment   Current Treatment: Oral Medication(metformin 1000 mg BID, Jardiance 100 mg)    Social History  Preferred Learning Method: Face to Face  Primary Support: Self  Alcohol Use: Weekly    Barriers to Change  Barriers to Change: None  Learning Challenges : None    Readiness to Learn   Readiness to Learn : Acceptance    Cultural Influences  Cultural Influences: None    Diabetes Education Assessment/Progress  Diabetes Disease Process (diabetes disease process and treatment options): Discussion, Comprehends Key Points.  Hgb A1c drawn today and labs pending.  Hgb A1c delayed due to COVID-19.    Nutrition (Incorporating nutritional management into one's lifestyle): Discussion, Comprehends Key Points.  Patient now eating carb and lean protein as snack before long workouts.  Does meal prep for himself many meals and choosing balanced options. Avoids sweetened beverages and sweets/desserts.  Will "cheat" with a cheeseburger on occasion.  States he is choosing more fish at meals.      Physical Activity (incorporating physical activity into one's lifestyle): Discussion, Comprehends Key Points.  Patient maintained exercise regimen during COVID-19--has since resumed his daily gym routine and martial arts regimen. Plans to start bicycling with friend on weekends.      Medications (states correct name, dose, onset, peak, duration, side effects " & timing of meds): Discussion, Comprehends Key Points.  Patient taking his oral diabetic medications as prescribed.  Was taking Jardiance at night but now taking in morning as he was waking up in middle of the night to use the restroom too frequently.      Monitoring (monitoring blood glucose/other parameters & using results): Discussion, Comprehends Key Points.  Wears personal Freestyle Amena periodically to check glucose levels.  Uses Amena Momence--did not bring for download today.  Self reported glucose levels.  Intructed to bring Amena Momence when seeing diabetes education or Endocrinology.  Does have regular glucometer at home but has not used recently.     Behavioral (readiness for change, lifestyle practices, self-care behaviors): Discussion, Comprehends Key Points.  Patient states he feels he is doing well right now with managing his diet and exercise.  Awaiting Hgb A1c results from today.      Goals  Patient has selected/evaluated goals during today's session: Yes, selected  Physical Activity: In Progress  Monitoring: In Progress  Medications: In Progress     Diabetes Care Plan/Intervention  Education Plan/Intervention: Individual Follow-Up DSMT  1.  Will continue to follow up every 6 months per patient request--patient feels it helps him maintain his diabetes.  Next appt set for 1/22/2021 at 8am.  2.  Continue exercise regimen.    3.  Avoid weight gain.   4.  Continue sporadic checking of glucose levels--discussed goal fasting and pre prandial ranges.  If glucose control worsening, patient encouraged to contact diabetes education or endocrinology.   5.  Has made better choices when snacking before/after workouts.        Today's Self-Management Care Plan was developed with the patient's input and is based on barriers identified during today's assessment.    The long and short-term goals in the care plan were written with the patient/caregiver's input. The patient has agreed to work toward these goals to  improve his overall diabetes control.      The patient received a copy of today's self-management plan and verbalized understanding of the care plan, goals, and all of today's instructions.      The patient was encouraged to communicate with his physician and care team regarding his condition(s) and treatment.  I provided the patient with my contact information today and encouraged him to contact me via phone or patient portal as needed.     Education Units of Time   Time Spent: 30 min

## 2020-07-11 ENCOUNTER — TELEPHONE (OUTPATIENT)
Dept: ENDOCRINOLOGY | Facility: CLINIC | Age: 50
End: 2020-07-11

## 2020-07-11 DIAGNOSIS — E29.1 MALE HYPOGONADISM: Primary | ICD-10-CM

## 2020-07-11 LAB — RPR SER QL: NORMAL

## 2020-07-13 ENCOUNTER — PATIENT MESSAGE (OUTPATIENT)
Dept: ENDOCRINOLOGY | Facility: CLINIC | Age: 50
End: 2020-07-13

## 2020-07-13 ENCOUNTER — TELEPHONE (OUTPATIENT)
Dept: ENDOCRINOLOGY | Facility: CLINIC | Age: 50
End: 2020-07-13

## 2020-07-13 LAB
HAV IGM SERPL QL IA: NEGATIVE
HBV CORE IGM SERPL QL IA: NEGATIVE
HBV SURFACE AG SERPL QL IA: NEGATIVE
HCV AB SERPL QL IA: NEGATIVE
HIV 1+2 AB+HIV1 P24 AG SERPL QL IA: NEGATIVE

## 2020-07-14 LAB
C TRACH DNA SPEC QL NAA+PROBE: NOT DETECTED
N GONORRHOEA DNA SPEC QL NAA+PROBE: NOT DETECTED

## 2020-07-30 NOTE — PROGRESS NOTES
HPI     Blurred Vision    Additional comments: blurred va at the near and computer 2 months//            Comments   blurred va at the near and computer 2 months//  Dm  Sugars have been off   for about 2 months now//  Blur ou at near x mos, no assoc pain or red, constant, no relief over   time.       Last edited by Adonay Azevedo, OD on 3/24/2018  8:50 AM. (History)            Assessment /Plan     For exam results, see Encounter Report.    Type 2 diabetes mellitus without retinopathy    Bilateral presbyopia      1. No diabetic retinopathy, no csme. Return  for yearly dilated eye exam. Recent blood sugars very high, distance vision improved and near worsened.  2. Temporary Spec Rx given. Different lens options discussed with patient.                  60

## 2020-08-14 ENCOUNTER — LAB VISIT (OUTPATIENT)
Dept: LAB | Facility: HOSPITAL | Age: 50
End: 2020-08-14
Attending: INTERNAL MEDICINE
Payer: COMMERCIAL

## 2020-08-14 DIAGNOSIS — E29.1 MALE HYPOGONADISM: ICD-10-CM

## 2020-08-14 PROCEDURE — 36415 COLL VENOUS BLD VENIPUNCTURE: CPT | Mod: PO

## 2020-08-14 PROCEDURE — 85027 COMPLETE CBC AUTOMATED: CPT

## 2020-08-15 LAB
ERYTHROCYTE [DISTWIDTH] IN BLOOD BY AUTOMATED COUNT: 12.2 % (ref 11.5–14.5)
HCT VFR BLD AUTO: 54.6 % (ref 40–54)
HGB BLD-MCNC: 17.8 G/DL (ref 14–18)
MCH RBC QN AUTO: 31.2 PG (ref 27–31)
MCHC RBC AUTO-ENTMCNC: 32.6 G/DL (ref 32–36)
MCV RBC AUTO: 96 FL (ref 82–98)
PLATELET # BLD AUTO: 279 K/UL (ref 150–350)
PMV BLD AUTO: 11.1 FL (ref 9.2–12.9)
RBC # BLD AUTO: 5.71 M/UL (ref 4.6–6.2)
WBC # BLD AUTO: 8.19 K/UL (ref 3.9–12.7)

## 2020-08-17 ENCOUNTER — PATIENT MESSAGE (OUTPATIENT)
Dept: ENDOCRINOLOGY | Facility: CLINIC | Age: 50
End: 2020-08-17

## 2020-08-17 ENCOUNTER — LAB VISIT (OUTPATIENT)
Dept: LAB | Facility: HOSPITAL | Age: 50
End: 2020-08-17
Attending: INTERNAL MEDICINE
Payer: COMMERCIAL

## 2020-08-17 DIAGNOSIS — E29.1 MALE HYPOGONADISM: ICD-10-CM

## 2020-08-17 DIAGNOSIS — R30.0 DYSURIA: Primary | ICD-10-CM

## 2020-08-17 DIAGNOSIS — E29.1 MALE HYPOGONADISM: Primary | ICD-10-CM

## 2020-08-17 LAB
BACTERIA #/AREA URNS HPF: NORMAL /HPF
BILIRUB UR QL STRIP: NEGATIVE
CLARITY UR: CLEAR
COLOR UR: YELLOW
GLUCOSE UR QL STRIP: ABNORMAL
HGB UR QL STRIP: NEGATIVE
KETONES UR QL STRIP: NEGATIVE
LEUKOCYTE ESTERASE UR QL STRIP: NEGATIVE
MICROSCOPIC COMMENT: NORMAL
NITRITE UR QL STRIP: NEGATIVE
PH UR STRIP: 7 [PH] (ref 5–8)
PROT UR QL STRIP: NEGATIVE
SP GR UR STRIP: 1.02 (ref 1–1.03)
SQUAMOUS #/AREA URNS HPF: 1 /HPF
URN SPEC COLLECT METH UR: ABNORMAL
WBC #/AREA URNS HPF: 3 /HPF (ref 0–5)
YEAST URNS QL MICRO: NORMAL

## 2020-08-17 PROCEDURE — 81000 URINALYSIS NONAUTO W/SCOPE: CPT | Mod: PO

## 2020-08-17 NOTE — TELEPHONE ENCOUNTER
Pt. Wants to know if he should be concerned about some elevated values on his CBC and what are those elevated values mean. Please advise.    Thanks

## 2020-08-17 NOTE — TELEPHONE ENCOUNTER
Will need UA    Let him know as far as CBC, will repeat At f/u. If still elevated may need to discuss reducing testosterone dose

## 2020-08-28 ENCOUNTER — OFFICE VISIT (OUTPATIENT)
Dept: OPTOMETRY | Facility: CLINIC | Age: 50
End: 2020-08-28
Payer: COMMERCIAL

## 2020-08-28 DIAGNOSIS — H52.203 ASTIGMATISM WITH PRESBYOPIA, BILATERAL: ICD-10-CM

## 2020-08-28 DIAGNOSIS — H52.4 ASTIGMATISM WITH PRESBYOPIA, BILATERAL: ICD-10-CM

## 2020-08-28 DIAGNOSIS — H04.123 BILATERAL DRY EYES: ICD-10-CM

## 2020-08-28 DIAGNOSIS — E11.9 DIABETES MELLITUS TYPE 2 WITHOUT RETINOPATHY: Primary | ICD-10-CM

## 2020-08-28 LAB
LEFT EYE DM RETINOPATHY: NEGATIVE
LEFT EYE DM RETINOPATHY: NEGATIVE
RIGHT EYE DM RETINOPATHY: NEGATIVE
RIGHT EYE DM RETINOPATHY: NEGATIVE

## 2020-08-28 PROCEDURE — 92014 PR EYE EXAM, EST PATIENT,COMPREHESV: ICD-10-PCS | Mod: S$GLB,,, | Performed by: OPTOMETRIST

## 2020-08-28 PROCEDURE — 92014 COMPRE OPH EXAM EST PT 1/>: CPT | Mod: S$GLB,,, | Performed by: OPTOMETRIST

## 2020-08-28 PROCEDURE — 99999 PR PBB SHADOW E&M-EST. PATIENT-LVL IV: CPT | Mod: PBBFAC,,, | Performed by: OPTOMETRIST

## 2020-08-28 PROCEDURE — 99999 PR PBB SHADOW E&M-EST. PATIENT-LVL IV: ICD-10-PCS | Mod: PBBFAC,,, | Performed by: OPTOMETRIST

## 2020-08-28 PROCEDURE — 92015 PR REFRACTION: ICD-10-PCS | Mod: S$GLB,,, | Performed by: OPTOMETRIST

## 2020-08-28 PROCEDURE — 92015 DETERMINE REFRACTIVE STATE: CPT | Mod: S$GLB,,, | Performed by: OPTOMETRIST

## 2020-08-28 NOTE — PATIENT INSTRUCTIONS
"DRY EYES -- BURNING OR SONA SYMPTOMS:  Use Over The Counter artificial tears as needed for dry eye symptoms.   Some common brands include:  Systane, Optive, Refresh, and Thera-Tears.  These drops can be used as frequently as desired, but may be most helpful use during long periods of concentrated work.  For example, reading / working at the computer. Start with 3-4x per day.     Nighttime Ophthalmic gel or ointments are available: Refresh PM, Genteal, and Lacrilube.    Avoid drops that "get redness out" (Visine, Murine, Clear Eyes), as these may contain medication that could further irritate the eyes, especially with chronic use.    ALLERGY EYES -- ITCHING SYMPTOMS:  Over the counter medications include--Pataday, Zaditor, and Alaway.  Use as directed 1-2 drops daily for symptoms of itching / watering eyes.  These drops will not help for dry eye or exposure symptoms.    REDNESS RELIEF:  Lumify---is a good redness reliever that will not cause irritation if used chronically.         FLASHES / FLOATERS / POSTERIOR VITREOUS DETACHMENT    Call the clinic if you have any further changes in symptoms.  Including:  Increased numbers of floaters or flashing lights, dimness or darkness that moves through or stays constant in your vision, or any pain in the eye (s).    You may sometimes see small specks or clouds moving in your field of vision.  They are called FLOATERS.  You can often see them when looking at a plain background, like a blank wall or blue edgar.  Floaters are actually tiny clumps of gel or cells inside the VITREOUS, the clear jelly-like fluid that fills the inside of your eye.    While these objects look like they are in front of your eye, they are actually floating inside.  What you see are the shadows they cast on the RETINA, the nerve layer at the back of the eye that senses light and allows you to see.      POSTERIOR VITREOUS DETACHMENT    The appearance of new floaters may be alarming.  If you suddenly " develop new floaters, you should contact your eye care professional  right away.    The retina can tear if the shrinking vitreous pulls away from the wall of the eye.  This sometimes causes a small amount of bleeding in the eye that may appear as new floaters.    A torn retina is always a serious problem, since it can lead to a retinal detachment.  You should see your eye care professional as soon as possible if:     even one new floater appears suddenly;   you see sudden flashes of light;   you notice other symptoms, like the loss of side vision, or a curtain closes down in your vision        POSTERIOR VITREOUS DETACHMENT is more common for people who:     are nearsighted;   have had cataract surgery;   have had YAG laser surgery of the eye;   have had inflammation inside the eye;   are over age 60.      While some floaters may remain visible, many of them will fade over time and become less noticeable.  Even if you've had some floaters for years, you should have your eyes checked as soon as possible if you notice new ones.    FLASHING LIGHTS    When the vitreous gel rubs or pulls on the retina, you may see what look like flashing lights or lightning streaks.  These flashes can appear off and on for several weeks or months.      Some people experience flashes of light that appear as jagged lines or heat waves in both eyes, lasting 10-20 minutes.  These flashes are caused by a spasm of blood vessels in the brain, which is called a migraine.    If a headache follows these flashes, it's called a migraine headache.  If   no headache occurs, these flashes are called Ophthalmic or Ocular Migraine.            DIABETES AND THE EYE / DIABETIC RETINOPATHY    Diabetic retinopathy is a condition occurring in persons with diabetes, which causes progressive damage to the retina, the light sensitive lining at the back of the eye. It is a serious sight-threatening complication of diabetes.    Diabetic retinopathy is  the result of damage to the tiny blood vessels that nourish the retina. They leak blood and other fluids that cause swelling of retinal tissue and clouding of vision. The condition usually affects both eyes. The longer a person has diabetes, the more likely they will develop diabetic retinopathy. If left untreated, diabetic retinopathy can cause blindness.  There are two basic types of diabetic retinopathy:    Background or nonproliferative diabetic retinopathy (NPDR)  Nonproliferative diabetic retinopathy (NPDR) is the earliest stage of diabetic retinopathy. With this condition, damaged blood vessels in the retina begin to leak extra fluid and small amounts of blood into the eye. Sometimes, deposits of cholesterol or other fats from the blood may leak into the retina. Many people with diabetes have mild NPDR, which usually does not affect their vision. However, if their vision is affected, it is the result of macular edema and macular ischemia.    If vision is affected due to macular changes, a consult with a Retina Specialist may be advised.  This is an ophthalmologist that treats retina conditions, including diabetic retinopathy.     Proliferative diabetic retinopathy (PDR)  Proliferative diabetic retinopathy (PDR) mainly occurs when many of the blood vessels in the retina close, preventing enough blood flow. In an attempt to supply blood to the area where the original vessels closed, the retina responds by growing new blood vessels. This is called neovascularization. However, these new blood vessels are abnormal and do not supply the retina with proper blood flow. The new vessels are also often accompanied by scar tissue that may cause the retina to wrinkle or detach. PDR may cause more severe vision loss than NPDR because it can affect both central and peripheral vision.     A patient diagnosed with proliferative diabetic eye disease will be referred to a retinal specialist for consultation.    Often there are  no visual symptoms in the early stages of diabetic retinopathy. That is why our eye care professionals recommend that everyone with diabetes have a comprehensive dilated eye examination once a year. Early detection and treatment can limit the potential for significant vision loss from diabetic retinopathy.

## 2020-08-28 NOTE — PROGRESS NOTES
HPI     Routine DM-dle-4/11/19 fahad   Patient states he has been needing his reading glasses more, diabetes is   stable no htn.  No floaters or fol, not using any gtts.     Hemoglobin A1C       Date                     Value               Ref Range             Status                07/10/2020               7.5 (H)             4.0 - 5.6 %           Final              Comment:    ADA Screening Guidelines:  5.7-6.4%  Consistent with   prediabetes  >or=6.5%  Consistent with diabetes  High levels of fetal   hemoglobin interfere with the HbA1C  assay. Heterozygous hemoglobin   variants (HbS, HgC, etc)do  not significantly interfere with this assay.     However, presence of multiple variants may affect accuracy.         09/04/2019               6.7 (H)             4.0 - 5.6 %           Final              Comment:    ADA Screening Guidelines:  5.7-6.4%  Consistent with   prediabetes  >or=6.5%  Consistent with diabetes  High levels of fetal   hemoglobin interfere with the HbA1C  assay. Heterozygous hemoglobin   variants (HbS, HgC, etc)do  not significantly interfere with this assay.     However, presence of multiple variants may affect accuracy.         03/18/2019               6.6 (H)             4.0 - 5.6 %           Final              Comment:    ADA Screening Guidelines:  5.7-6.4%  Consistent with   prediabetes  >or=6.5%  Consistent with diabetes  High levels of fetal   hemoglobin interfere with the HbA1C  assay. Heterozygous hemoglobin   variants (HbS, HgC, etc)do  not significantly interfere with this assay.     However, presence of multiple variants may affect accuracy.         03/18/2019               6.6 (H)             4.0 - 5.6 %           Final              Comment:    ADA Screening Guidelines:  5.7-6.4%  Consistent with   prediabetes  >or=6.5%  Consistent with diabetes  High levels of fetal   hemoglobin interfere with the HbA1C  assay. Heterozygous hemoglobin   variants (HbS, HgC, etc)do  not significantly  interfere with this assay.     However, presence of multiple variants may affect accuracy.    ----------    Last edited by Belkis De La Paz MA on 8/28/2020  3:56 PM. (History)        ROS     Positive for: Eyes    Negative for: Constitutional, Gastrointestinal, Neurological, Skin,   Genitourinary, Musculoskeletal, HENT, Endocrine, Cardiovascular,   Respiratory, Psychiatric, Allergic/Imm, Heme/Lymph    Last edited by IVIS Cunningham, SHAYAN on 8/28/2020  4:11 PM. (History)        Assessment /Plan     For exam results, see Encounter Report.    Diabetes mellitus type 2 without retinopathy    Bilateral dry eyes    Bilateral presbyopia

## 2020-08-29 NOTE — PROGRESS NOTES
HPI     Routine DM-dle-4/11/19 fahad   Patient states he has been needing his reading glasses more, diabetes is   stable no htn.  No floaters or fol, not using any gtts.     Hemoglobin A1C       Date                     Value               Ref Range             Status                07/10/2020               7.5 (H)             4.0 - 5.6 %           Final              Comment:    ADA Screening Guidelines:  5.7-6.4%  Consistent with   prediabetes  >or=6.5%  Consistent with diabetes  High levels of fetal   hemoglobin interfere with the HbA1C  assay. Heterozygous hemoglobin   variants (HbS, HgC, etc)do  not significantly interfere with this assay.     However, presence of multiple variants may affect accuracy.         09/04/2019               6.7 (H)             4.0 - 5.6 %           Final              Comment:    ADA Screening Guidelines:  5.7-6.4%  Consistent with   prediabetes  >or=6.5%  Consistent with diabetes  High levels of fetal   hemoglobin interfere with the HbA1C  assay. Heterozygous hemoglobin   variants (HbS, HgC, etc)do  not significantly interfere with this assay.     However, presence of multiple variants may affect accuracy.         03/18/2019               6.6 (H)             4.0 - 5.6 %           Final              Comment:    ADA Screening Guidelines:  5.7-6.4%  Consistent with   prediabetes  >or=6.5%  Consistent with diabetes  High levels of fetal   hemoglobin interfere with the HbA1C  assay. Heterozygous hemoglobin   variants (HbS, HgC, etc)do  not significantly interfere with this assay.     However, presence of multiple variants may affect accuracy.         03/18/2019               6.6 (H)             4.0 - 5.6 %           Final              Comment:    ADA Screening Guidelines:  5.7-6.4%  Consistent with   prediabetes  >or=6.5%  Consistent with diabetes  High levels of fetal   hemoglobin interfere with the HbA1C  assay. Heterozygous hemoglobin   variants (HbS, HgC, etc)do  not significantly  interfere with this assay.     However, presence of multiple variants may affect accuracy.    ----------    Last edited by Belkis De La Paz MA on 8/28/2020  3:56 PM. (History)        ROS     Positive for: Eyes    Negative for: Constitutional, Gastrointestinal, Neurological, Skin,   Genitourinary, Musculoskeletal, HENT, Endocrine, Cardiovascular,   Respiratory, Psychiatric, Allergic/Imm, Heme/Lymph    Last edited by IVIS Cunningham, OD on 8/28/2020  4:11 PM. (History)        Assessment /Plan     For exam results, see Encounter Report.    Diabetes mellitus type 2 without retinopathy    Bilateral dry eyes    Astigmatism with presbyopia, bilateral      1. No luana/ retinopathy, no csme, gave Diabetic Retinopathy info, control glucose and BP  Advise annual DFE  2. Longstanding per patient, no spk, gave otc suggestions  3. Low Rx not needed for full time wear  Ok continue with otc only for near    Discussed and educated patient on current findings /plan.  RTC 1 year, prn if any changes / issues

## 2020-09-16 ENCOUNTER — PATIENT MESSAGE (OUTPATIENT)
Dept: GASTROENTEROLOGY | Facility: CLINIC | Age: 50
End: 2020-09-16

## 2020-09-18 ENCOUNTER — DOCUMENTATION ONLY (OUTPATIENT)
Dept: GASTROENTEROLOGY | Facility: CLINIC | Age: 50
End: 2020-09-18

## 2020-09-18 ENCOUNTER — OFFICE VISIT (OUTPATIENT)
Dept: GASTROENTEROLOGY | Facility: CLINIC | Age: 50
End: 2020-09-18
Payer: COMMERCIAL

## 2020-09-18 DIAGNOSIS — K21.00 GASTROESOPHAGEAL REFLUX DISEASE WITH ESOPHAGITIS: ICD-10-CM

## 2020-09-18 DIAGNOSIS — Z79.899 ENCOUNTER FOR MONITORING LONG-TERM PROTON PUMP INHIBITOR THERAPY: Primary | ICD-10-CM

## 2020-09-18 DIAGNOSIS — K22.0 ACHALASIA: ICD-10-CM

## 2020-09-18 DIAGNOSIS — R13.19 INTERMITTENT DYSPHAGIA: ICD-10-CM

## 2020-09-18 DIAGNOSIS — Z51.81 ENCOUNTER FOR MONITORING LONG-TERM PROTON PUMP INHIBITOR THERAPY: Primary | ICD-10-CM

## 2020-09-18 DIAGNOSIS — Z98.890 HISTORY OF ESOPHAGEAL SURGERY: ICD-10-CM

## 2020-09-18 DIAGNOSIS — R09.A2 GLOBUS SENSATION: ICD-10-CM

## 2020-09-18 DIAGNOSIS — R19.7 INTERMITTENT DIARRHEA: ICD-10-CM

## 2020-09-18 PROCEDURE — 3008F BODY MASS INDEX DOCD: CPT | Mod: CPTII,,, | Performed by: NURSE PRACTITIONER

## 2020-09-18 PROCEDURE — 3008F PR BODY MASS INDEX (BMI) DOCUMENTED: ICD-10-PCS | Mod: CPTII,,, | Performed by: NURSE PRACTITIONER

## 2020-09-18 PROCEDURE — 99214 OFFICE O/P EST MOD 30 MIN: CPT | Mod: 95,,, | Performed by: NURSE PRACTITIONER

## 2020-09-18 PROCEDURE — 99214 PR OFFICE/OUTPT VISIT, EST, LEVL IV, 30-39 MIN: ICD-10-PCS | Mod: 95,,, | Performed by: NURSE PRACTITIONER

## 2020-09-18 RX ORDER — OMEPRAZOLE 40 MG/1
40 CAPSULE, DELAYED RELEASE ORAL DAILY
Qty: 90 CAPSULE | Refills: 3 | Status: SHIPPED | OUTPATIENT
Start: 2020-09-18 | End: 2022-05-06 | Stop reason: SDUPTHER

## 2020-09-18 RX ORDER — FAMOTIDINE 40 MG/1
40 TABLET, FILM COATED ORAL DAILY
Qty: 90 TABLET | Refills: 3 | Status: SHIPPED | OUTPATIENT
Start: 2020-09-18 | End: 2021-09-20

## 2020-09-18 NOTE — PROGRESS NOTES
Subjective:       Patient ID: Abhi Pompa is a 49 y.o. male Body mass index is 36.18 kg/m².    Chief Complaint: No chief complaint on file.  This patient is established with myself and Dr. Dunbar.    The patient location is: home in [place]. I verified patient name and date of birth. Patient provided current height and weight measurements.  The chief complaint leading to consultation is: GERD follow-up    Visit type: audiovisual    Face to Face time with patient: 17 minutes  28 minutes of total time spent on the encounter, which includes face to face time and non-face to face time preparing to see the patient (eg, review of tests), Obtaining and/or reviewing separately obtained history, Documenting clinical information in the electronic or other health record, Independently interpreting results (not separately reported) and communicating results to the patient/family/caregiver, or Care coordination (not separately reported).     Each patient to whom he or she provides medical services by telemedicine is:  (1) informed of the relationship between the physician and patient and the respective role of any other health care provider with respect to management of the patient; and (2) notified that he or she may decline to receive medical services by telemedicine and may withdraw from such care at any time.    Gastroesophageal Reflux  He complains of belching (not more than usual), dysphagia (only since reflux has flared- up; occasional occurs only with cold drinks, chicken and doughy foods; significantly improved since surgery), globus sensation (rarely) and heartburn (~2 times a week). He reports no abdominal pain, no chest pain, no choking, no coughing, no early satiety, no hoarse voice, no nausea, no sore throat or no water brash. This is a chronic (for several years) problem. Episode frequency: occurs about 1-2 times a week. The problem has been gradually worsening (since out of zantac which has been  since ~ 4/2020). The heartburn wakes him from sleep. The heartburn changes with position. The symptoms are aggravated by lying down and stress (tomato products, red foods, lying on left side). Associated symptoms include weight loss (trying to lose weight; seeing dietician and doing weight training, lost 10-15 lbs over the past year). Pertinent negatives include no fatigue or melena. Risk factors include ETOH use, caffeine use, obesity, smoking/tobacco exposure and NSAIDs (reports NSAIDs use PRN). He has tried a histamine-2 antagonist, an antacid, a PPI, a diet change and ETOH reduction (prilosec 40 mg once daily; PAST: carafate, zantac) for the symptoms. The treatment provided moderate relief. Past procedures include an EGD and esophageal manometry. Past invasive treatments include gastroplication (heller with fundoplication).     Review of Systems   Constitutional: Positive for weight loss (trying to lose weight; seeing dietician and doing weight training, lost 10-15 lbs over the past year). Negative for appetite change, chills, diaphoresis, fatigue and fever.   HENT: Positive for trouble swallowing. Negative for hoarse voice and sore throat.    Respiratory: Negative for cough, choking, chest tightness and shortness of breath.    Cardiovascular: Negative for chest pain.   Gastrointestinal: Positive for diarrhea (Reports he takes questryramine 4 grams daily PRN- which is rarely for intermittent diarrhea from metformin use; denies diarrhea currently), dysphagia (only since reflux has flared- up; occasional occurs only with cold drinks, chicken and doughy foods; significantly improved since surgery) and heartburn (~2 times a week). Negative for abdominal pain, anal bleeding, blood in stool, constipation, melena, nausea, rectal pain and vomiting.   Neurological: Negative for weakness.       Past Medical History:   Diagnosis Date    Achalasia     Arthritis     right ankle    Cataract     Diabetes mellitus      taking lisinopril preventive to protect the kidneys, no HTN    Dyslipidemia     pt denies    Dysphagia     Former tobacco use     chew    GERD (gastroesophageal reflux disease)     H. pylori infection     Hypogonadism in male     MELLY (obstructive sleep apnea)     uses cpap     Past Surgical History:   Procedure Laterality Date    ANKLE SURGERY Right     HAND SURGERY Right     laparoscopic Heller myotomy      MYOTOMY  07/2016    heller- achalasia surgery    TONSILLECTOMY      UPPER GASTROINTESTINAL ENDOSCOPY  12/2015    WISDOM TOOTH EXTRACTION       Family History   Problem Relation Age of Onset    Diabetes Mother     Allergies Mother     Lupus Mother     Diabetes Maternal Aunt     Allergic rhinitis Sister     Angioedema Neg Hx     Asthma Neg Hx     Eczema Neg Hx     Immunodeficiency Neg Hx     Urticaria Neg Hx     Amblyopia Neg Hx     Blindness Neg Hx     Cancer Neg Hx     Cataracts Neg Hx     Glaucoma Neg Hx     Hypertension Neg Hx     Macular degeneration Neg Hx     Retinal detachment Neg Hx     Strabismus Neg Hx     Stroke Neg Hx     Thyroid disease Neg Hx     Celiac disease Neg Hx     Cirrhosis Neg Hx     Colon cancer Neg Hx     Colon polyps Neg Hx     Crohn's disease Neg Hx     Cystic fibrosis Neg Hx     Esophageal cancer Neg Hx     Hemochromatosis Neg Hx     Inflammatory bowel disease Neg Hx     Irritable bowel syndrome Neg Hx     Liver cancer Neg Hx     Liver disease Neg Hx     Rectal cancer Neg Hx     Stomach cancer Neg Hx     Ulcerative colitis Neg Hx     Harvey's disease Neg Hx     Lymphoma Neg Hx     Tuberculosis Neg Hx     Scleroderma Neg Hx     Rheum arthritis Neg Hx     Multiple sclerosis Neg Hx     Melanoma Neg Hx     Psoriasis Neg Hx     Skin cancer Neg Hx      Wt Readings from Last 10 Encounters:   09/18/20 104.8 kg (231 lb)   07/10/20 105.1 kg (231 lb 11.3 oz)   12/17/19 110.7 kg (244 lb)   10/18/19 106.2 kg (234 lb 2.1 oz)   09/11/19 103.8  kg (228 lb 13.4 oz)   03/29/19 105.7 kg (233 lb 0.4 oz)   01/11/19 108.8 kg (239 lb 13.8 oz)   12/20/18 106.1 kg (233 lb 12.8 oz)   08/01/18 108 kg (238 lb)   07/20/18 105.9 kg (233 lb 6.4 oz)     Lab Results   Component Value Date    WBC 8.19 08/14/2020    HGB 17.8 08/14/2020    HCT 54.6 (H) 08/14/2020    MCV 96 08/14/2020     08/14/2020     CMP  Sodium   Date Value Ref Range Status   07/10/2020 135 (L) 136 - 145 mmol/L Final     Potassium   Date Value Ref Range Status   07/10/2020 4.6 3.5 - 5.1 mmol/L Final     Chloride   Date Value Ref Range Status   07/10/2020 100 95 - 110 mmol/L Final     CO2   Date Value Ref Range Status   07/10/2020 27 23 - 29 mmol/L Final     Glucose   Date Value Ref Range Status   07/10/2020 154 (H) 70 - 110 mg/dL Final     BUN, Bld   Date Value Ref Range Status   07/10/2020 13 6 - 20 mg/dL Final     Creatinine   Date Value Ref Range Status   07/10/2020 1.1 0.5 - 1.4 mg/dL Final     Calcium   Date Value Ref Range Status   07/10/2020 9.4 8.7 - 10.5 mg/dL Final     Total Protein   Date Value Ref Range Status   07/10/2020 7.0 6.0 - 8.4 g/dL Final     Albumin   Date Value Ref Range Status   07/10/2020 4.2 3.5 - 5.2 g/dL Final     Total Bilirubin   Date Value Ref Range Status   07/10/2020 0.9 0.1 - 1.0 mg/dL Final     Comment:     For infants and newborns, interpretation of results should be based  on gestational age, weight and in agreement with clinical  observations.  Premature Infant recommended reference ranges:  Up to 24 hours.............<8.0 mg/dL  Up to 48 hours............<12.0 mg/dL  3-5 days..................<15.0 mg/dL  6-29 days.................<15.0 mg/dL       Alkaline Phosphatase   Date Value Ref Range Status   07/10/2020 68 55 - 135 U/L Final     AST   Date Value Ref Range Status   07/10/2020 20 10 - 40 U/L Final     ALT   Date Value Ref Range Status   07/10/2020 35 10 - 44 U/L Final     Anion Gap   Date Value Ref Range Status   07/10/2020 8 8 - 16 mmol/L Final     eGFR  "if    Date Value Ref Range Status   07/10/2020 >60.0 >60 mL/min/1.73 m^2 Final     eGFR if non    Date Value Ref Range Status   07/10/2020 >60.0 >60 mL/min/1.73 m^2 Final     Comment:     Calculation used to obtain the estimated glomerular filtration  rate (eGFR) is the CKD-EPI equation.        Lab Results   Component Value Date    TSH 1.193 01/29/2016     Reviewed prior medical records including radiology report of 2/2/16 esophagram; 2/26/16 esophageal manometry; & endoscopy history (see surgical history).    12/11/2015 EGD was reviewed and procedure report states:   " Impression: - Normal oropharynx.  - Reflux esophagitis. Biopsied.  - Normal esophagus.  - Z-line regular, 40 cm from the incisors.  - Normal cardia.  - Normal stomach. Biopsied.  - Normal examined duodenum.  - No endoscopic esophageal abnormality to explain   patient's dysphagia. Esophagus dilated, 51 Fr and 54   Fr.  Recommendation: - Discharge patient to home.  - Await pathology results.  - Follow an antireflux regimen.  - Continue present medications.  - Use Prilosec (omeprazole) 40 mg PO daily.  - Use Zantac (ranitidine) 300 mg PO daily.  - Add sucralfate tablets 2 gram PO BID for 2 weeks,   ac  and HS.  - Call the G.I. clinic in 2 weeks for reports (if   you haven't heard from us sooner) [phone].  - Return to GI clinic in 6-8 weeks.  - If no improvement, do an upper GI series. ".  Biopsy results:   "DISTAL ESOPHAGUS, BIOPSY:  -Squamous mucosa with mild reactive changes.  -No significant inflammation.  -No intraepithelial eosinophils."  Felicia test negative  Objective:      Physical Exam  Constitutional:       General: He is not in acute distress.     Appearance: He is well-developed.   Pulmonary:      Effort: Pulmonary effort is normal. No respiratory distress.   Skin:     Coloration: Skin is not pale.   Neurological:      Mental Status: He is alert and oriented to person, place, and time.   Psychiatric:         " Speech: Speech normal.         Behavior: Behavior normal.         Thought Content: Thought content normal.         Judgment: Judgment normal.         Assessment:       1. Encounter for monitoring long-term proton pump inhibitor therapy    2. Gastroesophageal reflux disease with esophagitis    3. Intermittent dysphagia    4. Achalasia    5. History of esophageal surgery    6. Globus sensation    7. Intermittent diarrhea        Plan:       Encounter for monitoring long-term proton pump inhibitor therapy & Gastroesophageal reflux disease with esophagitis  -     Vitamin B12; Future; Expected date: 09/18/2020  -     Magnesium; Future; Expected date: 09/18/2020  -   REFILL  omeprazole (PRILOSEC) 40 MG capsule; Take 1 capsule (40 mg total) by mouth once daily.  Dispense: 90 capsule; Refill: 3  -   START  famotidine (PEPCID) 40 MG tablet; Take 1 tablet (40 mg total) by mouth once daily.  Dispense: 90 tablet; Refill: 3  - discussed with patient about long term use of reflux medications (preference to use lowest effective dose or discontinuing if possible), the risk and benefits of using these medications long term, the risk of untreated GERD such as hernandez's esophagus, and recommend a diet high in calcium and/or taking OTC calcium and vitamin d supplements as directed (such as Citracal +D), patient verbalized understanding & patient wants to continue omeprazole as prescribed and add on pepcid.  - recommend annual monitoring with blood work to include CMP, CBC, vitamin B12, and magnesium.    Intermittent dysphagia, Achalasia, & History of esophageal surgery  - recommended scheduling EGD, discussed procedure with patient, patient verbalized understanding but declined EGD at this time; patient reports he would like to start pepcid and see his response before scheduling EGD; informed patient that if his symptoms persist then we will need to schedule EGD at that time; patient verbalized understanding  - educated patient to eat  smaller more frequent meals and to eat slowly and advised to eat a soft diet.  Recommend follow-up with Dr. Watkins & Dr. Le for continued evaluation and management.    Globus sensation  Recommend follow-up with Primary Care Provider for continued evaluation and management.    Intermittent diarrhea  - recommended OTC probiotic, such as Align or Culturelle, as directed  - avoid lactose & caffeine  - avoid known triggers  - discussed with patient that certain medications, such as metformin, may be contributing to symptoms. I recommend follow-up with provider who manages medication to discuss about possible alternative therapy, patient verbalized understanding  - recommend stool studies if diarrhea recurs; possible colonoscopy pending results of testing and if symptoms persist    Follow up in about 1 month (around 10/18/2020), or if symptoms worsen or fail to improve.    If no improvement in symptoms or symptoms worsen, call/follow-up at clinic or go to ER

## 2020-09-21 VITALS — HEIGHT: 67 IN | WEIGHT: 231 LBS | BODY MASS INDEX: 36.26 KG/M2

## 2020-09-21 NOTE — PATIENT INSTRUCTIONS
"GERD (Adult)    The esophagus is a tube that carries food from the mouth to the stomach. A valve at the lower end of the esophagus prevents stomach acid from flowing upward. When this valve doesn't work properly, stomach contents may repeatedly flow back up (reflux) into the esophagus. This is called gastroesophageal reflux disease (GERD). GERD can irritate the esophagus. It can cause problems with swallowing or breathing. In severe cases, GERD can cause recurrent pneumonia or other serious problems.  Symptoms of reflux include burning, pressure or sharp pain in the upper abdomen or mid to lower chest. The pain can spread to the neck, back, or shoulder. There may be belching, an acid taste in the back of the throat, chronic cough, or sore throat or hoarseness. GERD symptoms often occur during the day after a big meal. They can also occur at night when lying down.   Home care  Lifestyle changes can help reduce symptoms. If needed, medicines may be prescribed. Symptoms often improve with treatment, but if treatment is stopped, the symptoms often return after a few months. So most persons with GERD will need to continue treatment.  Lifestyle changes  · Limit or avoid fatty, fried, and spicy foods, as well as coffee, chocolate, mint, and foods with high acid content such as tomatoes and citrus fruit and juices (orange, grapefruit, lemon).  · Dont eat large meals, especially at night. Frequent, smaller meals are best. Do not lie down right after eating. And dont eat anything 3 hours before going to bed.  · Avoid drinking alcohol and smoking. As much as possible, stay away from second hand smoke.  · If you are overweight, losing weight will reduce symptoms.   · Avoid wearing tight clothing around your stomach area.  · If your symptoms occur during sleep, use a foam wedge to elevate your upper body (not just your head.) Or, place 4" blocks under the head of your bed.  Medicines  If needed, medicines can help relieve the " symptoms of GERD and prevent damage to the esophagus. Discuss a medicine plan with your healthcare provider. This may include one or more of the following medicines:  · Antacids to help neutralize the normal acids in your stomach.  · Acid blockers (H2 blockers) to decrease acid production.  · Acid inhibitors (PPIs) to decrease acid production in a different way than the blockers. They may work better, but can take a little longer to take effect.  Take an antacid 30-60 minutes after eating and at bedtime, but not at the same time as an acid blocker.  Try not to take medicines such as ibuprofen and aspirin. If you are taking aspirin for your heart or other medical reasons, talk to your healthcare provider about stopping it.  Follow-up care  Follow up with your healthcare provider or as advised by our staff.  When to seek medical advice  Call your healthcare provider if any of the following occur:  · Stomach pain gets worse or moves to the lower right abdomen (appendix area)  · Chest pain appears or gets worse, or spreads to the back, neck, shoulder, or arm  · Frequent vomiting (cant keep down liquids)  · Blood in the stool or vomit (red or black in color)  · Feeling weak or dizzy  · Fever of 100.4ºF (38ºC) or higher, or as directed by your healthcare provider  Date Last Reviewed: 6/23/2015  © 2809-8142 [employer]. [address] [place] [address], [place] [address]. All rights reserved. This information is not intended as a substitute for professional medical care. Always follow your healthcare professional's instructions.

## 2020-09-28 ENCOUNTER — TELEPHONE (OUTPATIENT)
Dept: GASTROENTEROLOGY | Facility: CLINIC | Age: 50
End: 2020-09-28

## 2020-09-28 ENCOUNTER — LAB VISIT (OUTPATIENT)
Dept: LAB | Facility: HOSPITAL | Age: 50
End: 2020-09-28
Attending: INTERNAL MEDICINE
Payer: COMMERCIAL

## 2020-09-28 DIAGNOSIS — Z51.81 ENCOUNTER FOR MONITORING LONG-TERM PROTON PUMP INHIBITOR THERAPY: ICD-10-CM

## 2020-09-28 DIAGNOSIS — E11.9 TYPE 2 DIABETES MELLITUS WITHOUT COMPLICATION, UNSPECIFIED WHETHER LONG TERM INSULIN USE: ICD-10-CM

## 2020-09-28 DIAGNOSIS — E29.1 MALE HYPOGONADISM: ICD-10-CM

## 2020-09-28 DIAGNOSIS — Z79.899 ENCOUNTER FOR MONITORING LONG-TERM PROTON PUMP INHIBITOR THERAPY: ICD-10-CM

## 2020-09-28 LAB
ALBUMIN SERPL BCP-MCNC: 4.3 G/DL (ref 3.5–5.2)
ALP SERPL-CCNC: 58 U/L (ref 55–135)
ALT SERPL W/O P-5'-P-CCNC: 35 U/L (ref 10–44)
ANION GAP SERPL CALC-SCNC: 10 MMOL/L (ref 8–16)
AST SERPL-CCNC: 23 U/L (ref 10–40)
BASOPHILS # BLD AUTO: 0.06 K/UL (ref 0–0.2)
BASOPHILS NFR BLD: 0.8 % (ref 0–1.9)
BILIRUB SERPL-MCNC: 1 MG/DL (ref 0.1–1)
BUN SERPL-MCNC: 22 MG/DL (ref 6–20)
CALCIUM SERPL-MCNC: 8.6 MG/DL (ref 8.7–10.5)
CHLORIDE SERPL-SCNC: 100 MMOL/L (ref 95–110)
CO2 SERPL-SCNC: 25 MMOL/L (ref 23–29)
CREAT SERPL-MCNC: 0.9 MG/DL (ref 0.5–1.4)
DIFFERENTIAL METHOD: ABNORMAL
EOSINOPHIL # BLD AUTO: 0.2 K/UL (ref 0–0.5)
EOSINOPHIL NFR BLD: 2.7 % (ref 0–8)
ERYTHROCYTE [DISTWIDTH] IN BLOOD BY AUTOMATED COUNT: 12.5 % (ref 11.5–14.5)
EST. GFR  (AFRICAN AMERICAN): >60 ML/MIN/1.73 M^2
EST. GFR  (NON AFRICAN AMERICAN): >60 ML/MIN/1.73 M^2
ESTIMATED AVG GLUCOSE: 166 MG/DL (ref 68–131)
GLUCOSE SERPL-MCNC: 123 MG/DL (ref 70–110)
HBA1C MFR BLD HPLC: 7.4 % (ref 4–5.6)
HCT VFR BLD AUTO: 56.1 % (ref 40–54)
HGB BLD-MCNC: 18.4 G/DL (ref 14–18)
IMM GRANULOCYTES # BLD AUTO: 0.02 K/UL (ref 0–0.04)
IMM GRANULOCYTES NFR BLD AUTO: 0.3 % (ref 0–0.5)
LYMPHOCYTES # BLD AUTO: 1.9 K/UL (ref 1–4.8)
LYMPHOCYTES NFR BLD: 23.6 % (ref 18–48)
MAGNESIUM SERPL-MCNC: 2 MG/DL (ref 1.6–2.6)
MCH RBC QN AUTO: 31.1 PG (ref 27–31)
MCHC RBC AUTO-ENTMCNC: 32.8 G/DL (ref 32–36)
MCV RBC AUTO: 95 FL (ref 82–98)
MONOCYTES # BLD AUTO: 1 K/UL (ref 0.3–1)
MONOCYTES NFR BLD: 12.6 % (ref 4–15)
NEUTROPHILS # BLD AUTO: 4.8 K/UL (ref 1.8–7.7)
NEUTROPHILS NFR BLD: 60 % (ref 38–73)
NRBC BLD-RTO: 0 /100 WBC
PLATELET # BLD AUTO: 289 K/UL (ref 150–350)
PMV BLD AUTO: 11 FL (ref 9.2–12.9)
POTASSIUM SERPL-SCNC: 4.1 MMOL/L (ref 3.5–5.1)
PROT SERPL-MCNC: 7.1 G/DL (ref 6–8.4)
RBC # BLD AUTO: 5.92 M/UL (ref 4.6–6.2)
SODIUM SERPL-SCNC: 135 MMOL/L (ref 136–145)
TESTOST SERPL-MCNC: 395 NG/DL (ref 304–1227)
VIT B12 SERPL-MCNC: 475 PG/ML (ref 210–950)
WBC # BLD AUTO: 7.91 K/UL (ref 3.9–12.7)

## 2020-09-28 PROCEDURE — 85025 COMPLETE CBC W/AUTO DIFF WBC: CPT

## 2020-09-28 PROCEDURE — 83735 ASSAY OF MAGNESIUM: CPT

## 2020-09-28 PROCEDURE — 36415 COLL VENOUS BLD VENIPUNCTURE: CPT | Mod: PO

## 2020-09-28 PROCEDURE — 82607 VITAMIN B-12: CPT

## 2020-09-28 PROCEDURE — 83036 HEMOGLOBIN GLYCOSYLATED A1C: CPT

## 2020-09-28 PROCEDURE — 84403 ASSAY OF TOTAL TESTOSTERONE: CPT

## 2020-09-28 PROCEDURE — 80053 COMPREHEN METABOLIC PANEL: CPT

## 2020-09-28 NOTE — TELEPHONE ENCOUNTER
----- Message from KARYNA Ba sent at 9/28/2020  2:40 PM CDT -----  Please call to inform & review the results with the patient- Lab results are normal: vitamin B12 & magnesium levels.   Continue with previous recommendations.  Thanks,  Teri TRONCOSO-C

## 2020-10-05 ENCOUNTER — OFFICE VISIT (OUTPATIENT)
Dept: ENDOCRINOLOGY | Facility: CLINIC | Age: 50
End: 2020-10-05
Payer: COMMERCIAL

## 2020-10-05 VITALS
DIASTOLIC BLOOD PRESSURE: 78 MMHG | BODY MASS INDEX: 35.62 KG/M2 | WEIGHT: 226.94 LBS | HEART RATE: 68 BPM | OXYGEN SATURATION: 97 % | SYSTOLIC BLOOD PRESSURE: 110 MMHG | HEIGHT: 67 IN

## 2020-10-05 DIAGNOSIS — E29.1 MALE HYPOGONADISM: ICD-10-CM

## 2020-10-05 DIAGNOSIS — E78.5 HYPERLIPIDEMIA, UNSPECIFIED HYPERLIPIDEMIA TYPE: ICD-10-CM

## 2020-10-05 DIAGNOSIS — E11.9 TYPE 2 DIABETES MELLITUS WITHOUT COMPLICATION, UNSPECIFIED WHETHER LONG TERM INSULIN USE: Primary | ICD-10-CM

## 2020-10-05 PROCEDURE — 3051F HG A1C>EQUAL 7.0%<8.0%: CPT | Mod: CPTII,S$GLB,, | Performed by: INTERNAL MEDICINE

## 2020-10-05 PROCEDURE — 99999 PR PBB SHADOW E&M-EST. PATIENT-LVL IV: ICD-10-PCS | Mod: PBBFAC,,, | Performed by: INTERNAL MEDICINE

## 2020-10-05 PROCEDURE — 99214 OFFICE O/P EST MOD 30 MIN: CPT | Mod: S$GLB,,, | Performed by: INTERNAL MEDICINE

## 2020-10-05 PROCEDURE — 99999 PR PBB SHADOW E&M-EST. PATIENT-LVL IV: CPT | Mod: PBBFAC,,, | Performed by: INTERNAL MEDICINE

## 2020-10-05 PROCEDURE — 99214 PR OFFICE/OUTPT VISIT, EST, LEVL IV, 30-39 MIN: ICD-10-PCS | Mod: S$GLB,,, | Performed by: INTERNAL MEDICINE

## 2020-10-05 PROCEDURE — 3051F PR MOST RECENT HEMOGLOBIN A1C LEVEL 7.0 - < 8.0%: ICD-10-PCS | Mod: CPTII,S$GLB,, | Performed by: INTERNAL MEDICINE

## 2020-10-05 PROCEDURE — 3008F BODY MASS INDEX DOCD: CPT | Mod: CPTII,S$GLB,, | Performed by: INTERNAL MEDICINE

## 2020-10-05 PROCEDURE — 3008F PR BODY MASS INDEX (BMI) DOCUMENTED: ICD-10-PCS | Mod: CPTII,S$GLB,, | Performed by: INTERNAL MEDICINE

## 2020-10-05 RX ORDER — TESTOSTERONE CYPIONATE 200 MG/ML
100 INJECTION, SOLUTION INTRAMUSCULAR
Qty: 10 ML | Refills: 4
Start: 2020-10-05 | End: 2021-04-05

## 2020-10-05 RX ORDER — EMPAGLIFLOZIN 25 MG/1
25 TABLET, FILM COATED ORAL DAILY
Qty: 30 TABLET | Refills: 6 | Status: SHIPPED | OUTPATIENT
Start: 2020-10-05 | End: 2021-05-24

## 2020-10-05 NOTE — PROGRESS NOTES
([facility])    CHIEF COMPLAINT: DM2  49 year old being seen as a f/u. Diagnosed with diabetes 2013. Off actos. On metformin XR 1000 BID and jardiance. On testosterone 100 mg weekly. Tolerating statin.No paresthesias. On a new diet and decreased alcohol intake. Still exercising        PAST MEDICAL HISTORY/PAST SURGICAL HISTORY:  Reviewed in [facility]    SOCIAL HISTORY: No tobacco. Social alcohol. Desk Job-     FAMILY HISTORY:  + DM 2. No known thyroid disease.     MEDICATIONS/ALLERGIES: The patient's MedCard has been updated and reviewed.      ROS:   Constitutional: weight decreased  Eyes: No recent visual changes  ENT: No dysphagia  Cardiovascular: Denies current anginal symptoms  Respiratory: Denies current respiratory difficulty  Gastrointestinal: No N/V  GenitoUrinary - No dysuria  Skin: No new skin rash  Neurologic: No focal neurologic complaints  Remainder ROS negative        PE:    GENERAL: Well developed, well nourished.  NECK: Supple, trachea midline, No palpable nodules  CHEST: Resp even and unlabored, CTA bilateral.  CARDIAC: RRR, S1, S2 heard, no murmurs, rubs, S3, or S4  FEET: Normal monofilament. No cuts or abrasions. 2 + pedal pulses.     Results for ITZ NARAYANAN (MRN [id number]) as of 10/5/2020 08:35   Ref. Range 9/28/2020 07:56   Sodium Latest Ref Range: 136 - 145 mmol/L 135 (L)   Potassium Latest Ref Range: 3.5 - 5.1 mmol/L 4.1   Chloride Latest Ref Range: 95 - 110 mmol/L 100   CO2 Latest Ref Range: 23 - 29 mmol/L 25   Anion Gap Latest Ref Range: 8 - 16 mmol/L 10   BUN, Bld Latest Ref Range: 6 - 20 mg/dL 22 (H)   Creatinine Latest Ref Range: 0.5 - 1.4 mg/dL 0.9   eGFR if non African American Latest Ref Range: >60 mL/min/1.73 m^2 >60.0   eGFR if African American Latest Ref Range: >60 mL/min/1.73 m^2 >60.0   Glucose Latest Ref Range: 70 - 110 mg/dL 123 (H)   Calcium Latest Ref Range: 8.7 - 10.5 mg/dL 8.6 (L)   Magnesium Latest Ref Range: 1.6 - 2.6 mg/dL 2.0   Alkaline Phosphatase Latest Ref Range: 55 -  135 U/L 58   PROTEIN TOTAL Latest Ref Range: 6.0 - 8.4 g/dL 7.1   Albumin Latest Ref Range: 3.5 - 5.2 g/dL 4.3   BILIRUBIN TOTAL Latest Ref Range: 0.1 - 1.0 mg/dL 1.0   AST Latest Ref Range: 10 - 40 U/L 23   ALT Latest Ref Range: 10 - 44 U/L 35   Vitamin B-12 Latest Ref Range: 210 - 950 pg/mL 475   Hemoglobin A1C External Latest Ref Range: 4.0 - 5.6 % 7.4 (H)   Estimated Avg Glucose Latest Ref Range: 68 - 131 mg/dL 166 (H)   Testosterone, Total Latest Ref Range: 304 - 1227 ng/dL 395         ASSESSMENT/PLAN:  1. DM 2- No known complications. Has seen DM Education. On metformin and Jardiance. Continue current Tx. Up to date with eye exam. Discussed getting flu vaccine which he states he will get today. Discussed getting Flu vaccine    2. Hyperlipidemia- Continue statin. Tolerating well. Monitor diet as Trig increased    3. Male Hypogonadism- decrease testosterone to 100 mg every other week due to HCT being elvetaed. Discussed risks of testosterone.     FOLLOWUP  3 months- testosterone, CBC, A1c, urine micro  F/U 6 months with Fasting CMP, FLP, CBC, A1c, testosterone,

## 2021-01-04 ENCOUNTER — LAB VISIT (OUTPATIENT)
Dept: LAB | Facility: HOSPITAL | Age: 51
End: 2021-01-04
Attending: INTERNAL MEDICINE
Payer: COMMERCIAL

## 2021-01-04 DIAGNOSIS — E29.1 MALE HYPOGONADISM: ICD-10-CM

## 2021-01-04 DIAGNOSIS — E11.9 TYPE 2 DIABETES MELLITUS WITHOUT COMPLICATION, UNSPECIFIED WHETHER LONG TERM INSULIN USE: ICD-10-CM

## 2021-01-04 LAB
ALBUMIN SERPL BCP-MCNC: 4.3 G/DL (ref 3.5–5.2)
ALP SERPL-CCNC: 72 U/L (ref 55–135)
ALT SERPL W/O P-5'-P-CCNC: 54 U/L (ref 10–44)
ANION GAP SERPL CALC-SCNC: 9 MMOL/L (ref 8–16)
AST SERPL-CCNC: 27 U/L (ref 10–40)
BILIRUB SERPL-MCNC: 0.9 MG/DL (ref 0.1–1)
BUN SERPL-MCNC: 18 MG/DL (ref 6–20)
CALCIUM SERPL-MCNC: 9.1 MG/DL (ref 8.7–10.5)
CHLORIDE SERPL-SCNC: 99 MMOL/L (ref 95–110)
CO2 SERPL-SCNC: 24 MMOL/L (ref 23–29)
CREAT SERPL-MCNC: 0.9 MG/DL (ref 0.5–1.4)
EST. GFR  (AFRICAN AMERICAN): >60 ML/MIN/1.73 M^2
EST. GFR  (NON AFRICAN AMERICAN): >60 ML/MIN/1.73 M^2
ESTIMATED AVG GLUCOSE: 177 MG/DL (ref 68–131)
GLUCOSE SERPL-MCNC: 190 MG/DL (ref 70–110)
HBA1C MFR BLD HPLC: 7.8 % (ref 4–5.6)
POTASSIUM SERPL-SCNC: 4.2 MMOL/L (ref 3.5–5.1)
PROT SERPL-MCNC: 7.2 G/DL (ref 6–8.4)
SODIUM SERPL-SCNC: 132 MMOL/L (ref 136–145)
TESTOST SERPL-MCNC: 166 NG/DL (ref 304–1227)

## 2021-01-04 PROCEDURE — 36415 COLL VENOUS BLD VENIPUNCTURE: CPT | Mod: PO

## 2021-01-04 PROCEDURE — 80053 COMPREHEN METABOLIC PANEL: CPT

## 2021-01-04 PROCEDURE — 84403 ASSAY OF TOTAL TESTOSTERONE: CPT

## 2021-01-04 PROCEDURE — 83036 HEMOGLOBIN GLYCOSYLATED A1C: CPT

## 2021-01-19 ENCOUNTER — PATIENT MESSAGE (OUTPATIENT)
Dept: ENDOCRINOLOGY | Facility: CLINIC | Age: 51
End: 2021-01-19

## 2021-01-22 ENCOUNTER — CLINICAL SUPPORT (OUTPATIENT)
Dept: DIABETES | Facility: CLINIC | Age: 51
End: 2021-01-22
Payer: COMMERCIAL

## 2021-01-22 VITALS — WEIGHT: 230.69 LBS | HEIGHT: 67 IN | BODY MASS INDEX: 36.21 KG/M2

## 2021-01-22 DIAGNOSIS — E11.9 TYPE 2 DIABETES MELLITUS WITHOUT RETINOPATHY: ICD-10-CM

## 2021-01-22 PROCEDURE — 99999 PR PBB SHADOW E&M-EST. PATIENT-LVL II: ICD-10-PCS | Mod: PBBFAC,,, | Performed by: DIETITIAN, REGISTERED

## 2021-01-22 PROCEDURE — G0108 DIAB MANAGE TRN  PER INDIV: HCPCS | Mod: S$GLB,,, | Performed by: DIETITIAN, REGISTERED

## 2021-01-22 PROCEDURE — 99999 PR PBB SHADOW E&M-EST. PATIENT-LVL II: CPT | Mod: PBBFAC,,, | Performed by: DIETITIAN, REGISTERED

## 2021-01-22 PROCEDURE — G0108 PR DIAB MANAGE TRN  PER INDIV: ICD-10-PCS | Mod: S$GLB,,, | Performed by: DIETITIAN, REGISTERED

## 2021-02-18 ENCOUNTER — PATIENT MESSAGE (OUTPATIENT)
Dept: ENDOCRINOLOGY | Facility: CLINIC | Age: 51
End: 2021-02-18

## 2021-03-02 ENCOUNTER — OFFICE VISIT (OUTPATIENT)
Dept: FAMILY MEDICINE | Facility: CLINIC | Age: 51
End: 2021-03-02
Payer: COMMERCIAL

## 2021-03-02 VITALS
DIASTOLIC BLOOD PRESSURE: 72 MMHG | HEART RATE: 72 BPM | OXYGEN SATURATION: 97 % | TEMPERATURE: 98 F | SYSTOLIC BLOOD PRESSURE: 118 MMHG | BODY MASS INDEX: 35.79 KG/M2 | HEIGHT: 67 IN | WEIGHT: 228 LBS

## 2021-03-02 DIAGNOSIS — N52.1 ERECTILE DYSFUNCTION DUE TO DISEASES CLASSIFIED ELSEWHERE: ICD-10-CM

## 2021-03-02 DIAGNOSIS — E34.9 TESTOSTERONE DEFICIENCY: Primary | ICD-10-CM

## 2021-03-02 PROCEDURE — 1126F PR PAIN SEVERITY QUANTIFIED, NO PAIN PRESENT: ICD-10-PCS | Mod: S$GLB,,, | Performed by: INTERNAL MEDICINE

## 2021-03-02 PROCEDURE — 99203 OFFICE O/P NEW LOW 30 MIN: CPT | Mod: S$GLB,,, | Performed by: INTERNAL MEDICINE

## 2021-03-02 PROCEDURE — 3008F BODY MASS INDEX DOCD: CPT | Mod: CPTII,S$GLB,, | Performed by: INTERNAL MEDICINE

## 2021-03-02 PROCEDURE — 1126F AMNT PAIN NOTED NONE PRSNT: CPT | Mod: S$GLB,,, | Performed by: INTERNAL MEDICINE

## 2021-03-02 PROCEDURE — 99999 PR PBB SHADOW E&M-EST. PATIENT-LVL V: CPT | Mod: PBBFAC,,, | Performed by: INTERNAL MEDICINE

## 2021-03-02 PROCEDURE — 3072F LOW RISK FOR RETINOPATHY: CPT | Mod: S$GLB,,, | Performed by: INTERNAL MEDICINE

## 2021-03-02 PROCEDURE — 99203 PR OFFICE/OUTPT VISIT, NEW, LEVL III, 30-44 MIN: ICD-10-PCS | Mod: S$GLB,,, | Performed by: INTERNAL MEDICINE

## 2021-03-02 PROCEDURE — 99999 PR PBB SHADOW E&M-EST. PATIENT-LVL V: ICD-10-PCS | Mod: PBBFAC,,, | Performed by: INTERNAL MEDICINE

## 2021-03-02 PROCEDURE — 3008F PR BODY MASS INDEX (BMI) DOCUMENTED: ICD-10-PCS | Mod: CPTII,S$GLB,, | Performed by: INTERNAL MEDICINE

## 2021-03-02 PROCEDURE — 3072F PR LOW RISK FOR RETINOPATHY: ICD-10-PCS | Mod: S$GLB,,, | Performed by: INTERNAL MEDICINE

## 2021-03-02 RX ORDER — TADALAFIL 10 MG/1
TABLET ORAL
COMMUNITY
Start: 2021-01-29 | End: 2021-03-02 | Stop reason: SDUPTHER

## 2021-03-02 RX ORDER — TADALAFIL 10 MG/1
10 TABLET ORAL DAILY PRN
Qty: 10 TABLET | Refills: 3 | Status: SHIPPED | OUTPATIENT
Start: 2021-03-02 | End: 2021-07-15 | Stop reason: SDUPTHER

## 2021-03-02 RX ORDER — SILDENAFIL CITRATE 20 MG/1
TABLET ORAL
Qty: 30 TABLET | Refills: 3 | Status: SHIPPED | OUTPATIENT
Start: 2021-03-02 | End: 2021-12-29

## 2021-03-05 ENCOUNTER — DOCUMENTATION ONLY (OUTPATIENT)
Dept: FAMILY MEDICINE | Facility: CLINIC | Age: 51
End: 2021-03-05

## 2021-03-19 ENCOUNTER — PATIENT MESSAGE (OUTPATIENT)
Dept: FAMILY MEDICINE | Facility: CLINIC | Age: 51
End: 2021-03-19

## 2021-03-29 ENCOUNTER — LAB VISIT (OUTPATIENT)
Dept: LAB | Facility: HOSPITAL | Age: 51
End: 2021-03-29
Attending: INTERNAL MEDICINE
Payer: COMMERCIAL

## 2021-03-29 DIAGNOSIS — E11.9 TYPE 2 DIABETES MELLITUS WITHOUT COMPLICATION, UNSPECIFIED WHETHER LONG TERM INSULIN USE: ICD-10-CM

## 2021-03-29 DIAGNOSIS — E29.1 MALE HYPOGONADISM: ICD-10-CM

## 2021-03-29 LAB
ALBUMIN SERPL BCP-MCNC: 4.1 G/DL (ref 3.5–5.2)
ALP SERPL-CCNC: 72 U/L (ref 55–135)
ALT SERPL W/O P-5'-P-CCNC: 44 U/L (ref 10–44)
ANION GAP SERPL CALC-SCNC: 7 MMOL/L (ref 8–16)
AST SERPL-CCNC: 23 U/L (ref 10–40)
BILIRUB SERPL-MCNC: 0.7 MG/DL (ref 0.1–1)
BUN SERPL-MCNC: 15 MG/DL (ref 6–20)
CALCIUM SERPL-MCNC: 9.4 MG/DL (ref 8.7–10.5)
CHLORIDE SERPL-SCNC: 102 MMOL/L (ref 95–110)
CHOLEST SERPL-MCNC: 173 MG/DL (ref 120–199)
CHOLEST/HDLC SERPL: 5.8 {RATIO} (ref 2–5)
CO2 SERPL-SCNC: 28 MMOL/L (ref 23–29)
CREAT SERPL-MCNC: 1.2 MG/DL (ref 0.5–1.4)
ERYTHROCYTE [DISTWIDTH] IN BLOOD BY AUTOMATED COUNT: 12.2 % (ref 11.5–14.5)
EST. GFR  (AFRICAN AMERICAN): >60 ML/MIN/1.73 M^2
EST. GFR  (NON AFRICAN AMERICAN): >60 ML/MIN/1.73 M^2
ESTIMATED AVG GLUCOSE: 151 MG/DL (ref 68–131)
GLUCOSE SERPL-MCNC: 160 MG/DL (ref 70–110)
HBA1C MFR BLD: 6.9 % (ref 4–5.6)
HCT VFR BLD AUTO: 56.1 % (ref 40–54)
HDLC SERPL-MCNC: 30 MG/DL (ref 40–75)
HDLC SERPL: 17.3 % (ref 20–50)
HGB BLD-MCNC: 18.7 G/DL (ref 14–18)
LDLC SERPL CALC-MCNC: 73.6 MG/DL (ref 63–159)
MCH RBC QN AUTO: 31.1 PG (ref 27–31)
MCHC RBC AUTO-ENTMCNC: 33.3 G/DL (ref 32–36)
MCV RBC AUTO: 93 FL (ref 82–98)
NONHDLC SERPL-MCNC: 143 MG/DL
PLATELET # BLD AUTO: 305 K/UL (ref 150–450)
PMV BLD AUTO: 10.7 FL (ref 9.2–12.9)
POTASSIUM SERPL-SCNC: 4.9 MMOL/L (ref 3.5–5.1)
PROT SERPL-MCNC: 7.3 G/DL (ref 6–8.4)
RBC # BLD AUTO: 6.01 M/UL (ref 4.6–6.2)
SODIUM SERPL-SCNC: 137 MMOL/L (ref 136–145)
TRIGL SERPL-MCNC: 347 MG/DL (ref 30–150)
WBC # BLD AUTO: 6.93 K/UL (ref 3.9–12.7)

## 2021-03-29 PROCEDURE — 36415 COLL VENOUS BLD VENIPUNCTURE: CPT | Mod: PO | Performed by: INTERNAL MEDICINE

## 2021-03-29 PROCEDURE — 85027 COMPLETE CBC AUTOMATED: CPT | Performed by: INTERNAL MEDICINE

## 2021-03-29 PROCEDURE — 84403 ASSAY OF TOTAL TESTOSTERONE: CPT | Performed by: INTERNAL MEDICINE

## 2021-03-29 PROCEDURE — 80061 LIPID PANEL: CPT | Performed by: INTERNAL MEDICINE

## 2021-03-29 PROCEDURE — 80053 COMPREHEN METABOLIC PANEL: CPT | Performed by: INTERNAL MEDICINE

## 2021-03-29 PROCEDURE — 83036 HEMOGLOBIN GLYCOSYLATED A1C: CPT | Performed by: INTERNAL MEDICINE

## 2021-03-30 ENCOUNTER — PATIENT MESSAGE (OUTPATIENT)
Dept: FAMILY MEDICINE | Facility: CLINIC | Age: 51
End: 2021-03-30

## 2021-03-30 LAB — TESTOST SERPL-MCNC: 246 NG/DL (ref 304–1227)

## 2021-04-05 ENCOUNTER — OFFICE VISIT (OUTPATIENT)
Dept: ENDOCRINOLOGY | Facility: CLINIC | Age: 51
End: 2021-04-05
Payer: COMMERCIAL

## 2021-04-05 VITALS
HEART RATE: 74 BPM | WEIGHT: 222.25 LBS | HEIGHT: 67 IN | BODY MASS INDEX: 34.88 KG/M2 | SYSTOLIC BLOOD PRESSURE: 118 MMHG | OXYGEN SATURATION: 97 % | DIASTOLIC BLOOD PRESSURE: 80 MMHG

## 2021-04-05 DIAGNOSIS — E29.1 MALE HYPOGONADISM: ICD-10-CM

## 2021-04-05 DIAGNOSIS — E78.5 HYPERLIPIDEMIA ASSOCIATED WITH TYPE 2 DIABETES MELLITUS: ICD-10-CM

## 2021-04-05 DIAGNOSIS — E11.69 HYPERLIPIDEMIA ASSOCIATED WITH TYPE 2 DIABETES MELLITUS: ICD-10-CM

## 2021-04-05 DIAGNOSIS — E11.9 TYPE 2 DIABETES MELLITUS WITHOUT COMPLICATION, UNSPECIFIED WHETHER LONG TERM INSULIN USE: Primary | ICD-10-CM

## 2021-04-05 DIAGNOSIS — G47.33 OSA (OBSTRUCTIVE SLEEP APNEA): ICD-10-CM

## 2021-04-05 PROCEDURE — 3044F HG A1C LEVEL LT 7.0%: CPT | Mod: CPTII,S$GLB,, | Performed by: INTERNAL MEDICINE

## 2021-04-05 PROCEDURE — 1126F AMNT PAIN NOTED NONE PRSNT: CPT | Mod: S$GLB,,, | Performed by: INTERNAL MEDICINE

## 2021-04-05 PROCEDURE — 3072F LOW RISK FOR RETINOPATHY: CPT | Mod: S$GLB,,, | Performed by: INTERNAL MEDICINE

## 2021-04-05 PROCEDURE — 3044F PR MOST RECENT HEMOGLOBIN A1C LEVEL <7.0%: ICD-10-PCS | Mod: CPTII,S$GLB,, | Performed by: INTERNAL MEDICINE

## 2021-04-05 PROCEDURE — 3072F PR LOW RISK FOR RETINOPATHY: ICD-10-PCS | Mod: S$GLB,,, | Performed by: INTERNAL MEDICINE

## 2021-04-05 PROCEDURE — 99999 PR PBB SHADOW E&M-EST. PATIENT-LVL V: ICD-10-PCS | Mod: PBBFAC,,, | Performed by: INTERNAL MEDICINE

## 2021-04-05 PROCEDURE — 3008F BODY MASS INDEX DOCD: CPT | Mod: CPTII,S$GLB,, | Performed by: INTERNAL MEDICINE

## 2021-04-05 PROCEDURE — 3008F PR BODY MASS INDEX (BMI) DOCUMENTED: ICD-10-PCS | Mod: CPTII,S$GLB,, | Performed by: INTERNAL MEDICINE

## 2021-04-05 PROCEDURE — 99214 PR OFFICE/OUTPT VISIT, EST, LEVL IV, 30-39 MIN: ICD-10-PCS | Mod: S$GLB,,, | Performed by: INTERNAL MEDICINE

## 2021-04-05 PROCEDURE — 99214 OFFICE O/P EST MOD 30 MIN: CPT | Mod: S$GLB,,, | Performed by: INTERNAL MEDICINE

## 2021-04-05 PROCEDURE — 1126F PR PAIN SEVERITY QUANTIFIED, NO PAIN PRESENT: ICD-10-PCS | Mod: S$GLB,,, | Performed by: INTERNAL MEDICINE

## 2021-04-05 PROCEDURE — 99999 PR PBB SHADOW E&M-EST. PATIENT-LVL V: CPT | Mod: PBBFAC,,, | Performed by: INTERNAL MEDICINE

## 2021-04-05 RX ORDER — TESTOSTERONE CYPIONATE 1000 MG/10ML
50 INJECTION, SOLUTION INTRAMUSCULAR WEEKLY
Qty: 10 ML | Refills: 5 | Status: SHIPPED | OUTPATIENT
Start: 2021-04-05 | End: 2021-04-09 | Stop reason: SDUPTHER

## 2021-04-09 ENCOUNTER — PATIENT MESSAGE (OUTPATIENT)
Dept: ENDOCRINOLOGY | Facility: CLINIC | Age: 51
End: 2021-04-09

## 2021-04-09 ENCOUNTER — TELEPHONE (OUTPATIENT)
Dept: ENDOCRINOLOGY | Facility: CLINIC | Age: 51
End: 2021-04-09

## 2021-04-09 DIAGNOSIS — E29.1 MALE HYPOGONADISM: ICD-10-CM

## 2021-04-09 RX ORDER — TESTOSTERONE CYPIONATE 1000 MG/10ML
50 INJECTION, SOLUTION INTRAMUSCULAR WEEKLY
Qty: 10 ML | Refills: 5 | Status: SHIPPED | OUTPATIENT
Start: 2021-04-09 | End: 2021-10-25

## 2021-04-24 ENCOUNTER — PATIENT MESSAGE (OUTPATIENT)
Dept: ENDOCRINOLOGY | Facility: CLINIC | Age: 51
End: 2021-04-24

## 2021-07-09 ENCOUNTER — LAB VISIT (OUTPATIENT)
Dept: LAB | Facility: HOSPITAL | Age: 51
End: 2021-07-09
Attending: INTERNAL MEDICINE
Payer: COMMERCIAL

## 2021-07-09 DIAGNOSIS — E11.69 HYPERLIPIDEMIA ASSOCIATED WITH TYPE 2 DIABETES MELLITUS: ICD-10-CM

## 2021-07-09 DIAGNOSIS — E29.1 MALE HYPOGONADISM: ICD-10-CM

## 2021-07-09 DIAGNOSIS — E11.9 TYPE 2 DIABETES MELLITUS WITHOUT COMPLICATION, UNSPECIFIED WHETHER LONG TERM INSULIN USE: ICD-10-CM

## 2021-07-09 DIAGNOSIS — E78.5 HYPERLIPIDEMIA ASSOCIATED WITH TYPE 2 DIABETES MELLITUS: ICD-10-CM

## 2021-07-09 LAB
ERYTHROCYTE [DISTWIDTH] IN BLOOD BY AUTOMATED COUNT: 12.9 % (ref 11.5–14.5)
HCT VFR BLD AUTO: 50.2 % (ref 40–54)
HGB BLD-MCNC: 16.5 G/DL (ref 14–18)
MCH RBC QN AUTO: 29.9 PG (ref 27–31)
MCHC RBC AUTO-ENTMCNC: 32.9 G/DL (ref 32–36)
MCV RBC AUTO: 91 FL (ref 82–98)
PLATELET # BLD AUTO: 312 K/UL (ref 150–450)
PMV BLD AUTO: 11 FL (ref 9.2–12.9)
RBC # BLD AUTO: 5.51 M/UL (ref 4.6–6.2)
TESTOST SERPL-MCNC: 609 NG/DL (ref 304–1227)
WBC # BLD AUTO: 8.12 K/UL (ref 3.9–12.7)

## 2021-07-09 PROCEDURE — 84403 ASSAY OF TOTAL TESTOSTERONE: CPT | Performed by: INTERNAL MEDICINE

## 2021-07-09 PROCEDURE — 36415 COLL VENOUS BLD VENIPUNCTURE: CPT | Mod: PO | Performed by: INTERNAL MEDICINE

## 2021-07-09 PROCEDURE — 85027 COMPLETE CBC AUTOMATED: CPT | Performed by: INTERNAL MEDICINE

## 2021-07-15 DIAGNOSIS — N52.1 ERECTILE DYSFUNCTION DUE TO DISEASES CLASSIFIED ELSEWHERE: ICD-10-CM

## 2021-07-15 RX ORDER — TADALAFIL 10 MG/1
10 TABLET ORAL DAILY PRN
Qty: 10 TABLET | Refills: 3 | Status: SHIPPED | OUTPATIENT
Start: 2021-07-15 | End: 2022-07-22

## 2021-07-15 RX ORDER — TADALAFIL 10 MG/1
10 TABLET ORAL DAILY PRN
Qty: 10 TABLET | Refills: 3 | OUTPATIENT
Start: 2021-07-15

## 2021-07-23 ENCOUNTER — NUTRITION (OUTPATIENT)
Dept: DIABETES | Facility: CLINIC | Age: 51
End: 2021-07-23
Payer: COMMERCIAL

## 2021-07-23 VITALS — HEIGHT: 67 IN | WEIGHT: 220.44 LBS | BODY MASS INDEX: 34.6 KG/M2

## 2021-07-23 DIAGNOSIS — E11.9 TYPE 2 DIABETES MELLITUS WITHOUT RETINOPATHY: ICD-10-CM

## 2021-07-23 DIAGNOSIS — E11.9 TYPE 2 DIABETES MELLITUS WITHOUT RETINOPATHY: Primary | ICD-10-CM

## 2021-07-23 PROCEDURE — G0108 DIAB MANAGE TRN  PER INDIV: HCPCS | Mod: S$GLB,,, | Performed by: DIETITIAN, REGISTERED

## 2021-07-23 PROCEDURE — 99999 PR PBB SHADOW E&M-EST. PATIENT-LVL II: ICD-10-PCS | Mod: PBBFAC,,, | Performed by: DIETITIAN, REGISTERED

## 2021-07-23 PROCEDURE — 99999 PR PBB SHADOW E&M-EST. PATIENT-LVL II: CPT | Mod: PBBFAC,,, | Performed by: DIETITIAN, REGISTERED

## 2021-07-23 PROCEDURE — G0108 PR DIAB MANAGE TRN  PER INDIV: ICD-10-PCS | Mod: S$GLB,,, | Performed by: DIETITIAN, REGISTERED

## 2021-09-02 ENCOUNTER — PATIENT OUTREACH (OUTPATIENT)
Dept: ADMINISTRATIVE | Facility: HOSPITAL | Age: 51
End: 2021-09-02

## 2021-09-21 ENCOUNTER — PATIENT MESSAGE (OUTPATIENT)
Dept: ENDOCRINOLOGY | Facility: CLINIC | Age: 51
End: 2021-09-21

## 2021-09-21 RX ORDER — NEEDLES, DISPOSABLE 25GX5/8"
NEEDLE, DISPOSABLE MISCELLANEOUS
Qty: 100 EACH | Refills: 1 | Status: SHIPPED | OUTPATIENT
Start: 2021-09-21

## 2021-10-11 ENCOUNTER — LAB VISIT (OUTPATIENT)
Dept: LAB | Facility: HOSPITAL | Age: 51
End: 2021-10-11
Attending: INTERNAL MEDICINE
Payer: COMMERCIAL

## 2021-10-11 DIAGNOSIS — E11.9 TYPE 2 DIABETES MELLITUS WITHOUT COMPLICATION, UNSPECIFIED WHETHER LONG TERM INSULIN USE: ICD-10-CM

## 2021-10-11 DIAGNOSIS — E78.5 HYPERLIPIDEMIA ASSOCIATED WITH TYPE 2 DIABETES MELLITUS: ICD-10-CM

## 2021-10-11 DIAGNOSIS — E11.69 HYPERLIPIDEMIA ASSOCIATED WITH TYPE 2 DIABETES MELLITUS: ICD-10-CM

## 2021-10-11 DIAGNOSIS — E29.1 MALE HYPOGONADISM: ICD-10-CM

## 2021-10-11 LAB
ALBUMIN/CREAT UR: 7.5 UG/MG (ref 0–30)
CREAT UR-MCNC: 106 MG/DL (ref 23–375)
MICROALBUMIN UR DL<=1MG/L-MCNC: 8 UG/ML

## 2021-10-11 PROCEDURE — 82570 ASSAY OF URINE CREATININE: CPT | Performed by: INTERNAL MEDICINE

## 2021-10-18 ENCOUNTER — OFFICE VISIT (OUTPATIENT)
Dept: ENDOCRINOLOGY | Facility: CLINIC | Age: 51
End: 2021-10-18
Payer: COMMERCIAL

## 2021-10-18 ENCOUNTER — PATIENT MESSAGE (OUTPATIENT)
Dept: FAMILY MEDICINE | Facility: CLINIC | Age: 51
End: 2021-10-18
Payer: COMMERCIAL

## 2021-10-18 VITALS
HEIGHT: 67 IN | RESPIRATION RATE: 18 BRPM | HEART RATE: 76 BPM | WEIGHT: 226.19 LBS | DIASTOLIC BLOOD PRESSURE: 76 MMHG | BODY MASS INDEX: 35.5 KG/M2 | SYSTOLIC BLOOD PRESSURE: 116 MMHG

## 2021-10-18 DIAGNOSIS — G47.33 OSA (OBSTRUCTIVE SLEEP APNEA): ICD-10-CM

## 2021-10-18 DIAGNOSIS — E11.69 HYPERLIPIDEMIA ASSOCIATED WITH TYPE 2 DIABETES MELLITUS: ICD-10-CM

## 2021-10-18 DIAGNOSIS — E78.5 HYPERLIPIDEMIA ASSOCIATED WITH TYPE 2 DIABETES MELLITUS: ICD-10-CM

## 2021-10-18 DIAGNOSIS — E11.9 TYPE 2 DIABETES MELLITUS WITHOUT COMPLICATION, UNSPECIFIED WHETHER LONG TERM INSULIN USE: Primary | ICD-10-CM

## 2021-10-18 DIAGNOSIS — E29.1 MALE HYPOGONADISM: ICD-10-CM

## 2021-10-18 PROCEDURE — 1160F RVW MEDS BY RX/DR IN RCRD: CPT | Mod: CPTII,S$GLB,, | Performed by: INTERNAL MEDICINE

## 2021-10-18 PROCEDURE — 3008F BODY MASS INDEX DOCD: CPT | Mod: CPTII,S$GLB,, | Performed by: INTERNAL MEDICINE

## 2021-10-18 PROCEDURE — 3078F PR MOST RECENT DIASTOLIC BLOOD PRESSURE < 80 MM HG: ICD-10-PCS | Mod: CPTII,S$GLB,, | Performed by: INTERNAL MEDICINE

## 2021-10-18 PROCEDURE — 3074F SYST BP LT 130 MM HG: CPT | Mod: CPTII,S$GLB,, | Performed by: INTERNAL MEDICINE

## 2021-10-18 PROCEDURE — 3061F NEG MICROALBUMINURIA REV: CPT | Mod: CPTII,S$GLB,, | Performed by: INTERNAL MEDICINE

## 2021-10-18 PROCEDURE — 3066F PR DOCUMENTATION OF TREATMENT FOR NEPHROPATHY: ICD-10-PCS | Mod: CPTII,S$GLB,, | Performed by: INTERNAL MEDICINE

## 2021-10-18 PROCEDURE — 99214 OFFICE O/P EST MOD 30 MIN: CPT | Mod: S$GLB,,, | Performed by: INTERNAL MEDICINE

## 2021-10-18 PROCEDURE — 1159F PR MEDICATION LIST DOCUMENTED IN MEDICAL RECORD: ICD-10-PCS | Mod: CPTII,S$GLB,, | Performed by: INTERNAL MEDICINE

## 2021-10-18 PROCEDURE — 3061F PR NEG MICROALBUMINURIA RESULT DOCUMENTED/REVIEW: ICD-10-PCS | Mod: CPTII,S$GLB,, | Performed by: INTERNAL MEDICINE

## 2021-10-18 PROCEDURE — 3074F PR MOST RECENT SYSTOLIC BLOOD PRESSURE < 130 MM HG: ICD-10-PCS | Mod: CPTII,S$GLB,, | Performed by: INTERNAL MEDICINE

## 2021-10-18 PROCEDURE — 99999 PR PBB SHADOW E&M-EST. PATIENT-LVL V: CPT | Mod: PBBFAC,,, | Performed by: INTERNAL MEDICINE

## 2021-10-18 PROCEDURE — 3078F DIAST BP <80 MM HG: CPT | Mod: CPTII,S$GLB,, | Performed by: INTERNAL MEDICINE

## 2021-10-18 PROCEDURE — 3044F HG A1C LEVEL LT 7.0%: CPT | Mod: CPTII,S$GLB,, | Performed by: INTERNAL MEDICINE

## 2021-10-18 PROCEDURE — 1160F PR REVIEW ALL MEDS BY PRESCRIBER/CLIN PHARMACIST DOCUMENTED: ICD-10-PCS | Mod: CPTII,S$GLB,, | Performed by: INTERNAL MEDICINE

## 2021-10-18 PROCEDURE — 3066F NEPHROPATHY DOC TX: CPT | Mod: CPTII,S$GLB,, | Performed by: INTERNAL MEDICINE

## 2021-10-18 PROCEDURE — 99214 PR OFFICE/OUTPT VISIT, EST, LEVL IV, 30-39 MIN: ICD-10-PCS | Mod: S$GLB,,, | Performed by: INTERNAL MEDICINE

## 2021-10-18 PROCEDURE — 3072F PR LOW RISK FOR RETINOPATHY: ICD-10-PCS | Mod: CPTII,S$GLB,, | Performed by: INTERNAL MEDICINE

## 2021-10-18 PROCEDURE — 3008F PR BODY MASS INDEX (BMI) DOCUMENTED: ICD-10-PCS | Mod: CPTII,S$GLB,, | Performed by: INTERNAL MEDICINE

## 2021-10-18 PROCEDURE — 99999 PR PBB SHADOW E&M-EST. PATIENT-LVL V: ICD-10-PCS | Mod: PBBFAC,,, | Performed by: INTERNAL MEDICINE

## 2021-10-18 PROCEDURE — 3072F LOW RISK FOR RETINOPATHY: CPT | Mod: CPTII,S$GLB,, | Performed by: INTERNAL MEDICINE

## 2021-10-18 PROCEDURE — 3044F PR MOST RECENT HEMOGLOBIN A1C LEVEL <7.0%: ICD-10-PCS | Mod: CPTII,S$GLB,, | Performed by: INTERNAL MEDICINE

## 2021-10-18 PROCEDURE — 1159F MED LIST DOCD IN RCRD: CPT | Mod: CPTII,S$GLB,, | Performed by: INTERNAL MEDICINE

## 2021-11-05 ENCOUNTER — PATIENT MESSAGE (OUTPATIENT)
Dept: ENDOCRINOLOGY | Facility: CLINIC | Age: 51
End: 2021-11-05
Payer: COMMERCIAL

## 2021-11-08 ENCOUNTER — PATIENT MESSAGE (OUTPATIENT)
Dept: FAMILY MEDICINE | Facility: CLINIC | Age: 51
End: 2021-11-08
Payer: COMMERCIAL

## 2021-11-12 ENCOUNTER — IMMUNIZATION (OUTPATIENT)
Dept: PHARMACY | Facility: CLINIC | Age: 51
End: 2021-11-12
Payer: COMMERCIAL

## 2021-11-12 DIAGNOSIS — Z23 NEED FOR VACCINATION: Primary | ICD-10-CM

## 2021-11-18 ENCOUNTER — TELEPHONE (OUTPATIENT)
Dept: ENDOCRINOLOGY | Facility: CLINIC | Age: 51
End: 2021-11-18
Payer: COMMERCIAL

## 2021-11-22 ENCOUNTER — PATIENT MESSAGE (OUTPATIENT)
Dept: ENDOCRINOLOGY | Facility: CLINIC | Age: 51
End: 2021-11-22
Payer: COMMERCIAL

## 2021-11-24 ENCOUNTER — TELEPHONE (OUTPATIENT)
Dept: ENDOCRINOLOGY | Facility: CLINIC | Age: 51
End: 2021-11-24
Payer: COMMERCIAL

## 2021-11-30 ENCOUNTER — PATIENT MESSAGE (OUTPATIENT)
Dept: ENDOCRINOLOGY | Facility: CLINIC | Age: 51
End: 2021-11-30
Payer: COMMERCIAL

## 2021-11-30 ENCOUNTER — PATIENT MESSAGE (OUTPATIENT)
Dept: DIABETES | Facility: CLINIC | Age: 51
End: 2021-11-30
Payer: COMMERCIAL

## 2021-12-09 ENCOUNTER — OFFICE VISIT (OUTPATIENT)
Dept: OPTOMETRY | Facility: CLINIC | Age: 51
End: 2021-12-09
Payer: COMMERCIAL

## 2021-12-09 DIAGNOSIS — H52.13 MYOPIA WITH ASTIGMATISM AND PRESBYOPIA, BILATERAL: ICD-10-CM

## 2021-12-09 DIAGNOSIS — H52.4 MYOPIA WITH ASTIGMATISM AND PRESBYOPIA, BILATERAL: ICD-10-CM

## 2021-12-09 DIAGNOSIS — H04.123 BILATERAL DRY EYES: ICD-10-CM

## 2021-12-09 DIAGNOSIS — H52.203 MYOPIA WITH ASTIGMATISM AND PRESBYOPIA, BILATERAL: ICD-10-CM

## 2021-12-09 DIAGNOSIS — E11.9 DIABETES MELLITUS TYPE 2 WITHOUT RETINOPATHY: Primary | ICD-10-CM

## 2021-12-09 DIAGNOSIS — Z13.5 GLAUCOMA SCREENING: ICD-10-CM

## 2021-12-09 PROCEDURE — 2023F PR DILATED RETINAL EXAM W/O EVID OF RETINOPATHY: ICD-10-PCS | Mod: CPTII,S$GLB,, | Performed by: OPTOMETRIST

## 2021-12-09 PROCEDURE — 3066F PR DOCUMENTATION OF TREATMENT FOR NEPHROPATHY: ICD-10-PCS | Mod: CPTII,S$GLB,, | Performed by: OPTOMETRIST

## 2021-12-09 PROCEDURE — 3066F NEPHROPATHY DOC TX: CPT | Mod: CPTII,S$GLB,, | Performed by: OPTOMETRIST

## 2021-12-09 PROCEDURE — 3061F NEG MICROALBUMINURIA REV: CPT | Mod: CPTII,S$GLB,, | Performed by: OPTOMETRIST

## 2021-12-09 PROCEDURE — 3061F PR NEG MICROALBUMINURIA RESULT DOCUMENTED/REVIEW: ICD-10-PCS | Mod: CPTII,S$GLB,, | Performed by: OPTOMETRIST

## 2021-12-09 PROCEDURE — 92014 COMPRE OPH EXAM EST PT 1/>: CPT | Mod: S$GLB,,, | Performed by: OPTOMETRIST

## 2021-12-09 PROCEDURE — 99999 PR PBB SHADOW E&M-EST. PATIENT-LVL III: ICD-10-PCS | Mod: PBBFAC,,, | Performed by: OPTOMETRIST

## 2021-12-09 PROCEDURE — 92015 DETERMINE REFRACTIVE STATE: CPT | Mod: S$GLB,,, | Performed by: OPTOMETRIST

## 2021-12-09 PROCEDURE — 92015 PR REFRACTION: ICD-10-PCS | Mod: S$GLB,,, | Performed by: OPTOMETRIST

## 2021-12-09 PROCEDURE — 99999 PR PBB SHADOW E&M-EST. PATIENT-LVL III: CPT | Mod: PBBFAC,,, | Performed by: OPTOMETRIST

## 2021-12-09 PROCEDURE — 2023F DILAT RTA XM W/O RTNOPTHY: CPT | Mod: CPTII,S$GLB,, | Performed by: OPTOMETRIST

## 2021-12-09 PROCEDURE — 92014 PR EYE EXAM, EST PATIENT,COMPREHESV: ICD-10-PCS | Mod: S$GLB,,, | Performed by: OPTOMETRIST

## 2022-01-10 ENCOUNTER — PATIENT MESSAGE (OUTPATIENT)
Dept: ENDOCRINOLOGY | Facility: CLINIC | Age: 52
End: 2022-01-10
Payer: COMMERCIAL

## 2022-01-10 DIAGNOSIS — E11.9 TYPE 2 DIABETES MELLITUS WITHOUT COMPLICATION, UNSPECIFIED WHETHER LONG TERM INSULIN USE: Primary | ICD-10-CM

## 2022-01-10 RX ORDER — FLASH GLUCOSE SCANNING READER
EACH MISCELLANEOUS
Qty: 1 EACH | Refills: 0 | Status: SHIPPED | OUTPATIENT
Start: 2022-01-10

## 2022-01-16 ENCOUNTER — PATIENT MESSAGE (OUTPATIENT)
Dept: FAMILY MEDICINE | Facility: CLINIC | Age: 52
End: 2022-01-16
Payer: COMMERCIAL

## 2022-01-16 DIAGNOSIS — Z12.11 SCREENING FOR COLON CANCER: Primary | ICD-10-CM

## 2022-01-19 ENCOUNTER — TELEPHONE (OUTPATIENT)
Dept: GASTROENTEROLOGY | Facility: CLINIC | Age: 52
End: 2022-01-19
Payer: COMMERCIAL

## 2022-01-19 ENCOUNTER — PATIENT MESSAGE (OUTPATIENT)
Dept: GASTROENTEROLOGY | Facility: CLINIC | Age: 52
End: 2022-01-19
Payer: COMMERCIAL

## 2022-01-24 ENCOUNTER — PATIENT MESSAGE (OUTPATIENT)
Dept: GASTROENTEROLOGY | Facility: CLINIC | Age: 52
End: 2022-01-24
Payer: COMMERCIAL

## 2022-01-24 DIAGNOSIS — Z01.818 PRE-OP TESTING: ICD-10-CM

## 2022-01-24 NOTE — TELEPHONE ENCOUNTER
Pt scheduled for scope. Instructions mailed to home and sent via [employer]. Pt verbalized understanding of preop COVID test requirement.

## 2022-01-28 ENCOUNTER — NUTRITION (OUTPATIENT)
Dept: DIABETES | Facility: CLINIC | Age: 52
End: 2022-01-28
Payer: COMMERCIAL

## 2022-01-28 VITALS — BODY MASS INDEX: 34.6 KG/M2 | HEIGHT: 67 IN | WEIGHT: 220.44 LBS

## 2022-01-28 DIAGNOSIS — E11.9 TYPE 2 DIABETES MELLITUS WITHOUT RETINOPATHY: Primary | ICD-10-CM

## 2022-01-28 PROCEDURE — G0108 PR DIAB MANAGE TRN  PER INDIV: ICD-10-PCS | Mod: S$GLB,,, | Performed by: DIETITIAN, REGISTERED

## 2022-01-28 PROCEDURE — 99999 PR PBB SHADOW E&M-EST. PATIENT-LVL II: CPT | Mod: PBBFAC,,, | Performed by: DIETITIAN, REGISTERED

## 2022-01-28 PROCEDURE — 99999 PR PBB SHADOW E&M-EST. PATIENT-LVL II: ICD-10-PCS | Mod: PBBFAC,,, | Performed by: DIETITIAN, REGISTERED

## 2022-01-28 PROCEDURE — G0108 DIAB MANAGE TRN  PER INDIV: HCPCS | Mod: S$GLB,,, | Performed by: DIETITIAN, REGISTERED

## 2022-01-28 NOTE — PROGRESS NOTES
Diabetes Care Specialist Progress Note  Author: Leslie Ford RD, CDE  Date: 1/28/2022    Program Intake  Reason for Diabetes Program Visit:: Initial Diabetes Assessment (Patient seen every 6 months--initial assessment done annually)  Current diabetes risk level:: low  In the last 12 months, have you:: none  Permission to speak with others about care:: no    Lab Results   Component Value Date    HGBA1C 6.7 (H) 10/11/2021     Clinical    Patient Health Rating  Compared to other people your age, how would you rate your health?: Excellent    Problem Review  Reviewed Problem List with Patient: yes  Active comorbidities affecting diabetes self-care.: no  Reviewed health maintenance: yes    Clinical Assessment  Current Diabetes Treatment: Oral Medication (Metformin 1000 mg BID, Jardiance 25 mg once daily)  Have you ever experienced hypoglycemia (low blood sugar)?: yes  In the last month, how often have you experienced low blood sugar?: more than once a week (Has been happening recently when following no carb diet)  Are you able to tell when your blood sugar is low?: Yes  What symptoms do you experience?: Sweaty,Other (Has been happening when sleeping and makes him restless)  Have you ever been hospitalized because your blood sugar was too low?: no  How do you treat hypoglycemia (low blood sugar)?: other (Has not been treating and he scans Amena 2 CGMS more frequently and sees glucose rise on its own.)  Have you ever experienced hyperglycemia (high blood sugar)?: no    Medication Information  How do you obtain your medications?: Patient drives  How many days a week do you miss your medications?: Never  Do you use a pill box or medication chart to help you manage your medications?: No  Do you sometimes have difficulty refilling your medications?: No  Medication adherence impacting ability to self-manage diabetes?: No    Labs  Do you have regular lab work to monitor your medications?: Yes  Type of Regular Lab Work:  A1c,Cholesterol,CBC,BMP  Where do you get your labs drawn?: [facility]  Lab Compliance Barriers: No    Nutritional Status  Diet: Other diet (Currently following a low/no carb diet and also doing intermittent fasting.  Feels more energetic now.)  Change in appetite?: No  Dentation:: Intact  Recent Changes in Weight: No Recent Weight Change (Weight stable over past 3 months.  Has intentionally lost 20+ lbs over past 4 years.  Goal to lose 5-10 lbs in next 3-6 months.  Long term goal to be 200-210 lbs (current weight 220 lbs).)  Current nutritional status an area of need that is impacting patient's ability to self-manage diabetes?: No    Additional Social History    Support  Does anyone support you with your diabetes care?: yes  Who supports you?: self  Who takes you to your medical appointments?: self  Does the current support meet the patient's needs?: Yes  Is Support an area impacting ability to self-manage diabetes?: No    Access to Mass Media & Technology  Does the patient have access to any of the following devices or technologies?: Smart phone,Internet Access  Media or technology needs impacting ability to self-manage diabetes?: No    Cognitive/Behavioral Health  Alert and Oriented: Yes  Difficulty Thinking: No  Requires Prompting: No  Requires assistance for routine expression?: No  Cognitive or behavioral barriers impacting ability to self-manage diabetes?: No    Culture/Mandaen  Culture or Synagogue beliefs that may impact ability to access healthcare: No    Communication  Language preference: English  Hearing Problems: No  Vision Problems: No  Communication needs impacting ability to self-manage diabetes?: No    Health Literacy  Preferred Learning Method: Face to Face,Web Based,Hands On  How often do you need to have someone help you read instructions, pamphlets, or written material from your doctor or pharmacy?: Never  Health literacy needs impacting ability to self-manage diabetes?: No    Diabetes  Self-Management Skills Assessment    Diabetes Disease Process/Treatment Options  Patient/caregiver able to state what happens when someone has diabetes.: yes  Patient/caregiver knows what type of diabetes they have.: yes  Diabetes Type : Type II  Patient/caregiver able to identify at least three signs and symptoms of diabetes.: yes  Identified signs and symptoms:: frequent urination,increased thirst  Patient able to identify at least three risk factors for diabetes.: yes  Identified risk factors:: age over 40  Diabetes Disease Process/Treatment Options: Skills Assessment Completed: Yes  Assessment indicates:: Adequate understanding  Area of need?: No    Nutrition/Healthy Eating  Challenges to healthy eating:: other (see comments) (Patient overall making good choices at meals--currently following a low/no carb diet and cincerns intake of animal protein increased (high saturated fat).  Also, recent nocturnal hypoglycemia noted on personal CGMS--will recommend adding small amount of)  Method of carbohydrate measurement:: carb counting/reading labels,measuring cups/spoons  Patient can identify foods that impact blood sugar.: yes  Patient-identified foods:: starches (bread, pasta, rice, cereal),starchy vegetables (corn, peas, beans),milk,sweets,fruit/fruit juice  Nutrition/Healthy Eating Skills Assessment Completed:: Yes  Assessment indicates:: Instruction Needed,Adequate understanding  Area of need?: Yes    Physical Activity/Exercise  Patient's daily activity level:: moderately active  Patient formally exercises outside of work.: yes  Exercise Type: weight training,walking,other (see comments) (martial arts)  Intensity: High  Frequency: four or more times a week  Duration: > 1 hour  Patient can identify forms of physical activity.: yes  Stated forms of physical activity:: yardwork,recreational activities,any movement performed by muscles that uses energy  Patient can identify reasons why exercise/physical activity is  important in diabetes management.: yes  Identified reasons:: lowers blood glucose, blood pressure, and cholesterol  Physical Activity/Exercise Skills Assessment Completed: : Yes  Assessment indicates:: Adequate understanding,Instruction Needed  Area of need?: Yes    Medications  Patient is able to describe current diabetes management routine.: yes  Diabetes management routine:: diet,oral medications (metformin, Jardiance)  Patient is able to identify current diabetes medications, dosages, and appropriate timing of medications.: yes  Patient understands the purpose of the medications taken for diabetes.: yes  Patient reports problems or concerns with current medication regimen.: no  Medication Skills Assessment Completed:: Yes  Assessment indicates:: Adequate understanding  Area of need?: No    Home Blood Glucose Monitoring  Patient states that blood sugar is checked at home daily.: yes  Monitoring Method:: personal continuous glucose monitor  Personal CGM type:: Amena 2 CGMS--wears 2 weeks on and 2 weeks off  Patient is able to use personal CGM appropriately.: yes  CGM Report reviewed?: yes    [employer] AMENA 2 DOWNLOAD (1/15/22  To 1/28/22): See media file for details. Glucose levels are in target range majority of the time.  Recent frequent episodes of nocturnal hypoglycemia noted in report as patient has been following a low/no carb meal plan.    Sensor usage: 93%  Average glucose: 100 mg/dL    0% CGM readings greater than 250 mg/dL  0% CGM readings between 181-250 mg/dL  95% CGM readings in target glucose range of  mg/dL   5% CGM readings between 54-79%  0% CGM readings less than 54 mg/dL    Home Blood Glucose Monitoring Skills Assessment Completed: : Yes  Assessment indicates:: Adequate understanding  Area of need?: No    Acute Complications  Patient is able to identify types of acute complications: Yes  Patient Identified:: Hypoglycemia  Patient is able to state the basic meaning of hypoglycemia?:  Yes  Able to state the blood sugar range for hypoglycemia?: yes  Patient stated range:: Glucose < 70 mg/dL  Patient can identify general symptoms of hypoglycemia: yes  Patient identified:: sweating,shakiness  Able to state proper treatment of hypoglycemia?: yes  Patient identified:: 1 tablespoon sugar/honey  Acute Complications Skills Assessment Completed: : Yes  Assessment indicates:: Adequate understanding  Area of need?: No    Chronic Complications  Patient can identify major chronic complications of diabetes.: yes  Stated chronic complications:: heart disease/heart attack  Patient can identify ways to prevent or delay diabetes complications.: yes  Stated ways to prevent complications:: controlling cholesterol and triglycerides,controlling blood sugar  Patient is aware that having diabetes increases risk of heart disease?: Yes  Patient is aware that heart disease is the leading cause of death and disability in people with diabetes?: Yes  Patient able to state risk factors for heart disease?: Yes  Patient stated risk factors for heart disease:: High cholesterol  Patient is taking statin?: Yes  Chronic Complications Skills Assessment Completed: : Yes  Assessment indicates:: Adequate understanding  Area of need?: No    Psychosocial/Coping  Patient can identify ways of coping with chronic disease.: yes  Patient-stated ways of coping with chronic disease:: support from loved ones  Psychosocial/Coping Skills Assessment Completed: : Yes  Assessment indicates:: Adequate understanding  Area of need?: No    Assessment Summary and Plan    Based on today's diabetes care assessment, the following areas of need were identified:      Social 1/28/2022   Support No   Access to Mass Media/Tech No   Cognitive/Behavioral Health No   Culture/Mormon No   Communication No   Health Literacy No      Clinical 1/28/2022   Medication Adherence No   Lab Compliance No   Nutritional Status No      Diabetes Self-Management Skills 1/28/2022    Diabetes Disease Process/Treatment Options No   Nutrition/Healthy Eating Yes--see care plan.     Physical Activity/Exercise Yes--see care plan. Will add 30 minutes of walking on the weekends to his current exercise routine.    Medication No   Home Blood Glucose Monitoring No   Acute Complications No   Chronic Complications No   Psychosocial/Coping No      Today's interventions were provided through individual discussion, instruction, and written materials were provided.      Patient verbalized understanding of instruction and written materials.  Pt was able to return back demonstration of instructions today. Patient understood key points, needs reinforcement and further instruction.     Diabetes Self-Management Care Plan:    Today's Diabetes Self-Management Care Plan was developed with Abhi's input. Abhi has agreed to work toward the following goal(s) to improve his/her overall diabetes control.      Care Plan: Diabetes Management   Updates made since 12/29/2021 12:00 AM      Problem: Healthy Eating       Goal: Patient to work on getting 20-30 grams of total carbs at meals incorporated with protein and non-starchy vegetables.    Start Date: 1/28/2022   Expected End Date: 7/22/2022   Priority: Medium   Barriers: No Barriers Identified      Task: Reviewed the sources and role of Carbohydrate, Protein, and Fat and how each nutrient impacts blood sugar. Completed 1/28/2022      Task: Review the importance of balancing carbohydrates with each meal using portion control techniques to count servings of carbohydrate and label reading to identify serving size and amount of total carbs per serving. Completed 1/28/2022      Problem: Physical Activity and Exercise       Goal: Patient to add 30 minutes of aerobic exercise on the weekends and continue weekly exercise routine (weight training, walking, martial arts).    Start Date: 1/28/2022   Expected End Date: 7/22/2022   Priority: High   Barriers: No Barriers  Identified      Task: Discussed role of physical activity as it relates to weight loss Completed 1/28/2022      Task: Offered suggestions on how patient could increase their regular physical activity Completed 1/28/2022        Follow Up Plan     Follow up in 25 weeks (on 7/22/2022) for Continued DSMES--patient is seen every 6 months in between Endocrine visits. Next Hgb A1c 4/18/22.  Goals for next visit set at adding 20-30 grams of total carbs at each meal (currently following low/no carbs).  Also, will add 30 minutes aerobic exercise on the weekends in hopes to lose 5-10 lbs prior to next visit (weight of 210-215 lbs).  Patient with recent nocturnal hypoglycemia when following no/low carb diet.  Discussed importance of avoiding hypoglycemia.      Today's care plan and follow up schedule was discussed with patient.  Abhi verbalized understanding of the care plan, goals, and agrees to follow up plan.        The patient was encouraged to communicate with his/her health care provider/physician and care team regarding his/her condition(s) and treatment.  I provided the patient with my contact information today and encouraged to contact me via phone or [facility]'s Patient Portal as needed.     Length of Visit   Total Time: 45 Minutes

## 2022-02-13 ENCOUNTER — PATIENT MESSAGE (OUTPATIENT)
Dept: ENDOCRINOLOGY | Facility: CLINIC | Age: 52
End: 2022-02-13
Payer: COMMERCIAL

## 2022-03-22 ENCOUNTER — PATIENT MESSAGE (OUTPATIENT)
Dept: DIABETES | Facility: CLINIC | Age: 52
End: 2022-03-22
Payer: COMMERCIAL

## 2022-03-27 ENCOUNTER — TELEPHONE (OUTPATIENT)
Dept: GASTROENTEROLOGY | Facility: CLINIC | Age: 52
End: 2022-03-27
Payer: COMMERCIAL

## 2022-03-27 DIAGNOSIS — K21.00 GASTROESOPHAGEAL REFLUX DISEASE WITH ESOPHAGITIS: ICD-10-CM

## 2022-03-27 DIAGNOSIS — Z51.81 ENCOUNTER FOR MONITORING LONG-TERM PROTON PUMP INHIBITOR THERAPY: ICD-10-CM

## 2022-03-27 DIAGNOSIS — Z79.899 ENCOUNTER FOR MONITORING LONG-TERM PROTON PUMP INHIBITOR THERAPY: ICD-10-CM

## 2022-03-28 RX ORDER — FAMOTIDINE 40 MG/1
TABLET, FILM COATED ORAL
Qty: 90 TABLET | Refills: 0 | Status: SHIPPED | OUTPATIENT
Start: 2022-03-28 | End: 2022-05-06 | Stop reason: SDUPTHER

## 2022-03-28 NOTE — TELEPHONE ENCOUNTER
Please inform the patient that I refilled the prescription for pepcid and patient is due for a follow-up visit (last seen a year ago) for continued evaluation and management.  Thanks  RAD

## 2022-04-04 ENCOUNTER — PATIENT MESSAGE (OUTPATIENT)
Dept: GASTROENTEROLOGY | Facility: CLINIC | Age: 52
End: 2022-04-04
Payer: COMMERCIAL

## 2022-04-11 ENCOUNTER — TELEPHONE (OUTPATIENT)
Dept: GASTROENTEROLOGY | Facility: CLINIC | Age: 52
End: 2022-04-11
Payer: COMMERCIAL

## 2022-04-18 ENCOUNTER — LAB VISIT (OUTPATIENT)
Dept: LAB | Facility: HOSPITAL | Age: 52
End: 2022-04-18
Attending: INTERNAL MEDICINE
Payer: COMMERCIAL

## 2022-04-18 DIAGNOSIS — E78.5 HYPERLIPIDEMIA ASSOCIATED WITH TYPE 2 DIABETES MELLITUS: ICD-10-CM

## 2022-04-18 DIAGNOSIS — E11.9 TYPE 2 DIABETES MELLITUS WITHOUT COMPLICATION, UNSPECIFIED WHETHER LONG TERM INSULIN USE: ICD-10-CM

## 2022-04-18 DIAGNOSIS — E29.1 MALE HYPOGONADISM: ICD-10-CM

## 2022-04-18 DIAGNOSIS — E11.69 HYPERLIPIDEMIA ASSOCIATED WITH TYPE 2 DIABETES MELLITUS: ICD-10-CM

## 2022-04-18 LAB
ALBUMIN SERPL BCP-MCNC: 4 G/DL (ref 3.5–5.2)
ALP SERPL-CCNC: 62 U/L (ref 55–135)
ALT SERPL W/O P-5'-P-CCNC: 30 U/L (ref 10–44)
ANION GAP SERPL CALC-SCNC: 8 MMOL/L (ref 8–16)
AST SERPL-CCNC: 24 U/L (ref 10–40)
BILIRUB SERPL-MCNC: 0.7 MG/DL (ref 0.1–1)
BUN SERPL-MCNC: 20 MG/DL (ref 6–20)
CALCIUM SERPL-MCNC: 9.3 MG/DL (ref 8.7–10.5)
CHLORIDE SERPL-SCNC: 103 MMOL/L (ref 95–110)
CHOLEST SERPL-MCNC: 126 MG/DL (ref 120–199)
CHOLEST/HDLC SERPL: 4.1 {RATIO} (ref 2–5)
CO2 SERPL-SCNC: 25 MMOL/L (ref 23–29)
COMPLEXED PSA SERPL-MCNC: 0.95 NG/ML (ref 0–4)
CREAT SERPL-MCNC: 0.8 MG/DL (ref 0.5–1.4)
ERYTHROCYTE [DISTWIDTH] IN BLOOD BY AUTOMATED COUNT: 12.6 % (ref 11.5–14.5)
EST. GFR  (AFRICAN AMERICAN): >60 ML/MIN/1.73 M^2
EST. GFR  (NON AFRICAN AMERICAN): >60 ML/MIN/1.73 M^2
ESTIMATED AVG GLUCOSE: 134 MG/DL (ref 68–131)
GLUCOSE SERPL-MCNC: 126 MG/DL (ref 70–110)
HBA1C MFR BLD: 6.3 % (ref 4–5.6)
HCT VFR BLD AUTO: 52.7 % (ref 40–54)
HDLC SERPL-MCNC: 31 MG/DL (ref 40–75)
HDLC SERPL: 24.6 % (ref 20–50)
HGB BLD-MCNC: 18 G/DL (ref 14–18)
LDLC SERPL CALC-MCNC: 58.8 MG/DL (ref 63–159)
MCH RBC QN AUTO: 31 PG (ref 27–31)
MCHC RBC AUTO-ENTMCNC: 34.2 G/DL (ref 32–36)
MCV RBC AUTO: 91 FL (ref 82–98)
NONHDLC SERPL-MCNC: 95 MG/DL
PLATELET # BLD AUTO: 295 K/UL (ref 150–450)
PMV BLD AUTO: 10.6 FL (ref 9.2–12.9)
POTASSIUM SERPL-SCNC: 4.2 MMOL/L (ref 3.5–5.1)
PROT SERPL-MCNC: 6.7 G/DL (ref 6–8.4)
RBC # BLD AUTO: 5.81 M/UL (ref 4.6–6.2)
SODIUM SERPL-SCNC: 136 MMOL/L (ref 136–145)
TESTOST SERPL-MCNC: 783 NG/DL (ref 304–1227)
TRIGL SERPL-MCNC: 181 MG/DL (ref 30–150)
WBC # BLD AUTO: 6.59 K/UL (ref 3.9–12.7)

## 2022-04-18 PROCEDURE — 80061 LIPID PANEL: CPT | Performed by: INTERNAL MEDICINE

## 2022-04-18 PROCEDURE — 80053 COMPREHEN METABOLIC PANEL: CPT | Performed by: INTERNAL MEDICINE

## 2022-04-18 PROCEDURE — 85027 COMPLETE CBC AUTOMATED: CPT | Performed by: INTERNAL MEDICINE

## 2022-04-18 PROCEDURE — 84403 ASSAY OF TOTAL TESTOSTERONE: CPT | Performed by: INTERNAL MEDICINE

## 2022-04-18 PROCEDURE — 83036 HEMOGLOBIN GLYCOSYLATED A1C: CPT | Performed by: INTERNAL MEDICINE

## 2022-04-18 PROCEDURE — 84153 ASSAY OF PSA TOTAL: CPT | Performed by: INTERNAL MEDICINE

## 2022-04-18 PROCEDURE — 36415 COLL VENOUS BLD VENIPUNCTURE: CPT | Mod: PO | Performed by: INTERNAL MEDICINE

## 2022-04-18 NOTE — H&P
History & Physical - Short Stay  Gastroenterology      SUBJECTIVE:     Procedure: Colonoscopy    Chief Complaint/Indication for Procedure: Screening    History of Present Illness:  Asymptomatic    January 16, 2022  Gennaro Pompa  to Rafita Amezcua DO  7:14 AM    Dr. Amezcua,  I'm turning 52 this year. What exams do I need outside of what Dr. Richey is conducting form diabetes management? I just had my annual eye exam last month and I have a appointment with Jenn Ford (dietician) in a couple weeks.   Thanks,  Gennaro      See most recent in person OV:  Office Visit   10/18/2021  [place] - Endocrinology     Kameron Richey DO    Endocrinology  Type 2 diabetes mellitus without complication, unspecified whether long term insulin use +3 more    Dx     Progress Notes  Kameron Richey DO (Physician)   Endocrinology   (Freestyle Amena)     CHIEF COMPLAINT: DM2  50 y.o.  being seen as a f/u. Diagnosed with diabetes 2013. Off actos. On metformin XR 1000 BID and jardiance. On testosterone 50 mg weekly. Tolerating statin.No paresthesias. Taking fish oil. He is watching portion sizes. Not always doing plant based diet. Still exercising. Has not always been wearing CPAP. States not snoring much. See DM education. Got 2/2 moderna vaccine.      PAST MEDICAL HISTORY/PAST SURGICAL HISTORY:  Reviewed in [facility]     SOCIAL HISTORY: No tobacco. Social alcohol. Desk Job-      FAMILY HISTORY:  + DM 2. No known thyroid disease.      MEDICATIONS/ALLERGIES: The patient's MedCard has been updated and reviewed.      ROS:   Constitutional: weight increased  Cardiovascular: No CP  Respiratory: No SOB.   Remainder ROS negative    ASSESSMENT/PLAN:  1. DM 2- No known complications. Has seen DM Education.  Continue current Tx. Received COVID vaccine. Continue diet as well as exercise.      2. Hyperlipidemia- Continue statin. Tolerating well. Taking fish oil as Trigs elevated. Doing more of a portion control diet.      3. Male Hypogonadism- Taking  "testosterone 50 mg weekly. Hg/HCT WNL. Symptomatically doing well. Had questions about clomid, free testosterone, estrogen that were answered.      4. MELLY- see # 3. Discussed wearing CPAP     FOLLOWUP  F/U 6 months with Fasting CMP, FLP, CBC, A1c, testosterone, Urine micro, PSA                 PTA Medications   Medication Sig    blood sugar diagnostic Strp 1 each by Misc.(Non-Drug; Combo Route) route 2 (two) times daily. Strips for One Touch Verio Flex Meter    blood sugar diagnostic Strp TEST EVERY DAY    blood-glucose meter kit     blunt needle, disposable 18 x 1 1/2 " Ndle Use as directed with testosterone injections    creatine monohydrate (CREATINE ORAL) Take by mouth.    famotidine (PEPCID) 40 MG tablet TAKE 1 TABLET BY MOUTH EVERY DAY    fish oil-omega-3 fatty acids 300-1,000 mg capsule Take 1 capsule by mouth once daily.    fish,saf,flx,brg oils/o3,6,9n2 (FISH-FLAX-BORAGE OIL ORAL) Take by mouth.    flash glucose scanning reader (FREESTYLE YAN 2 READER) Misc Use as directed to check blood glucose.    FLAXSEED OIL ORAL Take 1 capsule by mouth once daily.    FREESTYLE YAN 14 DAY SENSOR Kit CHANGE SENSOR EVERY 14 DAYS    JARDIANCE 25 mg tablet TAKE 1 TABLET BY MOUTH EVERY DAY    melatonin 10 mg Tab Take 10 mg by mouth nightly.    metFORMIN (GLUCOPHAGE-XR) 500 MG ER 24hr tablet TAKE 2 TABLETS BY MOUTH 2 TIMES DAILY WITH MEALS.    minoxidil (ROGAINE) 2 % external solution Apply 1 application topically 2 (two) times daily.    omeprazole (PRILOSEC) 40 MG capsule Take 1 capsule (40 mg total) by mouth once daily.    sildenafil (REVATIO) 20 mg Tab TAKE UP TO 5 TABLETS BY MOUTH DAILY AS NEEDED 30 MINUTES PRIOR TO INTERCOURSE **MD DENIED, PT CONTACT MD 11/13**    simvastatin (ZOCOR) 10 MG tablet TAKE 1 TABLET BY MOUTH EVERY DAY IN THE EVENING    syringe with needle 1 mL 22 gauge x 1 1/2" Syrg Use as directed to administer testosterone injections.    testosterone cypionate (DEPOTESTOTERONE " CYPIONATE) 100 mg/mL injection INJECT 0.5ML INTO THE MUSCLE ONCE A WEEK    TURMERIC ORAL Take by mouth.    UNABLE TO FIND Amitriptyline 1%/Meloxicam 0.09%/Lidocaine Hcl 2%/ Prilocaine 2%   APPLY 2 GRAMS QID    UNABLE TO FIND Beet root    VITAMIN B COMPLEX ORAL Take 1 tablet by mouth once daily.    cholestyramine (QUESTRAN) 4 gram packet Take 1 packet (4 g total) by mouth once daily. (Patient taking differently: Take 4 g by mouth daily as needed.)    ONETOUCH DELICA LANCETS 33 gauge Misc TEST EVERY DAY    tadalafiL (CIALIS) 10 MG tablet Take 1 tablet (10 mg total) by mouth daily as needed for Erectile Dysfunction.       Review of patient's allergies indicates:   Allergen Reactions    Poison ivy extract         Past Medical History:   Diagnosis Date    Achalasia     Arthritis     right ankle    Cataract     Diabetes mellitus     taking lisinopril preventive to protect the kidneys, no HTN    Dyslipidemia     pt denies    Dysphagia     Former tobacco use     chew    GERD (gastroesophageal reflux disease)     H. pylori infection     Hypogonadism in male     MELLY (obstructive sleep apnea)     uses cpap     Past Surgical History:   Procedure Laterality Date    ANKLE SURGERY Right     HAND SURGERY Right     laparoscopic Heller myotomy      MYOTOMY  07/2016    heller- achalasia surgery    TONSILLECTOMY      UPPER GASTROINTESTINAL ENDOSCOPY  12/11/2015    WISDOM TOOTH EXTRACTION       Family History   Problem Relation Age of Onset    Diabetes Mother     Allergies Mother     Lupus Mother     Diabetes Maternal Aunt     Allergic rhinitis Sister     Angioedema Neg Hx     Asthma Neg Hx     Eczema Neg Hx     Immunodeficiency Neg Hx     Urticaria Neg Hx     Amblyopia Neg Hx     Blindness Neg Hx     Cancer Neg Hx     Cataracts Neg Hx     Glaucoma Neg Hx     Hypertension Neg Hx     Macular degeneration Neg Hx     Retinal detachment Neg Hx     Strabismus Neg Hx     Stroke Neg Hx     Thyroid  "disease Neg Hx     Celiac disease Neg Hx     Cirrhosis Neg Hx     Colon cancer Neg Hx     Colon polyps Neg Hx     Crohn's disease Neg Hx     Cystic fibrosis Neg Hx     Esophageal cancer Neg Hx     Hemochromatosis Neg Hx     Inflammatory bowel disease Neg Hx     Irritable bowel syndrome Neg Hx     Liver cancer Neg Hx     Liver disease Neg Hx     Rectal cancer Neg Hx     Stomach cancer Neg Hx     Ulcerative colitis Neg Hx     Harvey's disease Neg Hx     Lymphoma Neg Hx     Tuberculosis Neg Hx     Scleroderma Neg Hx     Rheum arthritis Neg Hx     Multiple sclerosis Neg Hx     Melanoma Neg Hx     Psoriasis Neg Hx     Skin cancer Neg Hx      Social History     Tobacco Use    Smoking status: Never Smoker    Smokeless tobacco: Former User     Types: Chew     Quit date: 10/19/2012   Substance Use Topics    Alcohol use: Yes     Alcohol/week: 7.0 standard drinks     Types: 7 Glasses of wine per week     Comment: 1 glass of red wine every night    Drug use: No         OBJECTIVE:     Vital Signs (Most Recent)  Temp: 98 °F (36.7 °C) (04/19/22 0800)  Pulse: 79 (04/19/22 0800)  Resp: 16 (04/19/22 0800)  BP: 111/73 (04/19/22 0801)  SpO2: 98 % (04/19/22 0800)    Physical Exam:  : Ht: 5' 7" (170.2 cm)   Wt: 99.8 kg (220 lb)   BMI: 34.46 kg/m²                                                         GENERAL:  Comfortable, in no acute distress.                                 HEENT EXAM:  Nonicteric.  No adenopathy.  Oropharynx is clear.               NECK:  Supple.                                                               LUNGS:  Clear.                                                               CARDIAC:  Regular rate and rhythm.  S1, S2.  No murmur.                      ABDOMEN:  Obese.  Soft, positive bowel sounds, nontender.  No hepatosplenomegaly or masses.  No rebound or guarding.                                             EXTREMITIES:  No edema.     MENTAL STATUS:  Alert and " oriented.    ASSESSMENT/PLAN:     Assessment: Colorectal cancer screening    Plan: Colonoscopy    Anesthesia Plan:   MAC / General Anaesthesia    ASA Grade: ASA 2 - Patient with mild systemic disease with no functional limitations    MALLAMPATI SCORE: II (hard and soft palate, upper portion of tonsils anduvula visible)

## 2022-04-19 ENCOUNTER — HOSPITAL ENCOUNTER (OUTPATIENT)
Facility: HOSPITAL | Age: 52
Discharge: HOME | End: 2022-04-19
Attending: INTERNAL MEDICINE | Admitting: INTERNAL MEDICINE
Payer: COMMERCIAL

## 2022-04-19 ENCOUNTER — ANESTHESIA EVENT (OUTPATIENT)
Dept: ENDOSCOPY | Facility: HOSPITAL | Age: 52
End: 2022-04-19
Payer: COMMERCIAL

## 2022-04-19 ENCOUNTER — ANESTHESIA (OUTPATIENT)
Dept: ENDOSCOPY | Facility: HOSPITAL | Age: 52
End: 2022-04-19
Payer: COMMERCIAL

## 2022-04-19 VITALS
HEART RATE: 69 BPM | TEMPERATURE: 98 F | BODY MASS INDEX: 33.74 KG/M2 | DIASTOLIC BLOOD PRESSURE: 65 MMHG | WEIGHT: 215 LBS | OXYGEN SATURATION: 97 % | RESPIRATION RATE: 15 BRPM | HEIGHT: 67 IN | SYSTOLIC BLOOD PRESSURE: 101 MMHG

## 2022-04-19 DIAGNOSIS — Z12.11 COLON CANCER SCREENING: ICD-10-CM

## 2022-04-19 PROBLEM — Z86.010 PERSONAL HISTORY OF COLONIC POLYPS: Status: ACTIVE | Noted: 2022-04-19

## 2022-04-19 PROBLEM — Z86.0100 PERSONAL HISTORY OF COLONIC POLYPS: Status: ACTIVE | Noted: 2022-04-19

## 2022-04-19 LAB — GLUCOSE SERPL-MCNC: 95 MG/DL (ref 70–110)

## 2022-04-19 PROCEDURE — 88305 TISSUE EXAM BY PATHOLOGIST: ICD-10-PCS | Mod: 26,,, | Performed by: PATHOLOGY

## 2022-04-19 PROCEDURE — 88364 CHG INSITU HYBRIDIZATION (FISH: ICD-10-PCS | Mod: 26,,, | Performed by: PATHOLOGY

## 2022-04-19 PROCEDURE — 88365 INSITU HYBRIDIZATION (FISH): CPT | Mod: 26,,, | Performed by: PATHOLOGY

## 2022-04-19 PROCEDURE — 88341 PR IHC OR ICC EACH ADD'L SINGLE ANTIBODY  STAINPR: ICD-10-PCS | Mod: 26,,, | Performed by: PATHOLOGY

## 2022-04-19 PROCEDURE — 45385 PR COLONOSCOPY,REMV LESN,SNARE: ICD-10-PCS | Mod: 33,,, | Performed by: INTERNAL MEDICINE

## 2022-04-19 PROCEDURE — 63600175 PHARM REV CODE 636 W HCPCS: Mod: PO | Performed by: NURSE ANESTHETIST, CERTIFIED REGISTERED

## 2022-04-19 PROCEDURE — 88365 INSITU HYBRIDIZATION (FISH): CPT | Performed by: PATHOLOGY

## 2022-04-19 PROCEDURE — 82962 GLUCOSE BLOOD TEST: CPT | Mod: PO | Performed by: INTERNAL MEDICINE

## 2022-04-19 PROCEDURE — D9220A PRA ANESTHESIA: Mod: 33,CRNA,, | Performed by: NURSE ANESTHETIST, CERTIFIED REGISTERED

## 2022-04-19 PROCEDURE — D9220A PRA ANESTHESIA: ICD-10-PCS | Mod: 33,ANES,, | Performed by: ANESTHESIOLOGY

## 2022-04-19 PROCEDURE — 88341 IMHCHEM/IMCYTCHM EA ADD ANTB: CPT | Mod: 26,,, | Performed by: PATHOLOGY

## 2022-04-19 PROCEDURE — 81261 IGH GENE REARRANGE AMP METH: CPT | Performed by: PATHOLOGY

## 2022-04-19 PROCEDURE — 88364 INSITU HYBRIDIZATION (FISH): CPT | Performed by: PATHOLOGY

## 2022-04-19 PROCEDURE — 88342 IMHCHEM/IMCYTCHM 1ST ANTB: CPT | Performed by: PATHOLOGY

## 2022-04-19 PROCEDURE — 88342 CHG IMMUNOCYTOCHEMISTRY: ICD-10-PCS | Mod: 26,,, | Performed by: PATHOLOGY

## 2022-04-19 PROCEDURE — 37000009 HC ANESTHESIA EA ADD 15 MINS: Mod: PO | Performed by: INTERNAL MEDICINE

## 2022-04-19 PROCEDURE — 88342 IMHCHEM/IMCYTCHM 1ST ANTB: CPT | Mod: 26,,, | Performed by: PATHOLOGY

## 2022-04-19 PROCEDURE — 63600175 PHARM REV CODE 636 W HCPCS: Mod: PO | Performed by: INTERNAL MEDICINE

## 2022-04-19 PROCEDURE — 81264 IGK REARRANGEABN CLONAL POP: CPT | Performed by: PATHOLOGY

## 2022-04-19 PROCEDURE — 45385 COLONOSCOPY W/LESION REMOVAL: CPT | Mod: 33,,, | Performed by: INTERNAL MEDICINE

## 2022-04-19 PROCEDURE — 88305 TISSUE EXAM BY PATHOLOGIST: CPT | Mod: 26,,, | Performed by: PATHOLOGY

## 2022-04-19 PROCEDURE — 27201089 HC SNARE, DISP (ANY): Mod: PO | Performed by: INTERNAL MEDICINE

## 2022-04-19 PROCEDURE — 88341 IMHCHEM/IMCYTCHM EA ADD ANTB: CPT | Performed by: PATHOLOGY

## 2022-04-19 PROCEDURE — 37000008 HC ANESTHESIA 1ST 15 MINUTES: Mod: PO | Performed by: INTERNAL MEDICINE

## 2022-04-19 PROCEDURE — 25000003 PHARM REV CODE 250: Mod: PO | Performed by: NURSE ANESTHETIST, CERTIFIED REGISTERED

## 2022-04-19 PROCEDURE — 88364 INSITU HYBRIDIZATION (FISH): CPT | Mod: 26,,, | Performed by: PATHOLOGY

## 2022-04-19 PROCEDURE — 88365 PR  TISSUE HYBRIDIZATION: ICD-10-PCS | Mod: 26,,, | Performed by: PATHOLOGY

## 2022-04-19 PROCEDURE — D9220A PRA ANESTHESIA: Mod: 33,ANES,, | Performed by: ANESTHESIOLOGY

## 2022-04-19 PROCEDURE — 88305 TISSUE EXAM BY PATHOLOGIST: CPT | Performed by: PATHOLOGY

## 2022-04-19 PROCEDURE — 45385 COLONOSCOPY W/LESION REMOVAL: CPT | Mod: PT,PO | Performed by: INTERNAL MEDICINE

## 2022-04-19 PROCEDURE — D9220A PRA ANESTHESIA: ICD-10-PCS | Mod: 33,CRNA,, | Performed by: NURSE ANESTHETIST, CERTIFIED REGISTERED

## 2022-04-19 RX ORDER — LIDOCAINE HCL/PF 100 MG/5ML
SYRINGE (ML) INTRAVENOUS
Status: DISCONTINUED | OUTPATIENT
Start: 2022-04-19 | End: 2022-04-19

## 2022-04-19 RX ORDER — PROPOFOL 10 MG/ML
VIAL (ML) INTRAVENOUS
Status: DISCONTINUED | OUTPATIENT
Start: 2022-04-19 | End: 2022-04-19

## 2022-04-19 RX ORDER — SODIUM CHLORIDE 0.9 % (FLUSH) 0.9 %
10 SYRINGE (ML) INJECTION
Status: DISCONTINUED | OUTPATIENT
Start: 2022-04-19 | End: 2022-04-19 | Stop reason: HOSPADM

## 2022-04-19 RX ORDER — SODIUM CHLORIDE, SODIUM LACTATE, POTASSIUM CHLORIDE, CALCIUM CHLORIDE 600; 310; 30; 20 MG/100ML; MG/100ML; MG/100ML; MG/100ML
INJECTION, SOLUTION INTRAVENOUS CONTINUOUS
Status: DISCONTINUED | OUTPATIENT
Start: 2022-04-19 | End: 2022-04-19 | Stop reason: HOSPADM

## 2022-04-19 RX ADMIN — PROPOFOL 30 MG: 10 INJECTION, EMULSION INTRAVENOUS at 08:04

## 2022-04-19 RX ADMIN — LIDOCAINE HYDROCHLORIDE 100 MG: 20 INJECTION, SOLUTION INTRAVENOUS at 08:04

## 2022-04-19 RX ADMIN — PROPOFOL 30 MG: 10 INJECTION, EMULSION INTRAVENOUS at 09:04

## 2022-04-19 RX ADMIN — SODIUM CHLORIDE, SODIUM LACTATE, POTASSIUM CHLORIDE, AND CALCIUM CHLORIDE: .6; .31; .03; .02 INJECTION, SOLUTION INTRAVENOUS at 08:04

## 2022-04-19 NOTE — ANESTHESIA PREPROCEDURE EVALUATION
04/19/2022  Abhi Pompa is a 51 y.o., male.      Pre-op Assessment    I have reviewed the Patient Summary Reports.     I have reviewed the Nursing Notes. I have reviewed the NPO Status.   I have reviewed the Medications.     Review of Systems  Anesthesia Hx:  Denies Family Hx of Anesthesia complications.   Denies Personal Hx of Anesthesia complications.   Pulmonary:   Sleep Apnea, CPAP    Hepatic/GI:   GERD, well controlled    Endocrine:   Diabetes, type 2  Obesity / BMI > 30  Psych:   Psychiatric History          Physical Exam  General: Cooperative, Alert and Oriented    Airway:  Mallampati: IV / III  Mouth Opening: Small, but > 3cm  TM Distance: Normal  Tongue: Normal  Neck ROM: Normal ROM  Neck: Girth Increased    Dental:  Intact    Chest/Lungs:  Normal Respiratory Rate    Heart:  Rate: Normal  Rhythm: Regular Rhythm        Anesthesia Plan  Type of Anesthesia, risks & benefits discussed:    Anesthesia Type: Gen Natural Airway  Intra-op Monitoring Plan: Standard ASA Monitors  Induction:  IV  Informed Consent: Informed consent signed with the Patient and all parties understand the risks and agree with anesthesia plan.  All questions answered.   ASA Score: 3  Day of Surgery Review of History & Physical: H&P Update referred to the surgeon/provider.    Ready For Surgery From Anesthesia Perspective.     .

## 2022-04-19 NOTE — ANESTHESIA POSTPROCEDURE EVALUATION
Anesthesia Post Evaluation    Patient: Abhi Pompa    Procedure(s) Performed: Procedure(s) (LRB):  COLONOSCOPY (N/A)    Final Anesthesia Type: general      Patient location during evaluation: PACU  Patient participation: Yes- Able to Participate  Level of consciousness: awake and alert  Post-procedure vital signs: reviewed and stable  Pain management: adequate  Airway patency: patent    PONV status at discharge: No PONV  Anesthetic complications: no      Cardiovascular status: blood pressure returned to baseline  Respiratory status: unassisted  Hydration status: euvolemic  Follow-up not needed.          Vitals Value Taken Time   /65 04/19/22 0935   Temp 36.4 °C (97.5 °F) 04/19/22 0905   Pulse 69 04/19/22 0935   Resp 15 04/19/22 0935   SpO2 97 % 04/19/22 0935         Event Time   Out of Recovery 09:41:00         Pain/Moi Score: Moi Score: 10 (4/19/2022  9:25 AM)

## 2022-04-19 NOTE — TRANSFER OF CARE
"Anesthesia Transfer of Care Note    Patient: Abhi Pompa    Procedure(s) Performed: Procedure(s) (LRB):  COLONOSCOPY (N/A)    Patient location: PACU    Anesthesia Type: general    Transport from OR: Transported from OR on room air with adequate spontaneous ventilation    Post pain: adequate analgesia    Post assessment: no apparent anesthetic complications and tolerated procedure well    Post vital signs: stable    Level of consciousness: awake and alert    Nausea/Vomiting: no nausea/vomiting    Complications: none    Transfer of care protocol was followed      Last vitals:   Visit Vitals  /73   Pulse 79   Temp 36.7 °C (98 °F) (Skin)   Resp 16   Ht 5' 7" (1.702 m)   Wt 97.5 kg (215 lb)   SpO2 98%   BMI 33.67 kg/m²     "

## 2022-04-19 NOTE — [employer] PATIENT INSTRUCTIONS
Discharge Summary/Instructions for after Colonoscopy with   Biopsy/Polypectomy  Abhi Pompa    Tuesday, April 19, 2022  Julian Dunbar MD  RESTRICTIONS ON ACTIVITY:  - Do not drive a car or operate machinery until the day after the procedure.      - The following day: return to full activity including work.  - For  3 days: No heavy lifting, straining or running.  - Diet: You can have solid foods, but no gassy foods (i.e. beans, broccoli,   cabbage, etc).  TREATMENT FOR COMMON SIDE EFFECTS:  - Mild abdominal pain and bloating or excessive gas: rest, eat lightly and   use a heating pad.  SYMPTOMS TO WATCH FOR AND REPORT TO YOUR PHYSICIAN:  1. Severe abdominal pain.  2. Fever within 24 hours after a procedure.  3. A large amount of rectal bleeding. (A small amount of blood from the   rectum is not serious, especially if hemorrhoids are present.  3.  Because air was put into your colon during the procedure, expelling   large amounts of air from your rectum is normal.  4.  You may not have a bowel movement for 1-3 days because of the   colonoscopy prep.  This is normal.  5.  Call immediately if you notice any of the following:   Chills and/or fever over 101   Persistent vomiting   Severe abdominal pain, other than gas cramps   Severe chest pain   Black, tarry stools   Any bleeding - exceeding one tablespoon  Your doctor recommends these additional instructions:  We are waiting for your pathology results.   If the pathology report reveals adenomatous (precancerous) tissue, then your   physician recommends a repeat colonoscopy for surveillance in 5 years.   If the pathology report indicates a hyperplastic polyp, then the colonoscopy   will be repeated for surveillance in 8-10 years.   Eat a high fiber diet and eat a diabetic (ADA) diet.   Continue your present medications.   Call the G.I. clinic in 2 weeks for reports (if you haven't heard from us   sooner)  [phone].  None  If you have any questions or  problems, please call your physician.  EMERGENCY PHONE NUMBER: [phone]  LAB RESULTS: Call in two (2) weeks for lab results, [phone]  ___________________________________________  Nurse Signature  ___________________________________________  Patient/Designated Responsible Party Signature  Julian Dunbar MD  4/19/2022 9:19:07 AM  This report has been verified and signed electronically.  Dear patient,  As a result of recent federal legislation (The Federal Cures Act), you may   receive lab or pathology results from your procedure in your [facility]   account before your physician is able to contact you. Your physician or   their representative will relay the results to you with their   recommendations at their soonest availability.  Thank you.  [employer]

## 2022-04-19 NOTE — PATIENT INSTRUCTIONS
Recovery After Procedural Sedation (Adult)   You have been given medicine by vein to make you sleep during your surgery. This may have included both a pain medicine and sleeping medicine. Most of the effects have worn off. But you may still have some drowsiness for the next 6 to 8 hours.  Home care  Follow these guidelines when you get home:  For the next 8 hours, you should be watched by a responsible adult. This person should make sure your condition is not getting worse.  Don't drink any alcohol for the next 24 hours.  Don't drive, operate dangerous machinery, or make important business or personal decisions during the next 24 hours.  To prevent injury or falls, use caution when standing and walking for at least 24 hours after your procedure.  Note: Your healthcare provider may tell you not to take any medicine by mouth for pain or sleep in the next 4 hours. These medicines may react with the medicines you were given in the hospital. This could cause a much stronger response than usual.  Follow-up care  Follow up with your healthcare provider if you are not alert and back to your usual level of activity within 12 hours.  When to seek medical advice  Call your healthcare provider right away if any of these occur:  Drowsiness gets worse  Weakness or dizziness gets worse  Repeated vomiting  You can't be awakened  Fever  New rash  [employer] last reviewed this educational content on 9/1/2019  © 6819-1768 [employer]. [address] [place] [address], [place] [id number]. All rights reserved. This information is not intended as a substitute for professional medical care. Always follow your healthcare professional's instructions       High-Fiber Diet  Fiber is in fruits, vegetables, cereals, and grains. Fiber passes through your body undigested. A high-fiber diet helps food move through your intestinal tract. The added bulk is helpful in preventing constipation. In people with diverticulosis, fiber helps clean out the  pouches along the colon wall. It also prevents new pouches from forming. A high-fiber diet reduces the risk of colon cancer. It also lowers blood cholesterol and prevents high blood sugar in people with diabetes.    The fiber-rich foods listed below should be part of your diet. If you are not used to high-fiber foods, start with 1 or 2 foods from this list. Every 3 to 4 days add a new one to your diet. Do this until you are eating 4 high-fiber foods per day. This should give you 20 to 35 grams of fiber a day. It is also important to drink a lot of water when you are on this diet. You should have 6 to 8 glasses of water a day. Water makes the fiber swell and increases the benefit.  Foods high in dietary fiber  The following foods are high in dietary fiber:  Breads. Breads made with 100% whole-wheat flour; wilmer, wheat, or rye crackers; whole-grain tortillas, bran muffins.  Cereals. Whole-grain and bran cereals with bran (shredded wheat, wheat flakes, raisin bran, corn bran); oatmeal, rolled oats, granola, and brown rice.  Fruits. Fresh fruits and their edible skins (pears, prunes, raisins, berries, apples, and apricots); bananas, citrus fruit, mangoes, pineapple; and prune juice.  Nuts. Any nuts and seeds.  Vegetables. Best served raw or lightly cooked. All types, especially: green peas, celery, eggplant, potatoes, spinach, broccoli, Wilson sprouts, winter squash, carrots, cauliflower, soybeans, lentils, and fresh and dried beans of all kinds.  Other. Popcorn, any spices.  Date Last Reviewed: 8/1/2016  © 0630-0729 [employer]. [address] [place] [address], [place] [address]. All rights reserved. This information is not intended as a substitute for professional medical care. Always follow your healthcare professional's instructions.

## 2022-04-19 NOTE — BRIEF OP NOTE
Discharge Note  Short Stay      SUMMARY     Admit Date: 4/19/2022    Attending Physician: Julian Dunbar Jr., MD     Discharge Physician: Julian Dunbar Jr., MD    Discharge Date: 4/19/2022 9:21 AM    Final Diagnosis: Screening for colon cancer [Z12.11]    Impression:            - One <2 mm polyp in the mid ascending colon,                          removed with a cold snare. Resected and retrieved.                          - Non-bleeding internal hemorrhoids.                          - The examination was otherwise normal.                          - The examined portion of the ileum was normal.   Recommendation:        - Discharge patient to home.                          - Await pathology results.                          - If the pathology report reveals adenomatous                          tissue, then repeat the colonoscopy for                          surveillance in 5 years.                          - If the pathology report indicates hyperplastic                          polyp, then repeat colonoscopy for surveillance in                          8-10 years.                          - High fiber diet and diabetic (ADA) diet.                          - Continue present medications.                          - Call the G.I[facility] clinic in 2 weeks for reports (if                          you haven't heard from us sooner) [phone].                          - Patient has a contact number available for                          emergencies. The signs and symptoms of potential                          delayed complications were discussed with the                          patient. Return to normal activities tomorrow.                          Written discharge instructions were provided to                          the patient.                          - Return to normal activities tomorrow.   Julian Dunbar MD   4/19/2022       Disposition: HOME OR SELF CARE    Patient Instructions:   Current Discharge Medication  "List      CONTINUE these medications which have NOT CHANGED    Details   !! blood sugar diagnostic Strp 1 each by Misc.(Non-Drug; Combo Route) route 2 (two) times daily. Strips for One Touch Verio Flex Meter  Qty: 200 strip, Refills: 3      !! blood sugar diagnostic Strp TEST EVERY DAY      blood-glucose meter kit       blunt needle, disposable 18 x 1 1/2 " Ndle Use as directed with testosterone injections  Qty: 100 each, Refills: 1      creatine monohydrate (CREATINE ORAL) Take by mouth.      famotidine (PEPCID) 40 MG tablet TAKE 1 TABLET BY MOUTH EVERY DAY  Qty: 90 tablet, Refills: 0    Associated Diagnoses: Encounter for monitoring long-term proton pump inhibitor therapy; Gastroesophageal reflux disease with esophagitis      fish oil-omega-3 fatty acids 300-1,000 mg capsule Take 1 capsule by mouth once daily.      fish,saf,flx,brg oils/o3,6,9n2 (FISH-FLAX-BORAGE OIL ORAL) Take by mouth.      flash glucose scanning reader (FREESTYLE YAN 2 READER) Misc Use as directed to check blood glucose.  Qty: 1 each, Refills: 0    Associated Diagnoses: Type 2 diabetes mellitus without complication, unspecified whether long term insulin use      FLAXSEED OIL ORAL Take 1 capsule by mouth once daily.      FREESTYLE YAN 14 DAY SENSOR Kit CHANGE SENSOR EVERY 14 DAYS  Qty: 2 kit, Refills: 6      JARDIANCE 25 mg tablet TAKE 1 TABLET BY MOUTH EVERY DAY  Qty: 30 tablet, Refills: 6    Associated Diagnoses: Type 2 diabetes mellitus without complication, unspecified whether long term insulin use      melatonin 10 mg Tab Take 10 mg by mouth nightly.      metFORMIN (GLUCOPHAGE-XR) 500 MG ER 24hr tablet TAKE 2 TABLETS BY MOUTH 2 TIMES DAILY WITH MEALS.  Qty: 360 tablet, Refills: 3      minoxidil (ROGAINE) 2 % external solution Apply 1 application topically 2 (two) times daily.      omeprazole (PRILOSEC) 40 MG capsule Take 1 capsule (40 mg total) by mouth once daily.  Qty: 90 capsule, Refills: 3    Associated Diagnoses: Gastroesophageal " "reflux disease with esophagitis; Encounter for monitoring long-term proton pump inhibitor therapy      sildenafil (REVATIO) 20 mg Tab TAKE UP TO 5 TABLETS BY MOUTH DAILY AS NEEDED 30 MINUTES PRIOR TO INTERCOURSE **MD DENIED, PT CONTACT MD 11/13**  Qty: 30 tablet, Refills: 3    Associated Diagnoses: Erectile dysfunction due to diseases classified elsewhere      simvastatin (ZOCOR) 10 MG tablet TAKE 1 TABLET BY MOUTH EVERY DAY IN THE EVENING  Qty: 90 tablet, Refills: 3      syringe with needle 1 mL 22 gauge x 1 1/2" Syrg Use as directed to administer testosterone injections.  Qty: 100 each, Refills: 1      testosterone cypionate (DEPOTESTOTERONE CYPIONATE) 100 mg/mL injection INJECT 0.5ML INTO THE MUSCLE ONCE A WEEK  Qty: 10 mL, Refills: 3    Comments: This request is for a new prescription for a controlled substance as required by Federal/State law..  Associated Diagnoses: Male hypogonadism      TURMERIC ORAL Take by mouth.      !! UNABLE TO FIND Amitriptyline 1%/Meloxicam 0.09%/Lidocaine Hcl 2%/ Prilocaine 2%   APPLY 2 GRAMS QID      !! UNABLE TO FIND Beet root      VITAMIN B COMPLEX ORAL Take 1 tablet by mouth once daily.      cholestyramine (QUESTRAN) 4 gram packet Take 1 packet (4 g total) by mouth once daily.  Qty: 30 packet, Refills: 11    Associated Diagnoses: Diarrhea, unspecified type      ONETOUCH DELICA LANCETS 33 gauge Misc TEST EVERY DAY  Qty: 100 each, Refills: 5      tadalafiL (CIALIS) 10 MG tablet Take 1 tablet (10 mg total) by mouth daily as needed for Erectile Dysfunction.  Qty: 10 tablet, Refills: 3    Associated Diagnoses: Erectile dysfunction due to diseases classified elsewhere       !! - Potential duplicate medications found. Please discuss with provider.          Discharge Procedure Orders (must include Diet, Follow-up, Activity)    Follow Up:  Follow up with PCP as per your routine.  Please follow a high fiber diet.  Activity as tolerated.    No driving day of procedure.  "

## 2022-04-25 ENCOUNTER — OFFICE VISIT (OUTPATIENT)
Dept: ENDOCRINOLOGY | Facility: CLINIC | Age: 52
End: 2022-04-25
Payer: COMMERCIAL

## 2022-04-25 VITALS
BODY MASS INDEX: 35.05 KG/M2 | OXYGEN SATURATION: 97 % | HEIGHT: 67 IN | DIASTOLIC BLOOD PRESSURE: 70 MMHG | HEART RATE: 73 BPM | WEIGHT: 223.31 LBS | SYSTOLIC BLOOD PRESSURE: 110 MMHG

## 2022-04-25 DIAGNOSIS — E11.69 HYPERLIPIDEMIA ASSOCIATED WITH TYPE 2 DIABETES MELLITUS: ICD-10-CM

## 2022-04-25 DIAGNOSIS — E78.5 HYPERLIPIDEMIA ASSOCIATED WITH TYPE 2 DIABETES MELLITUS: ICD-10-CM

## 2022-04-25 DIAGNOSIS — E29.1 MALE HYPOGONADISM: ICD-10-CM

## 2022-04-25 DIAGNOSIS — E11.9 TYPE 2 DIABETES MELLITUS WITHOUT COMPLICATION, UNSPECIFIED WHETHER LONG TERM INSULIN USE: Primary | ICD-10-CM

## 2022-04-25 PROCEDURE — 3044F HG A1C LEVEL LT 7.0%: CPT | Mod: CPTII,S$GLB,, | Performed by: INTERNAL MEDICINE

## 2022-04-25 PROCEDURE — 1159F MED LIST DOCD IN RCRD: CPT | Mod: CPTII,S$GLB,, | Performed by: INTERNAL MEDICINE

## 2022-04-25 PROCEDURE — 3061F PR NEG MICROALBUMINURIA RESULT DOCUMENTED/REVIEW: ICD-10-PCS | Mod: CPTII,S$GLB,, | Performed by: INTERNAL MEDICINE

## 2022-04-25 PROCEDURE — 1160F RVW MEDS BY RX/DR IN RCRD: CPT | Mod: CPTII,S$GLB,, | Performed by: INTERNAL MEDICINE

## 2022-04-25 PROCEDURE — 3061F NEG MICROALBUMINURIA REV: CPT | Mod: CPTII,S$GLB,, | Performed by: INTERNAL MEDICINE

## 2022-04-25 PROCEDURE — 3008F BODY MASS INDEX DOCD: CPT | Mod: CPTII,S$GLB,, | Performed by: INTERNAL MEDICINE

## 2022-04-25 PROCEDURE — 1160F PR REVIEW ALL MEDS BY PRESCRIBER/CLIN PHARMACIST DOCUMENTED: ICD-10-PCS | Mod: CPTII,S$GLB,, | Performed by: INTERNAL MEDICINE

## 2022-04-25 PROCEDURE — 99999 PR PBB SHADOW E&M-EST. PATIENT-LVL V: ICD-10-PCS | Mod: PBBFAC,,, | Performed by: INTERNAL MEDICINE

## 2022-04-25 PROCEDURE — 3078F DIAST BP <80 MM HG: CPT | Mod: CPTII,S$GLB,, | Performed by: INTERNAL MEDICINE

## 2022-04-25 PROCEDURE — 1159F PR MEDICATION LIST DOCUMENTED IN MEDICAL RECORD: ICD-10-PCS | Mod: CPTII,S$GLB,, | Performed by: INTERNAL MEDICINE

## 2022-04-25 PROCEDURE — 99214 OFFICE O/P EST MOD 30 MIN: CPT | Mod: S$GLB,,, | Performed by: INTERNAL MEDICINE

## 2022-04-25 PROCEDURE — 3074F SYST BP LT 130 MM HG: CPT | Mod: CPTII,S$GLB,, | Performed by: INTERNAL MEDICINE

## 2022-04-25 PROCEDURE — 99214 PR OFFICE/OUTPT VISIT, EST, LEVL IV, 30-39 MIN: ICD-10-PCS | Mod: S$GLB,,, | Performed by: INTERNAL MEDICINE

## 2022-04-25 PROCEDURE — 3066F PR DOCUMENTATION OF TREATMENT FOR NEPHROPATHY: ICD-10-PCS | Mod: CPTII,S$GLB,, | Performed by: INTERNAL MEDICINE

## 2022-04-25 PROCEDURE — 3044F PR MOST RECENT HEMOGLOBIN A1C LEVEL <7.0%: ICD-10-PCS | Mod: CPTII,S$GLB,, | Performed by: INTERNAL MEDICINE

## 2022-04-25 PROCEDURE — 3072F PR LOW RISK FOR RETINOPATHY: ICD-10-PCS | Mod: CPTII,S$GLB,, | Performed by: INTERNAL MEDICINE

## 2022-04-25 PROCEDURE — 3066F NEPHROPATHY DOC TX: CPT | Mod: CPTII,S$GLB,, | Performed by: INTERNAL MEDICINE

## 2022-04-25 PROCEDURE — 3074F PR MOST RECENT SYSTOLIC BLOOD PRESSURE < 130 MM HG: ICD-10-PCS | Mod: CPTII,S$GLB,, | Performed by: INTERNAL MEDICINE

## 2022-04-25 PROCEDURE — 3072F LOW RISK FOR RETINOPATHY: CPT | Mod: CPTII,S$GLB,, | Performed by: INTERNAL MEDICINE

## 2022-04-25 PROCEDURE — 3008F PR BODY MASS INDEX (BMI) DOCUMENTED: ICD-10-PCS | Mod: CPTII,S$GLB,, | Performed by: INTERNAL MEDICINE

## 2022-04-25 PROCEDURE — 99999 PR PBB SHADOW E&M-EST. PATIENT-LVL V: CPT | Mod: PBBFAC,,, | Performed by: INTERNAL MEDICINE

## 2022-04-25 PROCEDURE — 3078F PR MOST RECENT DIASTOLIC BLOOD PRESSURE < 80 MM HG: ICD-10-PCS | Mod: CPTII,S$GLB,, | Performed by: INTERNAL MEDICINE

## 2022-04-25 RX ORDER — TESTOSTERONE CYPIONATE 1000 MG/10ML
INJECTION, SOLUTION INTRAMUSCULAR
Qty: 10 ML | Refills: 3 | Status: SHIPPED | OUTPATIENT
Start: 2022-04-25 | End: 2022-12-09 | Stop reason: SDUPTHER

## 2022-04-25 NOTE — PROGRESS NOTES
([facility])    CHIEF COMPLAINT: DM2  51 y.o.  being seen as a f/u. Diagnosed with diabetes 2013. Off actos. On metformin XR 1000 BID and jardiance 25 mg. On testosterone 50 mg weekly. Tolerating statin. Has had a screening colonoscopy. NO paresthesias. No change in vision. Still doing weight lifting. Will bring amena when sees . Doing lower carb, but not very low carb diet.         PAST MEDICAL HISTORY/PAST SURGICAL HISTORY:  Reviewed in EPIC    SOCIAL HISTORY: No tobacco. Social alcohol. Desk Job-     FAMILY HISTORY:  + DM 2. No known thyroid disease.     MEDICATIONS/ALLERGIES: The patient's MedCard has been updated and reviewed.      ROS:   Constitutional: weight increased  Cardiovascular: No CP  Respiratory: No SOB.   Remainder ROS negative        PE:    GENERAL: Well developed, well nourished.  NECK: Supple, trachea midline, No palpable nodules  CHEST: Resp even and unlabored, CTA bilateral.  CARDIAC: RRR, S1, S2 heard, no murmurs, rubs, S3, or S4  FEET: Normal monofilament. No cuts or abrasions. 2 + pedal pulses.      Latest Reference Range & Units 04/18/22 07:41   WBC 3.90 - 12.70 K/uL 6.59   RBC 4.60 - 6.20 M/uL 5.81   Hemoglobin 14.0 - 18.0 g/dL 18.0   Hematocrit 40.0 - 54.0 % 52.7   MCV 82 - 98 fL 91   MCH 27.0 - 31.0 pg 31.0   MCHC 32.0 - 36.0 g/dL 34.2   RDW 11.5 - 14.5 % 12.6   Platelets 150 - 450 K/uL 295   MPV 9.2 - 12.9 fL 10.6   Sodium 136 - 145 mmol/L 136   Potassium 3.5 - 5.1 mmol/L 4.2   Chloride 95 - 110 mmol/L 103   CO2 23 - 29 mmol/L 25   Anion Gap 8 - 16 mmol/L 8   BUN 6 - 20 mg/dL 20   Creatinine 0.5 - 1.4 mg/dL 0.8   EGFR if non African American >60 mL/min/1.73 m^2 >60.0 [1]   EGFR if African American >60 mL/min/1.73 m^2 >60.0   Glucose 70 - 110 mg/dL 126 (H)   Calcium 8.7 - 10.5 mg/dL 9.3   Alkaline Phosphatase 55 - 135 U/L 62   PROTEIN TOTAL 6.0 - 8.4 g/dL 6.7   Albumin 3.5 - 5.2 g/dL 4.0   BILIRUBIN TOTAL 0.1 - 1.0 mg/dL 0.7 [2]   AST 10 - 40 U/L 24   ALT 10 -  44 U/L 30   Triglycerides 30 - 150 mg/dL 181 (H) [3]   Cholesterol 120 - 199 mg/dL 126 [4]   HDL 40 - 75 mg/dL 31 (L) [5]   HDL/Cholesterol Ratio 20.0 - 50.0 % 24.6   LDL Cholesterol External 63.0 - 159.0 mg/dL 58.8 (L) [6]   Non-HDL Cholesterol mg/dL 95 [7]   Total Cholesterol/HDL Ratio 2.0 - 5.0  4.1   Hemoglobin A1C External 4.0 - 5.6 % 6.3 (H) [8]   Estimated Avg Glucose 68 - 131 mg/dL 134 (H)   PSA, Screen 0.00 - 4.00 ng/mL 0.95 [9]   Testosterone, Total 304 - 1227 ng/dL 783      Latest Reference Range & Units 04/18/22 07:28   Creatinine, Urine 23.0 - 375.0 mg/dL 40.0   Microalbumin, Urine ug/mL <5.0   MICROALB/CREAT RATIO 0.0 - 30.0 ug/mg Unable to calculate       ASSESSMENT/PLAN:  1. DM 2- No known complications. Has seen DM Education and reviewed their most recent visit 1/28/22.  Continue current Tx. Discussed avoiding very low carb (Keto ) Diet while on SGLT 2. Stay very well hydrated. Continue diet as well as exercise.     2. Hyperlipidemia- Continue statin. Tolerating well. Taking fish oil as Trigs elevated. Trigs improving    3. Male Hypogonadism- Taking testosterone 50 mg weekly. Hg/HCT WNL. Symptomatically doing well.    4. MELLY- see # 3. Discussed wearing CPAP    FOLLOWUP  F/U 6 months with CMP,CBC, A1c, testosterone

## 2022-05-04 ENCOUNTER — TELEPHONE (OUTPATIENT)
Dept: GASTROENTEROLOGY | Facility: CLINIC | Age: 52
End: 2022-05-04
Payer: COMMERCIAL

## 2022-05-04 LAB
COMMENT: NORMAL
FINAL PATHOLOGIC DIAGNOSIS: NORMAL
Lab: NORMAL
SUPPLEMENTAL DIAGNOSIS: NORMAL

## 2022-05-06 ENCOUNTER — OFFICE VISIT (OUTPATIENT)
Dept: GASTROENTEROLOGY | Facility: CLINIC | Age: 52
End: 2022-05-06
Payer: COMMERCIAL

## 2022-05-06 VITALS — HEIGHT: 67 IN | WEIGHT: 223.13 LBS | BODY MASS INDEX: 35.02 KG/M2

## 2022-05-06 DIAGNOSIS — Z79.899 ENCOUNTER FOR MONITORING LONG-TERM PROTON PUMP INHIBITOR THERAPY: Primary | ICD-10-CM

## 2022-05-06 DIAGNOSIS — R09.A2 GLOBUS SENSATION: ICD-10-CM

## 2022-05-06 DIAGNOSIS — R19.7 INTERMITTENT DIARRHEA: ICD-10-CM

## 2022-05-06 DIAGNOSIS — R10.13 EPIGASTRIC PAIN: ICD-10-CM

## 2022-05-06 DIAGNOSIS — Z98.890 HISTORY OF ESOPHAGEAL SURGERY: ICD-10-CM

## 2022-05-06 DIAGNOSIS — K22.0 ACHALASIA: ICD-10-CM

## 2022-05-06 DIAGNOSIS — K21.00 GASTROESOPHAGEAL REFLUX DISEASE WITH ESOPHAGITIS WITHOUT HEMORRHAGE: ICD-10-CM

## 2022-05-06 DIAGNOSIS — Z51.81 ENCOUNTER FOR MONITORING LONG-TERM PROTON PUMP INHIBITOR THERAPY: Primary | ICD-10-CM

## 2022-05-06 DIAGNOSIS — R13.14 PHARYNGOESOPHAGEAL DYSPHAGIA: ICD-10-CM

## 2022-05-06 PROCEDURE — 3008F BODY MASS INDEX DOCD: CPT | Mod: CPTII,S$GLB,, | Performed by: NURSE PRACTITIONER

## 2022-05-06 PROCEDURE — 3044F HG A1C LEVEL LT 7.0%: CPT | Mod: CPTII,S$GLB,, | Performed by: NURSE PRACTITIONER

## 2022-05-06 PROCEDURE — 3061F PR NEG MICROALBUMINURIA RESULT DOCUMENTED/REVIEW: ICD-10-PCS | Mod: CPTII,S$GLB,, | Performed by: NURSE PRACTITIONER

## 2022-05-06 PROCEDURE — 1159F MED LIST DOCD IN RCRD: CPT | Mod: CPTII,S$GLB,, | Performed by: NURSE PRACTITIONER

## 2022-05-06 PROCEDURE — 99214 PR OFFICE/OUTPT VISIT, EST, LEVL IV, 30-39 MIN: ICD-10-PCS | Mod: S$GLB,,, | Performed by: NURSE PRACTITIONER

## 2022-05-06 PROCEDURE — 1160F RVW MEDS BY RX/DR IN RCRD: CPT | Mod: CPTII,S$GLB,, | Performed by: NURSE PRACTITIONER

## 2022-05-06 PROCEDURE — 99214 OFFICE O/P EST MOD 30 MIN: CPT | Mod: S$GLB,,, | Performed by: NURSE PRACTITIONER

## 2022-05-06 PROCEDURE — 3061F NEG MICROALBUMINURIA REV: CPT | Mod: CPTII,S$GLB,, | Performed by: NURSE PRACTITIONER

## 2022-05-06 PROCEDURE — 1159F PR MEDICATION LIST DOCUMENTED IN MEDICAL RECORD: ICD-10-PCS | Mod: CPTII,S$GLB,, | Performed by: NURSE PRACTITIONER

## 2022-05-06 PROCEDURE — 3066F PR DOCUMENTATION OF TREATMENT FOR NEPHROPATHY: ICD-10-PCS | Mod: CPTII,S$GLB,, | Performed by: NURSE PRACTITIONER

## 2022-05-06 PROCEDURE — 3008F PR BODY MASS INDEX (BMI) DOCUMENTED: ICD-10-PCS | Mod: CPTII,S$GLB,, | Performed by: NURSE PRACTITIONER

## 2022-05-06 PROCEDURE — 99999 PR PBB SHADOW E&M-EST. PATIENT-LVL V: CPT | Mod: PBBFAC,,, | Performed by: NURSE PRACTITIONER

## 2022-05-06 PROCEDURE — 99999 PR PBB SHADOW E&M-EST. PATIENT-LVL V: ICD-10-PCS | Mod: PBBFAC,,, | Performed by: NURSE PRACTITIONER

## 2022-05-06 PROCEDURE — 3072F PR LOW RISK FOR RETINOPATHY: ICD-10-PCS | Mod: CPTII,S$GLB,, | Performed by: NURSE PRACTITIONER

## 2022-05-06 PROCEDURE — 3072F LOW RISK FOR RETINOPATHY: CPT | Mod: CPTII,S$GLB,, | Performed by: NURSE PRACTITIONER

## 2022-05-06 PROCEDURE — 3066F NEPHROPATHY DOC TX: CPT | Mod: CPTII,S$GLB,, | Performed by: NURSE PRACTITIONER

## 2022-05-06 PROCEDURE — 3044F PR MOST RECENT HEMOGLOBIN A1C LEVEL <7.0%: ICD-10-PCS | Mod: CPTII,S$GLB,, | Performed by: NURSE PRACTITIONER

## 2022-05-06 PROCEDURE — 1160F PR REVIEW ALL MEDS BY PRESCRIBER/CLIN PHARMACIST DOCUMENTED: ICD-10-PCS | Mod: CPTII,S$GLB,, | Performed by: NURSE PRACTITIONER

## 2022-05-06 RX ORDER — FAMOTIDINE 40 MG/1
40 TABLET, FILM COATED ORAL DAILY
Qty: 90 TABLET | Refills: 3 | Status: SHIPPED | OUTPATIENT
Start: 2022-05-06 | End: 2023-09-12

## 2022-05-06 RX ORDER — OMEPRAZOLE 40 MG/1
40 CAPSULE, DELAYED RELEASE ORAL DAILY
Qty: 90 CAPSULE | Refills: 3 | Status: SHIPPED | OUTPATIENT
Start: 2022-05-06 | End: 2023-11-05 | Stop reason: SDUPTHER

## 2022-05-06 NOTE — PATIENT INSTRUCTIONS
"GERD (Adult)    The esophagus is a tube that carries food from the mouth to the stomach. A valve at the lower end of the esophagus prevents stomach acid from flowing upward. When this valve doesn't work properly, stomach contents may repeatedly flow back up (reflux) into the esophagus. This is called gastroesophageal reflux disease (GERD). GERD can irritate the esophagus. It can cause problems with swallowing or breathing. In severe cases, GERD can cause recurrent pneumonia or other serious problems.  Symptoms of reflux include burning, pressure or sharp pain in the upper abdomen or mid to lower chest. The pain can spread to the neck, back, or shoulder. There may be belching, an acid taste in the back of the throat, chronic cough, or sore throat or hoarseness. GERD symptoms often occur during the day after a big meal. They can also occur at night when lying down.   Home care  Lifestyle changes can help reduce symptoms. If needed, medicines may be prescribed. Symptoms often improve with treatment, but if treatment is stopped, the symptoms often return after a few months. So most persons with GERD will need to continue treatment.  Lifestyle changes  Limit or avoid fatty, fried, and spicy foods, as well as coffee, chocolate, mint, and foods with high acid content such as tomatoes and citrus fruit and juices (orange, grapefruit, lemon).  Dont eat large meals, especially at night. Frequent, smaller meals are best. Do not lie down right after eating. And dont eat anything 3 hours before going to bed.  Avoid drinking alcohol and smoking. As much as possible, stay away from second hand smoke.  If you are overweight, losing weight will reduce symptoms.   Avoid wearing tight clothing around your stomach area.  If your symptoms occur during sleep, use a foam wedge to elevate your upper body (not just your head.) Or, place 4" blocks under the head of your bed.  Medicines  If needed, medicines can help relieve the symptoms of " GERD and prevent damage to the esophagus. Discuss a medicine plan with your healthcare provider. This may include one or more of the following medicines:  Antacids to help neutralize the normal acids in your stomach.  Acid blockers (H2 blockers) to decrease acid production.  Acid inhibitors (PPIs) to decrease acid production in a different way than the blockers. They may work better, but can take a little longer to take effect.  Take an antacid 30-60 minutes after eating and at bedtime, but not at the same time as an acid blocker.  Try not to take medicines such as ibuprofen and aspirin. If you are taking aspirin for your heart or other medical reasons, talk to your healthcare provider about stopping it.  Follow-up care  Follow up with your healthcare provider or as advised by our staff.  When to seek medical advice  Call your healthcare provider if any of the following occur:  Stomach pain gets worse or moves to the lower right abdomen (appendix area)  Chest pain appears or gets worse, or spreads to the back, neck, shoulder, or arm  Frequent vomiting (cant keep down liquids)  Blood in the stool or vomit (red or black in color)  Feeling weak or dizzy  Fever of 100.4ºF (38ºC) or higher, or as directed by your healthcare provider  Date Last Reviewed: 6/23/2015  © 2225-8835 [employer]. [address] [place] [address], [place] [address]. All rights reserved. This information is not intended as a substitute for professional medical care. Always follow your healthcare professional's instructions.

## 2022-05-06 NOTE — PROGRESS NOTES
Subjective:       Patient ID: Abhi Pompa is a 51 y.o. male Body mass index is 34.94 kg/m².    Chief Complaint: Follow-up and Rx Refill  This patient is established with myself and Dr. Dunbar.    Patient is here for annual follow-up as requested.    Gastroesophageal Reflux  He complains of abdominal pain (intermittent, maybe once a month, epigastric pain occurs after taking supplements, lasts 10-15 minutes), belching (not more than usual), dysphagia (occasional; occurs with cold drinks, chicken and doughy foods; significantly improved since surgery), globus sensation (rarely) and heartburn (rarely as long as he takes his medications). He reports no chest pain, no choking, no coughing, no early satiety, no hoarse voice, no nausea, no sore throat or no water brash. This is a chronic (for several years) problem. The problem occurs rarely. The problem has been rapidly improving (controlled on reflux medications). The heartburn wakes him from sleep. The heartburn changes with position. The symptoms are aggravated by lying down and stress (tomato products, red foods, lying on left side). Pertinent negatives include no fatigue, melena or weight loss. Risk factors include ETOH use, caffeine use, obesity, smoking/tobacco exposure and NSAIDs (reports NSAIDs use PRN). He has tried a histamine-2 antagonist, an antacid, a PPI, a diet change and ETOH reduction (prilosec 40 mg once daily; pepcid 40 mg nightly; PAST: carafate, zantac) for the symptoms. The treatment provided significant relief. Past procedures include an EGD and esophageal manometry. Past invasive treatments include gastroplication (heller with fundoplication).     Review of Systems   Constitutional: Negative for appetite change, chills, diaphoresis, fatigue, fever and weight loss.   HENT: Positive for trouble swallowing. Negative for hoarse voice and sore throat.    Respiratory: Negative for cough, choking, chest tightness and shortness of breath.     Cardiovascular: Negative for chest pain.   Gastrointestinal: Positive for abdominal pain (intermittent, maybe once a month, epigastric pain occurs after taking supplements, lasts 10-15 minutes), diarrhea (Reports he takes questran 4 grams daily PRN- which is rarely for intermittent diarrhea from metformin use; denies diarrhea currently), dysphagia (occasional; occurs with cold drinks, chicken and doughy foods; significantly improved since surgery) and heartburn (rarely as long as he takes his medications). Negative for anal bleeding, blood in stool, constipation, melena, nausea, rectal pain and vomiting.   Neurological: Negative for weakness.       Past Medical History:   Diagnosis Date    Achalasia     Arthritis     right ankle    Cataract     Colon polyp     Diabetes mellitus     taking lisinopril preventive to protect the kidneys, no HTN    Dyslipidemia     pt denies    Dysphagia     Former tobacco use     chew    GERD (gastroesophageal reflux disease)     H. pylori infection     Hypogonadism in male     MELLY (obstructive sleep apnea)     uses cpap     Past Surgical History:   Procedure Laterality Date    ANKLE SURGERY Right     COLONOSCOPY N/A 04/19/2022    Procedure: COLONOSCOPY;  Surgeon: Julian Dunbar Jr., MD;  Location: [facility];  Service: Endoscopy;  Laterality: N/A; internal hemorrhoids, 1 colon polyp removed; repeat in 8-10 years for surveillance; biopsy: Colonic mucosa with prominent lymphoid aggregates. Pending molecular study. See comment.    HAND SURGERY Right     laparoscopic Heller myotomy      MYOTOMY  07/2016    heller- achalasia surgery    TONSILLECTOMY      UPPER GASTROINTESTINAL ENDOSCOPY  12/11/2015    WISDOM TOOTH EXTRACTION       Family History   Problem Relation Age of Onset    Diabetes Mother     Allergies Mother     Lupus Mother     Diabetes Maternal Aunt     Allergic rhinitis Sister     Angioedema Neg Hx     Asthma Neg Hx     Eczema Neg Hx      Immunodeficiency Neg Hx     Urticaria Neg Hx     Amblyopia Neg Hx     Blindness Neg Hx     Cancer Neg Hx     Cataracts Neg Hx     Glaucoma Neg Hx     Hypertension Neg Hx     Macular degeneration Neg Hx     Retinal detachment Neg Hx     Strabismus Neg Hx     Stroke Neg Hx     Thyroid disease Neg Hx     Celiac disease Neg Hx     Cirrhosis Neg Hx     Colon cancer Neg Hx     Colon polyps Neg Hx     Crohn's disease Neg Hx     Cystic fibrosis Neg Hx     Esophageal cancer Neg Hx     Hemochromatosis Neg Hx     Inflammatory bowel disease Neg Hx     Irritable bowel syndrome Neg Hx     Liver cancer Neg Hx     Liver disease Neg Hx     Rectal cancer Neg Hx     Stomach cancer Neg Hx     Ulcerative colitis Neg Hx     Harvey's disease Neg Hx     Lymphoma Neg Hx     Tuberculosis Neg Hx     Scleroderma Neg Hx     Rheum arthritis Neg Hx     Multiple sclerosis Neg Hx     Melanoma Neg Hx     Psoriasis Neg Hx     Skin cancer Neg Hx      Social History     Tobacco Use    Smoking status: Current Some Day Smoker     Types: Cigars    Smokeless tobacco: Former User     Types: Chew     Quit date: 10/19/2012    Tobacco comment: Occasional cigar   Substance Use Topics    Alcohol use: Yes     Alcohol/week: 7.0 standard drinks     Types: 7 Glasses of wine per week     Comment: 1 glass of red wine every night    Drug use: No     Wt Readings from Last 10 Encounters:   05/06/22 101.2 kg (223 lb 1.7 oz)   04/25/22 101.3 kg (223 lb 5.2 oz)   04/19/22 97.5 kg (215 lb)   01/28/22 100 kg (220 lb 7.4 oz)   10/18/21 102.6 kg (226 lb 3.1 oz)   07/23/21 100 kg (220 lb 7.4 oz)   04/05/21 100.8 kg (222 lb 3.6 oz)   03/02/21 103.4 kg (228 lb)   01/22/21 104.6 kg (230 lb 11.4 oz)   10/05/20 103 kg (226 lb 15.4 oz)     Lab Results   Component Value Date    WBC 6.59 04/18/2022    HGB 18.0 04/18/2022    HCT 52.7 04/18/2022    MCV 91 04/18/2022     04/18/2022     CMP  Sodium   Date Value Ref Range Status   04/18/2022  136 136 - 145 mmol/L Final     Potassium   Date Value Ref Range Status   04/18/2022 4.2 3.5 - 5.1 mmol/L Final     Chloride   Date Value Ref Range Status   04/18/2022 103 95 - 110 mmol/L Final     CO2   Date Value Ref Range Status   04/18/2022 25 23 - 29 mmol/L Final     Glucose   Date Value Ref Range Status   04/18/2022 126 (H) 70 - 110 mg/dL Final     BUN   Date Value Ref Range Status   04/18/2022 20 6 - 20 mg/dL Final     Creatinine   Date Value Ref Range Status   04/18/2022 0.8 0.5 - 1.4 mg/dL Final     Calcium   Date Value Ref Range Status   04/18/2022 9.3 8.7 - 10.5 mg/dL Final     Total Protein   Date Value Ref Range Status   04/18/2022 6.7 6.0 - 8.4 g/dL Final     Albumin   Date Value Ref Range Status   04/18/2022 4.0 3.5 - 5.2 g/dL Final     Total Bilirubin   Date Value Ref Range Status   04/18/2022 0.7 0.1 - 1.0 mg/dL Final     Comment:     For infants and newborns, interpretation of results should be based  on gestational age, weight and in agreement with clinical  observations.    Premature Infant recommended reference ranges:  Up to 24 hours.............<8.0 mg/dL  Up to 48 hours............<12.0 mg/dL  3-5 days..................<15.0 mg/dL  6-29 days.................<15.0 mg/dL       Alkaline Phosphatase   Date Value Ref Range Status   04/18/2022 62 55 - 135 U/L Final     AST   Date Value Ref Range Status   04/18/2022 24 10 - 40 U/L Final     ALT   Date Value Ref Range Status   04/18/2022 30 10 - 44 U/L Final     Anion Gap   Date Value Ref Range Status   04/18/2022 8 8 - 16 mmol/L Final     eGFR if    Date Value Ref Range Status   04/18/2022 >60.0 >60 mL/min/1.73 m^2 Final     eGFR if non    Date Value Ref Range Status   04/18/2022 >60.0 >60 mL/min/1.73 m^2 Final     Comment:     Calculation used to obtain the estimated glomerular filtration  rate (eGFR) is the CKD-EPI equation.        Lab Results   Component Value Date    DSRHOBMY13 475 09/28/2020     Lab Results  "  Component Value Date    TSH 1.193 01/29/2016 9/28/2020 magnesium WNL    Reviewed prior medical records including radiology report of 2/2/16 esophagram; 2/26/16 esophageal manometry; 1/11/2019 visit note with EARLE Watson NP; & endoscopy history (see surgical history).    12/11/2015 EGD was reviewed and procedure report states:   " Impression: - Normal oropharynx.  - Reflux esophagitis. Biopsied.  - Normal esophagus.  - Z-line regular, 40 cm from the incisors.  - Normal cardia.  - Normal stomach. Biopsied.  - Normal examined duodenum.  - No endoscopic esophageal abnormality to explain   patient's dysphagia. Esophagus dilated, 51 Fr and 54   Fr.  Recommendation: - Discharge patient to home.  - Await pathology results.  - Follow an antireflux regimen.  - Continue present medications.  - Use Prilosec (omeprazole) 40 mg PO daily.  - Use Zantac (ranitidine) 300 mg PO daily.  - Add sucralfate tablets 2 gram PO BID for 2 weeks,   [facility] and HS.  - Call the G.I. clinic in 2 weeks for reports (if   you haven't heard from us sooner) [phone].  - Return to GI clinic in 6-8 weeks.  - If no improvement, do an upper GI series. ".  Biopsy results:   "DISTAL ESOPHAGUS, BIOPSY:  -Squamous mucosa with mild reactive changes.  -No significant inflammation.  -No intraepithelial eosinophils."  Felicia test negative  Objective:      Physical Exam  Vitals and nursing note reviewed.   Constitutional:       General: He is not in acute distress.     Appearance: Normal appearance. He is well-developed. He is not diaphoretic.   HENT:      Mouth/Throat:      Comments: Patient is wearing a face mask, which covers patient's mouth and nose, due to COVID 19 concerns.  Eyes:      General: No scleral icterus.     Conjunctiva/sclera: Conjunctivae normal.      Pupils: Pupils are equal, round, and reactive to light.   Pulmonary:      Effort: Pulmonary effort is normal. No respiratory distress.      Breath sounds: Normal breath sounds. No wheezing. "   Abdominal:      General: Bowel sounds are normal. There is no distension or abdominal bruit.      Palpations: Abdomen is soft. Abdomen is not rigid. There is no mass.      Tenderness: There is no abdominal tenderness. There is no guarding or rebound. Negative signs include Vera's sign and McBurney's sign.   Skin:     General: Skin is warm and dry.      Coloration: Skin is not pale.      Findings: No erythema or rash.      Comments: Non-jaundiced   Neurological:      Mental Status: He is alert and oriented to person, place, and time.   Psychiatric:         Speech: Speech normal.         Behavior: Behavior normal.         Thought Content: Thought content normal.         Judgment: Judgment normal.         Assessment:       1. Encounter for monitoring long-term proton pump inhibitor therapy    2. Gastroesophageal reflux disease with esophagitis without hemorrhage    3. Epigastric pain    4. Achalasia    5. History of esophageal surgery    6. Pharyngoesophageal dysphagia    7. Globus sensation    8. Intermittent diarrhea        Plan:       Encounter for monitoring long-term proton pump inhibitor therapy & Gastroesophageal reflux disease with esophagitis without hemorrhage  -     Vitamin B12; Future; Expected date: 05/06/2022  -     Magnesium; Future; Expected date: 05/06/2022  -  REFILL   omeprazole (PRILOSEC) 40 MG capsule; Take 1 capsule (40 mg total) by mouth once daily.  Dispense: 90 capsule; Refill: 3  - REFILL    famotidine (PEPCID) 40 MG tablet; Take 1 tablet (40 mg total) by mouth once daily.  Dispense: 90 tablet; Refill: 3  - discussed with patient about long term use of reflux medications (preference to use lowest effective dose or discontinuing if possible), the risk and benefits of using these medications long term, the risk of untreated GERD such as hernandez's esophagus, and recommend a diet high in calcium and/or taking OTC calcium and vitamin d supplements as directed (such as Citracal +D), patient  verbalized understanding & patient wants to continue current medications at current dosages.  - recommend annual monitoring with blood work to include CMP, CBC, vitamin B12, and magnesium.    Epigastric pain  - schedule EGD, discussed procedure with patient, including risks and benefits, patient verbalized understanding  - avoid/minimize use of NSAIDs- since they can cause GI upset, bleeding and/or ulcers. If NSAID must be taken, recommend take with food.    Achalasia, History of esophageal surgery, & Pharyngoesophageal dysphagia  - schedule EGD, discussed procedure with patient and possible esophageal dilation may be performed during procedure if indicated, patient verbalized understanding  - educated patient to eat smaller more frequent meals and to eat slowly and advised to eat a soft diet.  - possible UGI/esophagram/esophageal manometry if symptoms persist  - Recommend follow-up with Dr. Watkins & Dr. Le for continued evaluation and management.    Globus sensation  Recommend follow-up with Primary Care Provider for continued evaluation and management.  - schedule EGD, discussed procedure with patient, including risks and benefits, patient verbalized understanding    Intermittent diarrhea  - recommended OTC probiotic, such as Align or Culturelle, as directed  - avoid lactose & caffeine  - avoid known triggers  - discussed with patient that certain medications, such as metformin, may be contributing to symptoms. I recommend follow-up with provider who manages medication to discuss about possible alternative therapy, patient verbalized understanding  - recommend stool studies if diarrhea recurs/worsens  - schedule EGD, discussed procedure with patient, including risks and benefits, patient verbalized understanding  - can continue questran 4 grams once daily prn diarrhea    Follow up in about 3 months (around 8/6/2022), or if symptoms worsen or fail to improve.    If no improvement in symptoms or symptoms  worsen, call/follow-up at clinic or go to ER      40 minutes of total time spent on the encounter, which includes face to face time and non-face to face time preparing to see the patient (eg, review of tests), Obtaining and/or reviewing separately obtained history, Documenting clinical information in the electronic or other health record, Independently interpreting results (not separately reported) and communicating results to the patient/family/caregiver, or Care coordination (not separately reported).

## 2022-05-30 ENCOUNTER — PATIENT MESSAGE (OUTPATIENT)
Dept: FAMILY MEDICINE | Facility: CLINIC | Age: 52
End: 2022-05-30
Payer: COMMERCIAL

## 2022-05-30 NOTE — TELEPHONE ENCOUNTER
Taken from [facility] smart phrase:      - Guaifenesin (Mucinex) for chest congestion. It is essential that you ensure GOOD HYDRATION when taking this medication. Stop using this if you are no longer coughing up mucous or phlegm.   - If you are experiencing a dry cough, cough suppressants may be more appropriate. Ask your pharmacists for recommendations on available brands.

## 2022-05-31 ENCOUNTER — PATIENT MESSAGE (OUTPATIENT)
Dept: FAMILY MEDICINE | Facility: CLINIC | Age: 52
End: 2022-05-31
Payer: COMMERCIAL

## 2022-06-01 ENCOUNTER — TELEPHONE (OUTPATIENT)
Dept: GASTROENTEROLOGY | Facility: CLINIC | Age: 52
End: 2022-06-01
Payer: COMMERCIAL

## 2022-06-01 ENCOUNTER — OFFICE VISIT (OUTPATIENT)
Dept: FAMILY MEDICINE | Facility: CLINIC | Age: 52
End: 2022-06-01
Payer: COMMERCIAL

## 2022-06-01 ENCOUNTER — TELEPHONE (OUTPATIENT)
Dept: FAMILY MEDICINE | Facility: CLINIC | Age: 52
End: 2022-06-01

## 2022-06-01 DIAGNOSIS — R05.3 PERSISTENT COUGH: Primary | ICD-10-CM

## 2022-06-01 PROCEDURE — 3044F PR MOST RECENT HEMOGLOBIN A1C LEVEL <7.0%: ICD-10-PCS | Mod: CPTII,95,, | Performed by: INTERNAL MEDICINE

## 2022-06-01 PROCEDURE — 3044F HG A1C LEVEL LT 7.0%: CPT | Mod: CPTII,95,, | Performed by: INTERNAL MEDICINE

## 2022-06-01 PROCEDURE — 3061F PR NEG MICROALBUMINURIA RESULT DOCUMENTED/REVIEW: ICD-10-PCS | Mod: CPTII,95,, | Performed by: INTERNAL MEDICINE

## 2022-06-01 PROCEDURE — 3072F PR LOW RISK FOR RETINOPATHY: ICD-10-PCS | Mod: CPTII,95,, | Performed by: INTERNAL MEDICINE

## 2022-06-01 PROCEDURE — 99213 OFFICE O/P EST LOW 20 MIN: CPT | Mod: 95,,, | Performed by: INTERNAL MEDICINE

## 2022-06-01 PROCEDURE — 1160F RVW MEDS BY RX/DR IN RCRD: CPT | Mod: CPTII,95,, | Performed by: INTERNAL MEDICINE

## 2022-06-01 PROCEDURE — 3072F LOW RISK FOR RETINOPATHY: CPT | Mod: CPTII,95,, | Performed by: INTERNAL MEDICINE

## 2022-06-01 PROCEDURE — 1159F PR MEDICATION LIST DOCUMENTED IN MEDICAL RECORD: ICD-10-PCS | Mod: CPTII,95,, | Performed by: INTERNAL MEDICINE

## 2022-06-01 PROCEDURE — 3066F PR DOCUMENTATION OF TREATMENT FOR NEPHROPATHY: ICD-10-PCS | Mod: CPTII,95,, | Performed by: INTERNAL MEDICINE

## 2022-06-01 PROCEDURE — 1160F PR REVIEW ALL MEDS BY PRESCRIBER/CLIN PHARMACIST DOCUMENTED: ICD-10-PCS | Mod: CPTII,95,, | Performed by: INTERNAL MEDICINE

## 2022-06-01 PROCEDURE — 3061F NEG MICROALBUMINURIA REV: CPT | Mod: CPTII,95,, | Performed by: INTERNAL MEDICINE

## 2022-06-01 PROCEDURE — 1159F MED LIST DOCD IN RCRD: CPT | Mod: CPTII,95,, | Performed by: INTERNAL MEDICINE

## 2022-06-01 PROCEDURE — 99213 PR OFFICE/OUTPT VISIT, EST, LEVL III, 20-29 MIN: ICD-10-PCS | Mod: 95,,, | Performed by: INTERNAL MEDICINE

## 2022-06-01 PROCEDURE — 3066F NEPHROPATHY DOC TX: CPT | Mod: CPTII,95,, | Performed by: INTERNAL MEDICINE

## 2022-06-01 RX ORDER — BENZONATATE 200 MG/1
200 CAPSULE ORAL 3 TIMES DAILY PRN
Qty: 30 CAPSULE | Refills: 0 | Status: SHIPPED | OUTPATIENT
Start: 2022-06-01 | End: 2022-06-11

## 2022-06-01 NOTE — TELEPHONE ENCOUNTER
----- Message from Rafita Amezcua DO sent at 6/1/2022  2:52 PM CDT -----  Follow-up when available for an annual

## 2022-06-01 NOTE — PROGRESS NOTES
" Patient ID: Abhi Pompa is a 51 y.o. male.    Chief Complaint: No chief complaint on file.    Visit type: VIRTUAL    Assessment and Plan      Cough status post COVID.  Some fatigue.  I think this will continue to get better.  Will trial Tessalon.    Due for annual    1. Persistent cough  benzonatate (TESSALON) 200 MG capsule      HPI     Type 2 diabetes, hyperlipidemia, generalized anxiety disorder, testosterone deficiency, GERD, history of colonic polyps    COVID positive 2 weeks ago.  Feels good now but still having fatigue and cough.  Sleep is good.  Was taking DayQuil and NyQuil       Review of Systems   Constitutional: Positive for fatigue. Negative for chills and fever.   HENT: Negative for ear pain, postnasal drip, rhinorrhea and sore throat.    Respiratory: Positive for cough. Negative for shortness of breath and wheezing.    Cardiovascular: Negative for chest pain.   Musculoskeletal: Negative for myalgias.   Skin: Negative for rash.   Allergic/Immunologic: Negative for environmental allergies.   Neurological: Negative for headaches.     Medication List with Changes/Refills   New Medications    BENZONATATE (TESSALON) 200 MG CAPSULE    Take 1 capsule (200 mg total) by mouth 3 (three) times daily as needed for Cough.   Current Medications    BLOOD SUGAR DIAGNOSTIC STRP    1 each by Misc.(Non-Drug; Combo Route) route 2 (two) times daily. Strips for One Touch Verio Flex Meter    BLOOD SUGAR DIAGNOSTIC STRP    TEST EVERY DAY    BLOOD-GLUCOSE METER KIT        BLUNT NEEDLE, DISPOSABLE 18 X 1 1/2 " NDLE    Use as directed with testosterone injections    CHOLESTYRAMINE (QUESTRAN) 4 GRAM PACKET    Take 1 packet (4 g total) by mouth once daily.    CREATINE MONOHYDRATE (CREATINE ORAL)    Take by mouth.    FAMOTIDINE (PEPCID) 40 MG TABLET    Take 1 tablet (40 mg total) by mouth once daily.    FISH,SAF,FLX,BRG OILS/O3,6,9N2 (FISH-FLAX-BORAGE OIL ORAL)    Take by mouth.    FLASH GLUCOSE SCANNING READER (FREESTYLE " "YAN 2 READER) MISC    Use as directed to check blood glucose.    FREESTYLE YAN 14 DAY SENSOR KIT    CHANGE SENSOR EVERY 14 DAYS    JARDIANCE 25 MG TABLET    TAKE 1 TABLET BY MOUTH EVERY DAY    MELATONIN 10 MG TAB    Take 10 mg by mouth nightly.    METFORMIN (GLUCOPHAGE-XR) 500 MG ER 24HR TABLET    TAKE 2 TABLETS BY MOUTH 2 TIMES DAILY WITH MEALS.    MINOXIDIL (ROGAINE) 2 % EXTERNAL SOLUTION    Apply 1 application topically 2 (two) times daily.    OMEPRAZOLE (PRILOSEC) 40 MG CAPSULE    Take 1 capsule (40 mg total) by mouth once daily.    ONETOUCH DELICA LANCETS 33 GAUGE MISC    TEST EVERY DAY    SILDENAFIL (REVATIO) 20 MG TAB    TAKE UP TO 5 TABLETS BY MOUTH DAILY AS NEEDED 30 MINUTES PRIOR TO INTERCOURSE **MD DENIED, PT CONTACT MD 11/13**    SIMVASTATIN (ZOCOR) 10 MG TABLET    TAKE 1 TABLET BY MOUTH EVERY DAY IN THE EVENING    SYRINGE WITH NEEDLE 1 ML 22 GAUGE X 1 1/2" SYRG    Use as directed to administer testosterone injections.    TADALAFIL (CIALIS) 10 MG TABLET    Take 1 tablet (10 mg total) by mouth daily as needed for Erectile Dysfunction.    TESTOSTERONE CYPIONATE (DEPOTESTOTERONE CYPIONATE) 100 MG/ML INJECTION    INJECT 0.5ML INTO THE MUSCLE ONCE A WEEK    TURMERIC ORAL    Take by mouth.    UNABLE TO FIND    Amitriptyline 1%/Meloxicam 0.09%/Lidocaine Hcl 2%/ Prilocaine 2%   APPLY 2 GRAMS QID    UNABLE TO FIND    Beet root    VITAMIN B COMPLEX ORAL    Take 1 tablet by mouth once daily.        The patient location is:  [place]  The chief complaint leading to consultation is:  Cough  Face to Face time with patient:  15 minutes  minutes of total time spent on the encounter, which includes face to face time and   non-face to face time preparing to see the patient (eg, review of tests), Obtaining and/or   reviewing separately obtained history, Documenting clinical information in the electronic   or other health record, Independently interpreting results (not separately reported) and   communicating results " to the patient/family/caregiver, or   Care coordination (not separately reported).     Each patient to whom he or she provides medical services by telemedicine is:    (1) informed of the relationship between the physician and patient and the respective   role of any other health care provider with respect to management of the patient; and   (2) notified that he or she may decline to receive medical services by telemedicine and   may withdraw from such care at any time.    I personally reviewed past medical, family and social history.              Answers for HPI/ROS submitted by the patient on 6/1/2022  Chronicity: new  Onset: 1 to 4 weeks ago  Progression since onset: waxing and waning  Frequency: hourly  Cough characteristics: productive of sputum  ear congestion: No  heartburn: No  hemoptysis: No  nasal congestion: No  sweats: No  weight loss: No  Aggravated by: nothing  asthma: No  bronchiectasis: No  bronchitis: No  COPD: No  emphysema: No  pneumonia: No  Treatments tried: OTC cough suppressant  Improvement on treatment: mild

## 2022-06-28 NOTE — H&P
History & Physical - Short Stay  Gastroenterology      SUBJECTIVE:     Procedure: Gastroscopy    Chief Complaint/Indication for Procedure:  Epig pain.    History of Present Illness:  See recent GI OV note:  Office Visit   5/6/2022  [place] - Gastroenterology     KARYNA Ba    Gastroenterology  Encounter for monitoring long-term proton pump inhibitor therapy +7 more    Dx  Follow-up  Rx Refill; Referred by Aaareferral Self    Reason for Visit       Progress Notes  KARYNA Ba (Nurse Practitioner)   Gastroenterology  Subjective:       Patient ID: Abhi Pompa is a 51 y.o. male Body mass index is 34.94 kg/m².     Chief Complaint: Follow-up and Rx Refill  This patient is established with myself and Dr. Dunbar.     Patient is here for annual follow-up as requested.     Gastroesophageal Reflux  He complains of abdominal pain (intermittent, maybe once a month, epigastric pain occurs after taking supplements, lasts 10-15 minutes), belching (not more than usual), dysphagia (occasional; occurs with cold drinks, chicken and doughy foods; significantly improved since surgery), globus sensation (rarely) and heartburn (rarely as long as he takes his medications). He reports no chest pain, no choking, no coughing, no early satiety, no hoarse voice, no nausea, no sore throat or no water brash. This is a chronic (for several years) problem. The problem occurs rarely. The problem has been rapidly improving (controlled on reflux medications). The heartburn wakes him from sleep. The heartburn changes with position. The symptoms are aggravated by lying down and stress (tomato products, red foods, lying on left side). Pertinent negatives include no fatigue, melena or weight loss. Risk factors include ETOH use, caffeine use, obesity, smoking/tobacco exposure and NSAIDs (reports NSAIDs use PRN). He has tried a histamine-2 antagonist, an antacid, a PPI, a diet change and ETOH reduction (prilosec  "40 mg once daily; pepcid 40 mg nightly; PAST: carafate, zantac) for the symptoms. The treatment provided significant relief. Past procedures include an EGD and esophageal manometry. Past invasive treatments include gastroplication (heller with fundoplication).      Review of Systems   Constitutional: Negative for appetite change, chills, diaphoresis, fatigue, fever and weight loss.   HENT: Positive for trouble swallowing. Negative for hoarse voice and sore throat.    Respiratory: Negative for cough, choking, chest tightness and shortness of breath.    Cardiovascular: Negative for chest pain.   Gastrointestinal: Positive for abdominal pain (intermittent, maybe once a month, epigastric pain occurs after taking supplements, lasts 10-15 minutes), diarrhea (Reports he takes questran 4 grams daily PRN- which is rarely for intermittent diarrhea from metformin use; denies diarrhea currently), dysphagia (occasional; occurs with cold drinks, chicken and doughy foods; significantly improved since surgery) and heartburn (rarely as long as he takes his medications). Negative for anal bleeding, blood in stool, constipation, melena, nausea, rectal pain and vomiting.     12/11/2015 EGD was reviewed and procedure report states:   " Impression: - Normal oropharynx.  - Reflux esophagitis. Biopsied.  - Normal esophagus.  - Z-line regular, 40 cm from the incisors.  - Normal cardia.  - Normal stomach. Biopsied.  - Normal examined duodenum.  - No endoscopic esophageal abnormality to explain patient's dysphagia. Esophagus dilated, 51 Fr and 54 Fr.  Recommendation: - Discharge patient to home.  - Await pathology results.  - Follow an antireflux regimen.  - Continue present medications.  - Use Prilosec (omeprazole) 40 mg PO daily.  - Use Zantac (ranitidine) 300 mg PO daily.  - Add sucralfate tablets 2 gram PO BID for 2 weeks, ac BF and HS.  - Call the G.I. clinic in 2 weeks for reports (if   you haven't heard from us sooner) [phone].  - " "Return to GI clinic in 6-8 weeks.  - If no improvement, do an upper GI series. ".    Biopsy results:   "DISTAL ESOPHAGUS, BIOPSY:  -Squamous mucosa with mild reactive changes.  -No significant inflammation.  -No intraepithelial eosinophils."  Felicia test negative      UGI 2/2/2016:  Narrative & Impression  Fluoroscopic and film studies of the esophagus demonstrate a diminished primary peristaltic wave with prominent tertiary contractions noted diffusely.  No definite mucosal abnormalities are seen in the do not see evidence of a significant distal stricture there is some delayed emptying of the esophagus of barium.  Fluoroscopy time is 0.7 minutes.  IMPRESSION:    Diminished primary peristaltic wave with prominent tertiary contractions and no definite anatomic or mucosal abnormalities    Electronically signed by: LARA TORRES MD  Date:                                            02/02/16        Esophageal Manometry 2/26/2016:  High Resolution esophageal manometry   Indications:          Suspected esophageal motility disorder   Providers:            Wong Bhatti MD  Findings:        The high resolution manometry catheter was introduced transnasally        after application of topical anesthesia to the nasal passage. The        catheter was then carefully advanced to the stomach. The catheter        was positioned so that distal sensors were in the stomach and        proximal sensors were located above the upper esophageal sphincter.        A brief acclimation period was provided, followed by wet swallows of        water.        LES basal (eSleeve): 53.3 mmHg (normal 13-43)        LES residual (IRP): 40 mmHg (normal <15)        Simultaneous contractions and panesophageal pressurization in all 10        contractions.        DCI, CFV, DL not calculated   Impression:      - Manometry results are diagnostic of Type II achalasia.   Recommendation:       - Return to referring physician/provider.   Wong Bhatti MD "   2/26/2016       Assessment:       1. Encounter for monitoring long-term proton pump inhibitor therapy    2. Gastroesophageal reflux disease with esophagitis without hemorrhage    3. Epigastric pain    4. Achalasia    5. History of esophageal surgery    6. Pharyngoesophageal dysphagia    7. Globus sensation    8. Intermittent diarrhea        Plan:       Encounter for monitoring long-term proton pump inhibitor therapy & Gastroesophageal reflux disease with esophagitis without hemorrhage  -     Vitamin B12; Future; Expected date: 05/06/2022  -     Magnesium; Future; Expected date: 05/06/2022  -  REFILL   omeprazole (PRILOSEC) 40 MG capsule; Take 1 capsule (40 mg total) by mouth once daily.  Dispense: 90 capsule; Refill: 3  - REFILL    famotidine (PEPCID) 40 MG tablet; Take 1 tablet (40 mg total) by mouth once daily.  Dispense: 90 tablet; Refill: 3  - discussed with patient about long term use of reflux medications (preference to use lowest effective dose or discontinuing if possible), the risk and benefits of using these medications long term, the risk of untreated GERD such as hernandez's esophagus, and recommend a diet high in calcium and/or taking OTC calcium and vitamin d supplements as directed (such as Citracal +D), patient verbalized understanding & patient wants to continue current medications at current dosages.  - recommend annual monitoring with blood work to include CMP, CBC, vitamin B12, and magnesium.     Epigastric pain  - schedule EGD, discussed procedure with patient, including risks and benefits, patient verbalized understanding  - avoid/minimize use of NSAIDs- since they can cause GI upset, bleeding and/or ulcers. If NSAID must be taken, recommend take with food.     Achalasia, History of esophageal surgery, & Pharyngoesophageal dysphagia  - schedule EGD, discussed procedure with patient and possible esophageal dilation may be performed during procedure if indicated, patient verbalized  understanding  - educated patient to eat smaller more frequent meals and to eat slowly and advised to eat a soft diet.  - possible UGI/esophagram/esophageal manometry if symptoms persist  - Recommend follow-up with Dr. Watkins & Dr. Le for continued evaluation and management.     Globus sensation  Recommend follow-up with Primary Care Provider for continued evaluation and management.  - schedule EGD, discussed procedure with patient, including risks and benefits, patient verbalized understanding     Intermittent diarrhea  - recommended OTC probiotic, such as Align or Culturelle, as directed  - avoid lactose & caffeine  - avoid known triggers  - discussed with patient that certain medications, such as metformin, may be contributing to symptoms. I recommend follow-up with provider who manages medication to discuss about possible alternative therapy, patient verbalized understanding  - recommend stool studies if diarrhea recurs/worsens  - schedule EGD, discussed procedure with patient, including risks and benefits, patient verbalized understanding  - can continue questran 4 grams once daily prn diarrhea     Follow up in about 3 months (around 8/6/2022), or if symptoms worsen or fail to improve.    If no improvement in symptoms or symptoms worsen, call/follow-up at clinic or go to ER              See the Heller Myotomy report Surgery Date: 7/22/2016    Surgeon(s) and Role:     * Manny Joaquin MD     * Luis A Le MD - Primary   Pre-op Diagnosis:  Achalasia [K22.0]   Post-op Diagnosis:  Achalasia [K22.0]   Procedure(s) (LRB):  [id number] (N/A)    PROCEDURE IN DETAIL:  The blunt dissection occurred   into the mediastinum until 5 or 6 cm of esophagus could be pulled into the   abdominal cavity without difficulty. We reflected the anterior vagus nerves   into the patient's right and removed the epiphrenic fat pad from the anterior   esophagus. We started our myotomy 2 cm on the esophagus  with the Metzenbaum   scissors and we went up onto the esophagus for 4 to 5 cm using the hook cautery.  We brought the myotomy down on to the stomach for 2 cm using blunt dissection.  Endoscopy was then performed. There were no air leaks or enterotomies seen   and the path into the stomach appeared without any obstruction.  There was not repaired with one plegeted 0 Neurolon suture.  The crura were well approximated to the esophagus and there was an appropriate looking angle of his and the esophagus was a little dilated on our egd so fundoplication was not performed.           PTA Medications   Medication Sig    cholestyramine (QUESTRAN) 4 gram packet Take 1 packet (4 g total) by mouth once daily. (Patient taking differently: Take 4 g by mouth daily as needed.)    creatine monohydrate (CREATINE ORAL) Take by mouth.    famotidine (PEPCID) 40 MG tablet Take 1 tablet (40 mg total) by mouth once daily.    JARDIANCE 25 mg tablet TAKE 1 TABLET BY MOUTH EVERY DAY    melatonin 10 mg Tab Take 10 mg by mouth nightly.    metFORMIN (GLUCOPHAGE-XR) 500 MG ER 24hr tablet TAKE 2 TABLETS BY MOUTH 2 TIMES DAILY WITH MEALS.    minoxidil (ROGAINE) 2 % external solution Apply 1 application topically 2 (two) times daily.    omeprazole (PRILOSEC) 40 MG capsule Take 1 capsule (40 mg total) by mouth once daily.    sildenafil (REVATIO) 20 mg Tab TAKE UP TO 5 TABLETS BY MOUTH DAILY AS NEEDED 30 MINUTES PRIOR TO INTERCOURSE **MD DENIED, PT CONTACT MD 11/13**    simvastatin (ZOCOR) 10 MG tablet TAKE 1 TABLET BY MOUTH EVERY DAY IN THE EVENING    tadalafiL (CIALIS) 10 MG tablet Take 1 tablet (10 mg total) by mouth daily as needed for Erectile Dysfunction.    testosterone cypionate (DEPOTESTOTERONE CYPIONATE) 100 mg/mL injection INJECT 0.5ML INTO THE MUSCLE ONCE A WEEK    TURMERIC ORAL Take by mouth.    UNABLE TO FIND Beet root    VITAMIN B COMPLEX ORAL Take 1 tablet by mouth once daily.    blood sugar diagnostic Strp 1 each by  "Misc.(Non-Drug; Combo Route) route 2 (two) times daily. Strips for One Touch Verio Flex Meter    blood sugar diagnostic Strp TEST EVERY DAY    blood-glucose meter kit     blunt needle, disposable 18 x 1 1/2 " Ndle Use as directed with testosterone injections    fish,saf,flx,brg oils/o3,6,9n2 (FISH-FLAX-BORAGE OIL ORAL) Take by mouth.    flash glucose scanning reader (FREESTYLE YAN 2 READER) Misc Use as directed to check blood glucose.    FREESTYLE YAN 14 DAY SENSOR Kit CHANGE SENSOR EVERY 14 DAYS    ONETOUCH DELICA LANCETS 33 gauge Misc TEST EVERY DAY    syringe with needle 1 mL 22 gauge x 1 1/2" Syrg Use as directed to administer testosterone injections.    UNABLE TO FIND Amitriptyline 1%/Meloxicam 0.09%/Lidocaine Hcl 2%/ Prilocaine 2%   APPLY 2 GRAMS QID       Review of patient's allergies indicates:   Allergen Reactions    Poison ivy extract         Past Medical History:   Diagnosis Date    Achalasia     Arthritis     right ankle    Cataract     Colon polyp     Diabetes mellitus     taking lisinopril preventive to protect the kidneys, no HTN    Dyslipidemia     pt denies    Dysphagia     Former tobacco use     chew    GERD (gastroesophageal reflux disease)     H. pylori infection     Hypogonadism in male     MELLY (obstructive sleep apnea)     uses cpap     Past Surgical History:   Procedure Laterality Date    ANKLE SURGERY Right     COLONOSCOPY N/A 04/19/2022    Procedure: COLONOSCOPY;  Surgeon: Julian Dunbar Jr., MD;  Location: [facility];  Service: Endoscopy;  Laterality: N/A; internal hemorrhoids, 1 colon polyp removed; repeat in 8-10 years for surveillance; biopsy: Colonic mucosa with prominent lymphoid aggregates. Pending molecular study. See comment.    HAND SURGERY Right     laparoscopic Heller myotomy      MYOTOMY  07/2016    heller- achalasia surgery    TONSILLECTOMY      UPPER GASTROINTESTINAL ENDOSCOPY  12/11/2015    WISDOM TOOTH EXTRACTION       Family History " "  Problem Relation Age of Onset    Diabetes Mother     Allergies Mother     Lupus Mother     Diabetes Maternal Aunt     Allergic rhinitis Sister     Angioedema Neg Hx     Asthma Neg Hx     Eczema Neg Hx     Immunodeficiency Neg Hx     Urticaria Neg Hx     Amblyopia Neg Hx     Blindness Neg Hx     Cancer Neg Hx     Cataracts Neg Hx     Glaucoma Neg Hx     Hypertension Neg Hx     Macular degeneration Neg Hx     Retinal detachment Neg Hx     Strabismus Neg Hx     Stroke Neg Hx     Thyroid disease Neg Hx     Celiac disease Neg Hx     Cirrhosis Neg Hx     Colon cancer Neg Hx     Colon polyps Neg Hx     Crohn's disease Neg Hx     Cystic fibrosis Neg Hx     Esophageal cancer Neg Hx     Hemochromatosis Neg Hx     Inflammatory bowel disease Neg Hx     Irritable bowel syndrome Neg Hx     Liver cancer Neg Hx     Liver disease Neg Hx     Rectal cancer Neg Hx     Stomach cancer Neg Hx     Ulcerative colitis Neg Hx     Harvey's disease Neg Hx     Lymphoma Neg Hx     Tuberculosis Neg Hx     Scleroderma Neg Hx     Rheum arthritis Neg Hx     Multiple sclerosis Neg Hx     Melanoma Neg Hx     Psoriasis Neg Hx     Skin cancer Neg Hx      Social History     Tobacco Use    Smoking status: Current Some Day Smoker     Types: Cigars    Smokeless tobacco: Former User     Types: Chew     Quit date: 10/19/2012    Tobacco comment: Occasional cigar   Substance Use Topics    Alcohol use: Yes     Alcohol/week: 7.0 standard drinks     Types: 7 Glasses of wine per week     Comment: 1 glass of red wine every night    Drug use: No         OBJECTIVE:     Vital Signs (Most Recent)  Temp: 98.6 °F (37 °C) (06/29/22 0839)  Pulse: 77 (06/29/22 0839)  Resp: 16 (06/29/22 0839)  BP: (!) 143/98 (06/29/22 0839)  SpO2: 95 % (06/29/22 0839)    Physical Exam:  :Ht: 5' 6" (167.6 cm)   Wt: 97.5 kg (215 lb)   BMI: 34.70 kg/m²                                                          GENERAL:  Comfortable, in no acute " distress.                                 HEENT EXAM:  Nonicteric.  No adenopathy.  Oropharynx is clear.               NECK:  Supple.                                                               LUNGS:  Clear.                                                               CARDIAC:  Regular rate and rhythm.  S1, S2.  No murmur.                      ABDOMEN:  Obese.  Soft, positive bowel sounds, nontender.  No hepatosplenomegaly or masses.  No rebound or guarding.                                             EXTREMITIES:  No edema.     MENTAL STATUS:  Alert and oriented.    ASSESSMENT/PLAN:     Assessment:  Epig pain.    Plan: Gastroscopy    Anesthesia Plan:   MAC / General Anaesthesia    ASA Grade: ASA 2 - Patient with mild systemic disease with no functional limitations    MALLAMPATI SCORE: II (hard and soft palate, upper portion of tonsils anduvula visible)

## 2022-06-29 ENCOUNTER — ANESTHESIA (OUTPATIENT)
Dept: ENDOSCOPY | Facility: HOSPITAL | Age: 52
End: 2022-06-29
Payer: COMMERCIAL

## 2022-06-29 ENCOUNTER — ANESTHESIA EVENT (OUTPATIENT)
Dept: ENDOSCOPY | Facility: HOSPITAL | Age: 52
End: 2022-06-29
Payer: COMMERCIAL

## 2022-06-29 ENCOUNTER — HOSPITAL ENCOUNTER (OUTPATIENT)
Facility: HOSPITAL | Age: 52
Discharge: HOME | End: 2022-06-29
Attending: INTERNAL MEDICINE | Admitting: INTERNAL MEDICINE
Payer: COMMERCIAL

## 2022-06-29 DIAGNOSIS — R10.13 EPIGASTRIC PAIN: ICD-10-CM

## 2022-06-29 LAB — GLUCOSE SERPL-MCNC: 150 MG/DL (ref 70–110)

## 2022-06-29 PROCEDURE — 43239 EGD BIOPSY SINGLE/MULTIPLE: CPT | Mod: ,,, | Performed by: INTERNAL MEDICINE

## 2022-06-29 PROCEDURE — 43239 EGD BIOPSY SINGLE/MULTIPLE: CPT | Mod: PO | Performed by: INTERNAL MEDICINE

## 2022-06-29 PROCEDURE — D9220A PRA ANESTHESIA: ICD-10-PCS | Mod: ANES,,, | Performed by: ANESTHESIOLOGY

## 2022-06-29 PROCEDURE — 63600175 PHARM REV CODE 636 W HCPCS: Mod: PO | Performed by: INTERNAL MEDICINE

## 2022-06-29 PROCEDURE — 37000008 HC ANESTHESIA 1ST 15 MINUTES: Mod: PO | Performed by: INTERNAL MEDICINE

## 2022-06-29 PROCEDURE — 88305 TISSUE EXAM BY PATHOLOGIST: CPT | Mod: 26,,, | Performed by: PATHOLOGY

## 2022-06-29 PROCEDURE — 43239 PR EGD, FLEX, W/BIOPSY, SGL/MULTI: ICD-10-PCS | Mod: ,,, | Performed by: INTERNAL MEDICINE

## 2022-06-29 PROCEDURE — 37000009 HC ANESTHESIA EA ADD 15 MINS: Mod: PO | Performed by: INTERNAL MEDICINE

## 2022-06-29 PROCEDURE — D9220A PRA ANESTHESIA: ICD-10-PCS | Mod: CRNA,,, | Performed by: NURSE ANESTHETIST, CERTIFIED REGISTERED

## 2022-06-29 PROCEDURE — 63600175 PHARM REV CODE 636 W HCPCS: Mod: PO | Performed by: NURSE ANESTHETIST, CERTIFIED REGISTERED

## 2022-06-29 PROCEDURE — 88305 TISSUE EXAM BY PATHOLOGIST: CPT | Performed by: PATHOLOGY

## 2022-06-29 PROCEDURE — 27201012 HC FORCEPS, HOT/COLD, DISP: Mod: PO | Performed by: INTERNAL MEDICINE

## 2022-06-29 PROCEDURE — 88305 TISSUE EXAM BY PATHOLOGIST: ICD-10-PCS | Mod: 26,,, | Performed by: PATHOLOGY

## 2022-06-29 PROCEDURE — D9220A PRA ANESTHESIA: Mod: CRNA,,, | Performed by: NURSE ANESTHETIST, CERTIFIED REGISTERED

## 2022-06-29 PROCEDURE — 25000003 PHARM REV CODE 250: Mod: PO | Performed by: NURSE ANESTHETIST, CERTIFIED REGISTERED

## 2022-06-29 PROCEDURE — D9220A PRA ANESTHESIA: Mod: ANES,,, | Performed by: ANESTHESIOLOGY

## 2022-06-29 RX ORDER — SODIUM CHLORIDE 0.9 % (FLUSH) 0.9 %
10 SYRINGE (ML) INJECTION
Status: DISCONTINUED | OUTPATIENT
Start: 2022-06-29 | End: 2022-06-29 | Stop reason: HOSPADM

## 2022-06-29 RX ORDER — SODIUM CHLORIDE, SODIUM LACTATE, POTASSIUM CHLORIDE, CALCIUM CHLORIDE 600; 310; 30; 20 MG/100ML; MG/100ML; MG/100ML; MG/100ML
INJECTION, SOLUTION INTRAVENOUS CONTINUOUS
Status: DISCONTINUED | OUTPATIENT
Start: 2022-06-29 | End: 2022-06-29 | Stop reason: HOSPADM

## 2022-06-29 RX ORDER — LIDOCAINE HCL/PF 100 MG/5ML
SYRINGE (ML) INTRAVENOUS
Status: DISCONTINUED | OUTPATIENT
Start: 2022-06-29 | End: 2022-06-29

## 2022-06-29 RX ORDER — PROPOFOL 10 MG/ML
VIAL (ML) INTRAVENOUS CONTINUOUS PRN
Status: DISCONTINUED | OUTPATIENT
Start: 2022-06-29 | End: 2022-06-29

## 2022-06-29 RX ORDER — PROPOFOL 10 MG/ML
VIAL (ML) INTRAVENOUS
Status: DISCONTINUED | OUTPATIENT
Start: 2022-06-29 | End: 2022-06-29

## 2022-06-29 RX ADMIN — PROPOFOL 200 MCG/KG/MIN: 10 INJECTION, EMULSION INTRAVENOUS at 09:06

## 2022-06-29 RX ADMIN — SODIUM CHLORIDE, SODIUM LACTATE, POTASSIUM CHLORIDE, AND CALCIUM CHLORIDE: .6; .31; .03; .02 INJECTION, SOLUTION INTRAVENOUS at 08:06

## 2022-06-29 RX ADMIN — PROPOFOL 100 MG: 10 INJECTION, EMULSION INTRAVENOUS at 09:06

## 2022-06-29 RX ADMIN — LIDOCAINE HYDROCHLORIDE 100 MG: 20 INJECTION, SOLUTION INTRAVENOUS at 09:06

## 2022-06-29 RX ADMIN — SODIUM CHLORIDE, SODIUM LACTATE, POTASSIUM CHLORIDE, AND CALCIUM CHLORIDE: .6; .31; .03; .02 INJECTION, SOLUTION INTRAVENOUS at 09:06

## 2022-06-29 NOTE — BRIEF OP NOTE
Discharge Note  Short Stay      SUMMARY     Admit Date: 6/29/2022    Attending Physician: Julian Dunbar Jr., MD     Discharge Physician: Julian Dunbar Jr., MD    Discharge Date: 6/29/2022 10:02 AM    Final Diagnosis: Heartburn [R12]  Achalasia [K22.0]      Impression:            - Normal oropharynx.                          - Normal cricopharyngeus.                          - Reflux esophagitis.                          - Normal esophagus.                          - Z-line regular, 39 cm from the incisors.                          - Non-erosive esophageal reflux (NERD) disease                          present.                          - Normal cardia.                          - Gastritis. Biopsied.                          - Normal stomach otherwise.                          - Normal pylorus.                          - Normal examined duodenum.                          - Normal major papilla.   Recommendation:        - Discharge patient to home.                          - Await pathology results.                          - Follow an antireflux regimen.                          - Exercise and weight loss.                          - Continue present medications.                          - Use Prilosec (omeprazole) 40 mg PO daily.                          - Use Pepcid (famotidine) 40 mg PO daily.                          - Call the G.I. clinic in 2 weeks for reports (if                          you haven't heard from us sooner) [phone].                          - Return to GI clinic in 1 year, or earlier, PRN.   Julian Dunbar MD   6/29/2022       Disposition: HOME OR SELF CARE    Patient Instructions:   Current Discharge Medication List      CONTINUE these medications which have NOT CHANGED    Details   cholestyramine (QUESTRAN) 4 gram packet Take 1 packet (4 g total) by mouth once daily.  Qty: 30 packet, Refills: 11    Associated Diagnoses: Diarrhea, unspecified type      creatine monohydrate (CREATINE  ORAL) Take by mouth.      famotidine (PEPCID) 40 MG tablet Take 1 tablet (40 mg total) by mouth once daily.  Qty: 90 tablet, Refills: 3    Associated Diagnoses: Encounter for monitoring long-term proton pump inhibitor therapy; Gastroesophageal reflux disease with esophagitis without hemorrhage      JARDIANCE 25 mg tablet TAKE 1 TABLET BY MOUTH EVERY DAY  Qty: 30 tablet, Refills: 6    Associated Diagnoses: Type 2 diabetes mellitus without complication, unspecified whether long term insulin use      melatonin 10 mg Tab Take 10 mg by mouth nightly.      metFORMIN (GLUCOPHAGE-XR) 500 MG ER 24hr tablet TAKE 2 TABLETS BY MOUTH 2 TIMES DAILY WITH MEALS.  Qty: 360 tablet, Refills: 3      minoxidil (ROGAINE) 2 % external solution Apply 1 application topically 2 (two) times daily.      omeprazole (PRILOSEC) 40 MG capsule Take 1 capsule (40 mg total) by mouth once daily.  Qty: 90 capsule, Refills: 3    Associated Diagnoses: Encounter for monitoring long-term proton pump inhibitor therapy; Gastroesophageal reflux disease with esophagitis without hemorrhage      sildenafil (REVATIO) 20 mg Tab TAKE UP TO 5 TABLETS BY MOUTH DAILY AS NEEDED 30 MINUTES PRIOR TO INTERCOURSE **MD DENIED, PT CONTACT MD 11/13**  Qty: 30 tablet, Refills: 3    Associated Diagnoses: Erectile dysfunction due to diseases classified elsewhere      simvastatin (ZOCOR) 10 MG tablet TAKE 1 TABLET BY MOUTH EVERY DAY IN THE EVENING  Qty: 90 tablet, Refills: 3      tadalafiL (CIALIS) 10 MG tablet Take 1 tablet (10 mg total) by mouth daily as needed for Erectile Dysfunction.  Qty: 10 tablet, Refills: 3    Associated Diagnoses: Erectile dysfunction due to diseases classified elsewhere      testosterone cypionate (DEPOTESTOTERONE CYPIONATE) 100 mg/mL injection INJECT 0.5ML INTO THE MUSCLE ONCE A WEEK  Qty: 10 mL, Refills: 3    Comments: This request is for a new prescription for a controlled substance as required by Federal/State law..  Associated Diagnoses: Male  "hypogonadism      TURMERIC ORAL Take by mouth.      !! UNABLE TO FIND Beet root      VITAMIN B COMPLEX ORAL Take 1 tablet by mouth once daily.      !! blood sugar diagnostic Strp 1 each by Misc.(Non-Drug; Combo Route) route 2 (two) times daily. Strips for One Touch Verio Flex Meter  Qty: 200 strip, Refills: 3      !! blood sugar diagnostic Strp TEST EVERY DAY      blood-glucose meter kit       blunt needle, disposable 18 x 1 1/2 " Ndle Use as directed with testosterone injections  Qty: 100 each, Refills: 1      fish,saf,flx,brg oils/o3,6,9n2 (FISH-FLAX-BORAGE OIL ORAL) Take by mouth.      flash glucose scanning reader (FREESTYLE YAN 2 READER) Misc Use as directed to check blood glucose.  Qty: 1 each, Refills: 0    Associated Diagnoses: Type 2 diabetes mellitus without complication, unspecified whether long term insulin use      FREESTYLE YAN 14 DAY SENSOR Kit CHANGE SENSOR EVERY 14 DAYS  Qty: 2 kit, Refills: 6      ONETOUCH DELICA LANCETS 33 gauge Misc TEST EVERY DAY  Qty: 100 each, Refills: 5      syringe with needle 1 mL 22 gauge x 1 1/2" Syrg Use as directed to administer testosterone injections.  Qty: 100 each, Refills: 1      !! UNABLE TO FIND Amitriptyline 1%/Meloxicam 0.09%/Lidocaine Hcl 2%/ Prilocaine 2%   APPLY 2 GRAMS QID       !! - Potential duplicate medications found. Please discuss with provider.          Discharge Procedure Orders (must include Diet, Follow-up, Activity)    Follow Up:  Follow up with PCP as per your routine.  Please follow an anti reflux diet and a high fiber diet.  Activity as tolerated.    No driving day of procedure.  "

## 2022-06-29 NOTE — PROVATION PATIENT INSTRUCTIONS
Discharge Summary/Instructions after an Endoscopic Procedure  Patient Name: Abhi Pompa  Patient MRN: [id number]  Patient YOB: 1970  Wednesday, June 29, 2022  Julian Dunbar MD  Dear patient,  As a result of recent federal legislation (The Federal Cures Act), you may   receive lab or pathology results from your procedure in your [facility]   account before your physician is able to contact you. Your physician or   their representative will relay the results to you with their   recommendations at their soonest availability.  Thank you,  RESTRICTIONS:  During your procedure today, you received medications for sedation.  These   medications may affect your judgment, balance and coordination.  Therefore,   for 24 hours, you have the following restrictions:   - DO NOT drive a car, operate machinery, make legal/financial decisions,   sign important papers or drink alcohol.    ACTIVITY:  Today: no heavy lifting, straining or running due to procedural   sedation/anesthesia.  The following day: return to full activity including work.  DIET:  Eat and drink normally unless instructed otherwise.     TREATMENT FOR COMMON SIDE EFFECTS:  - Mild abdominal pain, nausea, belching, bloating or excessive gas:  rest,   eat lightly and use a heating pad.  - Sore Throat: treat with throat lozenges and/or gargle with warm salt   water.  - Because air was used during the procedure, expelling large amounts of air   from your rectum or belching is normal.  - If a bowel prep was taken, you may not have a bowel movement for 1-3 days.    This is normal.  SYMPTOMS TO WATCH FOR AND REPORT TO YOUR PHYSICIAN:  1. Abdominal pain or bloating, other than gas cramps.  2. Chest pain.  3. Back pain.  4. Signs of infection such as: chills or fever occurring within 24 hours   after the procedure.  5. Rectal bleeding, which would show as bright red, maroon, or black stools.   (A tablespoon of blood from the rectum is not serious,  especially if   hemorrhoids are present.)  6. Vomiting.  7. Weakness or dizziness.  GO DIRECTLY TO THE NEAREST EMERGENCY ROOM IF YOU HAVE ANY OF THE FOLLOWING:      Difficulty breathing              Chills and/or fever over 101 F   Persistent vomiting and/or vomiting blood   Severe abdominal pain   Severe chest pain   Black, tarry stools   Bleeding- more than one tablespoon   Any other symptom or condition that you feel may need urgent attention  Your doctor recommends these additional instructions:  If any biopsies were taken, your doctors clinic will contact you in 1 to 2   weeks with any results.  Follow an antireflux regimen.  This includes:       - Do not lie down for at least 3 to 4 hours after meals.        - Raise the head of the bed 4 to 6 inches.        - Decrease excess weight.        - Avoid citrus juices and other acidic foods, alcohol, chocolate, mints,   coffee and other caffeinated beverages, carbonated beverages, fatty and   fried foods.        - Avoid tight-fitting clothing.        - Avoid cigarettes and other tobacco products.   Especially:   Exercise and lose weight.  Continue your present medications.   Take Prilosec (omeprazole) 40 mg by mouth once a day.   Take Pepcid (famotidine) 40 mg by mouth once a day.   For questions, problems or results please call your physician - Julian Dunbar MD at Work:  [phone].  EMERGENCY PHONE NUMBER: [phone], LAB RESULTS: [phone]  IF A COMPLICATION OR EMERGENCY SITUATION ARISES AND YOU ARE UNABLE TO REACH   YOUR PHYSICIAN - GO DIRECTLY TO THE EMERGENCY ROOM.  ___________________________________________  Nurse Signature  ___________________________________________  Patient/Designated Responsible Party Signature  Julian Dunbar MD  6/29/2022 10:00:11 AM  This report has been verified and signed electronically.  Dear patient,  As a result of recent federal legislation (The Federal Cures Act), you may   receive lab or pathology  results from your procedure in your [facility]   account before your physician is able to contact you. Your physician or   their representative will relay the results to you with their   recommendations at their soonest availability.  Thank you.  PROVATION

## 2022-06-29 NOTE — TRANSFER OF CARE
"Anesthesia Transfer of Care Note    Patient: Abhi Pompa    Procedure(s) Performed: Procedure(s) (LRB):  EGD (ESOPHAGOGASTRODUODENOSCOPY) (N/A)    Patient location: PACU    Anesthesia Type: general    Transport from OR: Transported from OR on room air with adequate spontaneous ventilation    Post pain: adequate analgesia    Post assessment: no apparent anesthetic complications and tolerated procedure well    Post vital signs: stable    Level of consciousness: sedated and awake    Nausea/Vomiting: no nausea/vomiting    Complications: none    Transfer of care protocol was followed      Last vitals:   Visit Vitals  /78   Pulse 70   Temp 37 °C (98.6 °F)   Resp 17   Ht 5' 6" (1.676 m)   Wt 97.5 kg (215 lb)   SpO2 (!) 92%   BMI 34.70 kg/m²     "

## 2022-06-29 NOTE — PATIENT INSTRUCTIONS
Recovery After Procedural Sedation (Adult)   You have been given medicine by vein to make you sleep during your surgery. This may have included both a pain medicine and sleeping medicine. Most of the effects have worn off. But you may still have some drowsiness for the next 6 to 8 hours.  Home care  Follow these guidelines when you get home:  For the next 8 hours, you should be watched by a responsible adult. This person should make sure your condition is not getting worse.  Don't drink any alcohol for the next 24 hours.  Don't drive, operate dangerous machinery, or make important business or personal decisions during the next 24 hours.  To prevent injury or falls, use caution when standing and walking for at least 24 hours after your procedure.  Note: Your healthcare provider may tell you not to take any medicine by mouth for pain or sleep in the next 4 hours. These medicines may react with the medicines you were given in the hospital. This could cause a much stronger response than usual.  Follow-up care  Follow up with your healthcare provider if you are not alert and back to your usual level of activity within 12 hours.  When to seek medical advice  Call your healthcare provider right away if any of these occur:  Drowsiness gets worse  Weakness or dizziness gets worse  Repeated vomiting  You can't be awakened  Fever  New rash  [employer] last reviewed this educational content on 9/1/2019  © 2537-0552 [employer]. [address] [place] [address], [place] [address]. All rights reserved. This information is not intended as a substitute for professional medical care. Always follow your healthcare professional's instructions       Tips to Control Acid Reflux    To control acid reflux, youll need to make some basic diet and lifestyle changes. The simple steps outlined below may be all youll need to ease discomfort.  Watch what you eat  Avoid fatty foods and spicy foods.  Eat fewer acidic foods, such as citrus and  tomato-based foods. These can increase symptoms.  Limit drinking alcohol, caffeine, and fizzy beverages. All increase acid reflux.  Try limiting chocolate, peppermint, and spearmint. These can worsen acid reflux in some people.  Watch when you eat  Avoid lying down for 3 hours after eating.  Do not snack before going to bed.  Raise your head  Raising your head and upper body by 4 to 6 inches helps limit reflux when youre lying down. Put blocks under the head of your bed frame to raise it.  Other changes  Lose weight, if you need to  Dont exercise near bedtime  Avoid tight-fitting clothes  Limit aspirin and ibuprofen  Stop smoking   Date Last Reviewed: 7/1/2016  © 3706-7917 [employer]. [address] [place] [address], [place] [address]. All rights reserved. This information is not intended as a substitute for professional medical care. Always follow your healthcare professional's instructions.         High-Fiber Diet  Fiber is in fruits, vegetables, cereals, and grains. Fiber passes through your body undigested. A high-fiber diet helps food move through your intestinal tract. The added bulk is helpful in preventing constipation. In people with diverticulosis, fiber helps clean out the pouches along the colon wall. It also prevents new pouches from forming. A high-fiber diet reduces the risk of colon cancer. It also lowers blood cholesterol and prevents high blood sugar in people with diabetes.    The fiber-rich foods listed below should be part of your diet. If you are not used to high-fiber foods, start with 1 or 2 foods from this list. Every 3 to 4 days add a new one to your diet. Do this until you are eating 4 high-fiber foods per day. This should give you 20 to 35 grams of fiber a day. It is also important to drink a lot of water when you are on this diet. You should have 6 to 8 glasses of water a day. Water makes the fiber swell and increases the benefit.  Foods high in dietary fiber  The following foods  are high in dietary fiber:  Breads. Breads made with 100% whole-wheat flour; wilmer, wheat, or rye crackers; whole-grain tortillas, bran muffins.  Cereals. Whole-grain and bran cereals with bran (shredded wheat, wheat flakes, raisin bran, corn bran); oatmeal, rolled oats, granola, and brown rice.  Fruits. Fresh fruits and their edible skins (pears, prunes, raisins, berries, apples, and apricots); bananas, citrus fruit, mangoes, pineapple; and prune juice.  Nuts. Any nuts and seeds.  Vegetables. Best served raw or lightly cooked. All types, especially: green peas, celery, eggplant, potatoes, spinach, broccoli, [place] sprouts, winter squash, carrots, cauliflower, soybeans, lentils, and fresh and dried beans of all kinds.  Other. Popcorn, any spices.  Date Last Reviewed: 8/1/2016  © 8015-5918 [employer]. [address] [place] [address]. All rights reserved. This information is not intended as a substitute for professional medical care. Always follow your healthcare professional's instructions.

## 2022-06-29 NOTE — ANESTHESIA PREPROCEDURE EVALUATION
06/29/2022  Abhi Pompa is a 51 y.o., male.      Pre-op Assessment    I have reviewed the Patient Summary Reports.     I have reviewed the Nursing Notes. I have reviewed the NPO Status.   I have reviewed the Medications.     Review of Systems  Anesthesia Hx:  No problems with previous Anesthesia    Social:  Alcohol Use, Smoker    Cardiovascular:   hyperlipidemia    Pulmonary:   Sleep Apnea    Hepatic/GI:   GERD    Endocrine:   Diabetes  Obesity / BMI > 30  Psych:   Psychiatric History anxiety          Physical Exam  General: Well nourished, Cooperative, Alert and Oriented    Airway:  Mallampati: III   Mouth Opening: Small, but > 3cm  TM Distance: Normal  Neck ROM: Normal ROM    Dental:  Intact        Anesthesia Plan  Type of Anesthesia, risks & benefits discussed:    Anesthesia Type: Gen Natural Airway  Intra-op Monitoring Plan: Standard ASA Monitors  Induction:  IV  Informed Consent: Informed consent signed with the Patient and all parties understand the risks and agree with anesthesia plan.  All questions answered.   ASA Score: 3  Day of Surgery Review of History & Physical: H&P Update referred to the surgeon/provider.    Ready For Surgery From Anesthesia Perspective.     .

## 2022-06-29 NOTE — ANESTHESIA POSTPROCEDURE EVALUATION
Anesthesia Post Evaluation    Patient: Abhi Pompa    Procedure(s) Performed: Procedure(s) (LRB):  EGD (ESOPHAGOGASTRODUODENOSCOPY) (N/A)    Final Anesthesia Type: general      Patient location during evaluation: PACU  Patient participation: Yes- Able to Participate  Level of consciousness: awake and alert  Post-procedure vital signs: reviewed and stable  Pain management: adequate  Airway patency: patent    PONV status at discharge: No PONV  Anesthetic complications: no      Cardiovascular status: hemodynamically stable  Respiratory status: unassisted and room air  Hydration status: euvolemic  Follow-up not needed.          Vitals Value Taken Time   /82 06/29/22 0952        Pulse 74 06/29/22 0952   Resp 14 06/29/22 0952   SpO2 95 % 06/29/22 0952         Event Time   Out of Recovery 10:15:27         Pain/Moi Score: Moi Score: 10 (6/29/2022  9:50 AM)

## 2022-06-30 VITALS
HEIGHT: 66 IN | SYSTOLIC BLOOD PRESSURE: 120 MMHG | RESPIRATION RATE: 14 BRPM | BODY MASS INDEX: 34.55 KG/M2 | HEART RATE: 74 BPM | DIASTOLIC BLOOD PRESSURE: 82 MMHG | WEIGHT: 215 LBS | OXYGEN SATURATION: 95 % | TEMPERATURE: 99 F

## 2022-07-06 LAB
FINAL PATHOLOGIC DIAGNOSIS: NORMAL
Lab: NORMAL

## 2022-07-13 ENCOUNTER — TELEPHONE (OUTPATIENT)
Dept: GASTROENTEROLOGY | Facility: CLINIC | Age: 52
End: 2022-07-13
Payer: COMMERCIAL

## 2022-07-22 ENCOUNTER — NUTRITION (OUTPATIENT)
Dept: DIABETES | Facility: CLINIC | Age: 52
End: 2022-07-22
Payer: COMMERCIAL

## 2022-07-22 VITALS — HEIGHT: 67 IN | WEIGHT: 224.88 LBS | BODY MASS INDEX: 35.29 KG/M2

## 2022-07-22 DIAGNOSIS — E11.9 TYPE 2 DIABETES MELLITUS WITHOUT RETINOPATHY: Primary | ICD-10-CM

## 2022-07-22 PROCEDURE — 99999 PR PBB SHADOW E&M-EST. PATIENT-LVL I: CPT | Mod: PBBFAC,,, | Performed by: DIETITIAN, REGISTERED

## 2022-07-22 PROCEDURE — 99999 PR PBB SHADOW E&M-EST. PATIENT-LVL I: ICD-10-PCS | Mod: PBBFAC,,, | Performed by: DIETITIAN, REGISTERED

## 2022-07-22 PROCEDURE — G0108 PR DIAB MANAGE TRN  PER INDIV: ICD-10-PCS | Mod: S$GLB,,, | Performed by: DIETITIAN, REGISTERED

## 2022-07-22 PROCEDURE — G0108 DIAB MANAGE TRN  PER INDIV: HCPCS | Mod: S$GLB,,, | Performed by: DIETITIAN, REGISTERED

## 2022-07-22 NOTE — PROGRESS NOTES
Diabetes Care Specialist Progress Note  Author: Leslie Ford RD, CDE  Date: 7/22/2022    Program Intake  Reason for Diabetes Program Visit:: Intervention  Type of Intervention:: Individual  Individual: Education  Education: Self-Management Skill Review  Current diabetes risk level:: low    Lab Results   Component Value Date    HGBA1C 6.3 (H) 04/18/2022     Clinical    Problem Review  Active comorbidities affecting diabetes self-care.: no  Reviewed health maintenance: yes    Clinical Assessment  Current Diabetes Treatment: Oral Medication (Jardiance 25 mg once daily, metformin 1000 mg BID (takes 2000 mg in evening and tolerates))  Have you ever experienced hypoglycemia (low blood sugar)?: yes  In the last month, how often have you experienced low blood sugar?: other (see comments) (rare episode of glucose of 51 mg)  Are you able to tell when your blood sugar is low?: Yes  Have you ever been hospitalized because your blood sugar was too low?: no  Have you ever experienced hyperglycemia (high blood sugar)?: yes  In the last month, how often have you experienced high blood sugar?: other (see comments) (rare episode of glucose > 250 mg/dL after drinking regular soda)  Are you able to tell when your blood sugar is high?: Yes  What are your symptoms?: other (see comments) (scans CGMS (Amena 2))  Have you ever been hospitalized because your blood sugar was high?: no    Medication Information  How do you obtain your medications?: Patient drives  How many days a week do you miss your medications?: Never  Do you sometimes have difficulty refilling your medications?: No  Medication adherence impacting ability to self-manage diabetes?: No    Labs  Do you have regular lab work to monitor your medications?: Yes  Type of Regular Lab Work: A1c, Cholesterol, CBC  Where do you get your labs drawn?: [facility]  Lab Compliance Barriers: No    Nutritional Status  Recent Changes in Weight: Weight Gain (Patient states he reached weight  goal of 215 lbs but recently had COVID (May/June 2022) and has not been able to continue his exercise regimen but is gradually resuming)  Current nutritional status an area of need that is impacting patient's ability to self-manage diabetes?: No    Additional Social History    Support  Does anyone support you with your diabetes care?: yes  Who supports you?: self  Who takes you to your medical appointments?: self  Does the current support meet the patient's needs?: Yes  Is Support an area impacting ability to self-manage diabetes?: No    Access to Mass Media & Technology  Does the patient have access to any of the following devices or technologies?: Smart phone  Media or technology needs impacting ability to self-manage diabetes?: No    Cognitive/Behavioral Health  Alert and Oriented: Yes  Difficulty Thinking: No  Requires Prompting: No  Requires assistance for routine expression?: No  Cognitive or behavioral barriers impacting ability to self-manage diabetes?: No    Culture/Yazidism  Culture or Scientology beliefs that may impact ability to access healthcare: No    Communication  Language preference: English  Hearing Problems: No  Vision Problems: No  Communication needs impacting ability to self-manage diabetes?: No    Health Literacy  Preferred Learning Method: Face to Face, Web Based, Hands On  How often do you need to have someone help you read instructions, pamphlets, or written material from your doctor or pharmacy?: Never  Health literacy needs impacting ability to self-manage diabetes?: No    Diabetes Self-Management Skills Assessment    Physical Activity/Exercise  Patient's daily activity level:: constantly moving  Patient formally exercises outside of work.: yes  Exercise Type: walking, weight training, swimming  Intensity: High  Frequency: four or more times a week  Duration: > 1 hour  Patient can identify forms of physical activity.: yes  Stated forms of physical activity:: recreational activities,  seated/chair exercises, yardwork  Patient can identify reasons why exercise/physical activity is important in diabetes management.: yes  Identified reasons:: lowers blood glucose, blood pressure, and cholesterol, strengthens heart, muscles, and bones  Physical Activity/Exercise Skills Assessment Completed: : Yes  Assessment indicates:: Adequate understanding  Area of need?: No    Medications  Patient is able to describe current diabetes management routine.: yes  Diabetes management routine:: diet, exercise, oral medications  Patient is able to identify current diabetes medications, dosages, and appropriate timing of medications.: yes  Patient understands the purpose of the medications taken for diabetes.: yes  Patient reports problems or concerns with current medication regimen.: no  Medication Skills Assessment Completed:: Yes  Assessment indicates:: Adequate understanding  Area of need?: No    Home Blood Glucose Monitoring  Patient states that blood sugar is checked at home daily.: yes  Monitoring Method:: personal continuous glucose monitor  Personal CGM type:: Sporadically uses Freestyle Amena 2--uses WHOOP as his cell phone (iPhone 6) is not compatible with Amena 2 obdulia  Patient is able to use personal CGM appropriately.: yes  CGM Report reviewed?: yes    Freestyle Amena 2 download: See media file for details. Patient continues with good glucose control and 80% of glucose values are in target range. However,  Some post breakfast elevations in glucose noted when having chicken biscuit and discussed alternatives to this breakfast.  No episodes of hypoglycemia noted in the download.    Sensor usage: 61%  Average glucose: 151 mg/dL (patient is post COVID 1-2 months and feels glucose control is now improving--he did not wear a sensor past couple of months)    3% CGM readings greater than 250 mg/dL  17% CGM readings between 181-250 mg/dL  80% CGM readings in target glucose range of  mg/dL   0% CGM readings  between 54-79%  0% CGM readings less than 54 mg/dL    Home Blood Glucose Monitoring Skills Assessment Completed: : Yes  Assessment indicates:: Adequate understanding  Area of need?: No    Assessment Summary and Plan    Based on today's diabetes care assessment, the following areas of need were identified:      Social 7/22/2022   Support No   Access to Mass Media/Tech No   Cognitive/Behavioral Health No   Culture/Church No   Communication No   Health Literacy No      Clinical 7/22/2022   Medication Adherence No   Lab Compliance No   Nutritional Status No      Diabetes Self-Management Skills 7/22/2022   Physical Activity/Exercise No   Medication No--compliant with DM meds as prescribed   Home Blood Glucose Monitoring No--uses Freestyle Amena 2 intermittently      Today's interventions were provided through individual discussion, instruction, and written materials were provided.      Patient verbalized understanding of instruction and written materials.  Pt was able to return back demonstration of instructions today. Patient understood key points, needs reinforcement and further instruction.     Diabetes Self-Management Care Plan:    Today's Diabetes Self-Management Care Plan was developed with Abhi's input. Abhi has agreed to work toward the following goal(s) to improve his/her overall diabetes control.      Care Plan: Diabetes Management   Updates made since 6/22/2022 12:00 AM      Problem: Healthy Eating       Goal: Patient to work on getting 20-30 grams of total carbs at meals incorporated with protein and non-starchy vegetables. Completed 7/22/2022   Start Date: 1/28/2022   Expected End Date: 7/22/2022   This Visit's Progress: Met   Priority: Medium   Barriers: No Barriers Identified      Problem: Physical Activity and Exercise       Goal: Patient to add 30 minutes of aerobic exercise on the weekends and continue weekly exercise routine (weight training, walking, martial arts). Completed 7/22/2022    Start Date: 1/28/2022   Expected End Date: 7/22/2022   This Visit's Progress: Met   Priority: High   Barriers: No Barriers Identified   Note:    7/22/22:  Due to recent COVID (May/June 2022), patient has had to back off with exercise regimen but is now resuming normal routine as tolerated.          Follow Up Plan     Follow up in about 6 months (around 1/22/2023) for continued DSMES--appt for DM education follow up scheduled in late Jan 2023 for patient. Patient feels he is more compliant with his diabetes care when seeing DM education every 6 months.  Reviewed CryptoSeal 2 CGMS report with patient today--patient wears sporadically to monitor his glucose control and to determine how certain foods/meals change his glucose levels.  Discussed eliminating half of biscuit on days eating fried chicken biscuit for breakfast.  Patient resuming heavy lifting/rigorous exercise program slowly after recent COVID infection slowed him down.  Would like to return to weight of 215 lbs (today's weight 221-222 lbs).  Hgb A1c at goal and improved from 6.7% (Oct 2021) to 6.3% (April 2022).      Today's care plan and follow up schedule was discussed with patient.  Abhi verbalized understanding of the care plan, goals, and agrees to follow up plan.        The patient was encouraged to communicate with his/her health care provider/physician and care team regarding his/her condition(s) and treatment.  I provided the patient with my contact information today and encouraged to contact me via phone or [facility] Patient Portal as needed.     Length of Visit   Total Time: 30 Minutes

## 2022-10-04 ENCOUNTER — PATIENT MESSAGE (OUTPATIENT)
Dept: GASTROENTEROLOGY | Facility: CLINIC | Age: 52
End: 2022-10-04
Payer: COMMERCIAL

## 2022-10-05 ENCOUNTER — PATIENT MESSAGE (OUTPATIENT)
Dept: FAMILY MEDICINE | Facility: CLINIC | Age: 52
End: 2022-10-05
Payer: COMMERCIAL

## 2022-10-06 NOTE — TELEPHONE ENCOUNTER
Those are just small patches of minor gastritis (slightly inflamed stomach lining).  He is negative for the bacteria.     Is he taking his stomach meds?    (Of course, other things could have pain after he eats.  Does he still have his gallbladder?)

## 2022-10-07 ENCOUNTER — PATIENT MESSAGE (OUTPATIENT)
Dept: GASTROENTEROLOGY | Facility: CLINIC | Age: 52
End: 2022-10-07
Payer: COMMERCIAL

## 2022-10-07 NOTE — TELEPHONE ENCOUNTER
My chart message sent to patient    Per Dr. Dunbar-Those are just small patches of minor gastritis (slightly inflamed stomach lining).  He is negative for the bacteria.      Is he taking his stomach meds?     (Of course, other things could have pain after he eats.  Does he still have his gallbladder?)

## 2022-10-26 ENCOUNTER — IMMUNIZATION (OUTPATIENT)
Dept: PHARMACY | Facility: CLINIC | Age: 52
End: 2022-10-26
Payer: COMMERCIAL

## 2022-10-26 DIAGNOSIS — Z23 NEED FOR VACCINATION: Primary | ICD-10-CM

## 2022-11-08 ENCOUNTER — DOCUMENTATION ONLY (OUTPATIENT)
Dept: FAMILY MEDICINE | Facility: CLINIC | Age: 52
End: 2022-11-08
Payer: COMMERCIAL

## 2022-11-14 ENCOUNTER — TELEPHONE (OUTPATIENT)
Dept: GASTROENTEROLOGY | Facility: CLINIC | Age: 52
End: 2022-11-14
Payer: COMMERCIAL

## 2022-11-14 ENCOUNTER — LAB VISIT (OUTPATIENT)
Dept: LAB | Facility: HOSPITAL | Age: 52
End: 2022-11-14
Attending: INTERNAL MEDICINE
Payer: COMMERCIAL

## 2022-11-14 DIAGNOSIS — Z51.81 ENCOUNTER FOR MONITORING LONG-TERM PROTON PUMP INHIBITOR THERAPY: ICD-10-CM

## 2022-11-14 DIAGNOSIS — E78.5 HYPERLIPIDEMIA ASSOCIATED WITH TYPE 2 DIABETES MELLITUS: ICD-10-CM

## 2022-11-14 DIAGNOSIS — E29.1 MALE HYPOGONADISM: ICD-10-CM

## 2022-11-14 DIAGNOSIS — E11.9 TYPE 2 DIABETES MELLITUS WITHOUT COMPLICATION, UNSPECIFIED WHETHER LONG TERM INSULIN USE: ICD-10-CM

## 2022-11-14 DIAGNOSIS — Z79.899 ENCOUNTER FOR MONITORING LONG-TERM PROTON PUMP INHIBITOR THERAPY: ICD-10-CM

## 2022-11-14 DIAGNOSIS — E11.69 HYPERLIPIDEMIA ASSOCIATED WITH TYPE 2 DIABETES MELLITUS: ICD-10-CM

## 2022-11-14 LAB
ALBUMIN SERPL BCP-MCNC: 4.2 G/DL (ref 3.5–5.2)
ALP SERPL-CCNC: 62 U/L (ref 55–135)
ALT SERPL W/O P-5'-P-CCNC: 50 U/L (ref 10–44)
ANION GAP SERPL CALC-SCNC: 8 MMOL/L (ref 8–16)
AST SERPL-CCNC: 22 U/L (ref 10–40)
BILIRUB SERPL-MCNC: 0.7 MG/DL (ref 0.1–1)
BUN SERPL-MCNC: 15 MG/DL (ref 6–20)
CALCIUM SERPL-MCNC: 9.7 MG/DL (ref 8.7–10.5)
CHLORIDE SERPL-SCNC: 106 MMOL/L (ref 95–110)
CO2 SERPL-SCNC: 23 MMOL/L (ref 23–29)
CREAT SERPL-MCNC: 0.8 MG/DL (ref 0.5–1.4)
ERYTHROCYTE [DISTWIDTH] IN BLOOD BY AUTOMATED COUNT: 12.6 % (ref 11.5–14.5)
EST. GFR  (NO RACE VARIABLE): >60 ML/MIN/1.73 M^2
ESTIMATED AVG GLUCOSE: 169 MG/DL (ref 68–131)
GLUCOSE SERPL-MCNC: 165 MG/DL (ref 70–110)
HBA1C MFR BLD: 7.5 % (ref 4–5.6)
HCT VFR BLD AUTO: 52.4 % (ref 40–54)
HGB BLD-MCNC: 17.5 G/DL (ref 14–18)
MAGNESIUM SERPL-MCNC: 2 MG/DL (ref 1.6–2.6)
MCH RBC QN AUTO: 30.6 PG (ref 27–31)
MCHC RBC AUTO-ENTMCNC: 33.4 G/DL (ref 32–36)
MCV RBC AUTO: 92 FL (ref 82–98)
PLATELET # BLD AUTO: 261 K/UL (ref 150–450)
PMV BLD AUTO: 10.9 FL (ref 9.2–12.9)
POTASSIUM SERPL-SCNC: 4.1 MMOL/L (ref 3.5–5.1)
PROT SERPL-MCNC: 6.8 G/DL (ref 6–8.4)
RBC # BLD AUTO: 5.71 M/UL (ref 4.6–6.2)
SODIUM SERPL-SCNC: 137 MMOL/L (ref 136–145)
TESTOST SERPL-MCNC: 163 NG/DL (ref 304–1227)
VIT B12 SERPL-MCNC: 425 PG/ML (ref 210–950)
WBC # BLD AUTO: 5.83 K/UL (ref 3.9–12.7)

## 2022-11-14 PROCEDURE — 82607 VITAMIN B-12: CPT | Performed by: NURSE PRACTITIONER

## 2022-11-14 PROCEDURE — 83735 ASSAY OF MAGNESIUM: CPT | Performed by: NURSE PRACTITIONER

## 2022-11-14 PROCEDURE — 84403 ASSAY OF TOTAL TESTOSTERONE: CPT | Performed by: INTERNAL MEDICINE

## 2022-11-14 PROCEDURE — 80053 COMPREHEN METABOLIC PANEL: CPT | Performed by: INTERNAL MEDICINE

## 2022-11-14 PROCEDURE — 85027 COMPLETE CBC AUTOMATED: CPT | Performed by: INTERNAL MEDICINE

## 2022-11-14 PROCEDURE — 36415 COLL VENOUS BLD VENIPUNCTURE: CPT | Mod: PO | Performed by: INTERNAL MEDICINE

## 2022-11-14 PROCEDURE — 83036 HEMOGLOBIN GLYCOSYLATED A1C: CPT | Performed by: INTERNAL MEDICINE

## 2022-11-14 RX ORDER — FLASH GLUCOSE SENSOR
KIT MISCELLANEOUS
Qty: 2 KIT | Refills: 6 | Status: SHIPPED | OUTPATIENT
Start: 2022-11-14 | End: 2022-11-21

## 2022-11-14 NOTE — TELEPHONE ENCOUNTER
----- Message from KARYNA Ba sent at 11/14/2022  3:01 PM CST -----  Please call to inform & review the results with the patient- Lab results are normal.   Continue with previous recommendations.  Thanks,  Teri TRONCOSO-C

## 2022-11-18 ENCOUNTER — PATIENT MESSAGE (OUTPATIENT)
Dept: ENDOCRINOLOGY | Facility: CLINIC | Age: 52
End: 2022-11-18
Payer: COMMERCIAL

## 2022-11-20 ENCOUNTER — PATIENT MESSAGE (OUTPATIENT)
Dept: ENDOCRINOLOGY | Facility: CLINIC | Age: 52
End: 2022-11-20
Payer: COMMERCIAL

## 2022-11-21 ENCOUNTER — OFFICE VISIT (OUTPATIENT)
Dept: ENDOCRINOLOGY | Facility: CLINIC | Age: 52
End: 2022-11-21
Payer: COMMERCIAL

## 2022-11-21 VITALS
WEIGHT: 229.38 LBS | HEART RATE: 87 BPM | OXYGEN SATURATION: 98 % | BODY MASS INDEX: 36 KG/M2 | SYSTOLIC BLOOD PRESSURE: 120 MMHG | HEIGHT: 67 IN | DIASTOLIC BLOOD PRESSURE: 78 MMHG

## 2022-11-21 DIAGNOSIS — E11.9 TYPE 2 DIABETES MELLITUS WITHOUT COMPLICATION, UNSPECIFIED WHETHER LONG TERM INSULIN USE: Primary | ICD-10-CM

## 2022-11-21 DIAGNOSIS — G47.33 OSA (OBSTRUCTIVE SLEEP APNEA): ICD-10-CM

## 2022-11-21 DIAGNOSIS — E78.5 HYPERLIPIDEMIA ASSOCIATED WITH TYPE 2 DIABETES MELLITUS: ICD-10-CM

## 2022-11-21 DIAGNOSIS — E11.69 HYPERLIPIDEMIA ASSOCIATED WITH TYPE 2 DIABETES MELLITUS: ICD-10-CM

## 2022-11-21 DIAGNOSIS — E29.1 MALE HYPOGONADISM: ICD-10-CM

## 2022-11-21 PROCEDURE — 3008F BODY MASS INDEX DOCD: CPT | Mod: CPTII,S$GLB,, | Performed by: INTERNAL MEDICINE

## 2022-11-21 PROCEDURE — 3078F DIAST BP <80 MM HG: CPT | Mod: CPTII,S$GLB,, | Performed by: INTERNAL MEDICINE

## 2022-11-21 PROCEDURE — 3072F PR LOW RISK FOR RETINOPATHY: ICD-10-PCS | Mod: CPTII,S$GLB,, | Performed by: INTERNAL MEDICINE

## 2022-11-21 PROCEDURE — 99999 PR PBB SHADOW E&M-EST. PATIENT-LVL V: ICD-10-PCS | Mod: PBBFAC,,, | Performed by: INTERNAL MEDICINE

## 2022-11-21 PROCEDURE — 3051F HG A1C>EQUAL 7.0%<8.0%: CPT | Mod: CPTII,S$GLB,, | Performed by: INTERNAL MEDICINE

## 2022-11-21 PROCEDURE — 3066F PR DOCUMENTATION OF TREATMENT FOR NEPHROPATHY: ICD-10-PCS | Mod: CPTII,S$GLB,, | Performed by: INTERNAL MEDICINE

## 2022-11-21 PROCEDURE — 3074F SYST BP LT 130 MM HG: CPT | Mod: CPTII,S$GLB,, | Performed by: INTERNAL MEDICINE

## 2022-11-21 PROCEDURE — 3074F PR MOST RECENT SYSTOLIC BLOOD PRESSURE < 130 MM HG: ICD-10-PCS | Mod: CPTII,S$GLB,, | Performed by: INTERNAL MEDICINE

## 2022-11-21 PROCEDURE — 99214 OFFICE O/P EST MOD 30 MIN: CPT | Mod: S$GLB,,, | Performed by: INTERNAL MEDICINE

## 2022-11-21 PROCEDURE — 3051F PR MOST RECENT HEMOGLOBIN A1C LEVEL 7.0 - < 8.0%: ICD-10-PCS | Mod: CPTII,S$GLB,, | Performed by: INTERNAL MEDICINE

## 2022-11-21 PROCEDURE — 1159F PR MEDICATION LIST DOCUMENTED IN MEDICAL RECORD: ICD-10-PCS | Mod: CPTII,S$GLB,, | Performed by: INTERNAL MEDICINE

## 2022-11-21 PROCEDURE — 1160F PR REVIEW ALL MEDS BY PRESCRIBER/CLIN PHARMACIST DOCUMENTED: ICD-10-PCS | Mod: CPTII,S$GLB,, | Performed by: INTERNAL MEDICINE

## 2022-11-21 PROCEDURE — 1160F RVW MEDS BY RX/DR IN RCRD: CPT | Mod: CPTII,S$GLB,, | Performed by: INTERNAL MEDICINE

## 2022-11-21 PROCEDURE — 99999 PR PBB SHADOW E&M-EST. PATIENT-LVL V: CPT | Mod: PBBFAC,,, | Performed by: INTERNAL MEDICINE

## 2022-11-21 PROCEDURE — 3078F PR MOST RECENT DIASTOLIC BLOOD PRESSURE < 80 MM HG: ICD-10-PCS | Mod: CPTII,S$GLB,, | Performed by: INTERNAL MEDICINE

## 2022-11-21 PROCEDURE — 3066F NEPHROPATHY DOC TX: CPT | Mod: CPTII,S$GLB,, | Performed by: INTERNAL MEDICINE

## 2022-11-21 PROCEDURE — 1159F MED LIST DOCD IN RCRD: CPT | Mod: CPTII,S$GLB,, | Performed by: INTERNAL MEDICINE

## 2022-11-21 PROCEDURE — 99214 PR OFFICE/OUTPT VISIT, EST, LEVL IV, 30-39 MIN: ICD-10-PCS | Mod: S$GLB,,, | Performed by: INTERNAL MEDICINE

## 2022-11-21 PROCEDURE — 3061F PR NEG MICROALBUMINURIA RESULT DOCUMENTED/REVIEW: ICD-10-PCS | Mod: CPTII,S$GLB,, | Performed by: INTERNAL MEDICINE

## 2022-11-21 PROCEDURE — 3008F PR BODY MASS INDEX (BMI) DOCUMENTED: ICD-10-PCS | Mod: CPTII,S$GLB,, | Performed by: INTERNAL MEDICINE

## 2022-11-21 PROCEDURE — 3061F NEG MICROALBUMINURIA REV: CPT | Mod: CPTII,S$GLB,, | Performed by: INTERNAL MEDICINE

## 2022-11-21 PROCEDURE — 3072F LOW RISK FOR RETINOPATHY: CPT | Mod: CPTII,S$GLB,, | Performed by: INTERNAL MEDICINE

## 2022-11-21 RX ORDER — FLASH GLUCOSE SENSOR
KIT MISCELLANEOUS
Qty: 2 KIT | Refills: 6 | Status: SHIPPED | OUTPATIENT
Start: 2022-11-21

## 2022-11-21 RX ORDER — FLASH GLUCOSE SENSOR
KIT MISCELLANEOUS
COMMUNITY
End: 2022-11-21 | Stop reason: SDUPTHER

## 2022-11-21 NOTE — PROGRESS NOTES
(Freestyle Amena)    CHIEF COMPLAINT: DM2  52 y.o.  being seen as a f/u. Diagnosed with diabetes 2013. Off actos. On metformin XR 1000 BID and jardiance 25 mg. On testosterone 50 mg weekly. Tolerating statin. Weight increased. States that had COVID in May 2022. Had been having significant fatigue. Exercise limited. Had missed a testosterone dose prior to labs. Starting to get back into the gym. No Paresthesias. Scheduled for eye exam in early Dec.        PAST MEDICAL HISTORY/PAST SURGICAL HISTORY:  Reviewed in EPIC    SOCIAL HISTORY: No tobacco. Social alcohol. Desk Job-     FAMILY HISTORY:  + DM 2. No known thyroid disease.     MEDICATIONS/ALLERGIES: The patient's MedCard has been updated and reviewed.            PE:    GENERAL: Well developed, well nourished.  NECK: Supple, trachea midline, No palpable nodules  CHEST: Resp even and unlabored, CTA bilateral.  CARDIAC: RRR, S1, S2 heard, no murmurs, rubs, S3, or S4  FEET: Normal monofilament. No cuts or abrasions. 2 + pedal pulses.      Latest Reference Range & Units 11/14/22 07:53   WBC 3.90 - 12.70 K/uL 5.83   RBC 4.60 - 6.20 M/uL 5.71   Hemoglobin 14.0 - 18.0 g/dL 17.5   Hematocrit 40.0 - 54.0 % 52.4   MCV 82 - 98 fL 92   MCH 27.0 - 31.0 pg 30.6   MCHC 32.0 - 36.0 g/dL 33.4   RDW 11.5 - 14.5 % 12.6   Platelets 150 - 450 K/uL 261   MPV 9.2 - 12.9 fL 10.9      Latest Reference Range & Units 11/14/22 07:53   Sodium 136 - 145 mmol/L 137   Potassium 3.5 - 5.1 mmol/L 4.1   Chloride 95 - 110 mmol/L 106   CO2 23 - 29 mmol/L 23   Anion Gap 8 - 16 mmol/L 8   BUN 6 - 20 mg/dL 15   Creatinine 0.5 - 1.4 mg/dL 0.8   eGFR >60 mL/min/1.73 m^2 >60.0   Glucose 70 - 110 mg/dL 165 (H)   Calcium 8.7 - 10.5 mg/dL 9.7   Magnesium 1.6 - 2.6 mg/dL 2.0   Alkaline Phosphatase 55 - 135 U/L 62   PROTEIN TOTAL 6.0 - 8.4 g/dL 6.8   Albumin 3.5 - 5.2 g/dL 4.2   BILIRUBIN TOTAL 0.1 - 1.0 mg/dL 0.7   AST 10 - 40 U/L 22   ALT 10 - 44 U/L 50 (H)   (H): Data is abnormally high     Latest Reference  Range & Units 11/14/22 07:53   Hemoglobin A1C External 4.0 - 5.6 % 7.5 (H)   Estimated Avg Glucose 68 - 131 mg/dL 169 (H)   Testosterone, Total 304 - 1227 ng/dL 163 (L)   (H): Data is abnormally high  (L): Data is abnormally low      ASSESSMENT/PLAN:  1. DM 2- No known complications.   Following up regularly with Diabetes Education.  Currently on metformin in SGL T2.  Appears after COVID his blood sugars increased.  Also had limited ability to exercise due to excessive fatigue.  That is starting to improve now.  Is now back in the gym.  Discussed the option of stopping his SGL T2 and placing on a GLP -1.  At this time he prefers stay on his current regimen and continue with diet as well as exercise.  Will  reassess in 6 months.  Scheduled for eye exam next month.  Sent prescription for Amena 2 sensors.    2. Hyperlipidemia- Continue statin. Tolerating well. Taking fish oil as Trigs elevated.   Check levels at follow-up    3. Male Hypogonadism- Taking testosterone 50 mg weekly. Hg/HCT WNL now.  Asking about going up on his dose of testosterone again.  However since his hemoglobin and hematocrit were elevated will hold off on that for now.  Will repeat CBC at follow-up in can risk consider at that time    4. MELLY- see # 3. Discussed wearing CPAP    FOLLOWUP  F/U 6 months with CMP,CBC, A1c, testosterone, urine micro, lipid panel

## 2022-11-29 ENCOUNTER — PATIENT MESSAGE (OUTPATIENT)
Dept: ENDOCRINOLOGY | Facility: CLINIC | Age: 52
End: 2022-11-29
Payer: COMMERCIAL

## 2022-11-29 DIAGNOSIS — R71.8 ELEVATED HEMATOCRIT: Primary | ICD-10-CM

## 2022-11-30 ENCOUNTER — TELEPHONE (OUTPATIENT)
Dept: HEMATOLOGY/ONCOLOGY | Facility: CLINIC | Age: 52
End: 2022-11-30
Payer: COMMERCIAL

## 2022-11-30 NOTE — TELEPHONE ENCOUNTER
Called and discussed hem referral with pt.  Pt stated his testosterone is low and requesting to discuss the hematocrit on managing on getting it lower.  Pt also said he can not do any appts this year and requested hem appt mid January; said not in a hurry, it is a consult for answers only.  Hematocrit was in normal range last lab.  Confirmed with pt appt date time and location.

## 2022-12-01 ENCOUNTER — PATIENT MESSAGE (OUTPATIENT)
Dept: ENDOCRINOLOGY | Facility: CLINIC | Age: 52
End: 2022-12-01
Payer: COMMERCIAL

## 2022-12-05 ENCOUNTER — OFFICE VISIT (OUTPATIENT)
Dept: FAMILY MEDICINE | Facility: CLINIC | Age: 52
End: 2022-12-05
Payer: COMMERCIAL

## 2022-12-05 VITALS
OXYGEN SATURATION: 96 % | HEART RATE: 89 BPM | TEMPERATURE: 99 F | BODY MASS INDEX: 35.54 KG/M2 | SYSTOLIC BLOOD PRESSURE: 130 MMHG | WEIGHT: 226.44 LBS | HEIGHT: 67 IN | DIASTOLIC BLOOD PRESSURE: 88 MMHG

## 2022-12-05 DIAGNOSIS — K21.00 GASTROESOPHAGEAL REFLUX DISEASE WITH ESOPHAGITIS WITHOUT HEMORRHAGE: ICD-10-CM

## 2022-12-05 DIAGNOSIS — Z00.00 ANNUAL PHYSICAL EXAM: ICD-10-CM

## 2022-12-05 DIAGNOSIS — E78.2 MIXED HYPERLIPIDEMIA: ICD-10-CM

## 2022-12-05 DIAGNOSIS — E66.01 SEVERE OBESITY (BMI 35.0-39.9) WITH COMORBIDITY: Primary | ICD-10-CM

## 2022-12-05 DIAGNOSIS — E11.9 DIABETES MELLITUS WITHOUT COMPLICATION: ICD-10-CM

## 2022-12-05 PROCEDURE — 3072F PR LOW RISK FOR RETINOPATHY: ICD-10-PCS | Mod: CPTII,S$GLB,, | Performed by: INTERNAL MEDICINE

## 2022-12-05 PROCEDURE — 1159F PR MEDICATION LIST DOCUMENTED IN MEDICAL RECORD: ICD-10-PCS | Mod: CPTII,S$GLB,, | Performed by: INTERNAL MEDICINE

## 2022-12-05 PROCEDURE — 3061F PR NEG MICROALBUMINURIA RESULT DOCUMENTED/REVIEW: ICD-10-PCS | Mod: CPTII,S$GLB,, | Performed by: INTERNAL MEDICINE

## 2022-12-05 PROCEDURE — 99999 PR PBB SHADOW E&M-EST. PATIENT-LVL V: ICD-10-PCS | Mod: PBBFAC,,, | Performed by: INTERNAL MEDICINE

## 2022-12-05 PROCEDURE — 3075F PR MOST RECENT SYSTOLIC BLOOD PRESS GE 130-139MM HG: ICD-10-PCS | Mod: CPTII,S$GLB,, | Performed by: INTERNAL MEDICINE

## 2022-12-05 PROCEDURE — 99396 PR PREVENTIVE VISIT,EST,40-64: ICD-10-PCS | Mod: S$GLB,,, | Performed by: INTERNAL MEDICINE

## 2022-12-05 PROCEDURE — 99999 PR PBB SHADOW E&M-EST. PATIENT-LVL V: CPT | Mod: PBBFAC,,, | Performed by: INTERNAL MEDICINE

## 2022-12-05 PROCEDURE — 3051F HG A1C>EQUAL 7.0%<8.0%: CPT | Mod: CPTII,S$GLB,, | Performed by: INTERNAL MEDICINE

## 2022-12-05 PROCEDURE — 3008F PR BODY MASS INDEX (BMI) DOCUMENTED: ICD-10-PCS | Mod: CPTII,S$GLB,, | Performed by: INTERNAL MEDICINE

## 2022-12-05 PROCEDURE — 3051F PR MOST RECENT HEMOGLOBIN A1C LEVEL 7.0 - < 8.0%: ICD-10-PCS | Mod: CPTII,S$GLB,, | Performed by: INTERNAL MEDICINE

## 2022-12-05 PROCEDURE — 3079F DIAST BP 80-89 MM HG: CPT | Mod: CPTII,S$GLB,, | Performed by: INTERNAL MEDICINE

## 2022-12-05 PROCEDURE — 3075F SYST BP GE 130 - 139MM HG: CPT | Mod: CPTII,S$GLB,, | Performed by: INTERNAL MEDICINE

## 2022-12-05 PROCEDURE — 3008F BODY MASS INDEX DOCD: CPT | Mod: CPTII,S$GLB,, | Performed by: INTERNAL MEDICINE

## 2022-12-05 PROCEDURE — 1160F PR REVIEW ALL MEDS BY PRESCRIBER/CLIN PHARMACIST DOCUMENTED: ICD-10-PCS | Mod: CPTII,S$GLB,, | Performed by: INTERNAL MEDICINE

## 2022-12-05 PROCEDURE — 99396 PREV VISIT EST AGE 40-64: CPT | Mod: S$GLB,,, | Performed by: INTERNAL MEDICINE

## 2022-12-05 PROCEDURE — 3066F PR DOCUMENTATION OF TREATMENT FOR NEPHROPATHY: ICD-10-PCS | Mod: CPTII,S$GLB,, | Performed by: INTERNAL MEDICINE

## 2022-12-05 PROCEDURE — 3061F NEG MICROALBUMINURIA REV: CPT | Mod: CPTII,S$GLB,, | Performed by: INTERNAL MEDICINE

## 2022-12-05 PROCEDURE — 3066F NEPHROPATHY DOC TX: CPT | Mod: CPTII,S$GLB,, | Performed by: INTERNAL MEDICINE

## 2022-12-05 PROCEDURE — 3079F PR MOST RECENT DIASTOLIC BLOOD PRESSURE 80-89 MM HG: ICD-10-PCS | Mod: CPTII,S$GLB,, | Performed by: INTERNAL MEDICINE

## 2022-12-05 PROCEDURE — 3072F LOW RISK FOR RETINOPATHY: CPT | Mod: CPTII,S$GLB,, | Performed by: INTERNAL MEDICINE

## 2022-12-05 PROCEDURE — 1159F MED LIST DOCD IN RCRD: CPT | Mod: CPTII,S$GLB,, | Performed by: INTERNAL MEDICINE

## 2022-12-05 PROCEDURE — 1160F RVW MEDS BY RX/DR IN RCRD: CPT | Mod: CPTII,S$GLB,, | Performed by: INTERNAL MEDICINE

## 2022-12-05 NOTE — PROGRESS NOTES
Patient ID: Abhi Pompa is a 52 y.o. male.    Chief Complaint: Annual Exam     PMH Type 2 diabetes, hyperlipidemia, generalized anxiety disorder, testosterone deficiency, GERD, history of colonic polyps, MELLY on CPAP.    HPI Long covid improving. Fatigue is better. Still have intermittent blood glucose spikes. No unexpected lows.     Type 2 diabetes-A1c 7.5 was 6.3, followed by Dr. Richey, see note, they decided to stick with Jardiance and not switch to G LP 1, but he is now considering it.  He does admit to diarrhea with metformin and polyuria with Jardiance.    Hyperlipidemia- LDL is good, triglycerides 231, low HDL ( likely weight and testosterone)    Testosterone deficiency-last testosterone 163 was 783, followed by Dr. Richey.  Apparently missed dose of testosterone before testing.  They decided not to go up on the dose due to history of elevated hemoglobin and hematocrit. He will seeing hematology to consider therapeutic phlebotomy.     History of Achalasia- status post myotomy in 2016.     GERD- tolerating     Sees counselor for anxiety and depression. Dr Steven Salmeron, [facility] medical psychology    Diet- ok.   Exercise- increasing cardio  Alcohol- 3 drinks/ week   Tobacco- none   HM- had flu shot at work.    PLAN Continue follow-up with Dr. Richey.  I think that Trulicity, Ozempic, or mounjaro would be a good option in the place of Jardiance.  Metformin causing daily diarrhea.     Dx and Orders Abhi was seen today for annual exam.    Diagnoses and all orders for this visit:    Severe obesity (BMI 35.0-39.9) with comorbidity    Mixed hyperlipidemia    Diabetes mellitus without complication    Gastroesophageal reflux disease with esophagitis without hemorrhage    Annual physical exam     Review of Systems   Constitutional:  Negative for fever.   Respiratory:  Negative for shortness of breath.    Cardiovascular:  Negative for chest pain.   Gastrointestinal:  Positive for diarrhea. Negative for  "abdominal pain.   Endocrine: Positive for polyuria.     Physical Exam  Cardiovascular:      Rate and Rhythm: Normal rate and regular rhythm.      Heart sounds: No murmur heard.    No gallop.   Pulmonary:      Breath sounds: Normal breath sounds. No wheezing or rhonchi.   Abdominal:      Palpations: Abdomen is soft.      Tenderness: There is no abdominal tenderness.   Musculoskeletal:         General: Normal range of motion.      Cervical back: Neck supple.   Skin:     General: Skin is warm.      Findings: No rash.   Neurological:      Mental Status: He is alert.   Psychiatric:         Behavior: Behavior normal.       Vitals:    12/05/22 1010   BP: 130/88   Pulse:    Temp:      Wt Readings from Last 3 Encounters:   12/05/22 0940 102.7 kg (226 lb 6.6 oz)   11/21/22 0823 104.1 kg (229 lb 6.2 oz)   07/22/22 0800 102 kg (224 lb 13.9 oz)      Body mass index is 36 kg/m².   Diabetes Medications               JARDIANCE 25 mg tablet TAKE 1 TABLET BY MOUTH EVERY DAY    metFORMIN (GLUCOPHAGE-XR) 500 MG ER 24hr tablet TAKE 2 TABLETS BY MOUTH TWICE A DAY WITH MEALS              Hyperlipidemia Medications               cholestyramine (QUESTRAN) 4 gram packet Take 1 packet (4 g total) by mouth once daily.    fish,saf,flx,brg oils/o3,6,9n2 (FISH-FLAX-BORAGE OIL ORAL) Take by mouth.    simvastatin (ZOCOR) 10 MG tablet TAKE 1 TABLET BY MOUTH EVERY DAY IN THE EVENING           Medication List with Changes/Refills   Current Medications    BLOOD SUGAR DIAGNOSTIC STRP    1 each by Misc.(Non-Drug; Combo Route) route 2 (two) times daily. Strips for One Touch Verio Flex Meter    BLOOD SUGAR DIAGNOSTIC STRP    TEST EVERY DAY    BLOOD-GLUCOSE METER KIT        BLUNT NEEDLE, DISPOSABLE 18 X 1 1/2 " NDLE    Use as directed with testosterone injections    CHOLESTYRAMINE (QUESTRAN) 4 GRAM PACKET    Take 1 packet (4 g total) by mouth once daily.    CREATINE MONOHYDRATE (CREATINE ORAL)    Take by mouth.    FAMOTIDINE (PEPCID) 40 MG TABLET    Take 1 " "tablet (40 mg total) by mouth once daily.    FISH,SAF,FLX,BRG OILS/O3,6,9N2 (FISH-FLAX-BORAGE OIL ORAL)    Take by mouth.    FLASH GLUCOSE SCANNING READER (FREESTYLE YAN 2 READER) [facility] [place]    Use as directed to check blood glucose.    FLASH GLUCOSE SENSOR (FREESTYLE YAN 2 SENSOR) KIT    Change every 14 days.    FLASH GLUCOSE SENSOR (FREESTYLE YAN 2 SENSOR) KIT    Apply to skin Q 14 days    JARDIANCE 25 MG TABLET    TAKE 1 TABLET BY MOUTH EVERY DAY    MELATONIN 10 MG TAB    Take 10 mg by mouth nightly.    METFORMIN (GLUCOPHAGE-XR) 500 MG ER 24HR TABLET    TAKE 2 TABLETS BY MOUTH TWICE A DAY WITH MEALS    MINOXIDIL (ROGAINE) 2 % EXTERNAL SOLUTION    Apply 1 application topically 2 (two) times daily.    OMEPRAZOLE (PRILOSEC) 40 MG CAPSULE    Take 1 capsule (40 mg total) by mouth once daily.    ONETOUCH DELICA LANCETS 33 GAUGE MISC    TEST EVERY DAY    SILDENAFIL (REVATIO) 20 MG TAB    TAKE UP TO 5 TABLETS BY MOUTH DAILY AS NEEDED 30 MINUTES PRIOR TO INTERCOURSE    SIMVASTATIN (ZOCOR) 10 MG TABLET    TAKE 1 TABLET BY MOUTH EVERY DAY IN THE EVENING    SYRINGE WITH NEEDLE 1 ML 22 GAUGE X 1 1/2" SYRG    Use as directed to administer testosterone injections.    TADALAFIL (CIALIS) 10 MG TABLET    TAKE 1 TABLET (10 MG TOTAL) BY MOUTH DAILY AS NEEDED FOR ERECTILE DYSFUNCTION.    TESTOSTERONE CYPIONATE (DEPOTESTOTERONE CYPIONATE) 100 MG/ML INJECTION    INJECT 0.5ML INTO THE MUSCLE ONCE A WEEK    TURMERIC ORAL    Take by mouth.    UNABLE TO FIND    Amitriptyline 1%/Meloxicam 0.09%/Lidocaine Hcl 2%/ Prilocaine 2%   APPLY 2 GRAMS QID    UNABLE TO FIND    Beet root    VITAMIN B COMPLEX ORAL    Take 1 tablet by mouth once daily.       I personally reviewed past medical, family and social history.       "

## 2022-12-08 ENCOUNTER — OFFICE VISIT (OUTPATIENT)
Dept: GASTROENTEROLOGY | Facility: CLINIC | Age: 52
End: 2022-12-08
Payer: COMMERCIAL

## 2022-12-08 ENCOUNTER — PATIENT MESSAGE (OUTPATIENT)
Dept: ENDOCRINOLOGY | Facility: CLINIC | Age: 52
End: 2022-12-08
Payer: COMMERCIAL

## 2022-12-08 ENCOUNTER — OFFICE VISIT (OUTPATIENT)
Dept: OPTOMETRY | Facility: CLINIC | Age: 52
End: 2022-12-08
Payer: COMMERCIAL

## 2022-12-08 VITALS — HEIGHT: 67 IN | WEIGHT: 226.19 LBS | BODY MASS INDEX: 35.5 KG/M2

## 2022-12-08 DIAGNOSIS — R09.A2 GLOBUS SENSATION: ICD-10-CM

## 2022-12-08 DIAGNOSIS — H52.13 MYOPIA WITH ASTIGMATISM AND PRESBYOPIA, BILATERAL: ICD-10-CM

## 2022-12-08 DIAGNOSIS — Z87.19 HISTORY OF GASTRITIS: ICD-10-CM

## 2022-12-08 DIAGNOSIS — H52.203 MYOPIA WITH ASTIGMATISM AND PRESBYOPIA, BILATERAL: ICD-10-CM

## 2022-12-08 DIAGNOSIS — E29.1 MALE HYPOGONADISM: ICD-10-CM

## 2022-12-08 DIAGNOSIS — H02.403 BLEPHAROPTOSIS, ACQUIRED, BILATERAL: ICD-10-CM

## 2022-12-08 DIAGNOSIS — H43.393 VITREOUS FLOATERS, BILATERAL: ICD-10-CM

## 2022-12-08 DIAGNOSIS — R74.01 ELEVATED ALT MEASUREMENT: ICD-10-CM

## 2022-12-08 DIAGNOSIS — R19.7 INTERMITTENT DIARRHEA: ICD-10-CM

## 2022-12-08 DIAGNOSIS — Z86.19 HISTORY OF HELICOBACTER PYLORI INFECTION: ICD-10-CM

## 2022-12-08 DIAGNOSIS — Z13.5 GLAUCOMA SCREENING: ICD-10-CM

## 2022-12-08 DIAGNOSIS — K21.00 GASTROESOPHAGEAL REFLUX DISEASE WITH ESOPHAGITIS WITHOUT HEMORRHAGE: ICD-10-CM

## 2022-12-08 DIAGNOSIS — H52.4 MYOPIA WITH ASTIGMATISM AND PRESBYOPIA, BILATERAL: ICD-10-CM

## 2022-12-08 DIAGNOSIS — R10.13 EPIGASTRIC PAIN: Primary | ICD-10-CM

## 2022-12-08 DIAGNOSIS — K22.0 ACHALASIA: ICD-10-CM

## 2022-12-08 DIAGNOSIS — Z98.890 HISTORY OF ESOPHAGEAL SURGERY: ICD-10-CM

## 2022-12-08 DIAGNOSIS — R13.14 PHARYNGOESOPHAGEAL DYSPHAGIA: ICD-10-CM

## 2022-12-08 DIAGNOSIS — E11.9 DIABETES MELLITUS TYPE 2 WITHOUT RETINOPATHY: Primary | ICD-10-CM

## 2022-12-08 PROCEDURE — 1159F PR MEDICATION LIST DOCUMENTED IN MEDICAL RECORD: ICD-10-PCS | Mod: CPTII,S$GLB,, | Performed by: NURSE PRACTITIONER

## 2022-12-08 PROCEDURE — 3061F PR NEG MICROALBUMINURIA RESULT DOCUMENTED/REVIEW: ICD-10-PCS | Mod: CPTII,S$GLB,, | Performed by: OPTOMETRIST

## 2022-12-08 PROCEDURE — 3051F PR MOST RECENT HEMOGLOBIN A1C LEVEL 7.0 - < 8.0%: ICD-10-PCS | Mod: CPTII,S$GLB,, | Performed by: OPTOMETRIST

## 2022-12-08 PROCEDURE — 3061F NEG MICROALBUMINURIA REV: CPT | Mod: CPTII,S$GLB,, | Performed by: OPTOMETRIST

## 2022-12-08 PROCEDURE — 1160F RVW MEDS BY RX/DR IN RCRD: CPT | Mod: CPTII,S$GLB,, | Performed by: NURSE PRACTITIONER

## 2022-12-08 PROCEDURE — 99999 PR PBB SHADOW E&M-EST. PATIENT-LVL IV: CPT | Mod: PBBFAC,,, | Performed by: OPTOMETRIST

## 2022-12-08 PROCEDURE — 1159F PR MEDICATION LIST DOCUMENTED IN MEDICAL RECORD: ICD-10-PCS | Mod: CPTII,S$GLB,, | Performed by: OPTOMETRIST

## 2022-12-08 PROCEDURE — 3008F BODY MASS INDEX DOCD: CPT | Mod: CPTII,S$GLB,, | Performed by: NURSE PRACTITIONER

## 2022-12-08 PROCEDURE — 92014 COMPRE OPH EXAM EST PT 1/>: CPT | Mod: S$GLB,,, | Performed by: OPTOMETRIST

## 2022-12-08 PROCEDURE — 3066F NEPHROPATHY DOC TX: CPT | Mod: CPTII,S$GLB,, | Performed by: OPTOMETRIST

## 2022-12-08 PROCEDURE — 3066F NEPHROPATHY DOC TX: CPT | Mod: CPTII,S$GLB,, | Performed by: NURSE PRACTITIONER

## 2022-12-08 PROCEDURE — 3051F HG A1C>EQUAL 7.0%<8.0%: CPT | Mod: CPTII,S$GLB,, | Performed by: NURSE PRACTITIONER

## 2022-12-08 PROCEDURE — 1159F MED LIST DOCD IN RCRD: CPT | Mod: CPTII,S$GLB,, | Performed by: NURSE PRACTITIONER

## 2022-12-08 PROCEDURE — 3061F NEG MICROALBUMINURIA REV: CPT | Mod: CPTII,S$GLB,, | Performed by: NURSE PRACTITIONER

## 2022-12-08 PROCEDURE — 92015 DETERMINE REFRACTIVE STATE: CPT | Mod: S$GLB,,, | Performed by: OPTOMETRIST

## 2022-12-08 PROCEDURE — 3066F PR DOCUMENTATION OF TREATMENT FOR NEPHROPATHY: ICD-10-PCS | Mod: CPTII,S$GLB,, | Performed by: OPTOMETRIST

## 2022-12-08 PROCEDURE — 92015 PR REFRACTION: ICD-10-PCS | Mod: S$GLB,,, | Performed by: OPTOMETRIST

## 2022-12-08 PROCEDURE — 3051F PR MOST RECENT HEMOGLOBIN A1C LEVEL 7.0 - < 8.0%: ICD-10-PCS | Mod: CPTII,S$GLB,, | Performed by: NURSE PRACTITIONER

## 2022-12-08 PROCEDURE — 3051F HG A1C>EQUAL 7.0%<8.0%: CPT | Mod: CPTII,S$GLB,, | Performed by: OPTOMETRIST

## 2022-12-08 PROCEDURE — 3008F PR BODY MASS INDEX (BMI) DOCUMENTED: ICD-10-PCS | Mod: CPTII,S$GLB,, | Performed by: NURSE PRACTITIONER

## 2022-12-08 PROCEDURE — 1160F PR REVIEW ALL MEDS BY PRESCRIBER/CLIN PHARMACIST DOCUMENTED: ICD-10-PCS | Mod: CPTII,S$GLB,, | Performed by: NURSE PRACTITIONER

## 2022-12-08 PROCEDURE — 3066F PR DOCUMENTATION OF TREATMENT FOR NEPHROPATHY: ICD-10-PCS | Mod: CPTII,S$GLB,, | Performed by: NURSE PRACTITIONER

## 2022-12-08 PROCEDURE — 99999 PR PBB SHADOW E&M-EST. PATIENT-LVL IV: ICD-10-PCS | Mod: PBBFAC,,, | Performed by: OPTOMETRIST

## 2022-12-08 PROCEDURE — 99999 PR PBB SHADOW E&M-EST. PATIENT-LVL V: ICD-10-PCS | Mod: PBBFAC,,, | Performed by: NURSE PRACTITIONER

## 2022-12-08 PROCEDURE — 3072F PR LOW RISK FOR RETINOPATHY: ICD-10-PCS | Mod: CPTII,S$GLB,, | Performed by: NURSE PRACTITIONER

## 2022-12-08 PROCEDURE — 99214 PR OFFICE/OUTPT VISIT, EST, LEVL IV, 30-39 MIN: ICD-10-PCS | Mod: S$GLB,,, | Performed by: NURSE PRACTITIONER

## 2022-12-08 PROCEDURE — 99214 OFFICE O/P EST MOD 30 MIN: CPT | Mod: S$GLB,,, | Performed by: NURSE PRACTITIONER

## 2022-12-08 PROCEDURE — 3072F LOW RISK FOR RETINOPATHY: CPT | Mod: CPTII,S$GLB,, | Performed by: NURSE PRACTITIONER

## 2022-12-08 PROCEDURE — 1159F MED LIST DOCD IN RCRD: CPT | Mod: CPTII,S$GLB,, | Performed by: OPTOMETRIST

## 2022-12-08 PROCEDURE — 3061F PR NEG MICROALBUMINURIA RESULT DOCUMENTED/REVIEW: ICD-10-PCS | Mod: CPTII,S$GLB,, | Performed by: NURSE PRACTITIONER

## 2022-12-08 PROCEDURE — 92014 PR EYE EXAM, EST PATIENT,COMPREHESV: ICD-10-PCS | Mod: S$GLB,,, | Performed by: OPTOMETRIST

## 2022-12-08 PROCEDURE — 99999 PR PBB SHADOW E&M-EST. PATIENT-LVL V: CPT | Mod: PBBFAC,,, | Performed by: NURSE PRACTITIONER

## 2022-12-08 NOTE — PATIENT INSTRUCTIONS

## 2022-12-08 NOTE — PATIENT INSTRUCTIONS
"DRY EYES -- BURNING OR SONA SYMPTOMS:  Use Over The Counter artificial tears as needed for dry eye symptoms.   Some common brands include:  Systane, Optive, Refresh, and Thera-Tears.  These drops can be used as frequently as desired, but may be most helpful use during long periods of concentrated work.  For example, reading / working at the computer. Start with 3-4x per day.     Nighttime Ophthalmic gel or ointments are available: Refresh PM, Genteal, and Lacrilube.    Avoid drops that "get redness out" (Visine, Murine, Clear Eyes), as these may contain medication that could further irritate the eyes, especially with chronic use.    ALLERGY EYES -- ITCHING SYMPTOMS:  Over the counter medications include--Pataday, Zaditor, and Alaway.  Use as directed 1-2 drops daily for symptoms of itching / watering eyes.  These drops will not help for dry eye or exposure symptoms.    REDNESS RELIEF:  Lumify---is a good redness reliever that will not cause irritation if used chronically.        FLASHES / FLOATERS / POSTERIOR VITREOUS DETACHMENT    Call the clinic if you have any further changes in symptoms.  Including:  Increased numbers of floaters or flashing lights, dimness or darkness that moves through or stays constant in your vision, or any pain in the eye (s).    You may sometimes see small specks or clouds moving in your field of vision.  They are called FLOATERS.  You can often see them when looking at a plain background, like a blank wall or blue edgar.  Floaters are actually tiny clumps of gel or cells inside the VITREOUS, the clear jelly-like fluid that fills the inside of your eye.    While these objects look like they are in front of your eye, they are actually floating inside.  What you see are the shadows they cast on the RETINA, the nerve layer at the back of the eye that senses light and allows you to see.      POSTERIOR VITREOUS DETACHMENT    The appearance of new floaters may be alarming.  If you suddenly " develop new floaters, you should contact your eye care professional  right away.    The retina can tear if the shrinking vitreous pulls away from the wall of the eye.  This sometimes causes a small amount of bleeding in the eye that may appear as new floaters.    A torn retina is always a serious problem, since it can lead to a retinal detachment.  You should see your eye care professional as soon as possible if:    even one new floater appears suddenly;  you see sudden flashes of light;  you notice other symptoms, like the loss of side vision, or a curtain closes down in your vision        POSTERIOR VITREOUS DETACHMENT is more common for people who:    are nearsighted;  have had cataract surgery;  have had YAG laser surgery of the eye;  have had inflammation inside the eye;  are over age 60.      While some floaters may remain visible, many of them will fade over time and become less noticeable.  Even if you've had some floaters for years, you should have your eyes checked as soon as possible if you notice new ones.    FLASHING LIGHTS    When the vitreous gel rubs or pulls on the retina, you may see what look like flashing lights or lightning streaks.  These flashes can appear off and on for several weeks or months.      Some people experience flashes of light that appear as jagged lines or heat waves in both eyes, lasting 10-20 minutes.  These flashes are caused by a spasm of blood vessels in the brain, which is called a migraine.    If a headache follows these flashes, it's called a migraine headache.  If   no headache occurs, these flashes are called Ophthalmic or Ocular Migraine.           DIABETES AND THE EYE / DIABETIC RETINOPATHY    Diabetic retinopathy is a condition occurring in persons with diabetes, which causes progressive damage to the retina, the light sensitive lining at the back of the eye. It is a serious sight-threatening complication of diabetes.    Diabetic retinopathy is the result of  damage to the tiny blood vessels that nourish the retina. They leak blood and other fluids that cause swelling of retinal tissue and clouding of vision. The condition usually affects both eyes. The longer a person has diabetes, the more likely they will develop diabetic retinopathy. If left untreated, diabetic retinopathy can cause blindness.  There are two basic types of diabetic retinopathy:    Background or nonproliferative diabetic retinopathy (NPDR)  Nonproliferative diabetic retinopathy (NPDR) is the earliest stage of diabetic retinopathy. With this condition, damaged blood vessels in the retina begin to leak extra fluid and small amounts of blood into the eye. Sometimes, deposits of cholesterol or other fats from the blood may leak into the retina. Many people with diabetes have mild NPDR, which usually does not affect their vision. However, if their vision is affected, it is the result of macular edema and macular ischemia.    If vision is affected due to macular changes, a consult with a Retina Specialist may be advised.  This is an ophthalmologist that treats retina conditions, including diabetic retinopathy.     Proliferative diabetic retinopathy (PDR)  Proliferative diabetic retinopathy (PDR) mainly occurs when many of the blood vessels in the retina close, preventing enough blood flow. In an attempt to supply blood to the area where the original vessels closed, the retina responds by growing new blood vessels. This is called neovascularization. However, these new blood vessels are abnormal and do not supply the retina with proper blood flow. The new vessels are also often accompanied by scar tissue that may cause the retina to wrinkle or detach. PDR may cause more severe vision loss than NPDR because it can affect both central and peripheral vision.     A patient diagnosed with proliferative diabetic eye disease will be referred to a retinal specialist for consultation.    Often there are no visual  symptoms in the early stages of diabetic retinopathy. That is why our eye care professionals recommend that everyone with diabetes have a comprehensive dilated eye examination once a year. Early detection and treatment can limit the potential for significant vision loss from diabetic retinopathy.

## 2022-12-08 NOTE — PROGRESS NOTES
HPI    Routine dm eye exam-12/21    Pt denies any blurred vision. Wearing readers. Troubling at night. Eyes   get tired. BSL controlled. Was uncontrolled when he had covid. States he   feels like eyes are always squinting.     Hemoglobin A1C       Date                     Value               Ref Range             Status                11/14/2022               7.5 (H)             4.0 - 5.6 %           Final              Comment:    ADA Screening Guidelines:  5.7-6.4%  Consistent with   prediabetes  >or=6.5%  Consistent with diabetes    High levels of fetal   hemoglobin interfere with the HbA1C  assay. Heterozygous hemoglobin   variants (HbS, HgC, etc)do  not significantly interfere with this assay.     However, presence of multiple variants may affect accuracy.         04/18/2022               6.3 (H)             4.0 - 5.6 %           Final              Comment:    ADA Screening Guidelines:  5.7-6.4%  Consistent with   prediabetes  >or=6.5%  Consistent with diabetes    High levels of fetal   hemoglobin interfere with the HbA1C  assay. Heterozygous hemoglobin   variants (HbS, HgC, etc)do  not significantly interfere with this assay.     However, presence of multiple variants may affect accuracy.         10/11/2021               6.7 (H)             4.0 - 5.6 %           Final              Comment:    ADA Screening Guidelines:  5.7-6.4%  Consistent with   prediabetes  >or=6.5%  Consistent with diabetes    High levels of fetal   hemoglobin interfere with the HbA1C  assay. Heterozygous hemoglobin   variants (HbS, HgC, etc)do  not significantly interfere with this assay.     However, presence of multiple variants may affect accuracy.    ----------    Last edited by Sophia Shin on 12/8/2022  8:46 AM.        ROS    Positive for: Eyes  Negative for: Constitutional, Gastrointestinal, Neurological, Skin,   Genitourinary, Musculoskeletal, HENT, Endocrine, Cardiovascular,   Respiratory, Psychiatric, Allergic/Imm,  Heme/Lymph  Last edited by IVIS Cunningham, OD on 12/8/2022  8:55 AM.        Assessment /Plan     For exam results, see Encounter Report.    Diabetes mellitus type 2 without retinopathy    Blepharoptosis, acquired, bilateral    Vitreous floaters, bilateral    Glaucoma screening    Myopia with astigmatism and presbyopia, bilateral        No luana/ no csme, gave Diabetic Retinopathy info, advise tight control glucose, BP---Advise annual dilated fundus exam  Moderate d-chalasis OU, mildly vis sig, pt notes fhx same---monitor, will probably need sx in future   RD precautions given and reviewed. Patient knows to call/ message if any further changes in symptoms occur.  Not suspect  Gave copy of specs for near only / intermediate    Transient blur/ light adaptation issues more prominent w/ poorly controlled glucose following Covid infection    Discussed and educated patient on current findings /plan.  RTC 1 year, prn if any changes / issues

## 2022-12-08 NOTE — PROGRESS NOTES
"  Subjective:       Patient ID: Abhi Pompa is a 52 y.o. male Body mass index is 35.96 kg/m².    Chief Complaint: Other (Generalized "stomach pain" intermittent. Does not know what causes/Famotidine and prilosec helping)    This patient is established with myself and Dr. Dunbar.    Gastroesophageal Reflux  He complains of abdominal pain (intermittent, maybe once a month, epigastric pain occurs after taking supplements, lasts 10-15 minutes; described as ache), belching (not more than usual), dysphagia (occasional; occurs with cold drinks, chicken and doughy foods; significantly improved since surgery), globus sensation (rarely) and heartburn (rarely as long as he takes his medications). He reports no chest pain, no choking, no coughing, no early satiety, no hoarse voice, no nausea, no sore throat or no water brash. This is a chronic (for several years) problem. The problem occurs rarely. The problem has been rapidly improving (controlled on reflux medications). The heartburn wakes him from sleep. The heartburn changes with position. The symptoms are aggravated by lying down and stress (tomato products, red foods, lying on left side). Pertinent negatives include no fatigue, melena or weight loss. Risk factors include ETOH use, caffeine use, obesity, smoking/tobacco exposure and NSAIDs (reports NSAIDs use PRN). He has tried a histamine-2 antagonist, an antacid, a PPI, a diet change and ETOH reduction (prilosec 40 mg once daily; pepcid 40 mg nightly; PAST: carafate, zantac) for the symptoms. The treatment provided significant relief. Past procedures include an EGD and esophageal manometry. Past invasive treatments include gastroplication (heller with fundoplication).     Review of Systems   Constitutional:  Negative for appetite change, chills, diaphoresis, fatigue, fever and weight loss.   HENT:  Positive for trouble swallowing. Negative for hoarse voice and sore throat.    Respiratory:  Negative for " cough, choking, chest tightness and shortness of breath.    Cardiovascular:  Negative for chest pain.   Gastrointestinal:  Positive for abdominal pain (intermittent, maybe once a month, epigastric pain occurs after taking supplements, lasts 10-15 minutes; described as ache), diarrhea (Reports he takes questran 4 grams daily PRN- which is rarely for intermittent diarrhea from metformin use; denies diarrhea currently; reports when he deshpande snot take metformin then he does not have diarrhea), dysphagia (occasional; occurs with cold drinks, chicken and doughy foods; significantly improved since surgery) and heartburn (rarely as long as he takes his medications). Negative for anal bleeding, blood in stool, constipation, melena, nausea, rectal pain and vomiting.   Neurological:  Negative for weakness.       Past Medical History:   Diagnosis Date    Achalasia     Arthritis     right ankle    Cataract     Colon polyp     Diabetes mellitus     taking lisinopril preventive to protect the kidneys, no HTN    Dyslipidemia     pt denies    Dysphagia     Former tobacco use     chew    GERD (gastroesophageal reflux disease)     H. pylori infection     Hypogonadism in male     MELLY (obstructive sleep apnea)     uses cpap     Past Surgical History:   Procedure Laterality Date    ANKLE SURGERY Right     COLONOSCOPY N/A 04/19/2022    Procedure: COLONOSCOPY;  Surgeon: Julian Dunbar Jr., MD;  Location: [facility];  Service: Endoscopy;  Laterality: N/A; internal hemorrhoids, 1 colon polyp removed; repeat in 8-10 years for surveillance; biopsy: Colonic mucosa with prominent lymphoid aggregates. Pending molecular study. See comment.    ESOPHAGOGASTRODUODENOSCOPY N/A 6/29/2022    Procedure: EGD (ESOPHAGOGASTRODUODENOSCOPY);  Surgeon: Julian Dunbar Jr., MD;  Location: [facility];  Service: Endoscopy;  Laterality: N/A;    HAND SURGERY Right     laparoscopic Heller myotomy      MYOTOMY  07/2016    heller- achalasia surgery    TONSILLECTOMY       UPPER GASTROINTESTINAL ENDOSCOPY  12/11/2015    WISDOM TOOTH EXTRACTION       Family History   Problem Relation Age of Onset    Diabetes Mother     Allergies Mother     Lupus Mother     Diabetes Maternal Aunt     Allergic rhinitis Sister     Angioedema Neg Hx     Asthma Neg Hx     Eczema Neg Hx     Immunodeficiency Neg Hx     Urticaria Neg Hx     Amblyopia Neg Hx     Blindness Neg Hx     Cancer Neg Hx     Cataracts Neg Hx     Glaucoma Neg Hx     Hypertension Neg Hx     Macular degeneration Neg Hx     Retinal detachment Neg Hx     Strabismus Neg Hx     Stroke Neg Hx     Thyroid disease Neg Hx     Celiac disease Neg Hx     Cirrhosis Neg Hx     Colon cancer Neg Hx     Colon polyps Neg Hx     Crohn's disease Neg Hx     Cystic fibrosis Neg Hx     Esophageal cancer Neg Hx     Hemochromatosis Neg Hx     Inflammatory bowel disease Neg Hx     Irritable bowel syndrome Neg Hx     Liver cancer Neg Hx     Liver disease Neg Hx     Rectal cancer Neg Hx     Stomach cancer Neg Hx     Ulcerative colitis Neg Hx     Harvey's disease Neg Hx     Lymphoma Neg Hx     Tuberculosis Neg Hx     Scleroderma Neg Hx     Rheum arthritis Neg Hx     Multiple sclerosis Neg Hx     Melanoma Neg Hx     Psoriasis Neg Hx     Skin cancer Neg Hx      Social History     Tobacco Use    Smoking status: Some Days     Types: Cigars    Smokeless tobacco: Former     Types: Chew     Quit date: 10/19/2012    Tobacco comments:     Occasional cigar   Substance Use Topics    Alcohol use: Yes     Alcohol/week: 7.0 standard drinks     Types: 7 Glasses of wine per week     Comment: 1 glass of red wine every night    Drug use: No     Wt Readings from Last 10 Encounters:   12/08/22 102.6 kg (226 lb 3.1 oz)   12/05/22 102.7 kg (226 lb 6.6 oz)   11/21/22 104.1 kg (229 lb 6.2 oz)   07/22/22 102 kg (224 lb 13.9 oz)   06/24/22 97.5 kg (215 lb)   05/06/22 101.2 kg (223 lb 1.7 oz)   04/25/22 101.3 kg (223 lb 5.2 oz)   04/19/22 97.5 kg (215 lb)   01/28/22 100 kg (220 lb 7.4 oz)    10/18/21 102.6 kg (226 lb 3.1 oz)     Lab Results   Component Value Date    WBC 5.83 11/14/2022    HGB 17.5 11/14/2022    HCT 52.4 11/14/2022    MCV 92 11/14/2022     11/14/2022     CMP  Sodium   Date Value Ref Range Status   11/14/2022 137 136 - 145 mmol/L Final     Potassium   Date Value Ref Range Status   11/14/2022 4.1 3.5 - 5.1 mmol/L Final     Chloride   Date Value Ref Range Status   11/14/2022 106 95 - 110 mmol/L Final     CO2   Date Value Ref Range Status   11/14/2022 23 23 - 29 mmol/L Final     Glucose   Date Value Ref Range Status   11/14/2022 165 (H) 70 - 110 mg/dL Final     BUN   Date Value Ref Range Status   11/14/2022 15 6 - 20 mg/dL Final     Creatinine   Date Value Ref Range Status   11/14/2022 0.8 0.5 - 1.4 mg/dL Final     Calcium   Date Value Ref Range Status   11/14/2022 9.7 8.7 - 10.5 mg/dL Final     Total Protein   Date Value Ref Range Status   11/14/2022 6.8 6.0 - 8.4 g/dL Final     Albumin   Date Value Ref Range Status   11/14/2022 4.2 3.5 - 5.2 g/dL Final     Total Bilirubin   Date Value Ref Range Status   11/14/2022 0.7 0.1 - 1.0 mg/dL Final     Comment:     For infants and newborns, interpretation of results should be based  on gestational age, weight and in agreement with clinical  observations.    Premature Infant recommended reference ranges:  Up to 24 hours.............<8.0 mg/dL  Up to 48 hours............<12.0 mg/dL  3-5 days..................<15.0 mg/dL  6-29 days.................<15.0 mg/dL       Alkaline Phosphatase   Date Value Ref Range Status   11/14/2022 62 55 - 135 U/L Final     AST   Date Value Ref Range Status   11/14/2022 22 10 - 40 U/L Final     ALT   Date Value Ref Range Status   11/14/2022 50 (H) 10 - 44 U/L Final     Anion Gap   Date Value Ref Range Status   11/14/2022 8 8 - 16 mmol/L Final     eGFR if    Date Value Ref Range Status   04/18/2022 >60.0 >60 mL/min/1.73 m^2 Final     eGFR if non    Date Value Ref Range Status    04/18/2022 >60.0 >60 mL/min/1.73 m^2 Final     Comment:     Calculation used to obtain the estimated glomerular filtration  rate (eGFR) is the CKD-EPI equation.        Lab Results   Component Value Date    [id number] 425 11/14/2022     Lab Results   Component Value Date    TSH 1.193 01/29/2016 11/14/2022 magnesium WNL    Reviewed prior medical records including radiology report of 2/2/2016 esophagram; 2/26/16 esophageal manometry; 1/11/2019 visit note with EARLE Watson NP; & endoscopy history (see surgical history).  Objective:      Physical Exam  Vitals and nursing note reviewed.   Constitutional:       General: He is not in acute distress.     Appearance: Normal appearance. He is well-developed. He is not diaphoretic.   HENT:      Mouth/Throat:      Lips: Pink. No lesions.      Mouth: Mucous membranes are moist. No oral lesions.      Tongue: No lesions.      Pharynx: Oropharynx is clear. No pharyngeal swelling or posterior oropharyngeal erythema.   Eyes:      General: No scleral icterus.     Conjunctiva/sclera: Conjunctivae normal.   Pulmonary:      Effort: Pulmonary effort is normal. No respiratory distress.      Breath sounds: Normal breath sounds. No wheezing.   Abdominal:      General: Bowel sounds are normal. There is no distension or abdominal bruit.      Palpations: Abdomen is soft. Abdomen is not rigid. There is no mass.      Tenderness: There is no abdominal tenderness. There is no guarding or rebound. Negative signs include Vera's sign and McBurney's sign.   Skin:     General: Skin is warm and dry.      Coloration: Skin is not jaundiced or pale.      Findings: No erythema or rash.   Neurological:      Mental Status: He is alert and oriented to person, place, and time.   Psychiatric:         Behavior: Behavior normal.         Thought Content: Thought content normal.         Judgment: Judgment normal.       Assessment:       1. Epigastric pain    2. Elevated ALT measurement    3. Gastroesophageal  reflux disease with esophagitis without hemorrhage    4. History of gastritis    5. History of Helicobacter pylori infection    6. Pharyngoesophageal dysphagia    7. Achalasia    8. History of esophageal surgery    9. Globus sensation    10. Intermittent diarrhea        Plan:       Epigastric pain  -     Hepatic Function Panel; Future; Expected date: 12/08/2022  -     Hepatitis Panel, Acute; Future; Expected date: 12/08/2022  -     Lipase; Future; Expected date: 12/08/2022  -     US Abdomen Complete; Future; Expected date: 12/08/2022  - avoid/minimize use of NSAIDs- since they can cause GI upset, bleeding and/or ulcers. If NSAID must be taken, recommend take with food.  - AVOID KNOWN TRIGGERS (OTC SUPPLEMENTS)    Elevated ALT measurement  -     Hepatic Function Panel; Future; Expected date: 12/08/2022  -     Hepatitis Panel, Acute; Future; Expected date: 12/08/2022  -     US Abdomen Complete; Future; Expected date: 12/08/2022  - avoid alcohol and minimize tylenol products, & follow-up with PCP for continued evaluation and management; if specialist is needed, recommend seeing hepatology.    Gastroesophageal reflux disease with esophagitis without hemorrhage & History of gastritis  -  CONTINUE   omeprazole (PRILOSEC) 40 MG capsule; Take 1 capsule (40 mg total) by mouth once daily.  - CONTINUE    famotidine (PEPCID) 40 MG tablet; Take 1 tablet (40 mg total) by mouth once daily.  - discussed with patient about long term use of reflux medications (preference to use lowest effective dose or discontinuing if possible), the risk and benefits of using these medications long term, the risk of untreated GERD such as hernandez's esophagus, and recommend a diet high in calcium and/or taking OTC calcium and vitamin d supplements as directed (such as Citracal +D), patient verbalized understanding & patient wants to continue current medications at current dosages.  - recommend annual monitoring with blood work to include CMP, CBC,  vitamin B12, and magnesium.    History of Helicobacter pylori infection  - negative on 6/29/2022 EGD    Pharyngoesophageal dysphagia, Achalasia, & History of esophageal surgery  - educated patient to eat smaller more frequent meals and to eat slowly and advised to eat a soft diet.  - possible UGI/esophagram/esophageal manometry if symptoms persist  - Recommend follow-up with Dr. Watkins & Dr. Le for continued evaluation and management.    Globus sensation  Recommend follow-up with Primary Care Provider for continued evaluation and management.    Intermittent diarrhea  - recommended OTC probiotic, such as Align or Culturelle, as directed  - avoid lactose & caffeine  - avoid known triggers  - discussed with patient that certain medications, such as metformin, may be contributing to symptoms. I recommend follow-up with provider who manages medication to discuss about possible alternative therapy, patient verbalized understanding  - recommend stool studies if diarrhea recurs/worsens  - can continue questran 4 grams once daily prn diarrhea    Follow up in about 1 month (around 1/8/2023), or if symptoms worsen or fail to improve.    If no improvement in symptoms or symptoms worsen, call/follow-up at clinic or go to ER      38 minutes of total time spent on the encounter, which includes face to face time and non-face to face time preparing to see the patient (e.g., review of tests), Obtaining and/or reviewing separately obtained history, Documenting clinical information in the electronic or other health record, Independently interpreting results (not separately reported) and communicating results to the patient/family/caregiver, or Care coordination (not separately reported).

## 2022-12-09 RX ORDER — TESTOSTERONE CYPIONATE 1000 MG/10ML
INJECTION, SOLUTION INTRAMUSCULAR
Qty: 10 ML | Refills: 3 | Status: SHIPPED | OUTPATIENT
Start: 2022-12-09 | End: 2023-02-06

## 2022-12-13 ENCOUNTER — TELEPHONE (OUTPATIENT)
Dept: ENDOCRINOLOGY | Facility: CLINIC | Age: 52
End: 2022-12-13
Payer: COMMERCIAL

## 2022-12-13 NOTE — TELEPHONE ENCOUNTER
LVM advising pt Testosterone was faxed on 12/9.  Called and gave verbal to Susan Roach at [facility].

## 2022-12-14 ENCOUNTER — TELEPHONE (OUTPATIENT)
Dept: ENDOCRINOLOGY | Facility: CLINIC | Age: 52
End: 2022-12-14
Payer: COMMERCIAL

## 2022-12-14 NOTE — TELEPHONE ENCOUNTER
Initiated PA for testosterone cypionate (DEPOTESTOTERONE CYPIONATE) 100 mg/mL injection    Key: [id number]    PA Case ID: [id number]    Rx #: [id number]    [place] APPROVED    The authorization is valid from 11/14/2022 through 12/14/2023.

## 2023-01-04 ENCOUNTER — PATIENT MESSAGE (OUTPATIENT)
Dept: ENDOCRINOLOGY | Facility: CLINIC | Age: 53
End: 2023-01-04
Payer: COMMERCIAL

## 2023-01-05 RX ORDER — SEMAGLUTIDE 1.34 MG/ML
INJECTION, SOLUTION SUBCUTANEOUS
Qty: 14 PEN | Refills: 6 | Status: SHIPPED | OUTPATIENT
Start: 2023-01-05 | End: 2023-06-02 | Stop reason: SDUPTHER

## 2023-01-05 NOTE — TELEPHONE ENCOUNTER
Ozempic sent  Start at 0.25 weekly x 4 weeks  Then increase to 0.5 mcg weekly  The initial dose will not impact BG significantly and mainly to make sure can tolerate.   Should start seeing BG impact with 0.5 mcg dose  Stop Jardiance

## 2023-01-06 ENCOUNTER — TELEPHONE (OUTPATIENT)
Dept: GASTROENTEROLOGY | Facility: CLINIC | Age: 53
End: 2023-01-06
Payer: COMMERCIAL

## 2023-01-06 ENCOUNTER — HOSPITAL ENCOUNTER (OUTPATIENT)
Dept: RADIOLOGY | Facility: HOSPITAL | Age: 53
Discharge: HOME | End: 2023-01-06
Attending: NURSE PRACTITIONER
Payer: COMMERCIAL

## 2023-01-06 DIAGNOSIS — R10.13 EPIGASTRIC PAIN: ICD-10-CM

## 2023-01-06 DIAGNOSIS — R74.01 ELEVATED ALT MEASUREMENT: ICD-10-CM

## 2023-01-06 PROCEDURE — 76700 US EXAM ABDOM COMPLETE: CPT | Mod: TC,PO

## 2023-01-06 PROCEDURE — 76700 US ABDOMEN COMPLETE: ICD-10-PCS | Mod: 26,,, | Performed by: RADIOLOGY

## 2023-01-06 PROCEDURE — 76700 US EXAM ABDOM COMPLETE: CPT | Mod: 26,,, | Performed by: RADIOLOGY

## 2023-01-06 NOTE — TELEPHONE ENCOUNTER
Please call to inform & review the results with the patient- radiology report of the abdominal ultrasound showed enlarged fatty liver. For fatty liver recommend: low fat, low cholesterol diet, maintain good control of blood sugars and cholesterol levels, exercise, weight loss (if overweight), minimize/avoid alcohol and tylenol products, & follow-up with PCP for continued evaluation and management; if specialist is needed, recommend seeing hepatology.  Otherwise, no acute findings seen.   Continue with previous recommendations. If no improvement in symptoms or symptoms worsen, call/follow-up at clinic or go to ER.    Thanks,

## 2023-01-19 ENCOUNTER — OFFICE VISIT (OUTPATIENT)
Dept: HEMATOLOGY/ONCOLOGY | Facility: CLINIC | Age: 53
End: 2023-01-19
Payer: COMMERCIAL

## 2023-01-19 VITALS
RESPIRATION RATE: 16 BRPM | BODY MASS INDEX: 36.22 KG/M2 | SYSTOLIC BLOOD PRESSURE: 132 MMHG | HEIGHT: 67 IN | OXYGEN SATURATION: 98 % | WEIGHT: 230.81 LBS | TEMPERATURE: 98 F | HEART RATE: 81 BPM | DIASTOLIC BLOOD PRESSURE: 80 MMHG

## 2023-01-19 DIAGNOSIS — R71.8 ELEVATED HEMATOCRIT: ICD-10-CM

## 2023-01-19 DIAGNOSIS — G47.33 OBSTRUCTIVE SLEEP APNEA SYNDROME: ICD-10-CM

## 2023-01-19 DIAGNOSIS — E34.9 TESTOSTERONE DEFICIENCY: Primary | ICD-10-CM

## 2023-01-19 DIAGNOSIS — D75.1 POLYCYTHEMIA: ICD-10-CM

## 2023-01-19 PROCEDURE — 3008F PR BODY MASS INDEX (BMI) DOCUMENTED: ICD-10-PCS | Mod: CPTII,S$GLB,, | Performed by: INTERNAL MEDICINE

## 2023-01-19 PROCEDURE — 1159F PR MEDICATION LIST DOCUMENTED IN MEDICAL RECORD: ICD-10-PCS | Mod: CPTII,S$GLB,, | Performed by: INTERNAL MEDICINE

## 2023-01-19 PROCEDURE — 3079F DIAST BP 80-89 MM HG: CPT | Mod: CPTII,S$GLB,, | Performed by: INTERNAL MEDICINE

## 2023-01-19 PROCEDURE — 99205 PR OFFICE/OUTPT VISIT, NEW, LEVL V, 60-74 MIN: ICD-10-PCS | Mod: S$GLB,,, | Performed by: INTERNAL MEDICINE

## 2023-01-19 PROCEDURE — 99999 PR PBB SHADOW E&M-EST. PATIENT-LVL V: ICD-10-PCS | Mod: PBBFAC,,, | Performed by: INTERNAL MEDICINE

## 2023-01-19 PROCEDURE — 3079F PR MOST RECENT DIASTOLIC BLOOD PRESSURE 80-89 MM HG: ICD-10-PCS | Mod: CPTII,S$GLB,, | Performed by: INTERNAL MEDICINE

## 2023-01-19 PROCEDURE — 3008F BODY MASS INDEX DOCD: CPT | Mod: CPTII,S$GLB,, | Performed by: INTERNAL MEDICINE

## 2023-01-19 PROCEDURE — 3075F PR MOST RECENT SYSTOLIC BLOOD PRESS GE 130-139MM HG: ICD-10-PCS | Mod: CPTII,S$GLB,, | Performed by: INTERNAL MEDICINE

## 2023-01-19 PROCEDURE — 1159F MED LIST DOCD IN RCRD: CPT | Mod: CPTII,S$GLB,, | Performed by: INTERNAL MEDICINE

## 2023-01-19 PROCEDURE — 3075F SYST BP GE 130 - 139MM HG: CPT | Mod: CPTII,S$GLB,, | Performed by: INTERNAL MEDICINE

## 2023-01-19 PROCEDURE — 99999 PR PBB SHADOW E&M-EST. PATIENT-LVL V: CPT | Mod: PBBFAC,,, | Performed by: INTERNAL MEDICINE

## 2023-01-19 PROCEDURE — 99205 OFFICE O/P NEW HI 60 MIN: CPT | Mod: S$GLB,,, | Performed by: INTERNAL MEDICINE

## 2023-01-19 NOTE — PROGRESS NOTES
Subjective:       Patient ID: Abhi Pompa is a 52 y.o. male.    Chief Complaint: Elevated hematocrit (New Patient)    HPI    Mr. Pompa is a 52-year-old man referred for evaluation for polycythemia.  The most recent CBC within our system is from November 14, 2022 and had shown a white count of 5800 per cubic mm, hemoglobin 17.5 grams/dL, hematocrit 52.4% and platelets 450 K.  His CBC on 04/18/2022 had shown a white count of 6500 per cubic mm, hemoglobin 18 grams/deciliter, hematocrit 52.7% and platelets 295 K  His CBC on 03/29/2021 had shown a white count of 6900 per cubic mm, hemoglobin 18.7 grams/deciliter, hematocrit 56.1%, MCV 93 and platelets 305 K  A CBC in September 2020 had shown a white count of 7900 per cubic mm, hemoglobin 18.4 grams/deciliter, hematocrit 56.1% and platelets 284  His CBC on December 18, 2018 had shown a white count of 7600 per cubic mm, hemoglobin 17.5 grams/deciliter, hematocrit 53% and platelets 297 K    The patient has been on biweekly on weekly testosterone injections since 2014.  He states that he has undergone phlebotomy at least once.    PAST MEDICAL HISTORY:  Significant for diabetes, hyperlipidemia and sleep apnea.  Also history of achalasia  PAST SURGICAL HISTORY:  He has had right ankle surgery and surgery for his achalasia  SOCIAL HISTORY:  He is a retired submariner.  He works for the [employer] as an .  He is .  He drinks socially and smokes cigars occasionally.  FAMILY HISTORY:  Negative for cancer      Review of Systems    Overall he feels well.  He complains of mild fatigue with exertion.  When asked, he states that he feels well rested when he wakes up but he does not always keep his mask on through the night.  His ECOG PS is 1.  He denies any anxiety, depression, easy bruising, fevers, chills, night  sweats, weight loss, nausea, vomiting, diarrhea, constipation, diplopia, blurred vision, headache, chest pain, palpitations,  shortness of breath, breast pain, abdominal pain, extremity pain, or difficulty ambulating.  The remainder of the ten-point ROS, including general, skin, lymph, H/N, cardiorespiratory, GI, , Neuro, Endocrine, and psychiatric is negative.    Objective:      Physical Exam    He is alert, oriented to time, place, person, pleasant, well      nourished, in no acute physical distress.                                    VITAL SIGNS:  Reviewed                                      HEENT:  Normal.  There are no nasal, oral, lip, gingival, auricular, lid,    or conjunctival lesions.  Mucosae are moist and pink, and there is no        thrush.  Pupils are equal, reactive to light and accommodation.              Extraocular muscle movements are intact.    Dentition is good.  There is no frontal or maxillary tenderness.                                   NECK:  Supple without JVD, adenopathy, or thyromegaly.                       LUNGS:  Clear to auscultation without wheezing, rales, or rhonchi.           CARDIOVASCULAR:  Reveals an S1, S2, no murmurs, no rubs, no gallops.         ABDOMEN:  Soft, nontender, without organomegaly.  Bowel sounds are    present.                                                                     EXTREMITIES:  No cyanosis, clubbing, or edema.                                                               LYMPHATIC:  There is no cervical, axillary, or supraclavicular adenopathy.   SKIN:  Warm and moist, without petechiae, rashes, induration, or ecchymoses.           NEUROLOGIC:  DTRs are 0-1+ bilaterally, symmetrical, motor function is 5/5,  and cranial nerves are  within normal limits.   Assessment:     1. Erythrocytosis.  I suspect this is secondary to testosterone injections and or sleep apnea rather than secondary to MPN.    2. Obesity, sleep apnea    3. Diabetes, hyperlipidemia, history of achalasia        Plan:         I had a long discussion with Mr. Pompa.  We went over the differential for his  erythrocytosis.  I suspect that his erythrocytosis secondary to his testosterone injections and or his sleep apnea.  However, out of abundance of caution, MPN will need to be ruled out.  At this point we will proceed as follows:  He will have a repeat CBC 4 days from now  We will obtain a JAK2 mutational analysis with reflex to MPL and CALR.  If there is no evidence of an MPN, it is reasonable for him to continue the testosterone injections so long as his hematocrit is main pain below 55% with periodic phlebotomies.  If, on the other hand, he does have an MPN, he can still get testosterone injections but would maintain the hematocrit at or below 45%.  The above were explained to him and his multiple questions I answered to satisfaction.  I spent approximately 60 minutes reviewing the available records and evaluating the patient, out of which over 50% of the time was spent face to face with the patient in counseling and coordinating this patient's care.

## 2023-01-23 ENCOUNTER — LAB VISIT (OUTPATIENT)
Dept: LAB | Facility: HOSPITAL | Age: 53
End: 2023-01-23
Attending: INTERNAL MEDICINE
Payer: COMMERCIAL

## 2023-01-23 DIAGNOSIS — R71.8 ELEVATED HEMATOCRIT: ICD-10-CM

## 2023-01-23 LAB
ALBUMIN SERPL BCP-MCNC: 4.1 G/DL (ref 3.5–5.2)
ALP SERPL-CCNC: 58 U/L (ref 55–135)
ALT SERPL W/O P-5'-P-CCNC: 44 U/L (ref 10–44)
ANION GAP SERPL CALC-SCNC: 12 MMOL/L (ref 8–16)
AST SERPL-CCNC: 22 U/L (ref 10–40)
BASOPHILS # BLD AUTO: 0.06 K/UL (ref 0–0.2)
BASOPHILS NFR BLD: 0.5 % (ref 0–1.9)
BILIRUB SERPL-MCNC: 0.5 MG/DL (ref 0.1–1)
BUN SERPL-MCNC: 15 MG/DL (ref 6–20)
CALCIUM SERPL-MCNC: 9 MG/DL (ref 8.7–10.5)
CHLORIDE SERPL-SCNC: 105 MMOL/L (ref 95–110)
CHOLEST SERPL-MCNC: 153 MG/DL (ref 120–199)
CHOLEST/HDLC SERPL: 4.9 {RATIO} (ref 2–5)
CO2 SERPL-SCNC: 21 MMOL/L (ref 23–29)
CREAT SERPL-MCNC: 0.8 MG/DL (ref 0.5–1.4)
DIFFERENTIAL METHOD: ABNORMAL
EOSINOPHIL # BLD AUTO: 0.3 K/UL (ref 0–0.5)
EOSINOPHIL NFR BLD: 2.3 % (ref 0–8)
ERYTHROCYTE [DISTWIDTH] IN BLOOD BY AUTOMATED COUNT: 12.3 % (ref 11.5–14.5)
EST. GFR  (NO RACE VARIABLE): >60 ML/MIN/1.73 M^2
GLUCOSE SERPL-MCNC: 163 MG/DL (ref 70–110)
HCT VFR BLD AUTO: 47.9 % (ref 40–54)
HDLC SERPL-MCNC: 31 MG/DL (ref 40–75)
HDLC SERPL: 20.3 % (ref 20–50)
HGB BLD-MCNC: 16.7 G/DL (ref 14–18)
IMM GRANULOCYTES # BLD AUTO: 0.03 K/UL (ref 0–0.04)
IMM GRANULOCYTES NFR BLD AUTO: 0.3 % (ref 0–0.5)
LDLC SERPL CALC-MCNC: 62.8 MG/DL (ref 63–159)
LYMPHOCYTES # BLD AUTO: 1.8 K/UL (ref 1–4.8)
LYMPHOCYTES NFR BLD: 15.3 % (ref 18–48)
MCH RBC QN AUTO: 31.7 PG (ref 27–31)
MCHC RBC AUTO-ENTMCNC: 34.9 G/DL (ref 32–36)
MCV RBC AUTO: 91 FL (ref 82–98)
MONOCYTES # BLD AUTO: 1.3 K/UL (ref 0.3–1)
MONOCYTES NFR BLD: 10.9 % (ref 4–15)
NEUTROPHILS # BLD AUTO: 8.5 K/UL (ref 1.8–7.7)
NEUTROPHILS NFR BLD: 70.7 % (ref 38–73)
NONHDLC SERPL-MCNC: 122 MG/DL
NRBC BLD-RTO: 0 /100 WBC
PLATELET # BLD AUTO: 288 K/UL (ref 150–450)
PMV BLD AUTO: 9.6 FL (ref 9.2–12.9)
POTASSIUM SERPL-SCNC: 3.7 MMOL/L (ref 3.5–5.1)
PROT SERPL-MCNC: 6.7 G/DL (ref 6–8.4)
RBC # BLD AUTO: 5.27 M/UL (ref 4.6–6.2)
SODIUM SERPL-SCNC: 138 MMOL/L (ref 136–145)
TRIGL SERPL-MCNC: 296 MG/DL (ref 30–150)
WBC # BLD AUTO: 11.98 K/UL (ref 3.9–12.7)

## 2023-01-23 PROCEDURE — 80053 COMPREHEN METABOLIC PANEL: CPT | Mod: PN | Performed by: INTERNAL MEDICINE

## 2023-01-23 PROCEDURE — 80061 LIPID PANEL: CPT | Performed by: INTERNAL MEDICINE

## 2023-01-23 PROCEDURE — 85025 COMPLETE CBC W/AUTO DIFF WBC: CPT | Mod: PN | Performed by: INTERNAL MEDICINE

## 2023-01-23 PROCEDURE — 81270 JAK2 GENE: CPT | Performed by: INTERNAL MEDICINE

## 2023-01-27 ENCOUNTER — CLINICAL SUPPORT (OUTPATIENT)
Dept: DIABETES | Facility: CLINIC | Age: 53
End: 2023-01-27
Payer: COMMERCIAL

## 2023-01-27 VITALS — HEIGHT: 67 IN | WEIGHT: 232.56 LBS | BODY MASS INDEX: 36.5 KG/M2

## 2023-01-27 DIAGNOSIS — E11.9 TYPE 2 DIABETES MELLITUS WITHOUT RETINOPATHY: ICD-10-CM

## 2023-01-27 PROCEDURE — 99999 PR PBB SHADOW E&M-EST. PATIENT-LVL II: ICD-10-PCS | Mod: PBBFAC,,, | Performed by: DIETITIAN, REGISTERED

## 2023-01-27 PROCEDURE — 99999 PR PBB SHADOW E&M-EST. PATIENT-LVL II: CPT | Mod: PBBFAC,,, | Performed by: DIETITIAN, REGISTERED

## 2023-01-27 PROCEDURE — G0108 DIAB MANAGE TRN  PER INDIV: HCPCS | Mod: S$GLB,,, | Performed by: DIETITIAN, REGISTERED

## 2023-01-27 PROCEDURE — G0108 PR DIAB MANAGE TRN  PER INDIV: ICD-10-PCS | Mod: S$GLB,,, | Performed by: DIETITIAN, REGISTERED

## 2023-01-27 NOTE — PROGRESS NOTES
Diabetes Care Specialist Progress Note  Author: Leslie Ford RD, CDE  Date: 1/27/2023    Program Intake  Reason for Diabetes Program Visit:: Initial Diabetes Assessment (patient is seen q 6 months--last initial visit done > 1 yr ago)  Current diabetes risk level:: moderate  In the last 12 months, have you:: none  Permission to speak with others about care:: no    Lab Results   Component Value Date    HGBA1C 7.5 (H) 11/14/2022     Clinical    Problem Review  Reviewed Problem List with Patient: yes  Reviewed health maintenance: yes    Clinical Assessment  Current Diabetes Treatment: Oral Medication, Injectable (metformin XR 1000 mg (1 tablet BID), OZempic 0.25 mg weekly (Mondays--will be increasing to 0,5 mg dose on 2/6/23).  Stopped Jardiance when starting Ozempic.)  Have you ever experienced hypoglycemia (low blood sugar)?: no  Have you ever experienced hyperglycemia (high blood sugar)?: no    Medication Information  How do you obtain your medications?: Patient drives  How many days a week do you miss your medications?: Never  Do you sometimes have difficulty refilling your medications?: No  Medication adherence impacting ability to self-manage diabetes?: No    Labs  Do you have regular lab work to monitor your medications?: Yes  Type of Regular Lab Work: A1c, Cholesterol, CBC, BMP  Where do you get your labs drawn?: [facility]  Lab Compliance Barriers: No    Nutritional Status  Diet: Regular (Patient avoids processed foods, tries to eat clean the majority of the time.  Increased lean protein.)  Change in appetite?: No  Dentation:: Intact  Recent Changes in Weight: Weight Gain (Gradual weight gain over past 6 months--recently started Ozempic. Patient has personal goal weight of 220 lbs.)  Current nutritional status an area of need that is impacting patient's ability to self-manage diabetes?: No    Additional Social History    Support  Does anyone support you with your diabetes care?: yes  Who supports you?: self,  significant other  Who takes you to your medical appointments?: self  Does the current support meet the patient's needs?: Yes  Is Support an area impacting ability to self-manage diabetes?: No    Access to Mass Media & Technology  Does the patient have access to any of the following devices or technologies?: Smart phone, Internet Access  Media or technology needs impacting ability to self-manage diabetes?: No    Cognitive/Behavioral Health  Alert and Oriented: Yes  Difficulty Thinking: No  Requires Prompting: No  Requires assistance for routine expression?: No  Cognitive or behavioral barriers impacting ability to self-manage diabetes?: No    Culture/Catholic  Culture or Mormon beliefs that may impact ability to access healthcare: No    Communication  Language preference: English  Hearing Problems: No  Vision Problems: No  Communication needs impacting ability to self-manage diabetes?: No    Health Literacy  Preferred Learning Method: Face to Face  How often do you need to have someone help you read instructions, pamphlets, or written material from your doctor or pharmacy?: Never  Health literacy needs impacting ability to self-manage diabetes?: No    Diabetes Self-Management Skills Assessment    Diabetes Disease Process/Treatment Options  Patient/caregiver able to state what happens when someone has diabetes.: yes  Patient/caregiver knows what type of diabetes they have.: yes  Diabetes Type : Type II  Patient/caregiver able to identify at least three signs and symptoms of diabetes.: yes  Patient able to identify at least three risk factors for diabetes.: yes  Identified risk factors:: age over 40  Diabetes Disease Process/Treatment Options: Skills Assessment Completed: Yes  Assessment indicates:: Adequate understanding  Area of need?: No    Nutrition/Healthy Eating  Challenges to healthy eating:: portion control  Method of carbohydrate measurement:: carb counting/reading labels  Patient can identify foods that  impact blood sugar.: yes  Patient-identified foods:: starchy vegetables (corn, peas, beans), starches (bread, pasta, rice, cereal), sweets  Nutrition/Healthy Eating Skills Assessment Completed:: Yes  Assessment indicates:: Adequate understanding  Area of need?: Yes    Physical Activity/Exercise  Patient's daily activity level:: moderately active  Patient formally exercises outside of work.: yes  Exercise Type: using exercise equipment, walking  Intensity: Moderate  Frequency: four or more times a week (Now resuming physical activity to regular levels.  Will focus on aerobic exercise.)  Duration: 30 min  Patient can identify forms of physical activity.: yes  Stated forms of physical activity:: moving to burn calories, recreational activities  Patient can identify reasons why exercise/physical activity is important in diabetes management.: yes  Identified reasons:: tones muscles, other (see comments), lowers blood glucose, blood pressure, and cholesterol (helps with weight loss efforts)  Physical Activity/Exercise Skills Assessment Completed: : Yes  Assessment indicates:: Adequate understanding  Area of need?: No    Medications  Patient is able to describe current diabetes management routine.: yes  Diabetes management routine:: diet, oral medications, injectable medications  Patient is able to identify current diabetes medications, dosages, and appropriate timing of medications.: yes  Patient understands the purpose of the medications taken for diabetes.: yes  Patient reports problems or concerns with current medication regimen.: no  Medication Skills Assessment Completed:: Yes  Assessment indicates:: Adequate understanding  Area of need?: No    Home Blood Glucose Monitoring  Patient states that blood sugar is checked at home daily.: yes  Monitoring Method:: personal continuous glucose monitor  Personal CGM type:: Freestyle Amena 2  Patient is able to use personal CGM appropriately.: yes  CGM Report reviewed?:  no  Unable to download personal CGM: Patient forgot Amena 2 reader at home---does report glucose levels much more stable after meals since starting Ozempic 3 weeks ago.  Home Blood Glucose Monitoring Skills Assessment Completed: : Yes  Assessment indicates:: Adequate understanding  Area of need?: No    Psychosocial/Coping  Patient can identify ways of coping with chronic disease.: yes  Patient-stated ways of coping with chronic disease:: support from loved ones  Psychosocial/Coping Skills Assessment Completed: : Yes  Assessment indicates:: Adequate understanding  Area of need?: No    Assessment Summary and Plan    Based on today's diabetes care assessment, the following areas of need were identified:      Social 1/27/2023   Support No   Access to Mass Media/Tech No   Cognitive/Behavioral Health No   Culture/Caodaism No   Communication No   Health Literacy No        Clinical 1/27/2023   Medication Adherence No   Lab Compliance No   Nutritional Status No        Diabetes Self-Management Skills 1/27/2023   Diabetes Disease Process/Treatment Options No   Nutrition/Healthy Eating Yes--see care plan   Physical Activity/Exercise No   Medication No   Home Blood Glucose Monitoring No   Psychosocial/Coping No      Today's interventions were provided through individual discussion, instruction, and written materials were provided.      Patient verbalized understanding of instruction and written materials.  Pt was able to return back demonstration of instructions today. Patient understood key points, needs reinforcement and further instruction.     Diabetes Self-Management Care Plan:    Today's Diabetes Self-Management Care Plan was developed with Abhi's input. Abhi has agreed to work toward the following goal(s) to improve his/her overall diabetes control.      Care Plan: Diabetes Management   Updates made since 12/28/2022 12:00 AM        Problem: Healthy Eating         Goal: Refocus on eating balanced  meals--patient has resorted back to eating all carb meals at times.    Start Date: 1/27/2023   Expected End Date: 8/4/2023   Priority: Medium   Barriers: No Barriers Identified        Task: Reviewed the sources and role of Carbohydrate, Protein, and Fat and how each nutrient impacts blood sugar. Completed 1/27/2023        Task: Review the importance of balancing carbohydrates with each meal using portion control techniques to count servings of carbohydrate and label reading to identify serving size and amount of total carbs per serving. Completed 1/27/2023          Follow Up Plan     Follow up in about 6 months (appointment made for 8/4/23) for continued DSMES--patient long time patient seen every 6 months for continued support.  Recent Hgb A1c increased from 6.3% (April 2022) to now 7.5% (Nov 2022) which patient feels was due to having Covid and increasing fatigue levels after Covid.  Has since resume exercise and getting back to active lifestyle.  Continues metformin. Stopped Jardiance and has started Ozempic 0.25 mg weekly and will be increasing to 0.5 mg weekly in next 2 weeks.      Patient will work on balancing meals--has gotten off track with having carb + protein source at all meals.  Will refocus meal planning and get back on track.      Today's care plan and follow up schedule was discussed with patient.  Abhi verbalized understanding of the care plan, goals, and agrees to follow up plan.        The patient was encouraged to communicate with his/her health care provider/physician and care team regarding his/her condition(s) and treatment.  I provided the patient with my contact information today and encouraged to contact me via phone or [facility] Patient Portal as needed.     Length of Visit   Total Time: 45 Minutes

## 2023-01-30 ENCOUNTER — TELEPHONE (OUTPATIENT)
Dept: HEMATOLOGY/ONCOLOGY | Facility: CLINIC | Age: 53
End: 2023-01-30
Payer: COMMERCIAL

## 2023-01-30 LAB
MPNR  FINAL DIAGNOSIS: NORMAL
MPNR  SPECIMEN TYPE: 1
MPNR RESULT: NORMAL

## 2023-01-30 NOTE — TELEPHONE ENCOUNTER
Called patient to reschedule appointment on 2/2/23 with Dr Espinosa as provider will not be here in the morning. LVM to let him know that we do have some afternoon appointments available that day or on Friday 2/3/23. Asked that he please call us back to reschedule.

## 2023-02-02 ENCOUNTER — OFFICE VISIT (OUTPATIENT)
Dept: HEMATOLOGY/ONCOLOGY | Facility: CLINIC | Age: 53
End: 2023-02-02
Payer: COMMERCIAL

## 2023-02-02 VITALS
HEART RATE: 77 BPM | BODY MASS INDEX: 36.36 KG/M2 | HEIGHT: 67 IN | DIASTOLIC BLOOD PRESSURE: 60 MMHG | OXYGEN SATURATION: 96 % | RESPIRATION RATE: 16 BRPM | WEIGHT: 231.69 LBS | TEMPERATURE: 98 F | SYSTOLIC BLOOD PRESSURE: 102 MMHG

## 2023-02-02 DIAGNOSIS — D75.1 POLYCYTHEMIA: Primary | ICD-10-CM

## 2023-02-02 PROCEDURE — 99214 OFFICE O/P EST MOD 30 MIN: CPT | Mod: S$GLB,,, | Performed by: INTERNAL MEDICINE

## 2023-02-02 PROCEDURE — 3078F DIAST BP <80 MM HG: CPT | Mod: CPTII,S$GLB,, | Performed by: INTERNAL MEDICINE

## 2023-02-02 PROCEDURE — 99999 PR PBB SHADOW E&M-EST. PATIENT-LVL V: CPT | Mod: PBBFAC,,, | Performed by: INTERNAL MEDICINE

## 2023-02-02 PROCEDURE — 99214 PR OFFICE/OUTPT VISIT, EST, LEVL IV, 30-39 MIN: ICD-10-PCS | Mod: S$GLB,,, | Performed by: INTERNAL MEDICINE

## 2023-02-02 PROCEDURE — 3008F PR BODY MASS INDEX (BMI) DOCUMENTED: ICD-10-PCS | Mod: CPTII,S$GLB,, | Performed by: INTERNAL MEDICINE

## 2023-02-02 PROCEDURE — 1159F PR MEDICATION LIST DOCUMENTED IN MEDICAL RECORD: ICD-10-PCS | Mod: CPTII,S$GLB,, | Performed by: INTERNAL MEDICINE

## 2023-02-02 PROCEDURE — 3074F PR MOST RECENT SYSTOLIC BLOOD PRESSURE < 130 MM HG: ICD-10-PCS | Mod: CPTII,S$GLB,, | Performed by: INTERNAL MEDICINE

## 2023-02-02 PROCEDURE — 1159F MED LIST DOCD IN RCRD: CPT | Mod: CPTII,S$GLB,, | Performed by: INTERNAL MEDICINE

## 2023-02-02 PROCEDURE — 3078F PR MOST RECENT DIASTOLIC BLOOD PRESSURE < 80 MM HG: ICD-10-PCS | Mod: CPTII,S$GLB,, | Performed by: INTERNAL MEDICINE

## 2023-02-02 PROCEDURE — 3074F SYST BP LT 130 MM HG: CPT | Mod: CPTII,S$GLB,, | Performed by: INTERNAL MEDICINE

## 2023-02-02 PROCEDURE — 99999 PR PBB SHADOW E&M-EST. PATIENT-LVL V: ICD-10-PCS | Mod: PBBFAC,,, | Performed by: INTERNAL MEDICINE

## 2023-02-02 PROCEDURE — 3008F BODY MASS INDEX DOCD: CPT | Mod: CPTII,S$GLB,, | Performed by: INTERNAL MEDICINE

## 2023-02-02 NOTE — PROGRESS NOTES
Subjective:       Patient ID: Abhi Pompa is a 52 y.o. male.    Chief Complaint: No chief complaint on file.    HPI    Mr. Pompa returns today for follow-up.  I had seen him initially 2 weeks ago.  Briefly, he is a 52-year-old man referred for evaluation for polycythemia.    His CBC 2 weeks ago had shown [place] 87403 per cubic mm, hemoglobin 16.7 grams/deciliter, hematocrit 47.9% and platelets 288 K.  His JAK2 mutation test was negative    A CBC on November 14, 2022 and had shown a white count of 5,800 per cubic mm, hemoglobin 17.5 grams/dL, hematocrit 52.4% and platelets 450K.  A CBC on 04/18/2022 had shown a white count of 6500 per cubic mm, hemoglobin 18 grams/deciliter, hematocrit 52.7% and platelets 295 K  HACBC on 03/29/2021 had shown a white count of 6900 per cubic mm, hemoglobin 18.7 grams/deciliter, hematocrit 56.1%, MCV 93 and platelets 305 K  A CBC in September 2020 had shown a white count of 7900 per cubic mm, hemoglobin 18.4 grams/deciliter, hematocrit 56.1% and platelets 284  His CBC on December 18, 2018 had shown a white count of 7600 per cubic mm, hemoglobin 17.5 grams/deciliter, hematocrit 53% and platelets 297 K    The patient has been on testosterone injections since 2014, currently on a weekly basis.       Review of Systems    Overall he feels well.  He complains of mild fatigue with exertion.   He had previously stated that he feels well rested when he wakes up but he does not always keep his mask on through the night.  His ECOG PS is 1.  He denies any anxiety, depression, easy bruising, fevers, chills, night  sweats, weight loss, nausea, vomiting, diarrhea, constipation, diplopia, blurred vision, headache, chest pain, palpitations, shortness of breath, breast pain, abdominal pain, extremity pain, or difficulty ambulating.  The remainder of the ten-point ROS, including general, skin, lymph, H/N, cardiorespiratory, GI, , Neuro, Endocrine, and psychiatric is negative.     Objective:      Physical Exam    He is alert, oriented to time, place, person, pleasant, well      nourished, in no acute physical distress.                                    VITAL SIGNS:  Reviewed                                      HEENT:  Normal.  There are no nasal, oral, lip, gingival, auricular, lid,    or conjunctival lesions.  Mucosae are moist and pink, and there is no        thrush.  Pupils are equal, reactive to light and accommodation.              Extraocular muscle movements are intact.    Dentition is good.  There is no frontal or maxillary tenderness.                                   NECK:  Supple without JVD, adenopathy, or thyromegaly.                       LUNGS:  Clear to auscultation without wheezing, rales, or rhonchi.           CARDIOVASCULAR:  Reveals an S1, S2, no murmurs, no rubs, no gallops.         ABDOMEN:  Soft, nontender, without organomegaly.  Bowel sounds are    present.                                                                     EXTREMITIES:  No cyanosis, clubbing, or edema.                                                               LYMPHATIC:  There is no cervical, axillary, or supraclavicular adenopathy.   SKIN:  Warm and moist, without petechiae, rashes, induration, or ecchymoses.           NEUROLOGIC:  DTRs are 0-1+ bilaterally, symmetrical, motor function is 5/5,  and cranial nerves are  within normal limits.   Assessment:     1. Erythrocytosis.  I suspect this is secondary to testosterone injections and or sleep apnea rather than secondary to MPN.    2. Obesity, sleep apnea    3. Diabetes, hyperlipidemia, history of achalasia        Plan:         I had a long discussion with Mr. Pompa.  We went over the differential for his erythrocytosis.  A myeloproliferative syndrome has essentially been ruled out at this point.  I suspect that his erythrocytosis is secondary to his testosterone injections and or his sleep apnea.      Since there is no evidence of an MPN, it  is reasonable for him to continue the testosterone injections so long as his hematocrit remains below 55% with periodic phlebotomies.  I would also recommend that the quality of his sleep be evaluated given his known sleep apnea.  I will see him again in 6 weeks with a CBC  I spent approximately 30 minutes reviewing the available records and evaluating the patient, out of which over 50% of the time was spent face to face with the patient in counseling and coordinating this patient's care.

## 2023-02-05 ENCOUNTER — PATIENT MESSAGE (OUTPATIENT)
Dept: ENDOCRINOLOGY | Facility: CLINIC | Age: 53
End: 2023-02-05
Payer: COMMERCIAL

## 2023-02-05 DIAGNOSIS — E29.1 MALE HYPOGONADISM: ICD-10-CM

## 2023-02-06 RX ORDER — TESTOSTERONE CYPIONATE 1000 MG/10ML
INJECTION, SOLUTION INTRAMUSCULAR
Qty: 10 ML | Refills: 3
Start: 2023-02-06 | End: 2023-02-08

## 2023-02-07 ENCOUNTER — PATIENT MESSAGE (OUTPATIENT)
Dept: ENDOCRINOLOGY | Facility: CLINIC | Age: 53
End: 2023-02-07
Payer: COMMERCIAL

## 2023-02-07 DIAGNOSIS — E29.1 MALE HYPOGONADISM: ICD-10-CM

## 2023-02-07 NOTE — TELEPHONE ENCOUNTER
Ok. There is a discrepancy from what we are writing and what he has been getting.   Can we verify what concentrations he has been getting. 100mg/ml or 200 mg/ml the last few prescriptions.     Since this is controlled we need to make sure this is all accurate.   In my last not we were under the impression that we were giving him 50 mg weekly.

## 2023-02-07 NOTE — TELEPHONE ENCOUNTER
Per Susan [facility], at [facility], pt has been getting 200 mg/mL and taking 0.5 mL (100 mg) weekly.  She said it's best to send as 200 mg/mL as the 100 mg/mL vial is not always available.  She suggests sending for 4 - 200 mg/mL vials for 30 days.

## 2023-02-08 RX ORDER — TESTOSTERONE CYPIONATE 200 MG/ML
200 INJECTION, SOLUTION INTRAMUSCULAR WEEKLY
Qty: 4 ML | Refills: 4 | Status: SHIPPED | OUTPATIENT
Start: 2023-02-08 | End: 2023-02-09 | Stop reason: SDUPTHER

## 2023-02-09 ENCOUNTER — PATIENT MESSAGE (OUTPATIENT)
Dept: ENDOCRINOLOGY | Facility: CLINIC | Age: 53
End: 2023-02-09
Payer: COMMERCIAL

## 2023-02-09 DIAGNOSIS — E29.1 MALE HYPOGONADISM: ICD-10-CM

## 2023-02-09 RX ORDER — TESTOSTERONE CYPIONATE 200 MG/ML
100 INJECTION, SOLUTION INTRAMUSCULAR WEEKLY
Qty: 4 ML | Refills: 3 | OUTPATIENT
Start: 2023-02-09 | End: 2023-08-10

## 2023-02-09 RX ORDER — TESTOSTERONE CYPIONATE 200 MG/ML
200 INJECTION, SOLUTION INTRAMUSCULAR WEEKLY
Qty: 4 ML | Refills: 4 | Status: SHIPPED | OUTPATIENT
Start: 2023-02-09 | End: 2023-06-26 | Stop reason: SDUPTHER

## 2023-02-12 ENCOUNTER — PATIENT MESSAGE (OUTPATIENT)
Dept: DIABETES | Facility: CLINIC | Age: 53
End: 2023-02-12
Payer: COMMERCIAL

## 2023-02-28 ENCOUNTER — PATIENT MESSAGE (OUTPATIENT)
Dept: DIABETES | Facility: CLINIC | Age: 53
End: 2023-02-28
Payer: COMMERCIAL

## 2023-03-03 ENCOUNTER — PATIENT OUTREACH (OUTPATIENT)
Dept: DIABETES | Facility: CLINIC | Age: 53
End: 2023-03-03
Payer: COMMERCIAL

## 2023-03-03 VITALS — WEIGHT: 230.63 LBS | BODY MASS INDEX: 36.2 KG/M2 | HEIGHT: 67 IN

## 2023-03-03 NOTE — PROGRESS NOTES
Patient here today for Freestyle Amena CGM download and review--last seen in Jan 2023 and was to begin Ozempic and wanting download to see if glucose levels improved since initiating medication.  Reviewed download--glucose levels are improved and patient reports increasing satiety and eating less.  Intentional weight loss of 2 lbs over past month--personal goal weight to lose 15-20 additional lbs to weight of 210 lbs.      Freestyle Amena download (2/18/23 to 3/3/23): See media file for details. 89% of glucose values are in target range. No episodes of hypoglycemia noted in the download.    Sensor usage: 33% (decreased as patient had issues getting Rx filled for sensors)  Average glucose: 145 mg/dL    0% CGM readings greater than 250 mg/dL  11% CGM readings between 181-250 mg/dL  89% CGM readings in target glucose range of  mg/dL   0% CGM readings between 54-79%  0% CGM readings less than 54 mg/dL     Patient reminded of Endocrine follow up in June 2023.

## 2023-03-16 ENCOUNTER — OFFICE VISIT (OUTPATIENT)
Dept: HEMATOLOGY/ONCOLOGY | Facility: CLINIC | Age: 53
End: 2023-03-16
Payer: COMMERCIAL

## 2023-03-16 ENCOUNTER — LAB VISIT (OUTPATIENT)
Dept: LAB | Facility: HOSPITAL | Age: 53
End: 2023-03-16
Attending: INTERNAL MEDICINE
Payer: COMMERCIAL

## 2023-03-16 VITALS
BODY MASS INDEX: 36.92 KG/M2 | SYSTOLIC BLOOD PRESSURE: 127 MMHG | OXYGEN SATURATION: 98 % | WEIGHT: 229.75 LBS | HEART RATE: 76 BPM | RESPIRATION RATE: 16 BRPM | HEIGHT: 66 IN | DIASTOLIC BLOOD PRESSURE: 80 MMHG | TEMPERATURE: 98 F

## 2023-03-16 DIAGNOSIS — G47.33 OBSTRUCTIVE SLEEP APNEA SYNDROME: ICD-10-CM

## 2023-03-16 DIAGNOSIS — D75.1 POLYCYTHEMIA: ICD-10-CM

## 2023-03-16 DIAGNOSIS — D75.1 POLYCYTHEMIA: Primary | ICD-10-CM

## 2023-03-16 LAB
BASOPHILS # BLD AUTO: 0.06 K/UL (ref 0–0.2)
BASOPHILS NFR BLD: 0.7 % (ref 0–1.9)
DIFFERENTIAL METHOD: ABNORMAL
EOSINOPHIL # BLD AUTO: 0.1 K/UL (ref 0–0.5)
EOSINOPHIL NFR BLD: 1.5 % (ref 0–8)
ERYTHROCYTE [DISTWIDTH] IN BLOOD BY AUTOMATED COUNT: 12.5 % (ref 11.5–14.5)
HCT VFR BLD AUTO: 49.4 % (ref 40–54)
HGB BLD-MCNC: 17 G/DL (ref 14–18)
IMM GRANULOCYTES # BLD AUTO: 0.02 K/UL (ref 0–0.04)
IMM GRANULOCYTES NFR BLD AUTO: 0.2 % (ref 0–0.5)
LYMPHOCYTES # BLD AUTO: 1.8 K/UL (ref 1–4.8)
LYMPHOCYTES NFR BLD: 21.7 % (ref 18–48)
MCH RBC QN AUTO: 31.6 PG (ref 27–31)
MCHC RBC AUTO-ENTMCNC: 34.4 G/DL (ref 32–36)
MCV RBC AUTO: 92 FL (ref 82–98)
MONOCYTES # BLD AUTO: 1 K/UL (ref 0.3–1)
MONOCYTES NFR BLD: 11.9 % (ref 4–15)
NEUTROPHILS # BLD AUTO: 5.4 K/UL (ref 1.8–7.7)
NEUTROPHILS NFR BLD: 64 % (ref 38–73)
NRBC BLD-RTO: 0 /100 WBC
PLATELET # BLD AUTO: 328 K/UL (ref 150–450)
PMV BLD AUTO: 9.4 FL (ref 9.2–12.9)
RBC # BLD AUTO: 5.38 M/UL (ref 4.6–6.2)
WBC # BLD AUTO: 8.48 K/UL (ref 3.9–12.7)

## 2023-03-16 PROCEDURE — 99999 PR PBB SHADOW E&M-EST. PATIENT-LVL V: ICD-10-PCS | Mod: PBBFAC,,, | Performed by: INTERNAL MEDICINE

## 2023-03-16 PROCEDURE — 85025 COMPLETE CBC W/AUTO DIFF WBC: CPT | Mod: PN | Performed by: INTERNAL MEDICINE

## 2023-03-16 PROCEDURE — 99214 OFFICE O/P EST MOD 30 MIN: CPT | Mod: S$GLB,,, | Performed by: INTERNAL MEDICINE

## 2023-03-16 PROCEDURE — 1159F MED LIST DOCD IN RCRD: CPT | Mod: CPTII,S$GLB,, | Performed by: INTERNAL MEDICINE

## 2023-03-16 PROCEDURE — 1159F PR MEDICATION LIST DOCUMENTED IN MEDICAL RECORD: ICD-10-PCS | Mod: CPTII,S$GLB,, | Performed by: INTERNAL MEDICINE

## 2023-03-16 PROCEDURE — 3074F PR MOST RECENT SYSTOLIC BLOOD PRESSURE < 130 MM HG: ICD-10-PCS | Mod: CPTII,S$GLB,, | Performed by: INTERNAL MEDICINE

## 2023-03-16 PROCEDURE — 99214 PR OFFICE/OUTPT VISIT, EST, LEVL IV, 30-39 MIN: ICD-10-PCS | Mod: S$GLB,,, | Performed by: INTERNAL MEDICINE

## 2023-03-16 PROCEDURE — 3074F SYST BP LT 130 MM HG: CPT | Mod: CPTII,S$GLB,, | Performed by: INTERNAL MEDICINE

## 2023-03-16 PROCEDURE — 36415 COLL VENOUS BLD VENIPUNCTURE: CPT | Mod: PN | Performed by: INTERNAL MEDICINE

## 2023-03-16 PROCEDURE — 3008F PR BODY MASS INDEX (BMI) DOCUMENTED: ICD-10-PCS | Mod: CPTII,S$GLB,, | Performed by: INTERNAL MEDICINE

## 2023-03-16 PROCEDURE — 3079F DIAST BP 80-89 MM HG: CPT | Mod: CPTII,S$GLB,, | Performed by: INTERNAL MEDICINE

## 2023-03-16 PROCEDURE — 3008F BODY MASS INDEX DOCD: CPT | Mod: CPTII,S$GLB,, | Performed by: INTERNAL MEDICINE

## 2023-03-16 PROCEDURE — 3079F PR MOST RECENT DIASTOLIC BLOOD PRESSURE 80-89 MM HG: ICD-10-PCS | Mod: CPTII,S$GLB,, | Performed by: INTERNAL MEDICINE

## 2023-03-16 PROCEDURE — 99999 PR PBB SHADOW E&M-EST. PATIENT-LVL V: CPT | Mod: PBBFAC,,, | Performed by: INTERNAL MEDICINE

## 2023-03-16 NOTE — PROGRESS NOTES
Subjective:       Patient ID: Abhi Pompa is a 52 y.o. male.    Chief Complaint: Polycythemia (6 week follow up with labs)    HPI    Mr. Pompa returns today for follow-up.  I had seen him initially 6 weeks ago.  Briefly, he is a 52-year-old man referred for evaluation for polycythemia.  His JAK2 mutation was negative and his polycythemia was attributed to his testosterone injection and a documented sleep apnea that he has.  His CBC today shows a white count of 8400 per cubic mm, hemoglobin 17 grams/deciliter, hematocrit 49.4%, MCV 92 and platelets 328 K    The patient has been on testosterone injections since 2014, currently on a weekly basis.       Review of Systems    Overall he feels well and he states that he feels better.  He started using a new mask for his sleep apnea which, according to him, is much better tolerated.  His ECOG PS is 1.  He denies any anxiety, depression, easy bruising, fevers, chills, night  sweats, weight loss, nausea, vomiting, diarrhea, constipation, diplopia, blurred vision, headache, chest pain, palpitations, shortness of breath, breast pain, abdominal pain, extremity pain, or difficulty ambulating.  The remainder of the ten-point ROS, including general, skin, lymph, H/N, cardiorespiratory, GI, , Neuro, Endocrine, and psychiatric is negative.    Objective:      Physical Exam    He is alert, oriented to time, place, person, pleasant, well      nourished, in no acute physical distress.                                    VITAL SIGNS:  Reviewed                                      HEENT:  Normal.  There are no nasal, oral, lip, gingival, auricular, lid,    or conjunctival lesions.  Mucosae are moist and pink, and there is no        thrush.  Pupils are equal, reactive to light and accommodation.              Extraocular muscle movements are intact.    Dentition is good.  There is no frontal or maxillary tenderness.                                   NECK:  Supple without JVD,  adenopathy, or thyromegaly.                       LUNGS:  Clear to auscultation without wheezing, rales, or rhonchi.           CARDIOVASCULAR:  Reveals an S1, S2, no murmurs, no rubs, no gallops.         ABDOMEN:  Soft, nontender, without organomegaly.  Bowel sounds are    present.                                                                     EXTREMITIES:  No cyanosis, clubbing, or edema.                                                               LYMPHATIC:  There is no cervical, axillary, or supraclavicular adenopathy.   SKIN:  Warm and moist, without petechiae, rashes, induration, or ecchymoses.           NEUROLOGIC:  DTRs are 0-1+ bilaterally, symmetrical, motor function is 5/5,  and cranial nerves are  within normal limits.   Assessment:     1. Erythrocytosis, felt to be secondary to testosterone injections and or sleep apnea rather.  MPN has been ruled out    2. Obesity, sleep apnea    3. Diabetes, hyperlipidemia, history of achalasia        Plan:         I had a long discussion with Mr. Pompa.  We went over the differential for his erythrocytosis.  A myeloproliferative syndrome has essentially been ruled out at this point.    Since there is no evidence of an MPN, it is reasonable for him to continue the testosterone injections so long as his hematocrit remains below 55% with periodic phlebotomies.  I will see him again in late May with a CBC.  He stated that he will be seeing the endocrinologist in mid May and he usually gets a CBC prior to the visit.  His multiple questions were answered to his satisfaction.

## 2023-03-16 NOTE — Clinical Note
See me on May 25th.  He needs a CBC but he will probably get it around May 20th through his endocrinologist

## 2023-04-12 ENCOUNTER — OFFICE VISIT (OUTPATIENT)
Dept: DERMATOLOGY | Facility: CLINIC | Age: 53
End: 2023-04-12
Payer: COMMERCIAL

## 2023-04-12 DIAGNOSIS — L73.8 SEBACEOUS HYPERPLASIA: ICD-10-CM

## 2023-04-12 DIAGNOSIS — L91.8 ACROCHORDON: ICD-10-CM

## 2023-04-12 DIAGNOSIS — L82.1 SK (SEBORRHEIC KERATOSIS): ICD-10-CM

## 2023-04-12 DIAGNOSIS — L21.9 SEBORRHEIC DERMATITIS: Primary | ICD-10-CM

## 2023-04-12 DIAGNOSIS — L85.3 XEROSIS CUTIS: ICD-10-CM

## 2023-04-12 PROCEDURE — 99204 PR OFFICE/OUTPT VISIT, NEW, LEVL IV, 45-59 MIN: ICD-10-PCS | Mod: S$GLB,,, | Performed by: DERMATOLOGY

## 2023-04-12 PROCEDURE — 99204 OFFICE O/P NEW MOD 45 MIN: CPT | Mod: S$GLB,,, | Performed by: DERMATOLOGY

## 2023-04-12 PROCEDURE — 1159F MED LIST DOCD IN RCRD: CPT | Mod: CPTII,S$GLB,, | Performed by: DERMATOLOGY

## 2023-04-12 PROCEDURE — 99999 PR PBB SHADOW E&M-EST. PATIENT-LVL III: CPT | Mod: PBBFAC,,, | Performed by: DERMATOLOGY

## 2023-04-12 PROCEDURE — 1159F PR MEDICATION LIST DOCUMENTED IN MEDICAL RECORD: ICD-10-PCS | Mod: CPTII,S$GLB,, | Performed by: DERMATOLOGY

## 2023-04-12 PROCEDURE — 99999 PR PBB SHADOW E&M-EST. PATIENT-LVL III: ICD-10-PCS | Mod: PBBFAC,,, | Performed by: DERMATOLOGY

## 2023-04-12 RX ORDER — KETOCONAZOLE 20 MG/ML
SHAMPOO, SUSPENSION TOPICAL
Qty: 120 ML | Refills: 5 | Status: SHIPPED | OUTPATIENT
Start: 2023-04-12 | End: 2024-01-26

## 2023-04-12 NOTE — PROGRESS NOTES
Subjective:      Patient ID:  Abhi Pompa is a 52 y.o. male who presents for No chief complaint on file.    HPI  Established patient.  Here for new issues -   - Primary issue is itching of the entire back, constant in some severity, treats with Selsun Blue shampoo a couple times week. Hx of tinea versicolor in distant past, had treated successful with Selsun Blue in past.   - Skin tag at R inferior lid, wanting removal.   - New freckles at face.  - Itchy dry rash at beard region, wash with H&S daily at beard, use of beard oil makes rash worse.   No further complaints today.     Review of Systems   Constitutional:  Negative for malaise.   Skin:  Positive for itching, rash and dry skin.     Objective:   Physical Exam   Constitutional: He appears well-developed and well-nourished. No distress.   Neurological: He is alert and oriented to person, place, and time. He is not disoriented.   Psychiatric: He has a normal mood and affect.   Skin:   Areas Examined (abnormalities noted in diagram):   Scalp / Hair Palpated and Inspected  Back Inspection Performed               Diagram Legend     Erythematous scaling macule/papule c/w actinic keratosis       Vascular papule c/w angioma      Pigmented verrucoid papule/plaque c/w seborrheic keratosis      Yellow umbilicated papule c/w sebaceous hyperplasia      Irregularly shaped tan macule c/w lentigo     1-2 mm smooth white papules consistent with Milia      Movable subcutaneous cyst with punctum c/w epidermal inclusion cyst      Subcutaneous movable cyst c/w pilar cyst      Firm pink to brown papule c/w dermatofibroma      Pedunculated fleshy papule(s) c/w skin tag(s)      Evenly pigmented macule c/w junctional nevus     Mildly variegated pigmented, slightly irregular-bordered macule c/w mildly atypical nevus      Flesh colored to evenly pigmented papule c/w intradermal nevus       Pink pearly papule/plaque c/w basal cell carcinoma      Erythematous hyperkeratotic cursted  plaque c/w SCC      Surgical scar with no sign of skin cancer recurrence      Open and closed comedones      Inflammatory papules and pustules      Verrucoid papule consistent consistent with wart     Erythematous eczematous patches and plaques     Dystrophic onycholytic nail with subungual debris c/w onychomycosis     Umbilicated papule    Erythematous-base heme-crusted tan verrucoid plaque consistent with inflamed seborrheic keratosis     Erythematous Silvery Scaling Plaque c/w Psoriasis     See annotation      Assessment / Plan:        Xerosis cutis  - Discussed diagnosis, etiology, and treatment options.   - Counseled on gentle, dry skin care and avoidance of common allergens/irritants.    - OK to c/w once weekly Selsun Blue or below rx ketoconazole 2% shampoo to back for tinea versicolor prevention.   - Counseled on potential SE of medication(s) and instructed on use.     Seborrheic dermatitis  -     ketoconazole (NIZORAL) 2 % shampoo; Wash scalp and beard area with medicated shampoo at least 2-3x/week - let sit at least 5 minutes prior to rinsing.  Dispense: 120 mL; Refill: 5  - Discussed diagnosis, etiology, and treatment options.  - Recommended c/w daily washing of scalp and beard region as currently doing.   - Start alternating cleansing with above rx ketoconazole shampoo in addition to H&S or other OTC antidandruff shampoo.   - OTC hydrocortisone 1% solution BID PRN rash flares at beard region.  - Counseled on potential SE of medication(s) and instructed on use.   - Avoid heavy moisturizers/conditioners.    Acrochordon  - Benign; reassured treatment not necessary.   - LN2 offered.     SK (seborrheic keratosis)  Sebaceous hyperplasia  - Benign; reassured treatment not necessary.             Follow up if symptoms worsen or fail to improve.

## 2023-05-05 DIAGNOSIS — N52.1 ERECTILE DYSFUNCTION DUE TO DISEASES CLASSIFIED ELSEWHERE: ICD-10-CM

## 2023-05-05 RX ORDER — TADALAFIL 10 MG/1
10 TABLET ORAL DAILY PRN
Qty: 10 TABLET | Refills: 8 | Status: SHIPPED | OUTPATIENT
Start: 2023-05-05 | End: 2023-09-27 | Stop reason: SDUPTHER

## 2023-05-05 NOTE — TELEPHONE ENCOUNTER
No care due was identified.  [facility] Embedded Care Due Messages. Reference number: [id number].   5/05/2023 9:16:40 AM CDT

## 2023-05-05 NOTE — TELEPHONE ENCOUNTER
Refill Routing Note   Medication(s) are not appropriate for processing by [facility] for the following reason(s):      Drug-drug interaction: tadalafiL, sildenafil    [facility] action(s):  Defer     Medication Therapy Plan: Unclear which med pt is taking      Appointments  past 12m or future 3m with PCP    Date Provider   Last Visit   12/5/2022 Rafita Amezcua, DO   Next Visit   Visit date not found Rafita Amezcua,    ED visits in past 90 days: 0        Note composed:12:42 PM 05/05/2023

## 2023-06-02 NOTE — TELEPHONE ENCOUNTER
----- Message from Kerline Shepard sent at 6/2/2023  1:32 PM CDT -----  Type: Needs Medical Advice  Who Called:  jose a lemos     Best Call Back Number: [phone]  Additional Information: requesting a call back , semaglutide (OZEMPIC) 0.25 mg or 0.5 mg(2 mg/1.5 mL) pen injector the pt is doing .5 .. need new rx. Pt sts he thought it was change please advise thank you           [facility] - [place] - [address]   [address]   [place] [address]  Phone: [phone] Fax: [phone]

## 2023-06-04 RX ORDER — SEMAGLUTIDE 1.34 MG/ML
INJECTION, SOLUTION SUBCUTANEOUS
Qty: 1 EACH | Refills: 11 | Status: SHIPPED | OUTPATIENT
Start: 2023-06-04 | End: 2023-06-19

## 2023-06-12 ENCOUNTER — LAB VISIT (OUTPATIENT)
Dept: LAB | Facility: HOSPITAL | Age: 53
End: 2023-06-12
Attending: INTERNAL MEDICINE
Payer: COMMERCIAL

## 2023-06-12 DIAGNOSIS — E78.5 HYPERLIPIDEMIA ASSOCIATED WITH TYPE 2 DIABETES MELLITUS: ICD-10-CM

## 2023-06-12 DIAGNOSIS — E11.9 TYPE 2 DIABETES MELLITUS WITHOUT COMPLICATION, UNSPECIFIED WHETHER LONG TERM INSULIN USE: ICD-10-CM

## 2023-06-12 DIAGNOSIS — E11.69 HYPERLIPIDEMIA ASSOCIATED WITH TYPE 2 DIABETES MELLITUS: ICD-10-CM

## 2023-06-12 DIAGNOSIS — E29.1 MALE HYPOGONADISM: ICD-10-CM

## 2023-06-12 LAB
ALBUMIN SERPL BCP-MCNC: 3.8 G/DL (ref 3.5–5.2)
ALP SERPL-CCNC: 54 U/L (ref 55–135)
ALT SERPL W/O P-5'-P-CCNC: 32 U/L (ref 10–44)
ANION GAP SERPL CALC-SCNC: 9 MMOL/L (ref 8–16)
AST SERPL-CCNC: 21 U/L (ref 10–40)
BILIRUB SERPL-MCNC: 0.9 MG/DL (ref 0.1–1)
BUN SERPL-MCNC: 10 MG/DL (ref 6–20)
CALCIUM SERPL-MCNC: 8.8 MG/DL (ref 8.7–10.5)
CHLORIDE SERPL-SCNC: 104 MMOL/L (ref 95–110)
CHOLEST SERPL-MCNC: 111 MG/DL (ref 120–199)
CHOLEST/HDLC SERPL: 4.4 {RATIO} (ref 2–5)
CO2 SERPL-SCNC: 23 MMOL/L (ref 23–29)
CREAT SERPL-MCNC: 0.8 MG/DL (ref 0.5–1.4)
ERYTHROCYTE [DISTWIDTH] IN BLOOD BY AUTOMATED COUNT: 12.6 % (ref 11.5–14.5)
EST. GFR  (NO RACE VARIABLE): >60 ML/MIN/1.73 M^2
ESTIMATED AVG GLUCOSE: 137 MG/DL (ref 68–131)
GLUCOSE SERPL-MCNC: 147 MG/DL (ref 70–110)
HBA1C MFR BLD: 6.4 % (ref 4–5.6)
HCT VFR BLD AUTO: 51.7 % (ref 40–54)
HDLC SERPL-MCNC: 25 MG/DL (ref 40–75)
HDLC SERPL: 22.5 % (ref 20–50)
HGB BLD-MCNC: 17.9 G/DL (ref 14–18)
LDLC SERPL CALC-MCNC: 63.4 MG/DL (ref 63–159)
MCH RBC QN AUTO: 31.5 PG (ref 27–31)
MCHC RBC AUTO-ENTMCNC: 34.6 G/DL (ref 32–36)
MCV RBC AUTO: 91 FL (ref 82–98)
NONHDLC SERPL-MCNC: 86 MG/DL
PLATELET # BLD AUTO: 279 K/UL (ref 150–450)
PMV BLD AUTO: 10.9 FL (ref 9.2–12.9)
POTASSIUM SERPL-SCNC: 4.2 MMOL/L (ref 3.5–5.1)
PROT SERPL-MCNC: 6.5 G/DL (ref 6–8.4)
RBC # BLD AUTO: 5.68 M/UL (ref 4.6–6.2)
SODIUM SERPL-SCNC: 136 MMOL/L (ref 136–145)
TESTOST SERPL-MCNC: 703 NG/DL (ref 304–1227)
TRIGL SERPL-MCNC: 113 MG/DL (ref 30–150)
WBC # BLD AUTO: 10.78 K/UL (ref 3.9–12.7)

## 2023-06-12 PROCEDURE — 80061 LIPID PANEL: CPT | Performed by: INTERNAL MEDICINE

## 2023-06-12 PROCEDURE — 80053 COMPREHEN METABOLIC PANEL: CPT | Performed by: INTERNAL MEDICINE

## 2023-06-12 PROCEDURE — 84403 ASSAY OF TOTAL TESTOSTERONE: CPT | Performed by: INTERNAL MEDICINE

## 2023-06-12 PROCEDURE — 85027 COMPLETE CBC AUTOMATED: CPT | Performed by: INTERNAL MEDICINE

## 2023-06-12 PROCEDURE — 83036 HEMOGLOBIN GLYCOSYLATED A1C: CPT | Performed by: INTERNAL MEDICINE

## 2023-06-12 PROCEDURE — 36415 COLL VENOUS BLD VENIPUNCTURE: CPT | Mod: PO | Performed by: INTERNAL MEDICINE

## 2023-06-19 ENCOUNTER — OFFICE VISIT (OUTPATIENT)
Dept: ENDOCRINOLOGY | Facility: CLINIC | Age: 53
End: 2023-06-19
Payer: COMMERCIAL

## 2023-06-19 VITALS
OXYGEN SATURATION: 97 % | WEIGHT: 229.94 LBS | DIASTOLIC BLOOD PRESSURE: 80 MMHG | HEIGHT: 66 IN | SYSTOLIC BLOOD PRESSURE: 136 MMHG | BODY MASS INDEX: 36.95 KG/M2 | HEART RATE: 76 BPM

## 2023-06-19 DIAGNOSIS — E78.5 HYPERLIPIDEMIA ASSOCIATED WITH TYPE 2 DIABETES MELLITUS: ICD-10-CM

## 2023-06-19 DIAGNOSIS — E29.1 MALE HYPOGONADISM: Primary | ICD-10-CM

## 2023-06-19 DIAGNOSIS — E11.9 TYPE 2 DIABETES MELLITUS WITHOUT COMPLICATION, UNSPECIFIED WHETHER LONG TERM INSULIN USE: ICD-10-CM

## 2023-06-19 DIAGNOSIS — E11.69 HYPERLIPIDEMIA ASSOCIATED WITH TYPE 2 DIABETES MELLITUS: ICD-10-CM

## 2023-06-19 PROCEDURE — 3079F DIAST BP 80-89 MM HG: CPT | Mod: CPTII,S$GLB,, | Performed by: INTERNAL MEDICINE

## 2023-06-19 PROCEDURE — 3075F PR MOST RECENT SYSTOLIC BLOOD PRESS GE 130-139MM HG: ICD-10-PCS | Mod: CPTII,S$GLB,, | Performed by: INTERNAL MEDICINE

## 2023-06-19 PROCEDURE — 1160F RVW MEDS BY RX/DR IN RCRD: CPT | Mod: CPTII,S$GLB,, | Performed by: INTERNAL MEDICINE

## 2023-06-19 PROCEDURE — 3008F BODY MASS INDEX DOCD: CPT | Mod: CPTII,S$GLB,, | Performed by: INTERNAL MEDICINE

## 2023-06-19 PROCEDURE — 3044F PR MOST RECENT HEMOGLOBIN A1C LEVEL <7.0%: ICD-10-PCS | Mod: CPTII,S$GLB,, | Performed by: INTERNAL MEDICINE

## 2023-06-19 PROCEDURE — 3061F PR NEG MICROALBUMINURIA RESULT DOCUMENTED/REVIEW: ICD-10-PCS | Mod: CPTII,S$GLB,, | Performed by: INTERNAL MEDICINE

## 2023-06-19 PROCEDURE — 3079F PR MOST RECENT DIASTOLIC BLOOD PRESSURE 80-89 MM HG: ICD-10-PCS | Mod: CPTII,S$GLB,, | Performed by: INTERNAL MEDICINE

## 2023-06-19 PROCEDURE — 3066F PR DOCUMENTATION OF TREATMENT FOR NEPHROPATHY: ICD-10-PCS | Mod: CPTII,S$GLB,, | Performed by: INTERNAL MEDICINE

## 2023-06-19 PROCEDURE — 3044F HG A1C LEVEL LT 7.0%: CPT | Mod: CPTII,S$GLB,, | Performed by: INTERNAL MEDICINE

## 2023-06-19 PROCEDURE — 99999 PR PBB SHADOW E&M-EST. PATIENT-LVL V: ICD-10-PCS | Mod: PBBFAC,,, | Performed by: INTERNAL MEDICINE

## 2023-06-19 PROCEDURE — 99214 OFFICE O/P EST MOD 30 MIN: CPT | Mod: S$GLB,,, | Performed by: INTERNAL MEDICINE

## 2023-06-19 PROCEDURE — 1160F PR REVIEW ALL MEDS BY PRESCRIBER/CLIN PHARMACIST DOCUMENTED: ICD-10-PCS | Mod: CPTII,S$GLB,, | Performed by: INTERNAL MEDICINE

## 2023-06-19 PROCEDURE — 3061F NEG MICROALBUMINURIA REV: CPT | Mod: CPTII,S$GLB,, | Performed by: INTERNAL MEDICINE

## 2023-06-19 PROCEDURE — 3075F SYST BP GE 130 - 139MM HG: CPT | Mod: CPTII,S$GLB,, | Performed by: INTERNAL MEDICINE

## 2023-06-19 PROCEDURE — 3008F PR BODY MASS INDEX (BMI) DOCUMENTED: ICD-10-PCS | Mod: CPTII,S$GLB,, | Performed by: INTERNAL MEDICINE

## 2023-06-19 PROCEDURE — 1159F PR MEDICATION LIST DOCUMENTED IN MEDICAL RECORD: ICD-10-PCS | Mod: CPTII,S$GLB,, | Performed by: INTERNAL MEDICINE

## 2023-06-19 PROCEDURE — 99999 PR PBB SHADOW E&M-EST. PATIENT-LVL V: CPT | Mod: PBBFAC,,, | Performed by: INTERNAL MEDICINE

## 2023-06-19 PROCEDURE — 3066F NEPHROPATHY DOC TX: CPT | Mod: CPTII,S$GLB,, | Performed by: INTERNAL MEDICINE

## 2023-06-19 PROCEDURE — 1159F MED LIST DOCD IN RCRD: CPT | Mod: CPTII,S$GLB,, | Performed by: INTERNAL MEDICINE

## 2023-06-19 PROCEDURE — 99214 PR OFFICE/OUTPT VISIT, EST, LEVL IV, 30-39 MIN: ICD-10-PCS | Mod: S$GLB,,, | Performed by: INTERNAL MEDICINE

## 2023-06-19 RX ORDER — SEMAGLUTIDE 1.34 MG/ML
1 INJECTION, SOLUTION SUBCUTANEOUS
Qty: 3 ML | Refills: 11 | Status: SHIPPED | OUTPATIENT
Start: 2023-06-19 | End: 2024-06-18

## 2023-06-19 NOTE — PROGRESS NOTES
(Freestyle Amena)    CHIEF COMPLAINT: DM2  52 y.o.  being seen as a f/u. Diagnosed with diabetes 2013. Off actos. On metformin XR 1000 BID and ozempic 0.5. On testosterone 200 mg weekly. Tolerating statin. Concerned about not losing weight. He is exercising regularly. Occasional nausea. Has seen hematologist for elevated Hg/Hct. Has some loose stool. NO paresthesias. States up to date with eye exam- due in Oct.         PAST MEDICAL HISTORY/PAST SURGICAL HISTORY:  Reviewed in [facility]    SOCIAL HISTORY: No tobacco. Social alcohol. Desk Job-     FAMILY HISTORY:  + DM 2. No known thyroid disease.     MEDICATIONS/ALLERGIES: The patient's MedCard has been updated and reviewed.            PE:    GENERAL: Well developed, well nourished.  NECK: Supple, trachea midline, No palpable nodules  CHEST: Resp even and unlabored, CTA bilateral.  CARDIAC: RRR, S1, S2 heard, no murmurs, rubs, S3, or S4  FEET: Normal monofilament. No cuts or abrasions. 2 + pedal pulses.      Latest Reference Range & Units 06/12/23 07:18   WBC 3.90 - 12.70 K/uL 10.78   RBC 4.60 - 6.20 M/uL 5.68   Hemoglobin 14.0 - 18.0 g/dL 17.9   Hematocrit 40.0 - 54.0 % 51.7   MCV 82 - 98 fL 91   MCH 27.0 - 31.0 pg 31.5 (H)   MCHC 32.0 - 36.0 g/dL 34.6   RDW 11.5 - 14.5 % 12.6   Platelets 150 - 450 K/uL 279   MPV 9.2 - 12.9 fL 10.9   (H): Data is abnormally high   Latest Reference Range & Units 06/12/23 07:18   Sodium 136 - 145 mmol/L 136   Potassium 3.5 - 5.1 mmol/L 4.2   Chloride 95 - 110 mmol/L 104   CO2 23 - 29 mmol/L 23   Anion Gap 8 - 16 mmol/L 9   BUN 6 - 20 mg/dL 10   Creatinine 0.5 - 1.4 mg/dL 0.8   eGFR >60 mL/min/1.73 m^2 >60.0   Glucose 70 - 110 mg/dL 147 (H)   Calcium 8.7 - 10.5 mg/dL 8.8   Alkaline Phosphatase 55 - 135 U/L 54 (L)   PROTEIN TOTAL 6.0 - 8.4 g/dL 6.5   Albumin 3.5 - 5.2 g/dL 3.8   BILIRUBIN TOTAL 0.1 - 1.0 mg/dL 0.9   AST 10 - 40 U/L 21   ALT 10 - 44 U/L 32   (H): Data is abnormally high  (L): Data is abnormally low     Latest Reference Range  & Units 06/12/23 07:18   Cholesterol 120 - 199 mg/dL 111 (L)   HDL 40 - 75 mg/dL 25 (L)   HDL/Cholesterol Ratio 20.0 - 50.0 % 22.5   LDL Cholesterol External 63.0 - 159.0 mg/dL 63.4   Non-HDL Cholesterol mg/dL 86   Total Cholesterol/HDL Ratio 2.0 - 5.0  4.4   Triglycerides 30 - 150 mg/dL 113   Hemoglobin A1C External 4.0 - 5.6 % 6.4 (H)   Estimated Avg Glucose 68 - 131 mg/dL 137 (H)   (L): Data is abnormally low  (H): Data is abnormally high   Latest Reference Range & Units 06/12/23 07:01   Creatinine, Urine 23.0 - 375.0 mg/dL 184.0   Urine Microalbumin ug/mL 11.0   MICROALB/CREAT RATIO 0.0 - 30.0 ug/mg 6.0           ASSESSMENT/PLAN:  1. DM 2- No known complications.   Following up regularly with Diabetes Education.  Still having some difficulty losing weight despite exercising.  Increase Ozempic to 1 mg weekly.  Discussed side effects.  Discussed monitoring for worsening loose stools.  Discussed taking metformin with food.    2. Hyperlipidemia- Continue statin. Tolerating well. Taking fish oil as Trigs elevated.       3. Male Hypogonadism- currently on testosterone 200 mg weekly.  Monitor hemoglobin hematocrit has a has been elevated in the past.  Testosterone at an acceptable range.  However, do not need any high-risk can worsen hemoglobin hematocrit.    4. MELLY- see # 3.      FOLLOWUP  F/U 6 months with CMP,CBC, A1c, testosterone

## 2023-06-26 DIAGNOSIS — E29.1 MALE HYPOGONADISM: ICD-10-CM

## 2023-06-27 RX ORDER — TESTOSTERONE CYPIONATE 200 MG/ML
200 INJECTION, SOLUTION INTRAMUSCULAR WEEKLY
Qty: 4 ML | Refills: 4 | Status: SHIPPED | OUTPATIENT
Start: 2023-06-27 | End: 2023-11-29 | Stop reason: SDUPTHER

## 2023-06-30 ENCOUNTER — PATIENT MESSAGE (OUTPATIENT)
Dept: HEMATOLOGY/ONCOLOGY | Facility: CLINIC | Age: 53
End: 2023-06-30
Payer: COMMERCIAL

## 2023-07-27 ENCOUNTER — TELEPHONE (OUTPATIENT)
Dept: HEMATOLOGY/ONCOLOGY | Facility: CLINIC | Age: 53
End: 2023-07-27
Payer: COMMERCIAL

## 2023-07-27 DIAGNOSIS — D75.1 POLYCYTHEMIA: Primary | ICD-10-CM

## 2023-07-27 NOTE — TELEPHONE ENCOUNTER
Confirmed with pt that a lab appointment has been made  prior to MD appt . He verbalized understanding .

## 2023-07-28 ENCOUNTER — LAB VISIT (OUTPATIENT)
Dept: LAB | Facility: HOSPITAL | Age: 53
End: 2023-07-28
Attending: INTERNAL MEDICINE
Payer: COMMERCIAL

## 2023-07-28 ENCOUNTER — OFFICE VISIT (OUTPATIENT)
Dept: HEMATOLOGY/ONCOLOGY | Facility: CLINIC | Age: 53
End: 2023-07-28
Payer: COMMERCIAL

## 2023-07-28 VITALS
TEMPERATURE: 98 F | WEIGHT: 224.44 LBS | HEART RATE: 97 BPM | DIASTOLIC BLOOD PRESSURE: 96 MMHG | RESPIRATION RATE: 16 BRPM | HEIGHT: 67 IN | OXYGEN SATURATION: 96 % | BODY MASS INDEX: 35.22 KG/M2 | SYSTOLIC BLOOD PRESSURE: 132 MMHG

## 2023-07-28 DIAGNOSIS — D75.1 POLYCYTHEMIA: Primary | ICD-10-CM

## 2023-07-28 DIAGNOSIS — D75.1 POLYCYTHEMIA: ICD-10-CM

## 2023-07-28 LAB
ERYTHROCYTE [DISTWIDTH] IN BLOOD BY AUTOMATED COUNT: 12.8 % (ref 11.5–14.5)
HCT VFR BLD AUTO: 54.3 % (ref 40–54)
HGB BLD-MCNC: 18.6 G/DL (ref 14–18)
IMM GRANULOCYTES # BLD AUTO: 0.04 K/UL (ref 0–0.04)
MCH RBC QN AUTO: 30.8 PG (ref 27–31)
MCHC RBC AUTO-ENTMCNC: 34.3 G/DL (ref 32–36)
MCV RBC AUTO: 90 FL (ref 82–98)
NEUTROPHILS # BLD AUTO: 9.9 K/UL (ref 1.8–7.7)
PLATELET # BLD AUTO: 319 K/UL (ref 150–450)
PMV BLD AUTO: 9.6 FL (ref 9.2–12.9)
RBC # BLD AUTO: 6.03 M/UL (ref 4.6–6.2)
WBC # BLD AUTO: 13.34 K/UL (ref 3.9–12.7)

## 2023-07-28 PROCEDURE — 1159F MED LIST DOCD IN RCRD: CPT | Mod: CPTII,S$GLB,, | Performed by: INTERNAL MEDICINE

## 2023-07-28 PROCEDURE — 99213 OFFICE O/P EST LOW 20 MIN: CPT | Mod: S$GLB,,, | Performed by: INTERNAL MEDICINE

## 2023-07-28 PROCEDURE — 3061F PR NEG MICROALBUMINURIA RESULT DOCUMENTED/REVIEW: ICD-10-PCS | Mod: CPTII,S$GLB,, | Performed by: INTERNAL MEDICINE

## 2023-07-28 PROCEDURE — 3008F BODY MASS INDEX DOCD: CPT | Mod: CPTII,S$GLB,, | Performed by: INTERNAL MEDICINE

## 2023-07-28 PROCEDURE — 3066F PR DOCUMENTATION OF TREATMENT FOR NEPHROPATHY: ICD-10-PCS | Mod: CPTII,S$GLB,, | Performed by: INTERNAL MEDICINE

## 2023-07-28 PROCEDURE — 3044F PR MOST RECENT HEMOGLOBIN A1C LEVEL <7.0%: ICD-10-PCS | Mod: CPTII,S$GLB,, | Performed by: INTERNAL MEDICINE

## 2023-07-28 PROCEDURE — 3075F SYST BP GE 130 - 139MM HG: CPT | Mod: CPTII,S$GLB,, | Performed by: INTERNAL MEDICINE

## 2023-07-28 PROCEDURE — 36415 COLL VENOUS BLD VENIPUNCTURE: CPT | Mod: PN | Performed by: INTERNAL MEDICINE

## 2023-07-28 PROCEDURE — 99999 PR PBB SHADOW E&M-EST. PATIENT-LVL V: ICD-10-PCS | Mod: PBBFAC,,, | Performed by: INTERNAL MEDICINE

## 2023-07-28 PROCEDURE — 3061F NEG MICROALBUMINURIA REV: CPT | Mod: CPTII,S$GLB,, | Performed by: INTERNAL MEDICINE

## 2023-07-28 PROCEDURE — 3075F PR MOST RECENT SYSTOLIC BLOOD PRESS GE 130-139MM HG: ICD-10-PCS | Mod: CPTII,S$GLB,, | Performed by: INTERNAL MEDICINE

## 2023-07-28 PROCEDURE — 85027 COMPLETE CBC AUTOMATED: CPT | Mod: PN | Performed by: INTERNAL MEDICINE

## 2023-07-28 PROCEDURE — 1159F PR MEDICATION LIST DOCUMENTED IN MEDICAL RECORD: ICD-10-PCS | Mod: CPTII,S$GLB,, | Performed by: INTERNAL MEDICINE

## 2023-07-28 PROCEDURE — 3080F DIAST BP >= 90 MM HG: CPT | Mod: CPTII,S$GLB,, | Performed by: INTERNAL MEDICINE

## 2023-07-28 PROCEDURE — 3008F PR BODY MASS INDEX (BMI) DOCUMENTED: ICD-10-PCS | Mod: CPTII,S$GLB,, | Performed by: INTERNAL MEDICINE

## 2023-07-28 PROCEDURE — 3080F PR MOST RECENT DIASTOLIC BLOOD PRESSURE >= 90 MM HG: ICD-10-PCS | Mod: CPTII,S$GLB,, | Performed by: INTERNAL MEDICINE

## 2023-07-28 PROCEDURE — 99999 PR PBB SHADOW E&M-EST. PATIENT-LVL V: CPT | Mod: PBBFAC,,, | Performed by: INTERNAL MEDICINE

## 2023-07-28 PROCEDURE — 3066F NEPHROPATHY DOC TX: CPT | Mod: CPTII,S$GLB,, | Performed by: INTERNAL MEDICINE

## 2023-07-28 PROCEDURE — 3044F HG A1C LEVEL LT 7.0%: CPT | Mod: CPTII,S$GLB,, | Performed by: INTERNAL MEDICINE

## 2023-07-28 PROCEDURE — 99213 PR OFFICE/OUTPT VISIT, EST, LEVL III, 20-29 MIN: ICD-10-PCS | Mod: S$GLB,,, | Performed by: INTERNAL MEDICINE

## 2023-07-28 RX ORDER — SEMAGLUTIDE 0.68 MG/ML
INJECTION, SOLUTION SUBCUTANEOUS
COMMUNITY
Start: 2023-07-24 | End: 2023-12-27

## 2023-08-02 ENCOUNTER — PATIENT MESSAGE (OUTPATIENT)
Dept: ENDOCRINOLOGY | Facility: CLINIC | Age: 53
End: 2023-08-02
Payer: COMMERCIAL

## 2023-08-02 DIAGNOSIS — E11.9 TYPE 2 DIABETES MELLITUS WITHOUT COMPLICATION, UNSPECIFIED WHETHER LONG TERM INSULIN USE: Primary | ICD-10-CM

## 2023-08-02 RX ORDER — FLASH GLUCOSE SENSOR
KIT MISCELLANEOUS
Qty: 2 KIT | Refills: 11 | Status: SHIPPED | OUTPATIENT
Start: 2023-08-02

## 2023-08-04 ENCOUNTER — PATIENT MESSAGE (OUTPATIENT)
Dept: HEMATOLOGY/ONCOLOGY | Facility: CLINIC | Age: 53
End: 2023-08-04
Payer: COMMERCIAL

## 2023-08-04 ENCOUNTER — NUTRITION (OUTPATIENT)
Dept: DIABETES | Facility: CLINIC | Age: 53
End: 2023-08-04
Payer: COMMERCIAL

## 2023-08-04 VITALS — WEIGHT: 233.69 LBS | BODY MASS INDEX: 36.68 KG/M2 | HEIGHT: 67 IN

## 2023-08-04 DIAGNOSIS — E11.9 DIABETES MELLITUS WITHOUT COMPLICATION: Primary | ICD-10-CM

## 2023-08-04 DIAGNOSIS — E11.9 TYPE 2 DIABETES MELLITUS WITHOUT RETINOPATHY: Primary | ICD-10-CM

## 2023-08-04 PROCEDURE — 99999 PR PBB SHADOW E&M-EST. PATIENT-LVL II: CPT | Mod: PBBFAC,,, | Performed by: DIETITIAN, REGISTERED

## 2023-08-04 PROCEDURE — 99999 PR PBB SHADOW E&M-EST. PATIENT-LVL II: ICD-10-PCS | Mod: PBBFAC,,, | Performed by: DIETITIAN, REGISTERED

## 2023-08-04 PROCEDURE — G0108 PR DIAB MANAGE TRN  PER INDIV: ICD-10-PCS | Mod: S$GLB,,, | Performed by: DIETITIAN, REGISTERED

## 2023-08-04 PROCEDURE — G0108 DIAB MANAGE TRN  PER INDIV: HCPCS | Mod: S$GLB,,, | Performed by: DIETITIAN, REGISTERED

## 2023-08-04 NOTE — PROGRESS NOTES
Diabetes Care Specialist Progress Note  Author: Leslie Ford RD, CDE  Date: 8/4/2023    Program Intake  Reason for Diabetes Program Visit:: Intervention  Type of Intervention:: Individual  Individual: Education  Education: Self-Management Skill Review  Current diabetes risk level:: low    Lab Results   Component Value Date    HGBA1C 6.4 (H) 06/12/2023     Hgb A1c improved from 7.5%--11/14/22    Clinical    Patient Health Rating  Compared to other people your age, how would you rate your health?: Excellent    Problem Review  Reviewed Problem List with Patient: yes  Active comorbidities affecting diabetes self-care.: yes  Comorbidities: Other (comment) (Hyperlipidemia)  Reviewed health maintenance: yes    Clinical Assessment  Current Diabetes Treatment: Oral Medication, Injectable, Diet, Exercise (Metformin  mg (4 tablets at bedtime), Ozempic 1mg weekly.  Discussed dosing of metformin to with meals as patient continues to have GI side effects (diarrhea))  Have you ever experienced hypoglycemia (low blood sugar)?: no  Have you ever experienced hyperglycemia (high blood sugar)?: no    Medication Information  How do you obtain your medications?: Patient drives  How many days a week do you miss your medications?: Never  Do you sometimes have difficulty refilling your medications?: No  Medication adherence impacting ability to self-manage diabetes?: No    Labs  Do you have regular lab work to monitor your medications?: Yes  Type of Regular Lab Work: A1c, Cholesterol, Microalbumin, BMP, CBC  Where do you get your labs drawn?: [facility]  Lab Compliance Barriers: No    Nutritional Status  Diet: Regular (Patient intermittent fasting 7p-7am and eating small meals as he is not hungry.)  Meal Plan 24 Hour Recall: Breakfast, Lunch, Dinner, Snack  Meal Plan 24 Hour Recall - Breakfast: shake--protein  Meal Plan 24 Hour Recall - Lunch: low carb tortilla with all beef hot dog or deli meat, cheese, mustard, water  Meal Plan 24  Hour Recall - Dinner: same as lunch  Meal Plan 24 Hour Recall - Snack: banana + peanut butter, nuts, granola  Change in appetite?: Yes (patient feels appetite suppressed with higher dose of Ozempic (initiated June 2023))  Dentation:: Intact  Recent Changes in Weight: No Recent Weight Change (weight stable--personal goal to weigh 220 lbs)  Current nutritional status an area of need that is impacting patient's ability to self-manage diabetes?: No    Additional Social History    Support  Does anyone support you with your diabetes care?: yes  Who supports you?: self  Who takes you to your medical appointments?: self  Does the current support meet the patient's needs?: Yes  Is Support an area impacting ability to self-manage diabetes?: No    Access to Mass Media & Technology  Does the patient have access to any of the following devices or technologies?: Smart phone, Internet Access, Home computer  Media or technology needs impacting ability to self-manage diabetes?: No    Cognitive/Behavioral Health  Alert and Oriented: Yes  Difficulty Thinking: No  Requires Prompting: No  Requires assistance for routine expression?: No  Cognitive or behavioral barriers impacting ability to self-manage diabetes?: No    Culture/Scientology  Culture or Yarsani beliefs that may impact ability to access healthcare: No    Communication  Language preference: English  Hearing Problems: No  Vision Problems: No  Communication needs impacting ability to self-manage diabetes?: No    Health Literacy  Preferred Learning Method: Face to Face  How often do you need to have someone help you read instructions, pamphlets, or written material from your doctor or pharmacy?: Never  Health literacy needs impacting ability to self-manage diabetes?: No    Diabetes Self-Management Skills Assessment    Physical Activity/Exercise  Patient's daily activity level:: moderately active  Patient formally exercises outside of work.: yes  Exercise Type: weight training,  walking (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 Weigth training for at least an hour 2 times a week.)  Intensity: Moderate  Frequency: four or more times a week  Patient can identify forms of physical activity.: yes  Stated forms of physical activity:: moving to burn calories, any movement performed by muscles that uses energy  Patient can identify reasons why exercise/physical activity is important in diabetes management.: yes  Identified reasons:: keeps body and joints flexible, strengthens heart, muscles, and bones  Physical Activity/Exercise Skills Assessment Completed: : Yes  Assessment indicates:: Instruction Needed  Area of need?: Yes    Medications  Patient is able to describe current diabetes management routine.: yes  Diabetes management routine:: diet, exercise, oral medications, injectable medications  Patient is able to identify current diabetes medications, dosages, and appropriate timing of medications.: yes  Patient understands the purpose of the medications taken for diabetes.: yes  Patient reports problems or concerns with current medication regimen.: yes  Medication regimen problems/concerns:: concerned about side effects  Medication Skills Assessment Completed:: Yes  Assessment indicates:: Instruction Needed  Area of need?: Yes    Home Blood Glucose Monitoring  Patient states that blood sugar is checked at home daily.: yes  Monitoring Method:: personal continuous glucose monitor  Personal CGM type:: Amena 2--reader, uses off and on to assure he continues under good glucose control  Patient is able to use personal CGM appropriately.: yes  CGM Report reviewed?: no  Unable to download personal CGM: Amena 2 reader malfunctioning and unablet o be downloaded to system--patient has new Amena 2 reader and will switch to this when he starts new sensor.  Reviewed log of glucose in old Amena 2 reader--glucose levels fluctuating 117-164  mg/dL with one outlier of 258 mg/dL (patient reports cheat meal on weekend).  Home Blood Glucose Monitoring Skills Assessment Completed: : Yes  Assessment indicates:: Adequate understanding  Area of need?: No    Psychosocial/Coping  Patient can identify ways of coping with chronic disease.: yes  Patient-stated ways of coping with chronic disease:: support from loved ones  Psychosocial/Coping Skills Assessment Completed: : Yes  Assessment indicates:: Adequate understanding  Area of need?: No    Assessment Summary and Plan    Based on today's diabetes care assessment, the following areas of need were identified:      Social 8/4/2023   Support No   Access to Mass Media/Tech No   Cognitive/Behavioral Health No   Culture/Mandaen No   Communication No   Health Literacy No      Clinical 8/4/2023   Medication Adherence No   Lab Compliance No   Nutritional Status No      Diabetes Self-Management Skills 8/4/2023   Physical Activity/Exercise Yes--see care plan   Medication Yes--see care plan   Home Blood Glucose Monitoring No   Psychosocial/Coping No      Today's interventions were provided through individual discussion, instruction, and written materials were provided.      Patient verbalized understanding of instruction and written materials.  Pt was able to return back demonstration of instructions today. Patient understood key points, needs reinforcement and further instruction.     Diabetes Self-Management Care Plan:    Today's Diabetes Self-Management Care Plan was developed with Abhi's input. Abhi has agreed to work toward the following goal(s) to improve his/her overall diabetes control.      Care Plan: Diabetes Management   Updates made since 7/5/2023 12:00 AM     Problem: Healthy Eating         Goal: Refocus on eating balanced meals--patient has resorted back to eating all carb meals at times. Completed 8/4/2023   Start Date: 1/27/2023   Expected End Date: 8/4/2023   This Visit's Progress: Met    Priority: Medium   Barriers: No Barriers Identified   Note:    8/4/23:  Patient now choosing balanced meals and including protein source.  Discussed adding non-starchy vegetables to lunch & dinner such as adding lettuce/tomato to wraps or including a side salad with lunch and dinner.         Problem: Physical Activity and Exercise         Goal: Patient agrees to increase aerobic activity to a goal of 30 minutes most days of week--patient will walk in neighborhood, swim, or ride bicycle (has recently slacked off due to hot weather).    Start Date: 8/4/2023   Expected End Date: 2/23/2024   Priority: High   Barriers: No Barriers Identified        Task: Discussed role of physical activity on reducing insulin resistance and improvement in overall glycemic control. Completed 8/4/2023        Task: Discussed role of physical activity as it relates to weight loss Completed 8/4/2023        Problem: Medications         Goal: Patient will trial metformin XR dose with lunch and dinner instead of taking four 500 mg tablets at bedtime.  Having GI side effects and if GI side effects continue, patient will reach out to Endocrine. Continue Ozempic.    Start Date: 8/4/2023   Expected End Date: 2/23/2024   Priority: Medium   Barriers: No Barriers Identified        Task: Reviewed with patient all current diabetes medications and provided basic review of the purpose, dosage, frequency, side effects, and storage of both oral and injectable diabetes medications. Completed 8/4/2023          Follow Up Plan     Follow up in 29 weeks (on 2/23/2024) for continued DSMES--patient prefer to be seen every 6 months by diabetes care specialist for continued support.  Recent improvement in Hgb A1c from 7.5% to now 6.4%.  Last seen by Endocrine June 2023--Ozempic dose increased to 1 mg weekly.  Patient continues on metformin  mg (4 tablets daily) and struggles with diarrhea from this medication.  He is now taking all 4 metformin tablets in  evening--recommend he trial taking metformin 500 mg (1 tablet) with lunch and 1 tablet (500 mg) with with dinner to see if GI issues resolve. He will reach out to Endocrine provider if GI symptoms persist.  Continues to use Freestyle Amena 2 CGMS intermittently--unable to download Amena 2 reader at visit today and patient has replacement Amena 2 reader he will switch to when starting next sensor.  Did look at history of glucose levels in Amena 2 [place].      Patient now avoiding all carb meals and encouraged to add non-starchy vegetable to lunch and dinner.  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 Feel increasing aerobic activity will be best option to assist patient with meeting his weight loss goals as his diet is adequate. Patient does have an alarm on his phone to walk around throughout day when working every 20 minutes.      Today's care plan and follow up schedule was discussed with patient.  Abhi verbalized understanding of the care plan, goals, and agrees to follow up plan.        The patient was encouraged to communicate with his/her health care provider/physician and care team regarding his/her condition(s) and treatment.  I provided the patient with my contact information today and encouraged to contact me via phone or [facility] as needed.     Length of Visit   Total Time: 45 Minutes

## 2023-08-11 ENCOUNTER — PATIENT MESSAGE (OUTPATIENT)
Dept: ENDOCRINOLOGY | Facility: CLINIC | Age: 53
End: 2023-08-11
Payer: COMMERCIAL

## 2023-09-11 DIAGNOSIS — K21.00 GASTROESOPHAGEAL REFLUX DISEASE WITH ESOPHAGITIS WITHOUT HEMORRHAGE: ICD-10-CM

## 2023-09-11 DIAGNOSIS — Z79.899 ENCOUNTER FOR MONITORING LONG-TERM PROTON PUMP INHIBITOR THERAPY: ICD-10-CM

## 2023-09-11 DIAGNOSIS — Z51.81 ENCOUNTER FOR MONITORING LONG-TERM PROTON PUMP INHIBITOR THERAPY: ICD-10-CM

## 2023-09-12 RX ORDER — FAMOTIDINE 40 MG/1
40 TABLET, FILM COATED ORAL
Qty: 90 TABLET | Refills: 3 | Status: SHIPPED | OUTPATIENT
Start: 2023-09-12

## 2023-09-12 RX ORDER — METFORMIN HYDROCHLORIDE 500 MG/1
TABLET, EXTENDED RELEASE ORAL
Qty: 360 TABLET | Refills: 3 | Status: SHIPPED | OUTPATIENT
Start: 2023-09-12

## 2023-09-27 DIAGNOSIS — N52.1 ERECTILE DYSFUNCTION DUE TO DISEASES CLASSIFIED ELSEWHERE: ICD-10-CM

## 2023-09-27 RX ORDER — SILDENAFIL CITRATE 20 MG/1
TABLET ORAL
Qty: 30 TABLET | Refills: 2 | Status: SHIPPED | OUTPATIENT
Start: 2023-09-27 | End: 2024-01-25

## 2023-09-27 NOTE — TELEPHONE ENCOUNTER
Refill Decision Note   Abhi Pompa  is requesting a refill authorization.  Brief Assessment and Rationale for Refill:  Approve     Medication Therapy Plan:         Pharmacist review requested: Yes   Extended chart review required: Yes   Comments:     Note composed:6:29 PM 09/27/2023

## 2023-09-27 NOTE — TELEPHONE ENCOUNTER
Refill Routing Note   Medication(s) are not appropriate for processing by [facility] for the following reason(s):      Drug-drug interaction    [facility] action(s):  Defer Care Due:  None identified     Medication Therapy Plan: Drug drug: tadalafiL and sildenafil    Pharmacist review requested: Yes     Appointments  past 12m or future 3m with PCP    Date Provider   Last Visit   12/5/2022 Rafita Amezcua, DO   Next Visit   12/1/2023 Rafita Amezcua, DO   ED visits in past 90 days: 0        Note composed:5:48 PM 09/27/2023

## 2023-09-27 NOTE — TELEPHONE ENCOUNTER
No care due was identified.  [facility] Embedded Care Due Messages. Reference number: [id number].   9/27/2023 4:39:48 PM CDT

## 2023-10-12 ENCOUNTER — PATIENT MESSAGE (OUTPATIENT)
Dept: HEMATOLOGY/ONCOLOGY | Facility: CLINIC | Age: 53
End: 2023-10-12
Payer: COMMERCIAL

## 2023-10-12 ENCOUNTER — TELEPHONE (OUTPATIENT)
Dept: HEMATOLOGY/ONCOLOGY | Facility: CLINIC | Age: 53
End: 2023-10-12
Payer: COMMERCIAL

## 2023-10-12 NOTE — TELEPHONE ENCOUNTER
sent portal msg to pt regarding his appt on 10/27 that needs to be changed due to scheduling changes Informed pt to contact us if times are not convenient.

## 2023-10-19 ENCOUNTER — PATIENT MESSAGE (OUTPATIENT)
Dept: OPTOMETRY | Facility: CLINIC | Age: 53
End: 2023-10-19
Payer: COMMERCIAL

## 2023-11-05 DIAGNOSIS — Z51.81 ENCOUNTER FOR MONITORING LONG-TERM PROTON PUMP INHIBITOR THERAPY: ICD-10-CM

## 2023-11-05 DIAGNOSIS — Z79.899 ENCOUNTER FOR MONITORING LONG-TERM PROTON PUMP INHIBITOR THERAPY: ICD-10-CM

## 2023-11-05 DIAGNOSIS — K21.00 GASTROESOPHAGEAL REFLUX DISEASE WITH ESOPHAGITIS WITHOUT HEMORRHAGE: ICD-10-CM

## 2023-11-07 RX ORDER — OMEPRAZOLE 40 MG/1
40 CAPSULE, DELAYED RELEASE ORAL DAILY
Qty: 90 CAPSULE | Refills: 1 | Status: SHIPPED | OUTPATIENT
Start: 2023-11-07

## 2023-11-14 ENCOUNTER — LAB VISIT (OUTPATIENT)
Dept: LAB | Facility: HOSPITAL | Age: 53
End: 2023-11-14
Attending: INTERNAL MEDICINE
Payer: COMMERCIAL

## 2023-11-14 ENCOUNTER — OFFICE VISIT (OUTPATIENT)
Dept: HEMATOLOGY/ONCOLOGY | Facility: CLINIC | Age: 53
End: 2023-11-14
Payer: COMMERCIAL

## 2023-11-14 VITALS
HEART RATE: 83 BPM | OXYGEN SATURATION: 98 % | DIASTOLIC BLOOD PRESSURE: 79 MMHG | BODY MASS INDEX: 35.29 KG/M2 | WEIGHT: 224.88 LBS | RESPIRATION RATE: 18 BRPM | HEIGHT: 67 IN | SYSTOLIC BLOOD PRESSURE: 128 MMHG | TEMPERATURE: 98 F

## 2023-11-14 DIAGNOSIS — D75.1 POLYCYTHEMIA: Primary | ICD-10-CM

## 2023-11-14 DIAGNOSIS — D75.1 POLYCYTHEMIA: ICD-10-CM

## 2023-11-14 LAB
BASOPHILS # BLD AUTO: 0.08 K/UL (ref 0–0.2)
BASOPHILS NFR BLD: 0.7 % (ref 0–1.9)
DIFFERENTIAL METHOD: ABNORMAL
EOSINOPHIL # BLD AUTO: 0.1 K/UL (ref 0–0.5)
EOSINOPHIL NFR BLD: 1.3 % (ref 0–8)
ERYTHROCYTE [DISTWIDTH] IN BLOOD BY AUTOMATED COUNT: 12.4 % (ref 11.5–14.5)
HCT VFR BLD AUTO: 53.5 % (ref 40–54)
HGB BLD-MCNC: 18.3 G/DL (ref 14–18)
IMM GRANULOCYTES # BLD AUTO: 0.03 K/UL (ref 0–0.04)
IMM GRANULOCYTES NFR BLD AUTO: 0.3 % (ref 0–0.5)
LYMPHOCYTES # BLD AUTO: 1.6 K/UL (ref 1–4.8)
LYMPHOCYTES NFR BLD: 14.4 % (ref 18–48)
MCH RBC QN AUTO: 31 PG (ref 27–31)
MCHC RBC AUTO-ENTMCNC: 34.2 G/DL (ref 32–36)
MCV RBC AUTO: 91 FL (ref 82–98)
MONOCYTES # BLD AUTO: 1.2 K/UL (ref 0.3–1)
MONOCYTES NFR BLD: 10.4 % (ref 4–15)
NEUTROPHILS # BLD AUTO: 8 K/UL (ref 1.8–7.7)
NEUTROPHILS NFR BLD: 72.9 % (ref 38–73)
NRBC BLD-RTO: 0 /100 WBC
PLATELET # BLD AUTO: 354 K/UL (ref 150–450)
PMV BLD AUTO: 9.7 FL (ref 9.2–12.9)
RBC # BLD AUTO: 5.9 M/UL (ref 4.6–6.2)
WBC # BLD AUTO: 11.01 K/UL (ref 3.9–12.7)

## 2023-11-14 PROCEDURE — 99214 OFFICE O/P EST MOD 30 MIN: CPT | Mod: S$GLB,,, | Performed by: INTERNAL MEDICINE

## 2023-11-14 PROCEDURE — 3078F PR MOST RECENT DIASTOLIC BLOOD PRESSURE < 80 MM HG: ICD-10-PCS | Mod: CPTII,S$GLB,, | Performed by: INTERNAL MEDICINE

## 2023-11-14 PROCEDURE — 3066F NEPHROPATHY DOC TX: CPT | Mod: CPTII,S$GLB,, | Performed by: INTERNAL MEDICINE

## 2023-11-14 PROCEDURE — 1160F RVW MEDS BY RX/DR IN RCRD: CPT | Mod: CPTII,S$GLB,, | Performed by: INTERNAL MEDICINE

## 2023-11-14 PROCEDURE — 3044F HG A1C LEVEL LT 7.0%: CPT | Mod: CPTII,S$GLB,, | Performed by: INTERNAL MEDICINE

## 2023-11-14 PROCEDURE — 3066F PR DOCUMENTATION OF TREATMENT FOR NEPHROPATHY: ICD-10-PCS | Mod: CPTII,S$GLB,, | Performed by: INTERNAL MEDICINE

## 2023-11-14 PROCEDURE — 1159F PR MEDICATION LIST DOCUMENTED IN MEDICAL RECORD: ICD-10-PCS | Mod: CPTII,S$GLB,, | Performed by: INTERNAL MEDICINE

## 2023-11-14 PROCEDURE — 3061F PR NEG MICROALBUMINURIA RESULT DOCUMENTED/REVIEW: ICD-10-PCS | Mod: CPTII,S$GLB,, | Performed by: INTERNAL MEDICINE

## 2023-11-14 PROCEDURE — 1160F PR REVIEW ALL MEDS BY PRESCRIBER/CLIN PHARMACIST DOCUMENTED: ICD-10-PCS | Mod: CPTII,S$GLB,, | Performed by: INTERNAL MEDICINE

## 2023-11-14 PROCEDURE — 3061F NEG MICROALBUMINURIA REV: CPT | Mod: CPTII,S$GLB,, | Performed by: INTERNAL MEDICINE

## 2023-11-14 PROCEDURE — 99214 PR OFFICE/OUTPT VISIT, EST, LEVL IV, 30-39 MIN: ICD-10-PCS | Mod: S$GLB,,, | Performed by: INTERNAL MEDICINE

## 2023-11-14 PROCEDURE — 3044F PR MOST RECENT HEMOGLOBIN A1C LEVEL <7.0%: ICD-10-PCS | Mod: CPTII,S$GLB,, | Performed by: INTERNAL MEDICINE

## 2023-11-14 PROCEDURE — 3074F PR MOST RECENT SYSTOLIC BLOOD PRESSURE < 130 MM HG: ICD-10-PCS | Mod: CPTII,S$GLB,, | Performed by: INTERNAL MEDICINE

## 2023-11-14 PROCEDURE — 85025 COMPLETE CBC W/AUTO DIFF WBC: CPT | Mod: PN | Performed by: INTERNAL MEDICINE

## 2023-11-14 PROCEDURE — 3008F PR BODY MASS INDEX (BMI) DOCUMENTED: ICD-10-PCS | Mod: CPTII,S$GLB,, | Performed by: INTERNAL MEDICINE

## 2023-11-14 PROCEDURE — 36415 COLL VENOUS BLD VENIPUNCTURE: CPT | Mod: PN | Performed by: INTERNAL MEDICINE

## 2023-11-14 PROCEDURE — 3008F BODY MASS INDEX DOCD: CPT | Mod: CPTII,S$GLB,, | Performed by: INTERNAL MEDICINE

## 2023-11-14 PROCEDURE — 3074F SYST BP LT 130 MM HG: CPT | Mod: CPTII,S$GLB,, | Performed by: INTERNAL MEDICINE

## 2023-11-14 PROCEDURE — 99999 PR PBB SHADOW E&M-EST. PATIENT-LVL V: ICD-10-PCS | Mod: PBBFAC,,, | Performed by: INTERNAL MEDICINE

## 2023-11-14 PROCEDURE — 1159F MED LIST DOCD IN RCRD: CPT | Mod: CPTII,S$GLB,, | Performed by: INTERNAL MEDICINE

## 2023-11-14 PROCEDURE — 99999 PR PBB SHADOW E&M-EST. PATIENT-LVL V: CPT | Mod: PBBFAC,,, | Performed by: INTERNAL MEDICINE

## 2023-11-14 PROCEDURE — 3078F DIAST BP <80 MM HG: CPT | Mod: CPTII,S$GLB,, | Performed by: INTERNAL MEDICINE

## 2023-11-14 RX ORDER — TADALAFIL 20 MG/1
20 TABLET ORAL DAILY
COMMUNITY

## 2023-11-14 NOTE — PROGRESS NOTES
Subjective:       Patient ID: Abhi Pompa is a 53 y.o. male.    Chief Complaint: Establish Care ( 3mths f/u r/s from 10/27/)    HPI    Mr. Pompa returns today for follow-up.  I had last seen him in March of 2023 and he was seen by Dr. Rebolledo in July during my absence.  He donated 1 unit of PRBCs in July.        Briefly, he is a 52-year-old man referred for evaluation for polycythemia.  His JAK2 mutation was negative and his polycythemia was attributed to his testosterone injection and a documented sleep apnea that he has.  His CBC today shows a white count of 8400 per cubic mm, hemoglobin 17 grams/deciliter, hematocrit 49.4%, MCV 92 and platelets 328 K    The patient has been on testosterone injections since 2014, currently on a weekly basis.       Review of Systems    Overall he feels well and he states that he feels better.  He started using a new mask for his sleep apnea and according to him, his girlfriend is telling me that he does not snore anymore..  His ECOG PS is 1.  He denies any anxiety, depression, easy bruising, fevers, chills, night  sweats, weight loss, nausea, vomiting, diarrhea, constipation, diplopia, blurred vision, headache, chest pain, palpitations, shortness of breath, breast pain, abdominal pain, extremity pain, or difficulty ambulating.  The remainder of the ten-point ROS, including general, skin, lymph, H/N, cardiorespiratory, GI, , Neuro, Endocrine, and psychiatric is negative.    Objective:      Physical Exam    He is alert, oriented to time, place, person, pleasant, well      nourished, in no acute physical distress.                                    VITAL SIGNS:  Reviewed                                      HEENT:  Normal.  There are no nasal, oral, lip, gingival, auricular, lid,    or conjunctival lesions.  Mucosae are moist and pink, and there is no        thrush.  Pupils are equal, reactive to light and accommodation.              Extraocular muscle movements are intact.     Dentition is good.  There is no frontal or maxillary tenderness.                                   NECK:  Supple without JVD, adenopathy, or thyromegaly.                       LUNGS:  Clear to auscultation without wheezing, rales, or rhonchi.           CARDIOVASCULAR:  Reveals an S1, S2, no murmurs, no rubs, no gallops.         ABDOMEN:  Soft, nontender, without organomegaly.  Bowel sounds are    present.                                                                     EXTREMITIES:  No cyanosis, clubbing, or edema.                                                               LYMPHATIC:  There is no cervical, axillary, or supraclavicular adenopathy.   SKIN:  Warm and moist, without petechiae, rashes, induration, or ecchymoses.           NEUROLOGIC:  DTRs are 0-1+ bilaterally, symmetrical, motor function is 5/5,  and cranial nerves are  within normal limits.   Assessment:     1. Erythrocytosis, felt to be secondary to testosterone injections and or sleep apnea rather.  MPN has been ruled out    2. Obesity, sleep apnea    3. Diabetes, hyperlipidemia, history of achalasia        Plan:         I had a long discussion with Mr. Pompa.  We went over the differential for his erythrocytosis.  A myeloproliferative syndrome has essentially been ruled out at this point.    Since there is no evidence of an MPN, it is reasonable for him to continue the testosterone injections so long as his hematocrit remains below 55-60% with periodic phlebotomies.  Since he wants to donate 1 unit of blood I told him that he is authorized to do so  I will see him again in 2 months with a repeat CBC and a repeat ferritin.  His multiple questions were answered to his satisfaction.

## 2023-11-16 ENCOUNTER — PATIENT MESSAGE (OUTPATIENT)
Dept: DERMATOLOGY | Facility: CLINIC | Age: 53
End: 2023-11-16
Payer: COMMERCIAL

## 2023-11-29 DIAGNOSIS — E29.1 MALE HYPOGONADISM: ICD-10-CM

## 2023-11-29 RX ORDER — TESTOSTERONE CYPIONATE 200 MG/ML
200 INJECTION, SOLUTION INTRAMUSCULAR WEEKLY
Qty: 4 ML | Refills: 4 | Status: SHIPPED | OUTPATIENT
Start: 2023-11-29 | End: 2024-05-29

## 2023-12-01 ENCOUNTER — OFFICE VISIT (OUTPATIENT)
Dept: FAMILY MEDICINE | Facility: CLINIC | Age: 53
End: 2023-12-01
Payer: COMMERCIAL

## 2023-12-01 VITALS
WEIGHT: 223.13 LBS | HEIGHT: 67 IN | DIASTOLIC BLOOD PRESSURE: 60 MMHG | OXYGEN SATURATION: 96 % | HEART RATE: 85 BPM | SYSTOLIC BLOOD PRESSURE: 100 MMHG | BODY MASS INDEX: 35.02 KG/M2

## 2023-12-01 DIAGNOSIS — F41.1 GENERALIZED ANXIETY DISORDER: ICD-10-CM

## 2023-12-01 DIAGNOSIS — Z23 NEED FOR VACCINATION: ICD-10-CM

## 2023-12-01 DIAGNOSIS — Z00.00 ANNUAL PHYSICAL EXAM: ICD-10-CM

## 2023-12-01 DIAGNOSIS — E34.9 TESTOSTERONE DEFICIENCY: ICD-10-CM

## 2023-12-01 DIAGNOSIS — E11.9 DIABETES MELLITUS WITHOUT COMPLICATION: Primary | ICD-10-CM

## 2023-12-01 DIAGNOSIS — Z79.890 LONG-TERM CURRENT USE OF TESTOSTERONE CYPIONATE: ICD-10-CM

## 2023-12-01 DIAGNOSIS — K21.00 GASTROESOPHAGEAL REFLUX DISEASE WITH ESOPHAGITIS WITHOUT HEMORRHAGE: ICD-10-CM

## 2023-12-01 DIAGNOSIS — E78.2 MIXED HYPERLIPIDEMIA: ICD-10-CM

## 2023-12-01 PROCEDURE — 3074F SYST BP LT 130 MM HG: CPT | Mod: CPTII,S$GLB,, | Performed by: INTERNAL MEDICINE

## 2023-12-01 PROCEDURE — 90686 FLU VACCINE (QUAD) GREATER THAN OR EQUAL TO 3YO PRESERVATIVE FREE IM: ICD-10-PCS | Mod: S$GLB,,, | Performed by: INTERNAL MEDICINE

## 2023-12-01 PROCEDURE — 3008F PR BODY MASS INDEX (BMI) DOCUMENTED: ICD-10-PCS | Mod: CPTII,S$GLB,, | Performed by: INTERNAL MEDICINE

## 2023-12-01 PROCEDURE — 99999 PR PBB SHADOW E&M-EST. PATIENT-LVL V: CPT | Mod: PBBFAC,,, | Performed by: INTERNAL MEDICINE

## 2023-12-01 PROCEDURE — 99396 PREV VISIT EST AGE 40-64: CPT | Mod: 25,S$GLB,, | Performed by: INTERNAL MEDICINE

## 2023-12-01 PROCEDURE — 3066F NEPHROPATHY DOC TX: CPT | Mod: CPTII,S$GLB,, | Performed by: INTERNAL MEDICINE

## 2023-12-01 PROCEDURE — 3061F PR NEG MICROALBUMINURIA RESULT DOCUMENTED/REVIEW: ICD-10-PCS | Mod: CPTII,S$GLB,, | Performed by: INTERNAL MEDICINE

## 2023-12-01 PROCEDURE — 3044F PR MOST RECENT HEMOGLOBIN A1C LEVEL <7.0%: ICD-10-PCS | Mod: CPTII,S$GLB,, | Performed by: INTERNAL MEDICINE

## 2023-12-01 PROCEDURE — 3074F PR MOST RECENT SYSTOLIC BLOOD PRESSURE < 130 MM HG: ICD-10-PCS | Mod: CPTII,S$GLB,, | Performed by: INTERNAL MEDICINE

## 2023-12-01 PROCEDURE — 1159F PR MEDICATION LIST DOCUMENTED IN MEDICAL RECORD: ICD-10-PCS | Mod: CPTII,S$GLB,, | Performed by: INTERNAL MEDICINE

## 2023-12-01 PROCEDURE — 3061F NEG MICROALBUMINURIA REV: CPT | Mod: CPTII,S$GLB,, | Performed by: INTERNAL MEDICINE

## 2023-12-01 PROCEDURE — 3008F BODY MASS INDEX DOCD: CPT | Mod: CPTII,S$GLB,, | Performed by: INTERNAL MEDICINE

## 2023-12-01 PROCEDURE — 90471 IMMUNIZATION ADMIN: CPT | Mod: S$GLB,,, | Performed by: INTERNAL MEDICINE

## 2023-12-01 PROCEDURE — 90686 IIV4 VACC NO PRSV 0.5 ML IM: CPT | Mod: S$GLB,,, | Performed by: INTERNAL MEDICINE

## 2023-12-01 PROCEDURE — 1160F RVW MEDS BY RX/DR IN RCRD: CPT | Mod: CPTII,S$GLB,, | Performed by: INTERNAL MEDICINE

## 2023-12-01 PROCEDURE — 3044F HG A1C LEVEL LT 7.0%: CPT | Mod: CPTII,S$GLB,, | Performed by: INTERNAL MEDICINE

## 2023-12-01 PROCEDURE — 90471 FLU VACCINE (QUAD) GREATER THAN OR EQUAL TO 3YO PRESERVATIVE FREE IM: ICD-10-PCS | Mod: S$GLB,,, | Performed by: INTERNAL MEDICINE

## 2023-12-01 PROCEDURE — 3078F PR MOST RECENT DIASTOLIC BLOOD PRESSURE < 80 MM HG: ICD-10-PCS | Mod: CPTII,S$GLB,, | Performed by: INTERNAL MEDICINE

## 2023-12-01 PROCEDURE — 99999 PR PBB SHADOW E&M-EST. PATIENT-LVL V: ICD-10-PCS | Mod: PBBFAC,,, | Performed by: INTERNAL MEDICINE

## 2023-12-01 PROCEDURE — 3078F DIAST BP <80 MM HG: CPT | Mod: CPTII,S$GLB,, | Performed by: INTERNAL MEDICINE

## 2023-12-01 PROCEDURE — 99396 PR PREVENTIVE VISIT,EST,40-64: ICD-10-PCS | Mod: 25,S$GLB,, | Performed by: INTERNAL MEDICINE

## 2023-12-01 PROCEDURE — 1159F MED LIST DOCD IN RCRD: CPT | Mod: CPTII,S$GLB,, | Performed by: INTERNAL MEDICINE

## 2023-12-01 PROCEDURE — 1160F PR REVIEW ALL MEDS BY PRESCRIBER/CLIN PHARMACIST DOCUMENTED: ICD-10-PCS | Mod: CPTII,S$GLB,, | Performed by: INTERNAL MEDICINE

## 2023-12-01 PROCEDURE — 3066F PR DOCUMENTATION OF TREATMENT FOR NEPHROPATHY: ICD-10-PCS | Mod: CPTII,S$GLB,, | Performed by: INTERNAL MEDICINE

## 2023-12-01 NOTE — PROGRESS NOTES
Patient ID: Abhi Pompa is a 53 y.o. male.    Chief Complaint: Annual Exam      Assessment and plan     1. Diabetes mellitus without complication  - empagliflozin (JARDIANCE) 10 mg tablet; Take 1 tablet (10 mg total) by mouth once daily.  Dispense: 90 tablet; Refill: 1    2. Generalized anxiety disorder    3. Gastroesophageal reflux disease with esophagitis without hemorrhage    4. Mixed hyperlipidemia  - Lipid Panel; Future    5. Testosterone deficiency    6. Long-term current use of testosterone cypionate  - PSA, Screening; Future     Start jardiance   Stop metformin  Continue ozempic  Continue other medications     HPI     Annual     Medications, recent labs, health maintenance, and diet has been reviewed.    Exercise- walking and weights 2x   Alcohol- 3-4 drinks/ week   Tobacco- no     1. Diabetes mellitus without complication  - controlled on ozempic and metformin. On freestyle kelli. Has copious liquid stool with metformin and would like to switch back to jardiance.    2. Generalized anxiety disorder   - stable off med.    3. Gastroesophageal reflux disease with esophagitis without hemorrhage   - stable on pepcid and omeprazole.    4. Mixed hyperlipidemia   - stable on simvastatin.    5. Testosterone deficiency   - taking 200 mg weekly, following with Dr. Richey.   - following with heme/onc polycythemia    6. ED- stable on sildenafil and tadalafil     Review of Systems   Constitutional:  Negative for fever.   Respiratory:  Negative for shortness of breath.    Cardiovascular:  Negative for chest pain.   Gastrointestinal:  Negative for abdominal pain.        Objective     Vitals:    12/01/23 0855   BP: 100/60   Pulse: 85     Wt Readings from Last 3 Encounters:   12/01/23 0855 101.2 kg (223 lb 1.7 oz)   11/14/23 1004 102 kg (224 lb 13.9 oz)   08/04/23 0800 106 kg (233 lb 11 oz)      Body mass index is 34.94 kg/m².     Physical Exam  Cardiovascular:      Rate and Rhythm: Normal rate and regular rhythm.      " Heart sounds: No murmur heard.     No gallop.   Pulmonary:      Breath sounds: Normal breath sounds. No wheezing or rhonchi.   Abdominal:      Palpations: Abdomen is soft.      Tenderness: There is no abdominal tenderness.         Diabetes Medications               metFORMIN (GLUCOPHAGE-XR) 500 MG ER 24hr tablet TAKE 2 TABLETS BY MOUTH TWICE A DAY WITH MEALS    OZEMPIC 0.25 mg or 0.5 mg (2 mg/3 mL) pen injector SMARTSI.25 Milligram(s) SUB-Q Once a Week    semaglutide (OZEMPIC) 1 mg/dose (4 mg/3 mL) Inject 1 mg into the skin every 7 days.              Hyperlipidemia Medications               fish,saf,flx,brg oils/o3,6,9n2 (FISH-FLAX-BORAGE OIL ORAL) Take by mouth.    simvastatin (ZOCOR) 10 MG tablet TAKE 1 TABLET BY MOUTH EVERY DAY IN THE EVENING    cholestyramine (QUESTRAN) 4 gram packet Take 1 packet (4 g total) by mouth once daily.           Medication List with Changes/Refills   New Medications    EMPAGLIFLOZIN (JARDIANCE) 10 MG TABLET    Take 1 tablet (10 mg total) by mouth once daily.   Current Medications    ASHWAGANDHA EXTRACT ORAL        BLUNT NEEDLE, DISPOSABLE 18 X 1 1/2 " NDLE    Use as directed with testosterone injections    CHOLESTYRAMINE (QUESTRAN) 4 GRAM PACKET    Take 1 packet (4 g total) by mouth once daily.    CREATINE MONOHYDRATE (CREATINE ORAL)    Take by mouth.    FAMOTIDINE (PEPCID) 40 MG TABLET    TAKE 1 TABLET BY MOUTH EVERY DAY    FISH,SAF,FLX,BRG OILS/O3,6,9N2 (FISH-FLAX-BORAGE OIL ORAL)    Take by mouth.    FLASH GLUCOSE SCANNING READER (FREESTYLE YAN 2 READER) [facility] [place]    Use as directed to check blood glucose.    FLASH GLUCOSE SENSOR (FREESTYLE YAN 2 SENSOR) KIT    Apply to skin Q 14 days    FLASH GLUCOSE SENSOR (FREESTYLE YAN 2 SENSOR) KIT    Change every 14 days.    KETOCONAZOLE (NIZORAL) 2 % SHAMPOO    Wash scalp and beard area with medicated shampoo at least 2-3x/week - let sit at least 5 minutes prior to rinsing.    MELATONIN 10 MG TAB    Take 10 mg by mouth nightly.    " "METFORMIN (GLUCOPHAGE-XR) 500 MG ER 24HR TABLET    TAKE 2 TABLETS BY MOUTH TWICE A DAY WITH MEALS    MINOXIDIL (ROGAINE) 2 % EXTERNAL SOLUTION    Apply 1 application topically 2 (two) times daily.    OMEPRAZOLE (PRILOSEC) 40 MG CAPSULE    Take 1 capsule (40 mg total) by mouth once daily.    OZEMPIC 0.25 MG OR 0.5 MG (2 MG/3 ML) PEN INJECTOR    SMARTSI.25 Milligram(s) SUB-Q Once a Week    SEMAGLUTIDE (OZEMPIC) 1 MG/DOSE (4 MG/3 ML)    Inject 1 mg into the skin every 7 days.    SILDENAFIL (REVATIO) 20 MG TAB    TAKE UP TO 5 TABLETS BY MOUTH DAILY AS NEEDED 30 MINUTES PRIOR TO INTERCOURSE    SIMVASTATIN (ZOCOR) 10 MG TABLET    TAKE 1 TABLET BY MOUTH EVERY DAY IN THE EVENING    SYRINGE WITH NEEDLE 1 ML 22 GAUGE X 1 1/2" SYRG    Use as directed to administer testosterone injections.    TADALAFIL (CIALIS) 20 MG TAB    Take 20 mg by mouth once daily.    TESTOSTERONE CYPIONATE (DEPOTESTOTERONE CYPIONATE) 200 MG/ML INJECTION    Inject 1 mL (200 mg total) into the muscle once a week.    TURMERIC ORAL    Take by mouth.    UNABLE TO FIND    Amitriptyline 1%/Meloxicam 0.09%/Lidocaine Hcl 2%/ Prilocaine 2%   APPLY 2 GRAMS QID    UNABLE TO FIND    Beet root    VARICELLA-ZOSTER GE-AS01B, PF, (SHINGRIX) 50 MCG/0.5 ML INJECTION    Inject into the muscle.    VARICELLA-ZOSTER GE-AS01B, PF, (SHINGRIX) 50 MCG/0.5 ML INJECTION    Inject into the muscle.    VITAMIN B COMPLEX ORAL    Take 1 tablet by mouth once daily.       I personally reviewed past medical, family and social history.         "

## 2023-12-18 ENCOUNTER — PATIENT MESSAGE (OUTPATIENT)
Dept: FAMILY MEDICINE | Facility: CLINIC | Age: 53
End: 2023-12-18
Payer: COMMERCIAL

## 2023-12-18 DIAGNOSIS — E78.2 MIXED HYPERLIPIDEMIA: Primary | ICD-10-CM

## 2023-12-19 RX ORDER — SIMVASTATIN 10 MG/1
10 TABLET, FILM COATED ORAL NIGHTLY
Qty: 90 TABLET | Refills: 1 | Status: SHIPPED | OUTPATIENT
Start: 2023-12-19

## 2023-12-19 NOTE — TELEPHONE ENCOUNTER
Care Due:                  Date            Visit Type   Department     Provider  --------------------------------------------------------------------------------                                MYCHART                              ANNUAL                              CHECKUP/PHY  [facility]  Last Visit: 12-      S            CATHLEEN Amezcua  Next Visit: None Scheduled  None         None Found                                                            Last  Test          Frequency    Reason                     Performed    Due Date  --------------------------------------------------------------------------------    HBA1C.......  6 months...  empagliflozin............  06- 12-    [facility] [employer] Embedded Care Due Messages. Reference number: [id number].   12/19/2023 3:49:10 PM CST

## 2023-12-19 NOTE — TELEPHONE ENCOUNTER
Refill Routing Note   Medication(s) are not appropriate for processing by [facility] for the following reason(s):        No active prescription written by provider    [facility] action(s):  Defer   FLOS 12/20/23            Appointments  past 12m or future 3m with PCP    Date Provider   Last Visit   12/1/2023 Rafita Amezcua, DO   Next Visit   Visit date not found Rafita Amezcua, DO   ED visits in past 90 days: 0        Note composed:4:08 PM 12/19/2023

## 2023-12-20 ENCOUNTER — LAB VISIT (OUTPATIENT)
Dept: LAB | Facility: HOSPITAL | Age: 53
End: 2023-12-20
Attending: INTERNAL MEDICINE
Payer: COMMERCIAL

## 2023-12-20 DIAGNOSIS — E11.9 TYPE 2 DIABETES MELLITUS WITHOUT COMPLICATION, UNSPECIFIED WHETHER LONG TERM INSULIN USE: ICD-10-CM

## 2023-12-20 DIAGNOSIS — E11.69 HYPERLIPIDEMIA ASSOCIATED WITH TYPE 2 DIABETES MELLITUS: ICD-10-CM

## 2023-12-20 DIAGNOSIS — E29.1 MALE HYPOGONADISM: ICD-10-CM

## 2023-12-20 DIAGNOSIS — E78.5 HYPERLIPIDEMIA ASSOCIATED WITH TYPE 2 DIABETES MELLITUS: ICD-10-CM

## 2023-12-20 DIAGNOSIS — Z79.890 LONG-TERM CURRENT USE OF TESTOSTERONE CYPIONATE: ICD-10-CM

## 2023-12-20 DIAGNOSIS — E78.2 MIXED HYPERLIPIDEMIA: ICD-10-CM

## 2023-12-20 LAB
ALBUMIN SERPL BCP-MCNC: 4.3 G/DL (ref 3.5–5.2)
ALP SERPL-CCNC: 49 U/L (ref 55–135)
ALT SERPL W/O P-5'-P-CCNC: 47 U/L (ref 10–44)
ANION GAP SERPL CALC-SCNC: 12 MMOL/L (ref 8–16)
AST SERPL-CCNC: 34 U/L (ref 10–40)
BILIRUB SERPL-MCNC: 1.1 MG/DL (ref 0.1–1)
BUN SERPL-MCNC: 20 MG/DL (ref 6–20)
CALCIUM SERPL-MCNC: 9.1 MG/DL (ref 8.7–10.5)
CHLORIDE SERPL-SCNC: 105 MMOL/L (ref 95–110)
CHOLEST SERPL-MCNC: 161 MG/DL (ref 120–199)
CHOLEST/HDLC SERPL: 4.7 {RATIO} (ref 2–5)
CO2 SERPL-SCNC: 21 MMOL/L (ref 23–29)
COMPLEXED PSA SERPL-MCNC: 0.89 NG/ML (ref 0–4)
CREAT SERPL-MCNC: 0.9 MG/DL (ref 0.5–1.4)
ERYTHROCYTE [DISTWIDTH] IN BLOOD BY AUTOMATED COUNT: 13.5 % (ref 11.5–14.5)
EST. GFR  (NO RACE VARIABLE): >60 ML/MIN/1.73 M^2
ESTIMATED AVG GLUCOSE: 146 MG/DL (ref 68–131)
GLUCOSE SERPL-MCNC: 120 MG/DL (ref 70–110)
HBA1C MFR BLD: 6.7 % (ref 4–5.6)
HCT VFR BLD AUTO: 48.8 % (ref 40–54)
HDLC SERPL-MCNC: 34 MG/DL (ref 40–75)
HDLC SERPL: 21.1 % (ref 20–50)
HGB BLD-MCNC: 17.6 G/DL (ref 14–18)
LDLC SERPL CALC-MCNC: 106.8 MG/DL (ref 63–159)
MCH RBC QN AUTO: 31 PG (ref 27–31)
MCHC RBC AUTO-ENTMCNC: 36.1 G/DL (ref 32–36)
MCV RBC AUTO: 86 FL (ref 82–98)
NONHDLC SERPL-MCNC: 127 MG/DL
PLATELET # BLD AUTO: 328 K/UL (ref 150–450)
PMV BLD AUTO: 10.7 FL (ref 9.2–12.9)
POTASSIUM SERPL-SCNC: 4.1 MMOL/L (ref 3.5–5.1)
PROT SERPL-MCNC: 6.9 G/DL (ref 6–8.4)
RBC # BLD AUTO: 5.68 M/UL (ref 4.6–6.2)
SODIUM SERPL-SCNC: 138 MMOL/L (ref 136–145)
TESTOST SERPL-MCNC: 541 NG/DL (ref 304–1227)
TRIGL SERPL-MCNC: 101 MG/DL (ref 30–150)
WBC # BLD AUTO: 8.19 K/UL (ref 3.9–12.7)

## 2023-12-20 PROCEDURE — 36415 COLL VENOUS BLD VENIPUNCTURE: CPT | Mod: PO | Performed by: INTERNAL MEDICINE

## 2023-12-20 PROCEDURE — 84153 ASSAY OF PSA TOTAL: CPT | Performed by: INTERNAL MEDICINE

## 2023-12-20 PROCEDURE — 85027 COMPLETE CBC AUTOMATED: CPT | Performed by: INTERNAL MEDICINE

## 2023-12-20 PROCEDURE — 80061 LIPID PANEL: CPT | Performed by: INTERNAL MEDICINE

## 2023-12-20 PROCEDURE — 84403 ASSAY OF TOTAL TESTOSTERONE: CPT | Performed by: INTERNAL MEDICINE

## 2023-12-20 PROCEDURE — 83036 HEMOGLOBIN GLYCOSYLATED A1C: CPT | Performed by: INTERNAL MEDICINE

## 2023-12-20 PROCEDURE — 80053 COMPREHEN METABOLIC PANEL: CPT | Performed by: INTERNAL MEDICINE

## 2023-12-27 ENCOUNTER — OFFICE VISIT (OUTPATIENT)
Dept: ENDOCRINOLOGY | Facility: CLINIC | Age: 53
End: 2023-12-27
Payer: COMMERCIAL

## 2023-12-27 ENCOUNTER — PATIENT MESSAGE (OUTPATIENT)
Dept: FAMILY MEDICINE | Facility: CLINIC | Age: 53
End: 2023-12-27
Payer: COMMERCIAL

## 2023-12-27 VITALS
WEIGHT: 227.63 LBS | OXYGEN SATURATION: 98 % | BODY MASS INDEX: 35.73 KG/M2 | HEART RATE: 92 BPM | DIASTOLIC BLOOD PRESSURE: 70 MMHG | HEIGHT: 67 IN | SYSTOLIC BLOOD PRESSURE: 122 MMHG

## 2023-12-27 DIAGNOSIS — E78.5 HYPERLIPIDEMIA ASSOCIATED WITH TYPE 2 DIABETES MELLITUS: ICD-10-CM

## 2023-12-27 DIAGNOSIS — E29.1 MALE HYPOGONADISM: ICD-10-CM

## 2023-12-27 DIAGNOSIS — E11.69 HYPERLIPIDEMIA ASSOCIATED WITH TYPE 2 DIABETES MELLITUS: ICD-10-CM

## 2023-12-27 DIAGNOSIS — E66.01 SEVERE OBESITY (BMI 35.0-39.9) WITH COMORBIDITY: ICD-10-CM

## 2023-12-27 DIAGNOSIS — E11.9 TYPE 2 DIABETES MELLITUS WITHOUT COMPLICATION, UNSPECIFIED WHETHER LONG TERM INSULIN USE: Primary | ICD-10-CM

## 2023-12-27 DIAGNOSIS — E11.9 DIABETES MELLITUS WITHOUT COMPLICATION: Primary | ICD-10-CM

## 2023-12-27 PROCEDURE — 3061F PR NEG MICROALBUMINURIA RESULT DOCUMENTED/REVIEW: ICD-10-PCS | Mod: CPTII,S$GLB,, | Performed by: INTERNAL MEDICINE

## 2023-12-27 PROCEDURE — 3078F DIAST BP <80 MM HG: CPT | Mod: CPTII,S$GLB,, | Performed by: INTERNAL MEDICINE

## 2023-12-27 PROCEDURE — 3008F PR BODY MASS INDEX (BMI) DOCUMENTED: ICD-10-PCS | Mod: CPTII,S$GLB,, | Performed by: INTERNAL MEDICINE

## 2023-12-27 PROCEDURE — 3078F PR MOST RECENT DIASTOLIC BLOOD PRESSURE < 80 MM HG: ICD-10-PCS | Mod: CPTII,S$GLB,, | Performed by: INTERNAL MEDICINE

## 2023-12-27 PROCEDURE — 99214 OFFICE O/P EST MOD 30 MIN: CPT | Mod: S$GLB,,, | Performed by: INTERNAL MEDICINE

## 2023-12-27 PROCEDURE — 3066F NEPHROPATHY DOC TX: CPT | Mod: CPTII,S$GLB,, | Performed by: INTERNAL MEDICINE

## 2023-12-27 PROCEDURE — 3044F HG A1C LEVEL LT 7.0%: CPT | Mod: CPTII,S$GLB,, | Performed by: INTERNAL MEDICINE

## 2023-12-27 PROCEDURE — 3008F BODY MASS INDEX DOCD: CPT | Mod: CPTII,S$GLB,, | Performed by: INTERNAL MEDICINE

## 2023-12-27 PROCEDURE — 3044F PR MOST RECENT HEMOGLOBIN A1C LEVEL <7.0%: ICD-10-PCS | Mod: CPTII,S$GLB,, | Performed by: INTERNAL MEDICINE

## 2023-12-27 PROCEDURE — 1159F MED LIST DOCD IN RCRD: CPT | Mod: CPTII,S$GLB,, | Performed by: INTERNAL MEDICINE

## 2023-12-27 PROCEDURE — 3061F NEG MICROALBUMINURIA REV: CPT | Mod: CPTII,S$GLB,, | Performed by: INTERNAL MEDICINE

## 2023-12-27 PROCEDURE — 3066F PR DOCUMENTATION OF TREATMENT FOR NEPHROPATHY: ICD-10-PCS | Mod: CPTII,S$GLB,, | Performed by: INTERNAL MEDICINE

## 2023-12-27 PROCEDURE — 3074F SYST BP LT 130 MM HG: CPT | Mod: CPTII,S$GLB,, | Performed by: INTERNAL MEDICINE

## 2023-12-27 PROCEDURE — 1159F PR MEDICATION LIST DOCUMENTED IN MEDICAL RECORD: ICD-10-PCS | Mod: CPTII,S$GLB,, | Performed by: INTERNAL MEDICINE

## 2023-12-27 PROCEDURE — 99999 PR PBB SHADOW E&M-EST. PATIENT-LVL V: CPT | Mod: PBBFAC,,, | Performed by: INTERNAL MEDICINE

## 2023-12-27 PROCEDURE — 99214 PR OFFICE/OUTPT VISIT, EST, LEVL IV, 30-39 MIN: ICD-10-PCS | Mod: S$GLB,,, | Performed by: INTERNAL MEDICINE

## 2023-12-27 PROCEDURE — 3074F PR MOST RECENT SYSTOLIC BLOOD PRESSURE < 130 MM HG: ICD-10-PCS | Mod: CPTII,S$GLB,, | Performed by: INTERNAL MEDICINE

## 2023-12-27 PROCEDURE — 99999 PR PBB SHADOW E&M-EST. PATIENT-LVL V: ICD-10-PCS | Mod: PBBFAC,,, | Performed by: INTERNAL MEDICINE

## 2023-12-27 NOTE — PROGRESS NOTES
(Freestyle Amena)    CHIEF COMPLAINT: DM2  53 y.o.  being seen as a f/u. Diagnosed with diabetes 2013. Off actos. On ozempic 1.0. On testosterone 200 mg weekly. Tolerating statin. He has donated blood recently. Seeing hematology. Weight has been fluctuating. He is exercising regularly. Has stopped metformin due to difficulty tolerating metformin. On jardiance. Occasional Nausea after ozempic. Staying well hydrated. NO paresthesias. States up to date with eye exam- due in Oct. Scheduled for eye exam next month        PAST MEDICAL HISTORY/PAST SURGICAL HISTORY:  Reviewed in [facility]    SOCIAL HISTORY: No tobacco. Social alcohol. Desk Job-     FAMILY HISTORY:  + DM 2. No known thyroid disease.     MEDICATIONS/ALLERGIES: The patient's MedCard has been updated and reviewed.            PE:    GENERAL: Well developed, well nourished.  NECK: Supple, trachea midline, No palpable nodules  CHEST: Resp even and unlabored, CTA bilateral.  CARDIAC: RRR, S1, S2 heard, no murmurs, rubs, S3, or S4  FEET: Normal monofilament. No cuts or abrasions. 2 + pedal pulses.      Latest Reference Range & Units 12/20/23 07:30   WBC 3.90 - 12.70 K/uL 8.19   RBC 4.60 - 6.20 M/uL 5.68   Hemoglobin 14.0 - 18.0 g/dL 17.6   Hematocrit 40.0 - 54.0 % 48.8   MCV 82 - 98 fL 86   MCH 27.0 - 31.0 pg 31.0   MCHC 32.0 - 36.0 g/dL 36.1 (H)   RDW 11.5 - 14.5 % 13.5   Platelet Count 150 - 450 K/uL 328   MPV 9.2 - 12.9 fL 10.7   (H): Data is abnormally high   Latest Reference Range & Units 12/20/23 07:30   Sodium 136 - 145 mmol/L 138   Potassium 3.5 - 5.1 mmol/L 4.1   Chloride 95 - 110 mmol/L 105   CO2 23 - 29 mmol/L 21 (L)   Anion Gap 8 - 16 mmol/L 12   BUN 6 - 20 mg/dL 20   Creatinine 0.5 - 1.4 mg/dL 0.9   eGFR >60 mL/min/1.73 m^2 >60.0   Glucose 70 - 110 mg/dL 120 (H)   Calcium 8.7 - 10.5 mg/dL 9.1   ALP 55 - 135 U/L 49 (L)   PROTEIN TOTAL 6.0 - 8.4 g/dL 6.9   Albumin 3.5 - 5.2 g/dL 4.3   BILIRUBIN TOTAL 0.1 - 1.0 mg/dL 1.1 (H)   AST 10 - 40 U/L 34   ALT 10 - 44  U/L 47 (H)   (L): Data is abnormally low  (H): Data is abnormally high     Latest Reference Range & Units 12/20/23 07:30   Cholesterol Total 120 - 199 mg/dL 161   HDL 40 - 75 mg/dL 34 (L)   HDL/Cholesterol Ratio 20.0 - 50.0 % 21.1   Non-HDL Cholesterol mg/dL 127   Total Cholesterol/HDL Ratio 2.0 - 5.0  4.7   Triglycerides 30 - 150 mg/dL 101   LDL Cholesterol 63.0 - 159.0 mg/dL 106.8   Hemoglobin A1C External 4.0 - 5.6 % 6.7 (H)   Estimated Avg Glucose 68 - 131 mg/dL 146 (H)   Prostate Specific Antigen 0.00 - 4.00 ng/mL 0.89   Testosterone, Total 304 - 1227 ng/dL 541   (L): Data is abnormally low  (H): Data is abnormally high        ASSESSMENT/PLAN:  1. DM 2- No known complications.   Following up regularly with [facility].    A1c shows good control.  He is exercising, including resistance exercise.  Scheduled for eye exam.  At this point since A1c stable, okay to follow up with PCP.  Can re-consult as needed.  He would like to continue to follow with Diabetes [facility].    2. Hyperlipidemia- Continue statin. Tolerating well. Taking fish oil as Trigs elevated.       3. Male Hypogonadism- currently on testosterone 200 mg weekly.  Monitor hemoglobin hematocrit has a has been elevated in the past.  Testosterone at an acceptable range.  However, do not need any high-risk can worsen hemoglobin hematocrit.    4. MELLY- see # 3.      FOLLOWUP  AVS- will f/u with PCP

## 2024-01-02 ENCOUNTER — PATIENT MESSAGE (OUTPATIENT)
Dept: HEMATOLOGY/ONCOLOGY | Facility: CLINIC | Age: 54
End: 2024-01-02
Payer: COMMERCIAL

## 2024-01-02 ENCOUNTER — TELEPHONE (OUTPATIENT)
Dept: ENDOCRINOLOGY | Facility: CLINIC | Age: 54
End: 2024-01-02
Payer: COMMERCIAL

## 2024-01-03 ENCOUNTER — PATIENT MESSAGE (OUTPATIENT)
Dept: HEMATOLOGY/ONCOLOGY | Facility: CLINIC | Age: 54
End: 2024-01-03
Payer: COMMERCIAL

## 2024-01-03 NOTE — TELEPHONE ENCOUNTER
Spoke with the pt and got the pt rescheduled with labs and vv f/u appt. Pt verbalized and understanding.

## 2024-01-11 ENCOUNTER — OFFICE VISIT (OUTPATIENT)
Dept: OPTOMETRY | Facility: CLINIC | Age: 54
End: 2024-01-11
Payer: COMMERCIAL

## 2024-01-11 DIAGNOSIS — H52.203 MYOPIA WITH ASTIGMATISM AND PRESBYOPIA, BILATERAL: ICD-10-CM

## 2024-01-11 DIAGNOSIS — Z13.5 GLAUCOMA SCREENING: ICD-10-CM

## 2024-01-11 DIAGNOSIS — H52.4 MYOPIA WITH ASTIGMATISM AND PRESBYOPIA, BILATERAL: ICD-10-CM

## 2024-01-11 DIAGNOSIS — H52.13 MYOPIA WITH ASTIGMATISM AND PRESBYOPIA, BILATERAL: ICD-10-CM

## 2024-01-11 DIAGNOSIS — H43.393 VITREOUS FLOATERS, BILATERAL: ICD-10-CM

## 2024-01-11 DIAGNOSIS — H02.403 BLEPHAROPTOSIS, ACQUIRED, BILATERAL: ICD-10-CM

## 2024-01-11 DIAGNOSIS — H04.123 DRY EYES, BILATERAL: ICD-10-CM

## 2024-01-11 DIAGNOSIS — E11.9 DIABETES MELLITUS TYPE 2 WITHOUT RETINOPATHY: Primary | ICD-10-CM

## 2024-01-11 PROCEDURE — 92014 COMPRE OPH EXAM EST PT 1/>: CPT | Mod: S$GLB,,, | Performed by: OPTOMETRIST

## 2024-01-11 PROCEDURE — 92015 DETERMINE REFRACTIVE STATE: CPT | Mod: S$GLB,,, | Performed by: OPTOMETRIST

## 2024-01-11 PROCEDURE — 1159F MED LIST DOCD IN RCRD: CPT | Mod: CPTII,S$GLB,, | Performed by: OPTOMETRIST

## 2024-01-11 PROCEDURE — 99999 PR PBB SHADOW E&M-EST. PATIENT-LVL III: CPT | Mod: PBBFAC,,, | Performed by: OPTOMETRIST

## 2024-01-11 PROCEDURE — 2023F DILAT RTA XM W/O RTNOPTHY: CPT | Mod: CPTII,S$GLB,, | Performed by: OPTOMETRIST

## 2024-01-11 NOTE — PROGRESS NOTES
HPI    Routine dm-dle-12/22    Pt denies any blurred va. Wearing readers. Denies any flashes or floaters.   Complains of eye fatigue after working on computer. BSL controlled.    Hemoglobin A1C       Date                     Value               Ref Range             Status                12/20/2023               6.7 (H)             4.0 - 5.6 %           Final              Comment:    ADA Screening Guidelines:  5.7-6.4%  Consistent with   prediabetes  >or=6.5%  Consistent with diabetes    High levels of fetal   hemoglobin interfere with the HbA1C  assay. Heterozygous hemoglobin   variants (HbS, HgC, etc)do  not significantly interfere with this assay.     However, presence of multiple variants may affect accuracy.         06/12/2023               6.4 (H)             4.0 - 5.6 %           Final              Comment:    ADA Screening Guidelines:  5.7-6.4%  Consistent with   prediabetes  >or=6.5%  Consistent with diabetes    High levels of fetal   hemoglobin interfere with the HbA1C  assay. Heterozygous hemoglobin   variants (HbS, HgC, etc)do  not significantly interfere with this assay.     However, presence of multiple variants may affect accuracy.         11/14/2022               7.5 (H)             4.0 - 5.6 %           Final              Comment:    ADA Screening Guidelines:  5.7-6.4%  Consistent with   prediabetes  >or=6.5%  Consistent with diabetes    High levels of fetal   hemoglobin interfere with the HbA1C  assay. Heterozygous hemoglobin   variants (HbS, HgC, etc)do  not significantly interfere with this assay.     However, presence of multiple variants may affect accuracy.    ----------    Last edited by Sophia Shin on 1/11/2024  3:26 PM.            Assessment /Plan     For exam results, see Encounter Report.    Diabetes mellitus type 2 without retinopathy    Blepharoptosis, acquired, bilateral    Vitreous floaters, bilateral    Glaucoma screening    Dry eyes, bilateral    Myopia with astigmatism and  presbyopia, bilateral         No luana/ no csme, gave Diabetic Retinopathy info, advise tight control glucose, BP---Advise annual dilated fundus exam  Moderate d-chalasis OU, mildly vis sig, pt notes fhx same---monitor, will probably need sx in future   RD precautions given and reviewed. Patient knows to call/ message if any further changes in symptoms occur.  Not suspect  Gradually worsening w/ long hours on computer / documents ---gave otc ATs suggestions try bid -tid lamont in pm ---may have rosacea component in future   Gave copy of specs for near only / intermediate        Discussed and educated patient on current findings /plan.  RTC 1 year, prn if any changes / issues

## 2024-01-11 NOTE — PATIENT INSTRUCTIONS
"DRY EYES -- BURNING OR SONA SYMPTOMS:  Use Over The Counter artificial tears as needed for dry eye symptoms.   Some common brands include:  Systane, Optive, Refresh, and Thera-Tears.  These drops can be used as frequently as desired, but may be most helpful use during long periods of concentrated work.  For example, reading / working at the computer. Start with 3-4x per day.     Nighttime Ophthalmic gel or ointments are available: Refresh PM, Genteal, and Lacrilube.    Avoid drops that "get redness out" (Visine, Murine, Clear Eyes), as these may contain medication that could further irritate the eyes, especially with chronic use.    ALLERGY EYES -- ITCHING SYMPTOMS:  Over the counter medications include--Pataday, Zaditor, and Alaway.  Use as directed 1-2 drops daily for symptoms of itching / watering eyes.  These drops will not help for dry eye or exposure symptoms.    REDNESS RELIEF:  Lumify---is a good redness reliever that will not cause irritation if used chronically.       DIABETES AND THE EYE / DIABETIC RETINOPATHY    Diabetic retinopathy is a condition occurring in persons with diabetes, which causes progressive damage to the retina, the light sensitive lining at the back of the eye. It is a serious sight-threatening complication of diabetes.    Diabetic retinopathy is the result of damage to the tiny blood vessels that nourish the retina. They leak blood and other fluids that cause swelling of retinal tissue and clouding of vision. The condition usually affects both eyes. The longer a person has diabetes, the more likely they will develop diabetic retinopathy. If left untreated, diabetic retinopathy can cause blindness.  There are two basic types of diabetic retinopathy:    Background or nonproliferative diabetic retinopathy (NPDR)  Nonproliferative diabetic retinopathy (NPDR) is the earliest stage of diabetic retinopathy. With this condition, damaged blood vessels in the retina begin to leak extra fluid " and small amounts of blood into the eye. Sometimes, deposits of cholesterol or other fats from the blood may leak into the retina. Many people with diabetes have mild NPDR, which usually does not affect their vision. However, if their vision is affected, it is the result of macular edema and macular ischemia.    If vision is affected due to macular changes, a consult with a Retina Specialist may be advised.  This is an ophthalmologist that treats retina conditions, including diabetic retinopathy.     Proliferative diabetic retinopathy (PDR)  Proliferative diabetic retinopathy (PDR) mainly occurs when many of the blood vessels in the retina close, preventing enough blood flow. In an attempt to supply blood to the area where the original vessels closed, the retina responds by growing new blood vessels. This is called neovascularization. However, these new blood vessels are abnormal and do not supply the retina with proper blood flow. The new vessels are also often accompanied by scar tissue that may cause the retina to wrinkle or detach. PDR may cause more severe vision loss than NPDR because it can affect both central and peripheral vision.     A patient diagnosed with proliferative diabetic eye disease will be referred to a retinal specialist for consultation.    Often there are no visual symptoms in the early stages of diabetic retinopathy. That is why our eye care professionals recommend that everyone with diabetes have a comprehensive dilated eye examination once a year. Early detection and treatment can limit the potential for significant vision loss from diabetic retinopathy.      FLASHES / FLOATERS / POSTERIOR VITREOUS DETACHMENT    Call the clinic if you have any further changes in symptoms.  Including:  Increased numbers of floaters or flashing lights, dimness or darkness that moves through or stays constant in your vision, or any pain in the eye (s).    You may sometimes see small specks or clouds moving in  your field of vision.  They are called FLOATERS.  You can often see them when looking at a plain background, like a blank wall or blue edgar.  Floaters are actually tiny clumps of gel or cells inside the VITREOUS, the clear jelly-like fluid that fills the inside of your eye.    While these objects look like they are in front of your eye, they are actually floating inside.  What you see are the shadows they cast on the RETINA, the nerve layer at the back of the eye that senses light and allows you to see.      POSTERIOR VITREOUS DETACHMENT    The appearance of new floaters may be alarming.  If you suddenly develop new floaters, you should contact your eye care professional  right away.    The retina can tear if the shrinking vitreous pulls away from the wall of the eye.  This sometimes causes a small amount of bleeding in the eye that may appear as new floaters.    A torn retina is always a serious problem, since it can lead to a retinal detachment.  You should see your eye care professional as soon as possible if:    even one new floater appears suddenly;  you see sudden flashes of light;  you notice other symptoms, like the loss of side vision, or a curtain closes down in your vision        POSTERIOR VITREOUS DETACHMENT is more common for people who:    are nearsighted;  have had cataract surgery;  have had YAG laser surgery of the eye;  have had inflammation inside the eye;  are over age 60.      While some floaters may remain visible, many of them will fade over time and become less noticeable.  Even if you've had some floaters for years, you should have your eyes checked as soon as possible if you notice new ones.    FLASHING LIGHTS    When the vitreous gel rubs or pulls on the retina, you may see what look like flashing lights or lightning streaks.  These flashes can appear off and on for several weeks or months.      Some people experience flashes of light that appear as jagged lines or heat waves in both  eyes, lasting 10-20 minutes.  These flashes are caused by a spasm of blood vessels in the brain, which is called a migraine.    If a headache follows these flashes, it's called a migraine headache.  If   no headache occurs, these flashes are called Ophthalmic or Ocular Migraine.

## 2024-01-20 ENCOUNTER — PATIENT MESSAGE (OUTPATIENT)
Dept: DIABETES | Facility: CLINIC | Age: 54
End: 2024-01-20
Payer: COMMERCIAL

## 2024-01-25 DIAGNOSIS — N52.1 ERECTILE DYSFUNCTION DUE TO DISEASES CLASSIFIED ELSEWHERE: ICD-10-CM

## 2024-01-25 DIAGNOSIS — L21.9 SEBORRHEIC DERMATITIS: ICD-10-CM

## 2024-01-25 RX ORDER — SILDENAFIL CITRATE 20 MG/1
TABLET ORAL
Qty: 30 TABLET | Refills: 2 | Status: SHIPPED | OUTPATIENT
Start: 2024-01-25 | End: 2024-05-02

## 2024-01-25 NOTE — TELEPHONE ENCOUNTER
No care due was identified.  [facility] Embedded Care Due Messages. Reference number: [id number].   1/25/2024 3:04:51 PM CST

## 2024-01-25 NOTE — TELEPHONE ENCOUNTER
Refill Decision Note   Juanedson Pompa  is requesting a refill authorization.  Brief Assessment and Rationale for Refill:  Approve     Medication Therapy Plan:         Comments:     Note composed:4:36 PM 01/25/2024

## 2024-01-26 ENCOUNTER — DOCUMENTATION ONLY (OUTPATIENT)
Dept: FAMILY MEDICINE | Facility: CLINIC | Age: 54
End: 2024-01-26
Payer: COMMERCIAL

## 2024-01-26 RX ORDER — KETOCONAZOLE 20 MG/ML
SHAMPOO, SUSPENSION TOPICAL
Qty: 120 ML | Refills: 5 | Status: SHIPPED | OUTPATIENT
Start: 2024-01-26

## 2024-02-23 ENCOUNTER — CLINICAL SUPPORT (OUTPATIENT)
Dept: DIABETES | Facility: CLINIC | Age: 54
End: 2024-02-23
Payer: COMMERCIAL

## 2024-02-23 VITALS — BODY MASS INDEX: 34.16 KG/M2 | HEIGHT: 67 IN | WEIGHT: 217.63 LBS

## 2024-02-23 DIAGNOSIS — E11.9 DIABETES MELLITUS WITHOUT COMPLICATION: ICD-10-CM

## 2024-02-23 DIAGNOSIS — E11.9 TYPE 2 DIABETES MELLITUS WITHOUT RETINOPATHY: Primary | ICD-10-CM

## 2024-02-23 PROCEDURE — 99999 PR PBB SHADOW E&M-EST. PATIENT-LVL II: CPT | Mod: PBBFAC,,, | Performed by: DIETITIAN, REGISTERED

## 2024-02-23 PROCEDURE — G0108 DIAB MANAGE TRN  PER INDIV: HCPCS | Mod: S$GLB,,, | Performed by: DIETITIAN, REGISTERED

## 2024-02-23 NOTE — PROGRESS NOTES
"Diabetes Care Specialist Progress Note  Author: Leslie Ford RD, CDE  Date: 2/23/2024    Program Intake  Reason for Diabetes Program Visit:: Initial Diabetes Assessment (patient seen every 6-9 months, due for initial)  Current diabetes risk level:: low  In the last 12 months, have you:: none  Continuous Glucose Monitoring  Patient has CGM: Yes  Personal CGM type:: Freestyle Amena 2--uses intermittently to stay on track as diabetes is well controlled  GMI Value:  (patietn intermittently wears device, GMI not able to be calculated)    Lab Results   Component Value Date    HGBA1C 6.7 (H) 12/20/2023     Clinical    Weight: 98.7 kg (217 lb 9.5 oz)   Height: 5' 7" (170.2 cm)   Body mass index is 34.08 kg/m².    Patient Health Rating  Compared to other people your age, how would you rate your health?: Excellent    Problem Review  Reviewed Problem List with Patient: yes  Active comorbidities affecting diabetes self-care.: yes  Comorbidities: Other (comment) (hyperlipidemia)  Reviewed health maintenance: yes    Clinical Assessment  Current Diabetes Treatment: Oral Medication, Diet, Exercise, Injectable (Jardiance 10 mg daily, Ozempic 1mg weekly--Metformin discontinued by patient in December due to chronic GI issues)  Have you ever experienced hypoglycemia (low blood sugar)?: yes  In the last month, how often have you experienced low blood sugar?: once a week  Are you able to tell when your blood sugar is low?: No (patient with hypoglycemia unawareness--was tired of Amena device alerting on low so turned off low glucose alert--recommending to turn on at 70 mg/dL)  Have you ever been hospitalized because your blood sugar was too low?: no  How do you treat hypoglycemia (low blood sugar)?: 1/2 can soda/fruit juice  Have you ever experienced hyperglycemia (high blood sugar)?: no    Medication Information  How do you obtain your medications?: Patient drives  How many days a week do you miss your medications?: Never  Do you " use a pill box or medication chart to help you manage your medications?: No  Do you sometimes have difficulty refilling your medications?: No  Medication adherence impacting ability to self-manage diabetes?: No    Labs  Do you have regular lab work to monitor your medications?: Yes  Type of Regular Lab Work: Microalbumin, CBC, BMP, Cholesterol, A1c  Where do you get your labs drawn?: [facility]  Lab Compliance Barriers: No    Nutritional Status  Diet: Diabetic diet (Occasionally doing intermittent fasting.  Eats clean diet--prepares most meals at home.  Good intake of water.)  Change in appetite?: Yes (smaller portions with use of Ozempic--feeling satisfied/full)  Dentation:: Intact  Recent Changes in Weight: Weight Loss  Was weight loss intentional or unintentional?: Intentional (Has lost 16 lbs intentionally over past 6 months with diet and exercise--would like to slowly continue to lose weight to long term personal weight goal of 205-210 lbs.)  Current nutritional status an area of need that is impacting patient's ability to self-manage diabetes?: No    Additional Social History    Support  Does anyone support you with your diabetes care?: yes  Who supports you?: self  Who takes you to your medical appointments?: self  Does the current support meet the patient's needs?: Yes  Is Support an area impacting ability to self-manage diabetes?: No    Access to Mass Media & Technology  Does the patient have access to any of the following devices or technologies?: Smart phone, Home computer, Internet Access  Media or technology needs impacting ability to self-manage diabetes?: No    Cognitive/Behavioral Health  Alert and Oriented: Yes  Difficulty Thinking: No  Requires Prompting: No  Requires assistance for routine expression?: No  Cognitive or behavioral barriers impacting ability to self-manage diabetes?: No    Culture/Uatsdin  Culture or Amish beliefs that may impact ability to access healthcare:  No    Communication  Language preference: English  Hearing Problems: No  Vision Problems: No  Communication needs impacting ability to self-manage diabetes?: No    Health Literacy  Preferred Learning Method: Face to Face  How often do you need to have someone help you read instructions, pamphlets, or written material from your doctor or pharmacy?: Never  Health literacy needs impacting ability to self-manage diabetes?: No    Diabetes Self-Management Skills Assessment    Diabetes Disease Process/Treatment Options  Patient/caregiver able to state what happens when someone has diabetes.: yes  Patient/caregiver knows what type of diabetes they have.: yes  Diabetes Type : Type II  Patient/caregiver able to identify at least three signs and symptoms of diabetes.: yes  Identified signs and symptoms:: frequent urination, frequent infections  Patient able to identify at least three risk factors for diabetes.: yes  Identified risk factors:: family history, age over 40  Diabetes Disease Process/Treatment Options: Skills Assessment Completed: Yes  Assessment indicates:: Adequate understanding  Area of need?: No    Nutrition/Healthy Eating  Challenges to healthy eating:: other (see comments) (Patient currently without challenges to eating healthy)  Method of carbohydrate measurement:: carb counting/reading labels  Patient can identify foods that impact blood sugar.: yes  Patient-identified foods:: starches (bread, pasta, rice, cereal), sweets, fruit/fruit juice, starchy vegetables (corn, peas, beans), soda, yogurt  Nutrition/Healthy Eating Skills Assessment Completed:: Yes  Assessment indicates:: Adequate understanding  Area of need?: No    Physical Activity/Exercise  Patient's daily activity level:: moderately active  Patient formally exercises outside of work.: yes  Exercise Type: weight training, using exercise equipment, walking  Intensity: High  Frequency: four or more times a week  Duration: 1 hour  Patient can identify  forms of physical activity.: yes  Stated forms of physical activity:: any movement performed by muscles that uses energy, moving to burn calories  Patient can identify reasons why exercise/physical activity is important in diabetes management.: yes  Identified reasons:: lowers blood glucose, blood pressure, and cholesterol, helps insulin work better, keeps body and joints flexible, strengthens heart, muscles, and bones, tones muscles  Physical Activity/Exercise Skills Assessment Completed: : Yes  Assessment indicates:: Adequate understanding  Area of need?: No    Medications  Patient is able to describe current diabetes management routine.: yes  Diabetes management routine:: diet, exercise, injectable medications, oral medications  Patient is able to identify current diabetes medications, dosages, and appropriate timing of medications.: yes  Patient understands the purpose of the medications taken for diabetes.: yes  Patient reports problems or concerns with current medication regimen.: yes  Medication regimen problems/concerns:: concerned about side effects  Medication Skills Assessment Completed:: Yes  Assessment indicates:: Instruction Needed  Area of need?: Yes    Home Blood Glucose Monitoring  Patient states that blood sugar is checked at home daily.: yes  Monitoring Method:: personal continuous glucose monitor  Personal CGM type:: Freestyle Amena 2--uses intermittently to stay on track as diabetes is well controlled  Patient is able to use personal CGM appropriately.: yes  CGM Report reviewed?: yes    Freestyle Amena 2 download (2/10/2024 to 2/23/2024): See media file for full Amena report--patient only wears device intermittently due to good diabetes control. 89% of glucose values are in target range. Occasional episodes of hypoglycemia noted in the download--discussed with PCP and patient will trial off Jardiance but continue Ozempic as hypoglycemia is new and he has recently lost 17 lbs over past 6  months.        Home Blood Glucose Monitoring Skills Assessment Completed: : Yes  Assessment indicates:: Adequate understanding  Area of need?: No    Acute Complications  Patient is able to identify types of acute complications: Yes  Patient Identified:: Hypoglycemia  Patient is able to state the basic meaning of hypoglycemia?: Yes  Able to state the blood sugar range for hypoglycemia?: yes  Patient stated range:: Glucose in the 60s  Patient can identify general symptoms of hypoglycemia: no (see comments) (patient asymptomatic with hypoglycemia when it has occurred)  Able to state proper treatment of hypoglycemia?: yes  Patient identified:: 1/2 can soda/fruit juice  Acute Complications Skills Assessment Completed: : Yes  Assessment indicates:: Instruction Needed  Area of need?: Yes    Chronic Complications  Patient can identify major chronic complications of diabetes.: yes  Stated chronic complications:: heart disease/heart attack, kidney disease  Patient can identify ways to prevent or delay diabetes complications.: yes  Stated ways to prevent complications:: healthy eating and regular activity, controlling cholesterol and triglycerides, controlling blood sugar  Patient is aware that having diabetes increases risk of heart disease?: Yes  Patient is aware that heart disease is the leading cause of death and disability in people with diabetes?: Yes  Patient able to state risk factors for heart disease?: Yes  Patient stated risk factors for heart disease:: Having diabetes, High cholesterol  Patient is taking statin?: Yes  Chronic Complications Skills Assessment Completed: : Yes  Assessment indicates:: Adequate understanding  Area of need?: No    Psychosocial/Coping  Patient can identify ways of coping with chronic disease.: yes  Patient-stated ways of coping with chronic disease:: counseling/actively seeing behavioral professional, meditation/yoga, support from loved ones (does meditation and recently restarted  behavior specialist once a month)  Psychosocial/Coping Skills Assessment Completed: : Yes  Assessment indicates:: Adequate understanding  Area of need?: No    Assessment Summary and Plan    Based on today's diabetes care assessment, the following areas of need were identified:          2/23/2024    12:01 AM   Social   Support No   Access to Mass Media/Tech No   Cognitive/Behavioral Health No   Culture/Lutheran No   Communication No   Health Literacy No          2/23/2024    12:01 AM   Clinical   Medication Adherence No   Lab Compliance No   Nutritional Status No          2/23/2024    12:01 AM   Diabetes Self-Management Skills   Diabetes Disease Process/Treatment Options No   Nutrition/Healthy Eating No   Physical Activity/Exercise No   Medication Yes--see care plan   Home Blood Glucose Monitoring No   Acute Complications Yes--see care plan   Chronic Complications No   Psychosocial/Coping No      Today's interventions were provided through individual discussion, instruction, and written materials were provided.      Patient verbalized understanding of instruction and written materials.  Pt was able to return back demonstration of instructions today. Patient understood key points, needs reinforcement and further instruction.     Diabetes Self-Management Care Plan:    Today's Diabetes Self-Management Care Plan was developed with Abhi's input. Abhi has agreed to work toward the following goal(s) to improve his/her overall diabetes control.      Care Plan: Diabetes Management   Updates made since 1/24/2024 12:00 AM        Problem: Medications         Goal: Due to recent weight loss and continued exercise and meal planning--PCP (Dr. Amezcua) agreeable to trial discontinuing Jardiance 10 mg. Continue Ozempic 1 mg weekly. Will send iVilka message in 2 weeks to check glucose levels with med change.    Start Date: 2/23/2024   Expected End Date: 12/2/2024   Priority: High   Barriers: No Barriers Identified         Task: Discussed guidelines for preventing, detecting and treating hypoglycemia and hyperglycemia and reviewed the importance of meal and medication timing with diabetes mediations for prevention of hypoglycemia and maximum drug benefit. Completed 2/23/2024        Problem: Acute Complications         Goal: Patient agrees to identify and manage signs and symptoms of high/low blood sugar (hyper/hypoglycemia) by keeping a log of events and using proper treatment.    Start Date: 2/23/2024   Expected End Date: 12/2/2024   Priority: Medium   Barriers: No Barriers Identified        Task: Reviewed proper treatment of hypoglycemia with the rule of 15--patient to eat 15g simple carbohydrate (4 glucose tablets, 1 glucose gel, 5 pieces hard candy, ½ cup fruit juice, ½ can regular soda, etc) and wait 15 minutes and recheck home glucose. Completed 2/23/2024        Task: Reviewed common causes and precautions to help prevent hyper/hypoglycemic events. Completed 2/23/2024        Task: Reviewed signs and symptoms of hyper/hypoglycemia, what range is considered to be hyper/hypoglycemia, and when to seek further medical attention. Completed 2/23/2024        Task: Encouraged patient to turn on low glucose alert for 70 mg/dL on Freestyle Amena 2--previously turned off due to alarming and patient is asymptomatic with hypoglycemia Completed 2/23/2024          Follow Up Plan     Follow up in 9 months (on 12/2/2024) for continued DSMES per patient request, has PCP same day. Patient prefers to follow with diabetes care specialist 1-2 times a year to stay on track with managing diabetes. Next Hgb A1c scheduled for 7/5/24.  Due to recent weight loss of > 10 lbs and continued diet and exercise, PCP agreeable to trial off Jardiance 10 mg due to occasional glucose levels in the 50s noted on CGM report.  Continue Ozempic 1 mg weekly.  Will send Clickst message to patient in 2 weeks to see if glucose control worsening with discontinuation of  Jardiance.  Continue exercise regimen and following of diabetic diet.  Patient reports he has decreased work stress over past 6 months and is feeling really good.      Today's care plan and follow up schedule was discussed with patient.  Abhi verbalized understanding of the care plan, goals, and agrees to follow up plan.        The patient was encouraged to communicate with his/her health care provider/physician and care team regarding his/her condition(s) and treatment.  I provided the patient with my contact information today and encouraged to contact me via phone or [facility] Patient Portal as needed.     Length of Visit   Total Time: 60 Minutes

## 2024-02-26 ENCOUNTER — LAB VISIT (OUTPATIENT)
Dept: LAB | Facility: HOSPITAL | Age: 54
End: 2024-02-26
Attending: INTERNAL MEDICINE
Payer: COMMERCIAL

## 2024-02-26 DIAGNOSIS — D75.1 POLYCYTHEMIA: ICD-10-CM

## 2024-02-26 LAB
BASOPHILS # BLD AUTO: 0.06 K/UL (ref 0–0.2)
BASOPHILS NFR BLD: 0.8 % (ref 0–1.9)
DIFFERENTIAL METHOD BLD: ABNORMAL
EOSINOPHIL # BLD AUTO: 0.2 K/UL (ref 0–0.5)
EOSINOPHIL NFR BLD: 2 % (ref 0–8)
ERYTHROCYTE [DISTWIDTH] IN BLOOD BY AUTOMATED COUNT: 12.7 % (ref 11.5–14.5)
FERRITIN SERPL-MCNC: 48 NG/ML (ref 20–300)
HCT VFR BLD AUTO: 49.8 % (ref 40–54)
HGB BLD-MCNC: 17.4 G/DL (ref 14–18)
IMM GRANULOCYTES # BLD AUTO: 0.01 K/UL (ref 0–0.04)
IMM GRANULOCYTES NFR BLD AUTO: 0.1 % (ref 0–0.5)
LYMPHOCYTES # BLD AUTO: 1.5 K/UL (ref 1–4.8)
LYMPHOCYTES NFR BLD: 18.9 % (ref 18–48)
MCH RBC QN AUTO: 31.5 PG (ref 27–31)
MCHC RBC AUTO-ENTMCNC: 34.9 G/DL (ref 32–36)
MCV RBC AUTO: 90 FL (ref 82–98)
MONOCYTES # BLD AUTO: 0.9 K/UL (ref 0.3–1)
MONOCYTES NFR BLD: 11.5 % (ref 4–15)
NEUTROPHILS # BLD AUTO: 5.3 K/UL (ref 1.8–7.7)
NEUTROPHILS NFR BLD: 66.7 % (ref 38–73)
NRBC BLD-RTO: 0 /100 WBC
PLATELET # BLD AUTO: 312 K/UL (ref 150–450)
PMV BLD AUTO: 9.9 FL (ref 9.2–12.9)
RBC # BLD AUTO: 5.53 M/UL (ref 4.6–6.2)
WBC # BLD AUTO: 7.99 K/UL (ref 3.9–12.7)

## 2024-02-26 PROCEDURE — 36415 COLL VENOUS BLD VENIPUNCTURE: CPT | Mod: PN | Performed by: INTERNAL MEDICINE

## 2024-02-26 PROCEDURE — 82728 ASSAY OF FERRITIN: CPT | Performed by: INTERNAL MEDICINE

## 2024-02-26 PROCEDURE — 85025 COMPLETE CBC W/AUTO DIFF WBC: CPT | Mod: PN | Performed by: INTERNAL MEDICINE

## 2024-02-29 ENCOUNTER — OFFICE VISIT (OUTPATIENT)
Dept: HEMATOLOGY/ONCOLOGY | Facility: CLINIC | Age: 54
End: 2024-02-29
Payer: COMMERCIAL

## 2024-02-29 DIAGNOSIS — G47.33 OBSTRUCTIVE SLEEP APNEA SYNDROME: ICD-10-CM

## 2024-02-29 DIAGNOSIS — D75.1 POLYCYTHEMIA: Primary | ICD-10-CM

## 2024-02-29 PROCEDURE — 1160F RVW MEDS BY RX/DR IN RCRD: CPT | Mod: CPTII,95,, | Performed by: INTERNAL MEDICINE

## 2024-02-29 PROCEDURE — 1159F MED LIST DOCD IN RCRD: CPT | Mod: CPTII,95,, | Performed by: INTERNAL MEDICINE

## 2024-02-29 PROCEDURE — 99214 OFFICE O/P EST MOD 30 MIN: CPT | Mod: 95,,, | Performed by: INTERNAL MEDICINE

## 2024-02-29 NOTE — PROGRESS NOTES
Subjective:       Patient ID: Abhi Pompa is a 53 y.o. male.    Chief Complaint: No chief complaint on file.    HPI        The patient location is: home  The chief complaint leading to consultation is:  Erythrocytosis    Visit type: audiovisual    Face to Face time with patient:  minutes of total time spent on the encounter, which includes face to face time and non-face to face time preparing to see the patient (eg, review of tests), Obtaining and/or reviewing separately obtained history, Documenting clinical information in the electronic or other health record, Independently interpreting results (not separately reported) and communicating results to the patient/family/caregiver, or Care coordination (not separately reported).         Each patient to whom he or she provides medical services by telemedicine is:  (1) informed of the relationship between the physician and patient and the respective role of any other health care provider with respect to management of the patient; and (2) notified that he or she may decline to receive medical services by telemedicine and may withdraw from such care at any time.    Notes:    Mr. Pompa had a virtual visit with me today.  I had last seen him in mid November 2023 and he was seen by Dr. Rebolledo in July during my absence.  He donated 1 unit of PRBCs in July.        Briefly, he is a 53-year-old man referred for evaluation for polycythemia.  His JAK2 mutation was negative and his polycythemia was attributed to his weekly testosterone injections and the documented sleep apnea that he has.  His CBC from February 26, 2023 shows a white count of  7,900 per cubic mm, hemoglobin 17.4 grams/deciliter, hematocrit 49.8%, MCV 90, and platelets 312K.  His ferritin is 48 ng/mL.  Of note, the patient had donated 1 unit of PRBCs 2 weeks ago.    The patient has been on testosterone injections since 2014, currently on a weekly basis.       ROS:  Overall he feels well.  He states that he  gets at least 7 hours of sleep every night.  He is using his mask on a nightly basis.   His ECOG PS is 1.  He denies any anxiety, depression, easy bruising, fevers, chills, night  sweats, weight loss, nausea, vomiting, diarrhea, constipation, diplopia, blurred vision, headache, chest pain, palpitations, shortness of breath, breast pain, abdominal pain, extremity pain, or difficulty ambulating.  The remainder of the ten-point ROS, including general, skin, lymph, H/N, cardiorespiratory, GI, , Neuro, Endocrine, and psychiatric is negative.    Objective:      Physical Exam    This was a virtual visit   Assessment:     1. Erythrocytosis, felt to be secondary to testosterone injections and/or sleep apnea rather.  MPN has been ruled out    2. Obesity, sleep apnea    3. Diabetes, hyperlipidemia, history of achalasia        Plan:         I had a long discussion with Mr. Pompa.  We again went over the differential for his erythrocytosis.  A myeloproliferative syndrome has essentially been ruled out at this point.    Since there is no evidence of an MPN, it is reasonable for him to continue the testosterone injections so long as his hematocrit remains below 55-60% with periodic phlebotomies.  He understands that his PSA needs to be monitored by whoever administers the testosterone injections  There is no need for a therapeutic phlebotomy at this point since he just donated 1 unit of PRBCs 2 weeks ago.  I will see him again in 2 months with a repeat CBC and a repeat ferritin.  His multiple questions were answered to his satisfaction.  I spent approximately 30 minutes reviewing the available records and evaluating the patient, out of which over 50% of the time was spent face to face with the patient in counseling and coordinating this patient's care.

## 2024-03-08 ENCOUNTER — PATIENT MESSAGE (OUTPATIENT)
Dept: DIABETES | Facility: CLINIC | Age: 54
End: 2024-03-08
Payer: COMMERCIAL

## 2024-03-15 ENCOUNTER — PATIENT OUTREACH (OUTPATIENT)
Dept: DIABETES | Facility: CLINIC | Age: 54
End: 2024-03-15
Payer: COMMERCIAL

## 2024-03-15 NOTE — PROGRESS NOTES
Patient seen 2/23/24 for 6 month follow up and at that time Jardiance discontinued due to hypoglycemia occurrence. Patient continues on Ozempic 1 mg weekly.  Patient brings Amena reader today to be downloaded to review glucose levels since stopping Jardiance. Average glucose is up from 127 mg/dL to 152 mg/dL, however hypoglycemia has stopped. Patient to continue exercise and balanced eating and to maintain recent weight loss.  Has PCP follow up in July 2024 with Hgb A1c prior (last Hgb A1c 6.7% Dec 2023).      Freestyle Amena 2 download (3/2/2024 to 3/15/2024): See media file for full Amena report. 89% of glucose values are in target range--patient meeting goal of glucose in target range > 70% of the time. Jardiance discontinued per PCP last month due to increasing hypoglycemia and  now patient without any recent episodes of hypoglycemia. He continues on Ozempic 1 mg weekly.

## 2024-04-08 ENCOUNTER — PATIENT MESSAGE (OUTPATIENT)
Dept: DIABETES | Facility: CLINIC | Age: 54
End: 2024-04-08
Payer: COMMERCIAL

## 2024-05-02 ENCOUNTER — PATIENT MESSAGE (OUTPATIENT)
Dept: FAMILY MEDICINE | Facility: CLINIC | Age: 54
End: 2024-05-02
Payer: COMMERCIAL

## 2024-05-02 DIAGNOSIS — E29.1 MALE HYPOGONADISM: ICD-10-CM

## 2024-05-02 DIAGNOSIS — Z51.81 ENCOUNTER FOR MONITORING LONG-TERM PROTON PUMP INHIBITOR THERAPY: ICD-10-CM

## 2024-05-02 DIAGNOSIS — Z79.899 ENCOUNTER FOR MONITORING LONG-TERM PROTON PUMP INHIBITOR THERAPY: ICD-10-CM

## 2024-05-02 DIAGNOSIS — K21.00 GASTROESOPHAGEAL REFLUX DISEASE WITH ESOPHAGITIS WITHOUT HEMORRHAGE: ICD-10-CM

## 2024-05-02 DIAGNOSIS — N52.1 ERECTILE DYSFUNCTION DUE TO DISEASES CLASSIFIED ELSEWHERE: ICD-10-CM

## 2024-05-02 RX ORDER — OMEPRAZOLE 40 MG/1
40 CAPSULE, DELAYED RELEASE ORAL
Qty: 90 CAPSULE | Refills: 0 | Status: SHIPPED | OUTPATIENT
Start: 2024-05-02

## 2024-05-02 RX ORDER — SILDENAFIL CITRATE 20 MG/1
TABLET ORAL
Qty: 30 TABLET | Refills: 5 | Status: SHIPPED | OUTPATIENT
Start: 2024-05-02

## 2024-05-02 NOTE — TELEPHONE ENCOUNTER
Refill Decision Note   Juanedson Pompa  is requesting a refill authorization.  Brief Assessment and Rationale for Refill:  Approve     Medication Therapy Plan: FLOS      Pharmacist review requested: Yes   Extended chart review required: Yes   Comments:     Note composed:3:02 PM 05/02/2024

## 2024-05-02 NOTE — TELEPHONE ENCOUNTER
Care Due:                  Date            Visit Type   Department     Provider  --------------------------------------------------------------------------------                                MYCHART                              ANNUAL                              CHECKUP/PHY  [facility]  Last Visit: 12-      S            CATHLEEN Amezcua                              MYCHART                              FOLLOWUP/OF  [facility]  Next Visit: 07-      [facility]ICE VISIT   MEDICINE       Rafita Amezcua                                                            Last  Test          Frequency    Reason                     Performed    Due Date  --------------------------------------------------------------------------------    HBA1C.......  6 months...  empagliflozin............  12- 06-    Health [facility] Embedded Care Due Messages. Reference number: [id number].   5/02/2024 9:10:01 AM CDT

## 2024-05-02 NOTE — TELEPHONE ENCOUNTER
Refill Routing Note   Medication(s) are not appropriate for processing by [facility] for the following reason(s):        Drug-drug interaction    [facility] action(s):  Defer        Medication Therapy Plan: FLOS; Duplicate Therapy: tadalafiL, sildenafil    Pharmacist review requested: Yes     Appointments  past 12m or future 3m with PCP    Date Provider   Last Visit   12/1/2023 Rafita Amezcua, DO   Next Visit   7/12/2024 Rafita Amezcua, DO   ED visits in past 90 days: 0        Note composed:2:42 PM 05/02/2024

## 2024-05-08 RX ORDER — TESTOSTERONE CYPIONATE 200 MG/ML
200 INJECTION, SOLUTION INTRAMUSCULAR WEEKLY
Qty: 4 ML | Refills: 4 | Status: SHIPPED | OUTPATIENT
Start: 2024-05-08 | End: 2024-11-06

## 2024-05-08 NOTE — TELEPHONE ENCOUNTER
No care due was identified.  Health [facility] Embedded Care Due Messages. Reference number: [id number].   5/08/2024 10:32:17 AM CDT

## 2024-05-22 DIAGNOSIS — E11.9 DIABETES MELLITUS WITHOUT COMPLICATION: ICD-10-CM

## 2024-05-22 RX ORDER — EMPAGLIFLOZIN 10 MG/1
10 TABLET, FILM COATED ORAL
Qty: 90 TABLET | Refills: 0 | Status: SHIPPED | OUTPATIENT
Start: 2024-05-22

## 2024-05-22 NOTE — TELEPHONE ENCOUNTER
Refill Decision Note   Juanedson Pompa  is requesting a refill authorization.  Brief Assessment and Rationale for Refill:  Approve     Medication Therapy Plan:         Comments:     Note composed:10:36 AM 05/22/2024

## 2024-05-22 NOTE — TELEPHONE ENCOUNTER
No care due was identified.  Health [facility] Embedded Care Due Messages. Reference number: [id number].   5/22/2024 12:18:57 AM CDT

## 2024-05-29 ENCOUNTER — LAB VISIT (OUTPATIENT)
Dept: LAB | Facility: HOSPITAL | Age: 54
End: 2024-05-29
Attending: INTERNAL MEDICINE
Payer: COMMERCIAL

## 2024-05-29 DIAGNOSIS — D75.1 POLYCYTHEMIA: ICD-10-CM

## 2024-05-29 LAB
ALBUMIN SERPL BCP-MCNC: 3.8 G/DL (ref 3.5–5.2)
ALP SERPL-CCNC: 54 U/L (ref 55–135)
ALT SERPL W/O P-5'-P-CCNC: 46 U/L (ref 10–44)
ANION GAP SERPL CALC-SCNC: 7 MMOL/L (ref 8–16)
AST SERPL-CCNC: 21 U/L (ref 10–40)
BASOPHILS # BLD AUTO: 0.06 K/UL (ref 0–0.2)
BASOPHILS NFR BLD: 1 % (ref 0–1.9)
BILIRUB SERPL-MCNC: 0.9 MG/DL (ref 0.1–1)
BUN SERPL-MCNC: 10 MG/DL (ref 6–20)
CALCIUM SERPL-MCNC: 9.1 MG/DL (ref 8.7–10.5)
CHLORIDE SERPL-SCNC: 105 MMOL/L (ref 95–110)
CO2 SERPL-SCNC: 24 MMOL/L (ref 23–29)
CREAT SERPL-MCNC: 0.9 MG/DL (ref 0.5–1.4)
DIFFERENTIAL METHOD BLD: NORMAL
EOSINOPHIL # BLD AUTO: 0.2 K/UL (ref 0–0.5)
EOSINOPHIL NFR BLD: 3.6 % (ref 0–8)
ERYTHROCYTE [DISTWIDTH] IN BLOOD BY AUTOMATED COUNT: 13.1 % (ref 11.5–14.5)
EST. GFR  (NO RACE VARIABLE): >60 ML/MIN/1.73 M^2
FERRITIN SERPL-MCNC: 163 NG/ML (ref 20–300)
GLUCOSE SERPL-MCNC: 131 MG/DL (ref 70–110)
HCT VFR BLD AUTO: 45.3 % (ref 40–54)
HGB BLD-MCNC: 15.9 G/DL (ref 14–18)
IMM GRANULOCYTES # BLD AUTO: 0.01 K/UL (ref 0–0.04)
IMM GRANULOCYTES NFR BLD AUTO: 0.2 % (ref 0–0.5)
LYMPHOCYTES # BLD AUTO: 1.6 K/UL (ref 1–4.8)
LYMPHOCYTES NFR BLD: 27.3 % (ref 18–48)
MCH RBC QN AUTO: 30.2 PG (ref 27–31)
MCHC RBC AUTO-ENTMCNC: 35.1 G/DL (ref 32–36)
MCV RBC AUTO: 86 FL (ref 82–98)
MONOCYTES # BLD AUTO: 0.7 K/UL (ref 0.3–1)
MONOCYTES NFR BLD: 11.8 % (ref 4–15)
NEUTROPHILS # BLD AUTO: 3.3 K/UL (ref 1.8–7.7)
NEUTROPHILS NFR BLD: 56.1 % (ref 38–73)
NRBC BLD-RTO: 0 /100 WBC
PLATELET # BLD AUTO: 292 K/UL (ref 150–450)
PMV BLD AUTO: 9.6 FL (ref 9.2–12.9)
POTASSIUM SERPL-SCNC: 4.3 MMOL/L (ref 3.5–5.1)
PROT SERPL-MCNC: 6.4 G/DL (ref 6–8.4)
RBC # BLD AUTO: 5.26 M/UL (ref 4.6–6.2)
SODIUM SERPL-SCNC: 136 MMOL/L (ref 136–145)
WBC # BLD AUTO: 5.83 K/UL (ref 3.9–12.7)

## 2024-05-29 PROCEDURE — 82728 ASSAY OF FERRITIN: CPT | Performed by: INTERNAL MEDICINE

## 2024-05-29 PROCEDURE — 36415 COLL VENOUS BLD VENIPUNCTURE: CPT | Mod: PN | Performed by: INTERNAL MEDICINE

## 2024-05-29 PROCEDURE — 85025 COMPLETE CBC W/AUTO DIFF WBC: CPT | Mod: PN | Performed by: INTERNAL MEDICINE

## 2024-05-29 PROCEDURE — 80053 COMPREHEN METABOLIC PANEL: CPT | Mod: PN | Performed by: INTERNAL MEDICINE

## 2024-05-31 ENCOUNTER — OFFICE VISIT (OUTPATIENT)
Dept: HEMATOLOGY/ONCOLOGY | Facility: CLINIC | Age: 54
End: 2024-05-31
Payer: COMMERCIAL

## 2024-05-31 VITALS
BODY MASS INDEX: 33.78 KG/M2 | TEMPERATURE: 98 F | OXYGEN SATURATION: 97 % | DIASTOLIC BLOOD PRESSURE: 78 MMHG | HEART RATE: 89 BPM | WEIGHT: 215.19 LBS | RESPIRATION RATE: 18 BRPM | SYSTOLIC BLOOD PRESSURE: 118 MMHG | HEIGHT: 67 IN

## 2024-05-31 DIAGNOSIS — D75.1 POLYCYTHEMIA: Primary | ICD-10-CM

## 2024-05-31 PROCEDURE — 3078F DIAST BP <80 MM HG: CPT | Mod: CPTII,S$GLB,, | Performed by: INTERNAL MEDICINE

## 2024-05-31 PROCEDURE — 99214 OFFICE O/P EST MOD 30 MIN: CPT | Mod: S$GLB,,, | Performed by: INTERNAL MEDICINE

## 2024-05-31 PROCEDURE — 1159F MED LIST DOCD IN RCRD: CPT | Mod: CPTII,S$GLB,, | Performed by: INTERNAL MEDICINE

## 2024-05-31 PROCEDURE — 3008F BODY MASS INDEX DOCD: CPT | Mod: CPTII,S$GLB,, | Performed by: INTERNAL MEDICINE

## 2024-05-31 PROCEDURE — 3074F SYST BP LT 130 MM HG: CPT | Mod: CPTII,S$GLB,, | Performed by: INTERNAL MEDICINE

## 2024-05-31 PROCEDURE — 99999 PR PBB SHADOW E&M-EST. PATIENT-LVL V: CPT | Mod: PBBFAC,,, | Performed by: INTERNAL MEDICINE

## 2024-05-31 NOTE — PROGRESS NOTES
Subjective:       Patient ID: Abhi Pompa is a 53 y.o. male.    Chief Complaint: Polycythemia    HPI    Mr. Pompa presents today for follow-up.  I had last seen him in mid November 2023 and we had a virtual visit in late February 2024.  He states that he donated 1 unit of PRBCs last week.    Repeat labs from 05/29/2024 are as follows:  Ferritin is 163 ng/mL  Creatinine is 0.9 mg/dL  Bilirubin is 0.9 mg/dL and transaminases are normal  WBCs are 5800 per cubic mm, hemoglobin 15.9 grams/deciliter, hematocrit 45.3%, MCV 86 and platelets 292 K      Briefly, he is a 53-year-old man referred for evaluation for polycythemia.  His JAK2 mutation was negative and his polycythemia was attributed to his weekly testosterone injections and the documented sleep apnea that he has.      The patient has been on testosterone injections since 2014, currently on a weekly basis.       ROS:  Overall he feels well.  He states that is using his CPAP at night and he sleeps well.   His ECOG PS is 1.  He denies any anxiety, depression, easy bruising, fevers, chills, night  sweats, weight loss, nausea, vomiting, diarrhea, constipation, diplopia, blurred vision, headache, chest pain, palpitations, shortness of breath, breast pain, abdominal pain, extremity pain, or difficulty ambulating.  The remainder of the ten-point ROS, including general, skin, lymph, H/N, cardiorespiratory, GI, , Neuro, Endocrine, and psychiatric is negative.    Objective:      Physical Exam    He is alert, oriented to time, place, person, pleasant, well      nourished, in no acute physical distress.                                    VITAL SIGNS:  Reviewed                                      HEENT:  Normal.  There are no nasal, oral, lip, gingival, auricular, lid,    or conjunctival lesions.  Mucosae are moist and pink, and there is no        thrush.  Pupils are equal, reactive to light and accommodation.              Extraocular muscle movements are intact.     Dentition is good.  There is no frontal or maxillary tenderness.                                   NECK:  Supple without JVD, adenopathy, or thyromegaly.                       LUNGS:  Clear to auscultation without wheezing, rales, or rhonchi.           CARDIOVASCULAR:  Reveals an S1, S2, no murmurs, no rubs, no gallops.         ABDOMEN:  Soft, nontender, without organomegaly.  Bowel sounds are    present.                                                                     EXTREMITIES:  No cyanosis, clubbing, or edema.                                                               LYMPHATIC:  There is no cervical, axillary, or supraclavicular adenopathy.   SKIN:  Warm and moist, without petechiae, rashes, induration, or ecchymoses.           NEUROLOGIC:  DTRs are 0-1+ bilaterally, symmetrical, motor function is 5/5,  and cranial nerves are  within normal limits.  Assessment:     1. Erythrocytosis, felt to be secondary to testosterone injections and/or sleep apnea rather.  MPN has been ruled out    2. Obesity, sleep apnea    3. Diabetes, hyperlipidemia, history of achalasia        Plan:         I had a long discussion with Mr. Pompa.  We again went over the differential for his erythrocytosis.  A myeloproliferative syndrome has essentially been ruled out at this point.    Since there is no evidence of an MPN, it is reasonable for him to continue the testosterone injections so long as his hematocrit remains below 55-60% with periodic phlebotomies.  He understands that his PSA needs to be monitored by whoever administers the testosterone injections.  Most recent PSA was less than 1 ng/mL  There is no need for a therapeutic phlebotomy at this point since he just donated 1 unit of PRBCs last week.  I will see him again in 4 months with a repeat CBC and a repeat ferritin.  Finally, I gave him the option of following up with his primary care physician at this point.  He will discuss with his primary care physician let me  know.  His multiple questions were answered to his satisfaction.  I spent approximately 15 minutes reviewing the available records and 15 minutes evaluating the patient and coordinating his care.

## 2024-06-19 RX ORDER — SEMAGLUTIDE 1.34 MG/ML
1 INJECTION, SOLUTION SUBCUTANEOUS
Qty: 3 ML | Refills: 11 | Status: SHIPPED | OUTPATIENT
Start: 2024-06-19 | End: 2025-06-19

## 2024-06-26 DIAGNOSIS — E78.2 MIXED HYPERLIPIDEMIA: ICD-10-CM

## 2024-06-26 RX ORDER — SIMVASTATIN 10 MG/1
10 TABLET, FILM COATED ORAL NIGHTLY
Qty: 90 TABLET | Refills: 1 | Status: SHIPPED | OUTPATIENT
Start: 2024-06-26

## 2024-06-26 NOTE — TELEPHONE ENCOUNTER
Refill Decision Note   Juanedson Pompa  is requesting a refill authorization.  Brief Assessment and Rationale for Refill:  Approve     Medication Therapy Plan:         Comments:     Note composed:2:46 AM 06/26/2024

## 2024-06-26 NOTE — TELEPHONE ENCOUNTER
No care due was identified.  [facility] Embedded Care Due Messages. Reference number: [id number].   6/26/2024 12:21:28 AM CDT

## 2024-06-28 ENCOUNTER — OFFICE VISIT (OUTPATIENT)
Dept: GASTROENTEROLOGY | Facility: CLINIC | Age: 54
End: 2024-06-28
Payer: COMMERCIAL

## 2024-06-28 VITALS — BODY MASS INDEX: 34.01 KG/M2 | HEIGHT: 67 IN | WEIGHT: 216.69 LBS

## 2024-06-28 DIAGNOSIS — K21.00 GASTROESOPHAGEAL REFLUX DISEASE WITH ESOPHAGITIS WITHOUT HEMORRHAGE: ICD-10-CM

## 2024-06-28 DIAGNOSIS — R74.01 ELEVATED ALT MEASUREMENT: ICD-10-CM

## 2024-06-28 DIAGNOSIS — Z79.899 ENCOUNTER FOR MONITORING LONG-TERM PROTON PUMP INHIBITOR THERAPY: ICD-10-CM

## 2024-06-28 DIAGNOSIS — Z86.19 HISTORY OF HELICOBACTER PYLORI INFECTION: ICD-10-CM

## 2024-06-28 DIAGNOSIS — Z87.19 HISTORY OF GASTRITIS: ICD-10-CM

## 2024-06-28 DIAGNOSIS — R13.14 PHARYNGOESOPHAGEAL DYSPHAGIA: ICD-10-CM

## 2024-06-28 DIAGNOSIS — Z51.81 ENCOUNTER FOR MONITORING LONG-TERM PROTON PUMP INHIBITOR THERAPY: ICD-10-CM

## 2024-06-28 DIAGNOSIS — R10.13 EPIGASTRIC PAIN: Primary | ICD-10-CM

## 2024-06-28 DIAGNOSIS — K22.0 ACHALASIA: ICD-10-CM

## 2024-06-28 DIAGNOSIS — Z98.890 HISTORY OF ESOPHAGEAL SURGERY: ICD-10-CM

## 2024-06-28 DIAGNOSIS — R19.7 INTERMITTENT DIARRHEA: ICD-10-CM

## 2024-06-28 DIAGNOSIS — K76.0 HEPATIC STEATOSIS: ICD-10-CM

## 2024-06-28 DIAGNOSIS — R16.0 HEPATOMEGALY: ICD-10-CM

## 2024-06-28 DIAGNOSIS — N52.1 ERECTILE DYSFUNCTION DUE TO DISEASES CLASSIFIED ELSEWHERE: ICD-10-CM

## 2024-06-28 PROCEDURE — 99999 PR PBB SHADOW E&M-EST. PATIENT-LVL IV: CPT | Mod: PBBFAC,,, | Performed by: NURSE PRACTITIONER

## 2024-06-28 RX ORDER — ESOMEPRAZOLE MAGNESIUM 40 MG/1
40 CAPSULE, DELAYED RELEASE ORAL
Qty: 90 CAPSULE | Refills: 3 | Status: SHIPPED | OUTPATIENT
Start: 2024-06-28

## 2024-06-28 RX ORDER — TADALAFIL 10 MG/1
10 TABLET ORAL DAILY PRN
Qty: 10 TABLET | Refills: 8 | OUTPATIENT
Start: 2024-06-28

## 2024-06-28 NOTE — PROGRESS NOTES
"Subjective:       Patient ID: Abhi Pompa is a 53 y.o. male Body mass index is 33.94 kg/m².    Chief Complaint: Follow-up and Abdominal Pain    This patient is established with Dr. Dunbar & myself.    Gastroesophageal Reflux  He complains of abdominal pain (started since been on ozempic which was started ~1/2023; epigastric described as "knotty"; worse with stress; occurs a few times a week and after weekly ozempic dose), belching (not more than usual), dysphagia (occasional; occurs with cold drinks, chicken and doughy foods; significantly improved since surgery) and heartburn (occurs most nights). He reports no chest pain, no choking, no coughing, no early satiety, no globus sensation, no hoarse voice, no nausea, no sore throat or no water brash. This is a chronic (for several years) problem. The problem occurs frequently. The problem has been gradually worsening. The heartburn wakes him from sleep. The heartburn changes with position. The symptoms are aggravated by lying down and stress (tomato products, red foods, lying on left side). Associated symptoms include weight loss (taking ozempic for diabetes (started ~1/2023)). Pertinent negatives include no fatigue or melena. Risk factors include ETOH use, caffeine use, obesity, smoking/tobacco exposure and NSAIDs (reports NSAIDs use PRN). He has tried a histamine-2 antagonist, an antacid, a PPI, a diet change and ETOH reduction (prilosec 40 mg once daily (less effective lately); pepcid 40 mg nightly; PAST: carafate, zantac) for the symptoms. The treatment provided mild relief. Past procedures include an EGD and esophageal manometry. Past invasive treatments include gastroplication (heller with fundoplication).     Review of Systems   Constitutional:  Positive for weight loss (taking ozempic for diabetes (started ~1/2023)). Negative for appetite change, chills, diaphoresis, fatigue and fever.   HENT:  Positive for trouble swallowing. Negative for hoarse " "voice and sore throat.    Respiratory:  Negative for cough, choking, chest tightness and shortness of breath.    Cardiovascular:  Negative for chest pain.   Gastrointestinal:  Positive for abdominal pain (started since been on ozempic which was started ~1/2023; epigastric described as "knotty"; worse with stress; occurs a few times a week and after weekly ozempic dose), diarrhea (Reports he takes questran 4 grams daily PRN- which is rarely for intermittent diarrhea; denies diarrhea currently; occurs rarely since off metformin), dysphagia (occasional; occurs with cold drinks, chicken and doughy foods; significantly improved since surgery) and heartburn (occurs most nights). Negative for anal bleeding, blood in stool, constipation, melena, nausea, rectal pain and vomiting.   Neurological:  Negative for weakness.         Past Medical History:   Diagnosis Date    Achalasia     Arthritis     right ankle    Cataract     Colon polyp     Diabetes mellitus     taking lisinopril preventive to protect the kidneys, no HTN    Dyslipidemia     pt denies    Dysphagia     Former tobacco use     chew    GERD (gastroesophageal reflux disease)     H. pylori infection     Hypogonadism in male     MELLY (obstructive sleep apnea)     uses cpap     Past Surgical History:   Procedure Laterality Date    ANKLE SURGERY Right     COLONOSCOPY N/A 04/19/2022    Procedure: COLONOSCOPY;  Surgeon: Julian Dunbar Jr., MD;  [facility];  Service: Endoscopy;  Laterality: N/A; internal hemorrhoids, 1 colon polyp removed; repeat in 8-10 years for surveillance; biopsy: Colonic mucosa with prominent lymphoid aggregates. Pending molecular study. See comment.    ESOPHAGOGASTRODUODENOSCOPY N/A 6/29/2022    Procedure: EGD (ESOPHAGOGASTRODUODENOSCOPY);  Surgeon: Julian Dunbar Jr., MD;  [facility];  Service: Endoscopy;  Laterality: N/A;    HAND SURGERY Right     laparoscopic Heller myotomy      MYOTOMY  07/2016    heller- achalasia surgery "    TONSILLECTOMY      UPPER GASTROINTESTINAL ENDOSCOPY  12/11/2015    WISDOM TOOTH EXTRACTION       Family History   Problem Relation Name Age of Onset    Diabetes Mother      Allergies Mother      Lupus Mother      Diabetes Maternal Aunt      Allergic rhinitis Sister      Angioedema Neg Hx      Asthma Neg Hx      Eczema Neg Hx      Immunodeficiency Neg Hx      Urticaria Neg Hx      Amblyopia Neg Hx      Blindness Neg Hx      Cancer Neg Hx      Cataracts Neg Hx      Glaucoma Neg Hx      Hypertension Neg Hx      Macular degeneration Neg Hx      Retinal detachment Neg Hx      Strabismus Neg Hx      Stroke Neg Hx      Thyroid disease Neg Hx      Celiac disease Neg Hx      Cirrhosis Neg Hx      Colon cancer Neg Hx      Colon polyps Neg Hx      Crohn's disease Neg Hx      Cystic fibrosis Neg Hx      Esophageal cancer Neg Hx      Hemochromatosis Neg Hx      Inflammatory bowel disease Neg Hx      Irritable bowel syndrome Neg Hx      Liver cancer Neg Hx      Liver disease Neg Hx      Rectal cancer Neg Hx      Stomach cancer Neg Hx      Ulcerative colitis Neg Hx      Harvey's disease Neg Hx      Lymphoma Neg Hx      Tuberculosis Neg Hx      Scleroderma Neg Hx      Rheum arthritis Neg Hx      Multiple sclerosis Neg Hx      Melanoma Neg Hx      Psoriasis Neg Hx      Skin cancer Neg Hx       Social History     Tobacco Use    Smoking status: Some Days     Types: Cigars    Smokeless tobacco: Former     Types: Chew     Quit date: 10/19/2012    Tobacco comments:     Occasional cigar   Substance Use Topics    Alcohol use: Yes     Alcohol/week: 7.0 standard drinks of alcohol     Types: 7 Glasses of wine per week     Comment: 1 glass of red wine every night    Drug use: No     Wt Readings from Last 10 Encounters:   06/28/24 98.3 kg (216 lb 11.4 oz)   05/31/24 97.6 kg (215 lb 2.7 oz)   02/23/24 98.7 kg (217 lb 9.5 oz)   12/27/23 103.3 kg (227 lb 10 oz)   12/01/23 101.2 kg (223 lb 1.7 oz)   11/14/23 102 kg (224 lb 13.9 oz)   08/04/23  106 kg (233 lb 11 oz)   07/28/23 101.8 kg (224 lb 6.9 oz)   06/19/23 104.3 kg (229 lb 15 oz)   03/16/23 104.2 kg (229 lb 11.5 oz)     Lab Results   Component Value Date    WBC 5.83 05/29/2024    HGB 15.9 05/29/2024    HCT 45.3 05/29/2024    MCV 86 05/29/2024     05/29/2024     CMP  Sodium   Date Value Ref Range Status   05/29/2024 136 136 - 145 mmol/L Final     Potassium   Date Value Ref Range Status   05/29/2024 4.3 3.5 - 5.1 mmol/L Final     Chloride   Date Value Ref Range Status   05/29/2024 105 95 - 110 mmol/L Final     CO2   Date Value Ref Range Status   05/29/2024 24 23 - 29 mmol/L Final     Glucose   Date Value Ref Range Status   05/29/2024 131 (H) 70 - 110 mg/dL Final     BUN   Date Value Ref Range Status   05/29/2024 10 6 - 20 mg/dL Final     Creatinine   Date Value Ref Range Status   05/29/2024 0.9 0.5 - 1.4 mg/dL Final     Calcium   Date Value Ref Range Status   05/29/2024 9.1 8.7 - 10.5 mg/dL Final     Total Protein   Date Value Ref Range Status   05/29/2024 6.4 6.0 - 8.4 g/dL Final     Albumin   Date Value Ref Range Status   05/29/2024 3.8 3.5 - 5.2 g/dL Final     Total Bilirubin   Date Value Ref Range Status   05/29/2024 0.9 0.1 - 1.0 mg/dL Final     Comment:     For infants and newborns, interpretation of results should be based  on gestational age, weight and in agreement with clinical  observations.    Premature Infant recommended reference ranges:  Up to 24 hours.............<8.0 mg/dL  Up to 48 hours............<12.0 mg/dL  3-5 days..................<15.0 mg/dL  6-29 days.................<15.0 mg/dL       Alkaline Phosphatase   Date Value Ref Range Status   05/29/2024 54 (L) 55 - 135 U/L Final     AST   Date Value Ref Range Status   05/29/2024 21 10 - 40 U/L Final     ALT   Date Value Ref Range Status   05/29/2024 46 (H) 10 - 44 U/L Final     Anion Gap   Date Value Ref Range Status   05/29/2024 7 (L) 8 - 16 mmol/L Final     eGFR if    Date Value Ref Range Status   04/18/2022  >60.0 >60 mL/min/1.73 m^2 Final     eGFR if non    Date Value Ref Range Status   04/18/2022 >60.0 >60 mL/min/1.73 m^2 Final     Comment:     Calculation used to obtain the estimated glomerular filtration  rate (eGFR) is the CKD-EPI equation.        Lab Results   Component Value Date    UCQBUUCA63 425 11/14/2022     Lab Results   Component Value Date    TSH 1.193 01/29/2016     Lab Results   Component Value Date    LIPASE 23 01/06/2023     Lab Results   Component Value Date    HEPAIGM Non-reactive 01/06/2023    HEPBIGM Non-reactive 01/06/2023    HEPBCAB Negative 01/11/2019    HEPCAB Non-reactive 01/06/2023 11/14/2022 magnesium WNL    Reviewed prior medical records including radiology report of 1/6/2023 abdominal ultrasound; 2/2/2016 esophagram; 2/26/16 esophageal manometry; 1/11/2019 visit note with EARLE Watson NP; & endoscopy history (see surgical history).  Objective:      Physical Exam  Vitals and nursing note reviewed.   Constitutional:       General: He is not in acute distress.     Appearance: Normal appearance. He is well-developed. He is not diaphoretic.   HENT:      Mouth/Throat:      Lips: Pink. No lesions.      Mouth: Mucous membranes are moist. No oral lesions.      Tongue: No lesions.      Pharynx: Oropharynx is clear. No pharyngeal swelling or posterior oropharyngeal erythema.   Eyes:      General: No scleral icterus.     Conjunctiva/sclera: Conjunctivae normal.   Pulmonary:      Effort: Pulmonary effort is normal. No respiratory distress.      Breath sounds: Normal breath sounds. No wheezing.   Abdominal:      General: Bowel sounds are normal. There is no distension or abdominal bruit.      Palpations: Abdomen is soft. Abdomen is not rigid. There is no mass.      Tenderness: There is no abdominal tenderness. There is no guarding or rebound. Negative signs include Vera's sign and McBurney's sign.   Skin:     General: Skin is warm and dry.      Coloration: Skin is not jaundiced or  pale.      Findings: No erythema or rash.   Neurological:      Mental Status: He is alert and oriented to person, place, and time.   Psychiatric:         Behavior: Behavior normal.         Thought Content: Thought content normal.         Judgment: Judgment normal.         Assessment:       1. Epigastric pain    2. Encounter for monitoring long-term proton pump inhibitor therapy    3. Gastroesophageal reflux disease with esophagitis without hemorrhage    4. History of gastritis    5. History of Helicobacter pylori infection    6. Pharyngoesophageal dysphagia    7. Achalasia    8. History of esophageal surgery    9. Elevated ALT measurement    10. Hepatomegaly    11. Hepatic steatosis          Plan:       Epigastric pain  - schedule EGD, discussed procedure with patient, including risks and benefits, patient verbalized understanding  - avoid/minimize use of NSAIDs- since they can cause GI upset, bleeding and/or ulcers. If NSAID must be taken, recommend take with food.  - discussed with patient that certain medications, such as Ozempic, may be contributing to symptoms. I recommend follow-up with provider who manages medication to discuss about possible alternative therapy, patient verbalized understanding    Encounter for monitoring long-term proton pump inhibitor therapy, Gastroesophageal reflux disease with esophagitis without hemorrhage, & History of gastritis  - DISCONTINUE PRILOSEC DUE TO ALTERNATE THERAPY  -  START   esomeprazole (NEXIUM) 40 MG capsule; Take 1 capsule (40 mg total) by mouth before breakfast.  Dispense: 90 capsule; Refill: 3  - CONTINUE    famotidine (PEPCID) 40 MG tablet; Take 1 tablet (40 mg total) by mouth once daily.  - schedule EGD, discussed procedure with patient, including risks and benefits, patient verbalized understanding  - discussed with patient about long term use of reflux medications (preference to use lowest effective dose or discontinuing if possible), the risk and benefits of  using these medications long term, the risk of untreated GERD such as hernandez's esophagus, and recommend a diet high in calcium and/or taking OTC calcium and vitamin d supplements as directed (such as Citracal +D), patient verbalized understanding & patient wants to try a different PPI at this time.  - recommend annual monitoring with blood work to include CMP, CBC, vitamin B12, and magnesium. B12 and magnesium levels ordered.    History of Helicobacter pylori infection  - schedule EGD, discussed procedure with patient, including risks and benefits, patient verbalized understanding    Pharyngoesophageal dysphagia, Achalasia, & History of esophageal surgery  - schedule EGD, discussed procedure with patient and possible esophageal dilation may be performed during procedure if indicated, patient verbalized understanding  - recommend to eat smaller more frequent meals and to eat slowly and advised to eat a soft diet. Take medications one at a time with a full glass of water.  - Recommend follow-up with Dr. Le for continued evaluation and management.  - possible referral to esophageal motility specialist if symptoms persist    Elevated ALT measurement, Hepatomegaly, &  Hepatic steatosis  - avoid alcohol and minimize tylenol products, & follow-up with PCP for continued evaluation and management; if specialist is needed, recommend seeing hepatology.    Intermittent diarrhea  - recommended OTC probiotic, such as Align or Culturelle, as directed  - avoid lactose & caffeine  - avoid known triggers  - recommend stool studies if diarrhea recurs/worsens  - can continue questran 4 grams once daily prn diarrhea    Follow up in about 6 months (around 12/28/2024), or if symptoms worsen or fail to improve.    If no improvement in symptoms or symptoms worsen, call/follow-up at clinic or go to ER      44 minutes of total time spent on the encounter, which includes face to face time and non-face to face time preparing to see the  patient (e.g., review of tests), Obtaining and/or reviewing separately obtained history, Documenting clinical information in the electronic or other health record, Independently interpreting results (not separately reported) and communicating results to the patient/family/caregiver, or Care coordination (not separately reported).

## 2024-06-28 NOTE — TELEPHONE ENCOUNTER
No care due was identified.  [facility] Embedded Care Due Messages. Reference number: [id number].   6/28/2024 12:42:44 PM CDT

## 2024-06-28 NOTE — TELEPHONE ENCOUNTER
Refill Decision Note  [facility] DC. Inappropriate Request     Abhi Pompa  is requesting a refill authorization.  Brief Assessment and Rationale for Refill:  Quick Discontinue     Medication Therapy Plan:  prescription was discontinued on 9/27/2023 by Cristal Cervantes, PharmD ON  [facility]    Medication Reconciliation Completed: No   Comments:           Note composed:1:22 PM 06/28/2024

## 2024-07-02 RX ORDER — FLASH GLUCOSE SENSOR
KIT MISCELLANEOUS
Qty: 2 KIT | Refills: 6 | Status: SHIPPED | OUTPATIENT
Start: 2024-07-02

## 2024-07-05 ENCOUNTER — LAB VISIT (OUTPATIENT)
Dept: LAB | Facility: HOSPITAL | Age: 54
End: 2024-07-05
Attending: INTERNAL MEDICINE
Payer: COMMERCIAL

## 2024-07-05 DIAGNOSIS — E11.9 DIABETES MELLITUS WITHOUT COMPLICATION: ICD-10-CM

## 2024-07-05 LAB
ESTIMATED AVG GLUCOSE: 126 MG/DL (ref 68–131)
HBA1C MFR BLD: 6 % (ref 4–5.6)

## 2024-07-05 PROCEDURE — 36415 COLL VENOUS BLD VENIPUNCTURE: CPT | Mod: PO | Performed by: INTERNAL MEDICINE

## 2024-07-05 PROCEDURE — 83036 HEMOGLOBIN GLYCOSYLATED A1C: CPT | Performed by: INTERNAL MEDICINE

## 2024-07-12 ENCOUNTER — OFFICE VISIT (OUTPATIENT)
Dept: FAMILY MEDICINE | Facility: CLINIC | Age: 54
End: 2024-07-12
Payer: COMMERCIAL

## 2024-07-12 ENCOUNTER — TELEPHONE (OUTPATIENT)
Dept: FAMILY MEDICINE | Facility: CLINIC | Age: 54
End: 2024-07-12

## 2024-07-12 VITALS
HEART RATE: 90 BPM | DIASTOLIC BLOOD PRESSURE: 80 MMHG | HEIGHT: 67 IN | SYSTOLIC BLOOD PRESSURE: 122 MMHG | WEIGHT: 220.88 LBS | OXYGEN SATURATION: 97 % | BODY MASS INDEX: 34.67 KG/M2

## 2024-07-12 DIAGNOSIS — N52.1 ERECTILE DYSFUNCTION DUE TO DISEASES CLASSIFIED ELSEWHERE: Primary | ICD-10-CM

## 2024-07-12 DIAGNOSIS — E11.9 DIABETES MELLITUS WITHOUT COMPLICATION: ICD-10-CM

## 2024-07-12 DIAGNOSIS — E78.5 HYPERLIPIDEMIA ASSOCIATED WITH TYPE 2 DIABETES MELLITUS: ICD-10-CM

## 2024-07-12 DIAGNOSIS — K21.00 GASTROESOPHAGEAL REFLUX DISEASE WITH ESOPHAGITIS WITHOUT HEMORRHAGE: ICD-10-CM

## 2024-07-12 DIAGNOSIS — E11.69 HYPERLIPIDEMIA ASSOCIATED WITH TYPE 2 DIABETES MELLITUS: ICD-10-CM

## 2024-07-12 DIAGNOSIS — E29.1 MALE HYPOGONADISM: ICD-10-CM

## 2024-07-12 DIAGNOSIS — E66.01 SEVERE OBESITY (BMI 35.0-39.9) WITH COMORBIDITY: ICD-10-CM

## 2024-07-12 PROCEDURE — 99999 PR PBB SHADOW E&M-EST. PATIENT-LVL V: CPT | Mod: PBBFAC,,, | Performed by: INTERNAL MEDICINE

## 2024-07-12 RX ORDER — TADALAFIL 10 MG/1
10 TABLET ORAL DAILY PRN
Qty: 20 TABLET | Refills: 5 | Status: SHIPPED | OUTPATIENT
Start: 2024-07-12 | End: 2025-07-12

## 2024-07-12 NOTE — TELEPHONE ENCOUNTER
Philippe, Mr. Pompa is asking for you to download his kelli, he is here in clinic today. Can you please advise.

## 2024-07-12 NOTE — PROGRESS NOTES
Patient ID: Abhi Pompa is a 53 y.o. male.    Chief Complaint: Follow-up    Six-month follow-up    Problem  HPI / physical exam Plan   Type 2 diabetes mellitus  jardiance 1-2x wk. Ozempic 1 mg- would like to stick with that dose. Attributing anxiety and fatigue to ozempic.  Taking simvastatin Continue current regimen   Low T.  CBC stable. Saw hematology for erythrocytosis. Donating blood every 8 -12 weeks H/o rec PSA every 6 mo.  Testosterone 541 December 2023.  H and H in normal range in May Continue current regimen   GERD  Stable  Continue Nexium   Hyperlipidemia  Controlled  Continue simvastatin   MELLY  Wearing CPAP  Monitor   Anxiety  Seems to be increased. States work is stressful. Sees a counselor (Dr. Moreland). Was on zoloft and prozac. Prozac was  He can send message to request restart of prozac if needed.         Medications, recent labs, health maintenance, and diet has been reviewed.    Exercise- yes   Alcohol- 7 drinks/ week   Tobacco- none           Orders   1. Severe obesity (BMI 35.0-39.9) with comorbidity    2. Hyperlipidemia associated with type 2 diabetes mellitus  - Comprehensive Metabolic Panel; Future    3. Erectile dysfunction due to diseases classified elsewhere  - tadalafiL (CIALIS) 10 MG tablet; Take 1 tablet (10 mg total) by mouth daily as needed for Erectile Dysfunction.  Dispense: 20 tablet; Refill: 5    4. Male hypogonadism  - Testosterone; Future  - PSA, Screening; Future  - CBC Auto Differential; Future    5. Diabetes mellitus without complication  - Hemoglobin A1C; Future    6. Gastroesophageal reflux disease with esophagitis without hemorrhage         Review of Systems   Constitutional:  Negative for fever.   Respiratory:  Negative for shortness of breath.    Cardiovascular:  Negative for chest pain.   Gastrointestinal:  Negative for abdominal pain.     Vitals:    07/12/24 0934   BP: 122/80   Pulse: 90      Wt Readings from Last 3 Encounters:   07/12/24 0934 100.2 kg (220 lb  "14.4 oz)   06/28/24 0920 98.3 kg (216 lb 11.4 oz)   05/31/24 1024 97.6 kg (215 lb 2.7 oz)      Body mass index is 34.6 kg/m².     Physical Exam  Cardiovascular:      Rate and Rhythm: Normal rate and regular rhythm.      Heart sounds: No murmur heard.     No gallop.   Pulmonary:      Breath sounds: Normal breath sounds. No wheezing or rhonchi.   Abdominal:      Palpations: Abdomen is soft.      Tenderness: There is no abdominal tenderness.         Diabetes Medications               JARDIANCE 10 mg tablet TAKE 1 TABLET BY MOUTH EVERY DAY    semaglutide (OZEMPIC) 1 mg/dose (4 mg/3 mL) Inject 1 mg into the skin every 7 days.              Hyperlipidemia Medications               fish,saf,flx,brg oils/o3,6,9n2 (FISH-FLAX-BORAGE OIL ORAL) Take by mouth.    simvastatin (ZOCOR) 10 MG tablet TAKE 1 TABLET BY MOUTH EVERY DAY IN THE EVENING    cholestyramine (QUESTRAN) 4 gram packet Take 1 packet (4 g total) by mouth once daily.           Medication List with Changes/Refills   New Medications    TADALAFIL (CIALIS) 10 MG TABLET    Take 1 tablet (10 mg total) by mouth daily as needed for Erectile Dysfunction.   Current Medications    ASHWAGANDHA EXTRACT ORAL        BLUNT NEEDLE, DISPOSABLE 18 X 1 1/2 " NDLE    Use as directed with testosterone injections    CHOLESTYRAMINE (QUESTRAN) 4 GRAM PACKET    Take 1 packet (4 g total) by mouth once daily.    CREATINE MONOHYDRATE (CREATINE ORAL)    Take by mouth.    ESOMEPRAZOLE (NEXIUM) 40 MG CAPSULE    Take 1 capsule (40 mg total) by mouth before breakfast.    FAMOTIDINE (PEPCID) 40 MG TABLET    TAKE 1 TABLET BY MOUTH EVERY DAY    FISH,SAF,FLX,BRG OILS/O3,6,9N2 (FISH-FLAX-BORAGE OIL ORAL)    Take by mouth.    FLASH GLUCOSE SCANNING READER (FREESTYLE YAN 2 READER) [facility]    Use as directed to check blood glucose.    FLASH GLUCOSE SENSOR (FREESTYLE YAN 2 SENSOR) KIT    Apply to skin Q 14 days    FLASH GLUCOSE SENSOR (FREESTYLE YAN 2 SENSOR) KIT    Change every 14 days.    FREESTYLE " "YAN 2 SENSOR KIT    APPLY TO SKIN EVERY 14 DAYS    JARDIANCE 10 MG TABLET    TAKE 1 TABLET BY MOUTH EVERY DAY    KETOCONAZOLE (NIZORAL) 2 % SHAMPOO    WASH SCALP AND BEARD AREA AT LEAST 2-3X/WEEK - LET SIT AT LEAST 5 MINUTES PRIOR TO RINSING.    MELATONIN 10 MG TAB    Take 10 mg by mouth nightly.    MINOXIDIL (ROGAINE) 2 % EXTERNAL SOLUTION    Apply 1 application topically 2 (two) times daily.    SEMAGLUTIDE (OZEMPIC) 1 MG/DOSE (4 MG/3 ML)    Inject 1 mg into the skin every 7 days.    SILDENAFIL (REVATIO) 20 MG TAB    TAKE UP TO 5 TABLETS BY MOUTH DAILY AS NEEDED 30 MINUTES PRIOR TO INTERCOURSE    SIMVASTATIN (ZOCOR) 10 MG TABLET    TAKE 1 TABLET BY MOUTH EVERY DAY IN THE EVENING    SYRINGE WITH NEEDLE 1 ML 22 GAUGE X 1 1/2" SYRG    Use as directed to administer testosterone injections.    TESTOSTERONE CYPIONATE (DEPOTESTOTERONE CYPIONATE) 200 MG/ML INJECTION    Inject 1 mL (200 mg total) into the muscle once a week.    TURMERIC ORAL    Take by mouth.    UNABLE TO FIND    Amitriptyline 1%/Meloxicam 0.09%/Lidocaine Hcl 2%/ Prilocaine 2%   APPLY 2 GRAMS QID    UNABLE TO FIND    Beet root    VITAMIN B COMPLEX ORAL    Take 1 tablet by mouth once daily.       I personally reviewed past medical, family and social history.         "

## 2024-08-10 DIAGNOSIS — K21.00 GASTROESOPHAGEAL REFLUX DISEASE WITH ESOPHAGITIS WITHOUT HEMORRHAGE: ICD-10-CM

## 2024-08-10 DIAGNOSIS — Z79.899 ENCOUNTER FOR MONITORING LONG-TERM PROTON PUMP INHIBITOR THERAPY: ICD-10-CM

## 2024-08-10 DIAGNOSIS — Z51.81 ENCOUNTER FOR MONITORING LONG-TERM PROTON PUMP INHIBITOR THERAPY: ICD-10-CM

## 2024-08-12 RX ORDER — FAMOTIDINE 40 MG/1
40 TABLET, FILM COATED ORAL
Qty: 90 TABLET | Refills: 3 | Status: SHIPPED | OUTPATIENT
Start: 2024-08-12

## 2024-08-30 RX ORDER — METFORMIN HYDROCHLORIDE 500 MG/1
TABLET, EXTENDED RELEASE ORAL
Qty: 360 TABLET | Refills: 3 | Status: SHIPPED | OUTPATIENT
Start: 2024-08-30

## 2024-09-02 ENCOUNTER — PATIENT MESSAGE (OUTPATIENT)
Dept: FAMILY MEDICINE | Facility: CLINIC | Age: 54
End: 2024-09-02
Payer: COMMERCIAL

## 2024-09-02 DIAGNOSIS — F41.1 GENERALIZED ANXIETY DISORDER: Primary | ICD-10-CM

## 2024-09-03 RX ORDER — FLUOXETINE 10 MG/1
10 CAPSULE ORAL DAILY
Qty: 90 CAPSULE | Refills: 1 | Status: SHIPPED | OUTPATIENT
Start: 2024-09-03 | End: 2025-09-03

## 2024-09-23 ENCOUNTER — LAB VISIT (OUTPATIENT)
Dept: LAB | Facility: HOSPITAL | Age: 54
End: 2024-09-23
Attending: STUDENT IN AN ORGANIZED HEALTH CARE EDUCATION/TRAINING PROGRAM
Payer: COMMERCIAL

## 2024-09-23 ENCOUNTER — PATIENT MESSAGE (OUTPATIENT)
Dept: FAMILY MEDICINE | Facility: CLINIC | Age: 54
End: 2024-09-23
Payer: COMMERCIAL

## 2024-09-23 ENCOUNTER — E-VISIT (OUTPATIENT)
Dept: FAMILY MEDICINE | Facility: CLINIC | Age: 54
End: 2024-09-23
Payer: COMMERCIAL

## 2024-09-23 DIAGNOSIS — Z11.3 SCREENING FOR STD (SEXUALLY TRANSMITTED DISEASE): Primary | ICD-10-CM

## 2024-09-23 DIAGNOSIS — Z11.3 SCREENING FOR STD (SEXUALLY TRANSMITTED DISEASE): ICD-10-CM

## 2024-09-23 LAB
HAV IGM SERPL QL IA: NORMAL
HBV CORE IGM SERPL QL IA: NORMAL
HBV SURFACE AG SERPL QL IA: NORMAL
HCV AB SERPL QL IA: NORMAL
HIV 1+2 AB+HIV1 P24 AG SERPL QL IA: NORMAL
TREPONEMA PALLIDUM IGG+IGM AB [PRESENCE] IN SERUM OR PLASMA BY IMMUNOASSAY: NONREACTIVE

## 2024-09-23 PROCEDURE — 87389 HIV-1 AG W/HIV-1&-2 AB AG IA: CPT | Performed by: STUDENT IN AN ORGANIZED HEALTH CARE EDUCATION/TRAINING PROGRAM

## 2024-09-23 PROCEDURE — 80074 ACUTE HEPATITIS PANEL: CPT | Performed by: STUDENT IN AN ORGANIZED HEALTH CARE EDUCATION/TRAINING PROGRAM

## 2024-09-23 PROCEDURE — 36415 COLL VENOUS BLD VENIPUNCTURE: CPT | Mod: PO | Performed by: STUDENT IN AN ORGANIZED HEALTH CARE EDUCATION/TRAINING PROGRAM

## 2024-09-23 PROCEDURE — 86593 SYPHILIS TEST NON-TREP QUANT: CPT | Performed by: STUDENT IN AN ORGANIZED HEALTH CARE EDUCATION/TRAINING PROGRAM

## 2024-09-23 NOTE — PROGRESS NOTES
Patient ID: Abhi Pompa is a 53 y.o. male.    Chief Complaint: General Illness (Entered automatically based on patient selection in QPD.)          274}  The patient initiated a request through QPD on 9/23/2024 for evaluation and management with a chief complaint of General Illness (Entered automatically based on patient selection in QPD.)     I evaluated the questionnaire submission on 09/23/2024 .    Total Time (in minutes): 12     Ohs Peq Evisit General    9/23/2024  1:15 PM CDT - Filed by Patient   Do you agree to participate in an E-Visit? Yes   If you have any of the following symptoms, please present to your local emergency room or call 911:  I acknowledge   Choose the state of your primary residence [place]   What is the main issue you would like addressed today? STD Panel   Please describe your symptoms No symptoms   Where is your problem located? None   How severe are your symptoms? Mild   Have you had these symptoms before? No   How long have you been having these symptoms? Just today   Please list any medications or treatments you have used for your condition and indicate if it was effective or not.    What makes this feel better? N/A   What makes this feel worse? N/A   Are these symptoms related to a condition that you currently have? I am not sure   What is the condition? N/a   When were you last seen for this condition? 9/23/2024   Please describe any probable cause for these symptoms N/A   Provide any additional information you feel is important. I want a baseline STD panel including HIV labs.   Please attach any relevant images or files    Are you able to take your vital signs? No          Active Problem List with Overview Notes    Diagnosis Date Noted    Polycythemia 01/19/2023    Obstructive sleep apnea syndrome 01/19/2023    Severe obesity (BMI 35.0-39.9) with comorbidity 12/05/2022    Epigastric pain 06/29/2022    Screening for colon cancer 04/19/2022    Personal history of  colonic polyps 04/19/2022    Generalized anxiety disorder- off zoloft in 2020 01/02/2017    Mixed hyperlipidemia 09/15/2016    GERD (gastroesophageal reflux disease) 01/22/2016    Testosterone deficiency 01/22/2016    Diabetes mellitus without complication 07/19/2013     Overview:   ICD-10 Transition        Recent Labs Obtained:  Lab Results   Component Value Date    WBC 5.83 05/29/2024    HGB 15.9 05/29/2024    HCT 45.3 05/29/2024    MCV 86 05/29/2024     05/29/2024     05/29/2024    K 4.3 05/29/2024     (H) 05/29/2024    CREATININE 0.9 05/29/2024    EGFRNORACEVR >60.0 05/29/2024    HGBA1C 6.0 (H) 07/05/2024    TSH 1.193 01/29/2016      Review of patient's allergies indicates:   Allergen Reactions    Poison ivy extract        Encounter Diagnosis   Name Primary?    Screening for STD (sexually transmitted disease) Yes        Orders Placed This Encounter   Procedures    C. trachomatis/N. gonorrhoeae by AMP DNA     Standing Status:   Future     Standing Expiration Date:   11/22/2025     Order Specific Question:   Source:     Answer:   Urine    Hepatitis Panel, Acute     Standing Status:   Future     Standing Expiration Date:   12/22/2025    HIV 1/2 Ag/Ab (4th Gen)     Standing Status:   Future     Standing Expiration Date:   11/22/2025     Order Specific Question:   Release to patient     Answer:   Immediate    Treponema Pallidium Antibodies IgG, IgM     Standing Status:   Future     Standing Expiration Date:   12/22/2025            E-Visit Time Tracking:    Day 1 Time (in minutes): 12    Total Time (in minutes): 12      274}

## 2024-09-29 DIAGNOSIS — E29.1 MALE HYPOGONADISM: ICD-10-CM

## 2024-09-29 RX ORDER — TESTOSTERONE CYPIONATE 200 MG/ML
200 INJECTION, SOLUTION INTRAMUSCULAR WEEKLY
Qty: 4 ML | Refills: 4 | OUTPATIENT
Start: 2024-09-29 | End: 2025-03-30

## 2024-09-29 NOTE — TELEPHONE ENCOUNTER
Care Due:                  Date            Visit Type   Department     Provider  --------------------------------------------------------------------------------                                MYCHART                              FOLLOWUP/OF  [facility] FAMILY  Last Visit: 07-      FICE VISIT   MEDICINE       Rafita Amezcua                              EP -                              PRIMARY      [facility]  Next Visit: 12-      CARE ([facility])   CATHLEEN Amezcua                                                            Last  Test          Frequency    Reason                     Performed    Due Date  --------------------------------------------------------------------------------    Lipid Panel.  12 months..  simvastatin..............  12- 12-    [employer] Embedded Care Due Messages. Reference number: [id number].   9/29/2024 7:17:53 AM CDT

## 2024-10-14 DIAGNOSIS — E29.1 MALE HYPOGONADISM: ICD-10-CM

## 2024-10-14 DIAGNOSIS — E78.2 MIXED HYPERLIPIDEMIA: ICD-10-CM

## 2024-10-14 DIAGNOSIS — E11.9 DIABETES MELLITUS WITHOUT COMPLICATION: ICD-10-CM

## 2024-10-14 RX ORDER — SIMVASTATIN 10 MG/1
10 TABLET, FILM COATED ORAL NIGHTLY
Qty: 90 TABLET | Refills: 0 | Status: SHIPPED | OUTPATIENT
Start: 2024-10-14

## 2024-10-14 RX ORDER — EMPAGLIFLOZIN 10 MG/1
10 TABLET, FILM COATED ORAL
Qty: 90 TABLET | Refills: 0 | Status: SHIPPED | OUTPATIENT
Start: 2024-10-14

## 2024-10-14 RX ORDER — TESTOSTERONE CYPIONATE 200 MG/ML
200 INJECTION, SOLUTION INTRAMUSCULAR WEEKLY
Qty: 4 ML | Refills: 4 | Status: SHIPPED | OUTPATIENT
Start: 2024-10-14 | End: 2025-04-14

## 2024-10-14 NOTE — TELEPHONE ENCOUNTER
Care Due:                  Date            Visit Type   Department     Provider  --------------------------------------------------------------------------------                                MYCHART                              FOLLOWUP/OF  [facility] FAMILY  Last Visit: 07-      FICE VISIT   MEDICINE       Rfaita Amezcua                              EP -                              PRIMARY      [facility]  Next Visit: 12-      CARE ([facility])   MEDICINE       Rafita Amezcua                                                            Last  Test          Frequency    Reason                     Performed    Due Date  --------------------------------------------------------------------------------    HBA1C.......  6 months...  mario HOFF...  07- 01-    [facility] Embedded Care Due Messages. Reference number: [id number].   10/14/2024 10:43:26 AM CDT

## 2024-10-14 NOTE — TELEPHONE ENCOUNTER
Refill Routing Note   Medication(s) are not appropriate for processing by [facility] for the following reason(s):        Outside of protocol    [facility] action(s):  Route  Approve    Future labs scheduled 11/25/24            Appointments  past 12m or future 3m with PCP    Date Provider   Last Visit   7/12/2024 Rafita Amezcua, DO   Next Visit   12/2/2024 Rafita Amezcua, DO   ED visits in past 90 days: 0        Note composed:2:53 PM 10/14/2024

## 2024-10-17 ENCOUNTER — TELEPHONE (OUTPATIENT)
Dept: GASTROENTEROLOGY | Facility: CLINIC | Age: 54
End: 2024-10-17
Payer: COMMERCIAL

## 2024-10-17 DIAGNOSIS — K21.00 GASTROESOPHAGEAL REFLUX DISEASE WITH ESOPHAGITIS WITHOUT HEMORRHAGE: ICD-10-CM

## 2024-10-17 DIAGNOSIS — Z87.19 HISTORY OF GASTRITIS: ICD-10-CM

## 2024-10-18 ENCOUNTER — CLINICAL SUPPORT (OUTPATIENT)
Dept: DIABETES | Facility: CLINIC | Age: 54
End: 2024-10-18
Payer: COMMERCIAL

## 2024-10-18 VITALS — WEIGHT: 220.25 LBS | HEIGHT: 67 IN | BODY MASS INDEX: 34.57 KG/M2

## 2024-10-18 DIAGNOSIS — E11.9 TYPE 2 DIABETES MELLITUS WITHOUT RETINOPATHY: ICD-10-CM

## 2024-10-18 PROCEDURE — 99999 PR PBB SHADOW E&M-EST. PATIENT-LVL II: CPT | Mod: PBBFAC,,, | Performed by: DIETITIAN, REGISTERED

## 2024-10-18 RX ORDER — OMEPRAZOLE 40 MG/1
CAPSULE, DELAYED RELEASE ORAL
Refills: 0 | OUTPATIENT
Start: 2024-10-18

## 2024-10-18 NOTE — PROGRESS NOTES
"Diabetes Care Specialist Follow-up Note  Author: Leslie Ford RD, CDE  Date: 10/18/2024    Intake    Program Intake  Reason for Diabetes Program Visit:: Intervention  Type of Intervention:: Individual  Individual: Education  Education: Self-Management Skill Review  Current diabetes risk level:: low  In the last 12 months, have you:: none  Permission to speak with others about care:: no    Current Diabetes Treatment: Diet/Exercise, DM Injectables, Oral Medications  Diet/Exercise Type/Dose: Walking and going to the gym. Reducing portions of carbs at meals.  Oral Medication Type/Dose: Jardiance 10 mg --takes approx 3 days a week  DM Injectables Type/Dose: Ozempic 1 mg weekly (Wednesdays--currently holding for 1-2 weeks to prep for endoscopy)    Continuous Glucose Monitoring  Patient has CGM: Yes  Personal CGM type:: Freestyle Amena 2--uses intermittently to manage behaviors and understand glucose patterns between Hgb A1c checks  GMI Date: 09/29/24  GMI Value: 6.9 %    Lab Results   Component Value Date    HGBA1C 6.0 (H) 07/05/2024     A1c Pre Diabetes Care Specialist Intervention:  6.7%    Weight: 99.9 kg (220 lb 3.8 oz)   Height: 5' 7" (170.2 cm)   Body mass index is 34.49 kg/m².  Wt stable since last visit on 2/23/2024    Physical activity/Exercise:   Walks 1.5-2.5 miles a few times a week.  Attends gym 2-3 days a week for weight training.    Glucose monitoring: Freestyle Amena 2 download (9/16/2024 to 9/29/2024): See media file for full report. Patient only wears Amena intermittently as a tool to help control his diabetes in between his Hgb A1c checks every 6 months. 91% of glucose values are in target range. Prandial excursions noted on some weekend meals as patient may dine out.  Occasional episodes of hypoglycemia noted in the download--patient found this to be sporaadic and after discussion today, is happening on days when exercising so discussed importance of not avoiding carb at meal prior to " exercise.        Lifestyle Coping Support & Clinical  Patient regularly sees therapist--finds this helpful in managing his overall well being.     Diabetes Self-Management Skills Assessment    Medication Skills Assessment  Patient is able to identify current diabetes medications, dosages, and appropriate timing of medications.: yes  Patient reports problems or concerns with current medication regimen.: no  Patient is  aware that some diabetes medications can cause low blood sugar?: Yes  Medication Skills Assessment Completed:: Yes  Assessment indicates:: Adequate understanding  Area of need?: No    Nutrition/Healthy Eating  Meal Plan 24 Hour Recall - Breakfast: 1 cup coffee (no sugar added), drinks mixture of aloe vera juice + apple cider vinegar + creatine + water, 1 tbsp peanut butter  Meal Plan 24 Hour Recall - Dinner: red beans and rice (tasso and andouille added)--small portion  Meal Plan 24 Hour Recall - Beverage: water  Who shops/cooks?: patient grocery shops and prepares meals at home  Patient can identify foods that impact blood sugar.: yes  Nutrition/Healthy Eating Skills Assessment Completed:: Yes  Assessment indicates:: Adequate understanding  Area of need?: No    Physical Activity/Exercise  Patient's daily activity level:: moderately active (works from home--has awareness to get up from desk and walk around throughout the day)  Patient formally exercises outside of work.: yes  Frequency: four or more times a week (walks 1.5-2.5 miles at least 3 days a week, goes to gym for weight training 2-3 times a week)  Patient can identify forms of physical activity.: yes  Physical Activity/Exercise Skills Assessment Completed: : Yes  Assessment indicates:: Adequate understanding  Area of need?: No    Home Blood Glucose Monitoring  Monitoring Method:: personal continuous glucose monitor  Personal CGM type:: Freestyle Amena 2--uses intermittently to manage behaviors and understand glucose patterns between Hgb A1c  checks   What is your current Time in Range?: 91%  What is your A1c Target?: 6%  Home Blood Glucose Monitoring Skills Assessment Completed: : Yes  Assessment indicates:: Adequate understanding  Area of need?: No    Chronic Complications  Reviewed health maintenance: yes  Have you completed your annual diabetes maintenance labwork? : yes  Do you examine your feet daily?: yes  Has your doctor examined your feet?: yes  Do you see a Dentist?: yes  Do you see an eye doctor?: yes  Eye doctor date of last visit:: Next visit 1/8/25  Chronic Complications Skills Assessment Completed: : Yes  Assessment indicates:: Adequate understanding  Area of need?: No    During today's follow-up visit,  the following areas required further assessment and content was provided/reviewed.    Based on today's diabetes care assessment, the following areas of need were identified:          10/18/2024   Areas of Need   Medications/Current Diabetes Treatment No   Nutrition/Healthy Eating No   Physical Activity/Exercise No   Home Blood Glucose Monitoring No   Chronic Complications No      Today's interventions were provided through individual discussion, instruction, and written materials were provided.    Patient verbalized understanding of instruction and written materials.  Pt was able to return back demonstration of instructions today. Patient understood key points, needs reinforcement and further instruction.     Diabetes Self-Management Care Plan Review and Evaluation of Progress:    During today's follow-up Abhi's Diabetes Self-Management Care Plan progress was reviewed and progress was evaluated including his/her input. Abhi has agreed to continue his/her journey to improve/maintain overall diabetes control by continuing to set health goals. See care plan progress below.      Care Plan: Diabetes Management   Updates made since 9/18/2024 12:00 AM     Problem: Medications         Goal: Due to recent weight loss and continued  exercise and meal planning--PCP (Dr. Amezcua) agreeable to trial discontinuing Jardiance 10 mg. Continue Ozempic 1 mg weekly. Will send [employer] message in 2 weeks to check glucose levels with med change. Completed 10/18/2024   Start Date: 2/23/2024   Expected End Date: 12/2/2024   This Visit's Progress: Met   Priority: High   Barriers: No Barriers Identified   Note:    10/18/24:  Patient now taking Jardiance 10 mg a few times a week and has found this to keep good control of diabetes control.  Continues on Ozempic 1 mg weekly dose--not interested in increasing dose, has maintained weight between 215-220 lbs.  Personal goal weight 215 lbs.  Most recent Hgb A1c improved from 6.7% (Dec 2023) to 6% (July 2024).  Diabetes under good control.        Problem: Acute Complications         Goal: Patient agrees to identify and manage signs and symptoms of high/low blood sugar (hyper/hypoglycemia) by keeping a log of events and using proper treatment. Completed 10/18/2024   Start Date: 2/23/2024   Expected End Date: 12/2/2024   This Visit's Progress: Met   Priority: Medium   Barriers: No Barriers Identified   Note:    10/18/24:  Patient noticing some days with frequent low glucose and discussed today at visit.  These days are typically walking or gym days and patient has been avoiding carbs in the morning.  He will make an effort to eat some carbs + protein (such as fruit + peanut butter) on these days to see if it levels glucose patterns. Does not happen regularly, just on occasion.  Aware of signs of hypoglycemia and how to treat with simple carb.        Follow Up Plan     Follow up in 6 months (on 4/18/2025) for continued DSMES on 3/21/25--patient seen every 6 months by diabetes care specialist as means to continue good control of diabetes.  Currently on Jardiance 10 mg (takes 3 times a week, not daily) and Ozempic 1 mg weekly.  Most recent Hgb A1c at goal of 6% without hypoglycemia.  Patient maintaining weight between  215-220 lbs, personal goal to be 215 lbs.  Continue walking for aerobic exercise and also goes to gym for weight training (maintenance now and no longer trying to build muscle). Will work on incorporating some carb in mornings as he has been eliminating.  Doing well limiting portions at meals and has been cooking at home more.  Next Hgb A1c scheduled 11/25/24 with PCP follow up on 12/2/24.     Today's care plan and follow up schedule was discussed with patient.  Abhi verbalized understanding of the care plan, goals, and agrees to follow up plan.        The patient was encouraged to communicate with his/her health care provider/physician and care team regarding his/her condition(s) and treatment.  I provided the patient with my contact information today and encouraged to contact me via phone or [facility] Portal as needed.     Length of Visit   Total Time: 30 Minutes

## 2024-10-20 NOTE — H&P
"History & Physical - Short Stay  Gastroenterology      SUBJECTIVE:     Procedure: Gastroscopy    Chief Complaint/Indication for Procedure:  Epig pain.  Hx of achalasia, s/p Heller myotomy.    History of Present Illness:  See recent GI OV note:  Office Visit   6/28/2024  [place] - Gastroenterology       Teri Kirk FNP  Gastroenterology Epigastric pain +11 more  Dx Follow-up  Abdominal Pain  Reason for Visit     Progress Notes    Teri Kirk FNP at 6/28/2024  9:30 AM    Status: Signed   Expand All Collapse All  Subjective:         Subjective  Patient ID: Abhi Pompa is a 53 y.o. male Body mass index is 33.94 kg/m².     Chief Complaint: Follow-up and Abdominal Pain     This patient is established with Dr. Dunbar & myself.     Gastroesophageal Reflux  He complains of abdominal pain (started since been on ozempic which was started ~1/2023; epigastric described as "knotty"; worse with stress; occurs a few times a week and after weekly ozempic dose), belching (not more than usual), dysphagia (occasional; occurs with cold drinks, chicken and doughy foods; significantly improved since surgery) and heartburn (occurs most nights). He reports no chest pain, no choking, no coughing, no early satiety, no globus sensation, no hoarse voice, no nausea, no sore throat or no water brash. This is a chronic (for several years) problem. The problem occurs frequently. The problem has been gradually worsening. The heartburn wakes him from sleep. The heartburn changes with position. The symptoms are aggravated by lying down and stress (tomato products, red foods, lying on left side). Associated symptoms include weight loss (taking ozempic for diabetes (started ~1/2023)). Pertinent negatives include no fatigue or melena. Risk factors include ETOH use, caffeine use, obesity, smoking/tobacco exposure and NSAIDs (reports NSAIDs use PRN). He has tried a histamine-2 antagonist, an antacid, a PPI, a diet change " "and ETOH reduction (prilosec 40 mg once daily (less effective lately); pepcid 40 mg nightly; PAST: carafate, zantac) for the symptoms. The treatment provided mild relief. Past procedures include an EGD and esophageal manometry. Past invasive treatments include gastroplication (heller with fundoplication).      Review of Systems   Constitutional:  Positive for weight loss (taking ozempic for diabetes (started ~1/2023)). Negative for appetite change, chills, diaphoresis, fatigue and fever.   HENT:  Positive for trouble swallowing. Negative for hoarse voice and sore throat.    Respiratory:  Negative for cough, choking, chest tightness and shortness of breath.    Cardiovascular:  Negative for chest pain.   Gastrointestinal:  Positive for abdominal pain (started since been on ozempic which was started ~1/2023; epigastric described as "knotty"; worse with stress; occurs a few times a week and after weekly ozempic dose), diarrhea (Reports he takes questran 4 grams daily PRN- which is rarely for intermittent diarrhea; denies diarrhea currently; occurs rarely since off metformin), dysphagia (occasional; occurs with cold drinks, chicken and doughy foods; significantly improved since surgery) and heartburn (occurs most nights). Negative for anal bleeding, blood in stool, constipation, melena, nausea, rectal pain and vomiting.       Wt Readings from Last 15 Encounters:   10/15/24 99.8 kg (220 lb)   10/18/24 99.9 kg (220 lb 3.8 oz)   07/12/24 100.2 kg (220 lb 14.4 oz)   06/28/24 98.3 kg (216 lb 11.4 oz)   05/31/24 97.6 kg (215 lb 2.7 oz)   02/23/24 98.7 kg (217 lb 9.5 oz)   12/27/23 103.3 kg (227 lb 10 oz)   12/01/23 101.2 kg (223 lb 1.7 oz)   11/14/23 102 kg (224 lb 13.9 oz)   08/04/23 106 kg (233 lb 11 oz)   07/28/23 101.8 kg (224 lb 6.9 oz)   06/19/23 104.3 kg (229 lb 15 oz)   03/16/23 104.2 kg (229 lb 11.5 oz)   03/03/23 104.6 kg (230 lb 9.6 oz)   02/02/23 105.1 kg (231 lb 11.3 oz)         Assessment:      Assessment  1. " Epigastric pain    2. Encounter for monitoring long-term proton pump inhibitor therapy    3. Gastroesophageal reflux disease with esophagitis without hemorrhage    4. History of gastritis    5. History of Helicobacter pylori infection    6. Pharyngoesophageal dysphagia    7. Achalasia    8. History of esophageal surgery    9. Elevated ALT measurement    10. Hepatomegaly    11. Hepatic steatosis             Plan:      Plan  Epigastric pain  - schedule EGD, discussed procedure with patient, including risks and benefits, patient verbalized understanding  - avoid/minimize use of NSAIDs- since they can cause GI upset, bleeding and/or ulcers. If NSAID must be taken, recommend take with food.  - discussed with patient that certain medications, such as Ozempic, may be contributing to symptoms. I recommend follow-up with provider who manages medication to discuss about possible alternative therapy, patient verbalized understanding     Encounter for monitoring long-term proton pump inhibitor therapy, Gastroesophageal reflux disease with esophagitis without hemorrhage, & History of gastritis  - DISCONTINUE PRILOSEC DUE TO ALTERNATE THERAPY  -  START   esomeprazole (NEXIUM) 40 MG capsule; Take 1 capsule (40 mg total) by mouth before breakfast.  Dispense: 90 capsule; Refill: 3  - CONTINUE    famotidine (PEPCID) 40 MG tablet; Take 1 tablet (40 mg total) by mouth once daily.  - schedule EGD, discussed procedure with patient, including risks and benefits, patient verbalized understanding  - discussed with patient about long term use of reflux medications (preference to use lowest effective dose or discontinuing if possible), the risk and benefits of using these medications long term, the risk of untreated GERD such as hernandez's esophagus, and recommend a diet high in calcium and/or taking OTC calcium and vitamin d supplements as directed (such as Citracal +D), patient verbalized understanding & patient wants to try a different PPI  at this time.  - recommend annual monitoring with blood work to include CMP, CBC, vitamin B12, and magnesium. B12 and magnesium levels ordered.     History of Helicobacter pylori infection  - schedule EGD, discussed procedure with patient, including risks and benefits, patient verbalized understanding     Pharyngoesophageal dysphagia, Achalasia, & History of esophageal surgery  - schedule EGD, discussed procedure with patient and possible esophageal dilation may be performed during procedure if indicated, patient verbalized understanding  - recommend to eat smaller more frequent meals and to eat slowly and advised to eat a soft diet. Take medications one at a time with a full glass of water.  - Recommend follow-up with Dr. Le for continued evaluation and management.  - possible referral to esophageal motility specialist if symptoms persist     Elevated ALT measurement, Hepatomegaly, &  Hepatic steatosis  - avoid alcohol and minimize tylenol products, & follow-up with PCP for continued evaluation and management; if specialist is needed, recommend seeing hepatology.     Intermittent diarrhea  - recommended OTC probiotic, such as Align or Culturelle, as directed  - avoid lactose & caffeine  - avoid known triggers  - recommend stool studies if diarrhea recurs/worsens  - can continue questran 4 grams once daily prn diarrhea     Follow up in about 6 months (around 12/28/2024), or if symptoms worsen or fail to improve.          PTA Medications   Medication Sig    ASHWAGANDHA EXTRACT ORAL     creatine monohydrate (CREATINE ORAL) Take by mouth.    esomeprazole (NEXIUM) 40 MG capsule Take 1 capsule (40 mg total) by mouth before breakfast.    famotidine (PEPCID) 40 MG tablet TAKE 1 TABLET BY MOUTH EVERY DAY    fish,saf,flx,brg oils/o3,6,9n2 (FISH-FLAX-BORAGE OIL ORAL) Take by mouth.    FLUoxetine 10 MG capsule Take 1 capsule (10 mg total) by mouth once daily.    JARDIANCE 10 mg tablet TAKE 1 TABLET BY MOUTH EVERY DAY  "   melatonin 10 mg Tab Take 10 mg by mouth nightly.    minoxidil (ROGAINE) 2 % external solution Apply 1 application topically 2 (two) times daily.    simvastatin (ZOCOR) 10 MG tablet TAKE 1 TABLET BY MOUTH EVERY DAY IN THE EVENING    testosterone cypionate (DEPOTESTOTERONE CYPIONATE) 200 mg/mL injection INJECT 1 ML (200 MG TOTAL) INTO THE MUSCLE ONCE A WEEK.    TURMERIC ORAL Take by mouth.    VITAMIN B COMPLEX ORAL Take 1 tablet by mouth once daily.    blunt needle, disposable 18 x 1 1/2 " Ndle Use as directed with testosterone injections    cholestyramine (QUESTRAN) 4 gram packet Take 1 packet (4 g total) by mouth once daily. (Patient taking differently: Take 4 g by mouth daily as needed.)    flash glucose scanning reader (FREESTYLE YAN 2 READER) Misc Use as directed to check blood glucose.    flash glucose sensor (FREESTYLE YAN 2 SENSOR) Kit Apply to skin Q 14 days    flash glucose sensor (FREESTYLE YAN 2 SENSOR) Kit Change every 14 days.    FREESTYLE YAN 2 SENSOR Kit APPLY TO SKIN EVERY 14 DAYS    ketoconazole (NIZORAL) 2 % shampoo WASH SCALP AND BEARD AREA AT LEAST 2-3X/WEEK - LET SIT AT LEAST 5 MINUTES PRIOR TO RINSING.    metFORMIN (GLUCOPHAGE-XR) 500 MG ER 24hr tablet TAKE 2 TABLETS BY MOUTH TWICE A DAY WITH MEALS    semaglutide (OZEMPIC) 1 mg/dose (4 mg/3 mL) Inject 1 mg into the skin every 7 days.    sildenafil (REVATIO) 20 mg Tab TAKE UP TO 5 TABLETS BY MOUTH DAILY AS NEEDED 30 MINUTES PRIOR TO INTERCOURSE    syringe with needle 1 mL 22 gauge x 1 1/2" Syrg Use as directed to administer testosterone injections.    tadalafiL (CIALIS) 10 MG tablet Take 1 tablet (10 mg total) by mouth daily as needed for Erectile Dysfunction.    UNABLE TO FIND Amitriptyline 1%/Meloxicam 0.09%/Lidocaine Hcl 2%/ Prilocaine 2%   APPLY 2 GRAMS QID    UNABLE TO FIND Beet root       Review of patient's allergies indicates:   Allergen Reactions    Poison ivy extract         Past Medical History:   Diagnosis Date    Achalasia "     Arthritis     right ankle    Cataract     Colon polyp     Diabetes mellitus     taking lisinopril preventive to protect the kidneys, no HTN    Dyslipidemia     pt denies    Dysphagia     Former tobacco use     chew    GERD (gastroesophageal reflux disease)     H. pylori infection     Hypogonadism in male     MELLY (obstructive sleep apnea)     uses cpap     Past Surgical History:   Procedure Laterality Date    ANKLE SURGERY Right     COLONOSCOPY N/A 04/19/2022    Procedure: COLONOSCOPY;  Surgeon: Julian Dunbar Jr., MD;  Location: [facility];  Service: Endoscopy;  Laterality: N/A; internal hemorrhoids, 1 colon polyp removed; repeat in 8-10 years for surveillance; biopsy: Colonic mucosa with prominent lymphoid aggregates. Pending molecular study. See comment.    ESOPHAGOGASTRODUODENOSCOPY N/A 6/29/2022    Procedure: EGD (ESOPHAGOGASTRODUODENOSCOPY);  Surgeon: Julian Dunbar Jr., MD;  Location: [facility];  Service: Endoscopy;  Laterality: N/A;    HAND SURGERY Right     laparoscopic Heller myotomy      MYOTOMY  07/2016    heller- achalasia surgery    TONSILLECTOMY      UPPER GASTROINTESTINAL ENDOSCOPY  12/11/2015    WISDOM TOOTH EXTRACTION       Family History   Problem Relation Name Age of Onset    Diabetes Mother      Allergies Mother      Lupus Mother      Diabetes Maternal Aunt      Allergic rhinitis Sister      Angioedema Neg Hx      Asthma Neg Hx      Eczema Neg Hx      Immunodeficiency Neg Hx      Urticaria Neg Hx      Amblyopia Neg Hx      Blindness Neg Hx      Cancer Neg Hx      Cataracts Neg Hx      Glaucoma Neg Hx      Hypertension Neg Hx      Macular degeneration Neg Hx      Retinal detachment Neg Hx      Strabismus Neg Hx      Stroke Neg Hx      Thyroid disease Neg Hx      Celiac disease Neg Hx      Cirrhosis Neg Hx      Colon cancer Neg Hx      Colon polyps Neg Hx      Crohn's disease Neg Hx      Cystic fibrosis Neg Hx      Esophageal cancer Neg Hx      Hemochromatosis Neg Hx      Inflammatory bowel  "disease Neg Hx      Irritable bowel syndrome Neg Hx      Liver cancer Neg Hx      Liver disease Neg Hx      Rectal cancer Neg Hx      Stomach cancer Neg Hx      Ulcerative colitis Neg Hx      Harvey's disease Neg Hx      Lymphoma Neg Hx      Tuberculosis Neg Hx      Scleroderma Neg Hx      Rheum arthritis Neg Hx      Multiple sclerosis Neg Hx      Melanoma Neg Hx      Psoriasis Neg Hx      Skin cancer Neg Hx       Social History     Tobacco Use    Smoking status: Some Days     Types: Cigars    Smokeless tobacco: Former     Types: Chew     Quit date: 10/19/2012    Tobacco comments:     Occasional cigar   Substance Use Topics    Alcohol use: Yes     Alcohol/week: 7.0 standard drinks of alcohol     Types: 7 Glasses of wine per week     Comment: 1 glass of red wine every night    Drug use: No         OBJECTIVE:     Vital Signs (Most Recent)  Temp: 98.2 °F (36.8 °C) (10/21/24 0936)  Pulse: 71 (10/21/24 0936)  Resp: 16 (10/21/24 0936)  BP: 130/78 (10/21/24 0936)  SpO2: 95 % (10/21/24 0936)    Physical Exam:  :Ht: 5' 7" (170.2 cm)   Wt: 99.8 kg   BMI: 34.49 kg/m² .                                                        GENERAL:  Comfortable, in no acute distress.                                 HEENT EXAM:  Nonicteric.  No adenopathy.  Oropharynx is clear.               NECK:  Supple.                                                               LUNGS:  Clear.                                                               CARDIAC:  Regular rate and rhythm.  S1, S2.  No murmur.                      ABDOMEN:  Obese.  Soft, positive bowel sounds, nontender.  No hepatosplenomegaly or masses.  No rebound or guarding.                                             EXTREMITIES:  No edema.     MENTAL STATUS:  Alert and oriented.    ASSESSMENT/PLAN:     Assessment: Epig pain.  Hx of achalasia, s/p Heller myotomy, 2016.    Plan: Gastroscopy    Anesthesia Plan:   MAC / General Anaesthesia    ASA Grade: ASA 2 - Patient with mild systemic " disease with no functional limitations    MALLAMPATI SCORE: II (hard and soft palate, upper portion of tonsils anduvula visible)

## 2024-10-21 ENCOUNTER — ANESTHESIA (OUTPATIENT)
Dept: ENDOSCOPY | Facility: HOSPITAL | Age: 54
End: 2024-10-21
Payer: COMMERCIAL

## 2024-10-21 ENCOUNTER — HOSPITAL ENCOUNTER (OUTPATIENT)
Facility: HOSPITAL | Age: 54
Discharge: HOME | End: 2024-10-21
Attending: INTERNAL MEDICINE | Admitting: INTERNAL MEDICINE
Payer: COMMERCIAL

## 2024-10-21 ENCOUNTER — ANESTHESIA EVENT (OUTPATIENT)
Dept: ENDOSCOPY | Facility: HOSPITAL | Age: 54
End: 2024-10-21
Payer: COMMERCIAL

## 2024-10-21 DIAGNOSIS — R10.13 EPIGASTRIC ABDOMINAL PAIN: ICD-10-CM

## 2024-10-21 LAB — GLUCOSE SERPL-MCNC: 168 MG/DL (ref 70–110)

## 2024-10-21 PROCEDURE — D9220A PRA ANESTHESIA: Mod: ANES,,, | Performed by: ANESTHESIOLOGY

## 2024-10-21 PROCEDURE — 88305 TISSUE EXAM BY PATHOLOGIST: CPT | Mod: 26,,, | Performed by: PATHOLOGY

## 2024-10-21 PROCEDURE — 27201012 HC FORCEPS, HOT/COLD, DISP: Mod: PO | Performed by: INTERNAL MEDICINE

## 2024-10-21 PROCEDURE — 37000008 HC ANESTHESIA 1ST 15 MINUTES: Mod: PO | Performed by: INTERNAL MEDICINE

## 2024-10-21 PROCEDURE — 63600175 PHARM REV CODE 636 W HCPCS: Mod: PO | Performed by: NURSE ANESTHETIST, CERTIFIED REGISTERED

## 2024-10-21 PROCEDURE — D9220A PRA ANESTHESIA: Mod: CRNA,,, | Performed by: NURSE ANESTHETIST, CERTIFIED REGISTERED

## 2024-10-21 PROCEDURE — 43239 EGD BIOPSY SINGLE/MULTIPLE: CPT | Mod: PO | Performed by: INTERNAL MEDICINE

## 2024-10-21 PROCEDURE — 88305 TISSUE EXAM BY PATHOLOGIST: CPT | Mod: PO | Performed by: PATHOLOGY

## 2024-10-21 PROCEDURE — 43239 EGD BIOPSY SINGLE/MULTIPLE: CPT | Mod: ,,, | Performed by: INTERNAL MEDICINE

## 2024-10-21 PROCEDURE — 37000009 HC ANESTHESIA EA ADD 15 MINS: Mod: PO | Performed by: INTERNAL MEDICINE

## 2024-10-21 RX ORDER — SODIUM CHLORIDE, SODIUM LACTATE, POTASSIUM CHLORIDE, CALCIUM CHLORIDE 600; 310; 30; 20 MG/100ML; MG/100ML; MG/100ML; MG/100ML
INJECTION, SOLUTION INTRAVENOUS CONTINUOUS
Status: DISCONTINUED | OUTPATIENT
Start: 2024-10-21 | End: 2024-10-21 | Stop reason: HOSPADM

## 2024-10-21 RX ORDER — SODIUM CHLORIDE 0.9 % (FLUSH) 0.9 %
10 SYRINGE (ML) INJECTION
Status: DISCONTINUED | OUTPATIENT
Start: 2024-10-21 | End: 2024-10-21 | Stop reason: HOSPADM

## 2024-10-21 RX ORDER — LIDOCAINE HYDROCHLORIDE 20 MG/ML
INJECTION INTRAVENOUS
Status: DISCONTINUED | OUTPATIENT
Start: 2024-10-21 | End: 2024-10-21

## 2024-10-21 RX ORDER — PROPOFOL 10 MG/ML
VIAL (ML) INTRAVENOUS
Status: DISCONTINUED | OUTPATIENT
Start: 2024-10-21 | End: 2024-10-21

## 2024-10-21 RX ORDER — FENTANYL CITRATE 50 UG/ML
INJECTION, SOLUTION INTRAMUSCULAR; INTRAVENOUS
Status: DISCONTINUED | OUTPATIENT
Start: 2024-10-21 | End: 2024-10-21

## 2024-10-21 RX ORDER — PROPOFOL 10 MG/ML
VIAL (ML) INTRAVENOUS CONTINUOUS PRN
Status: DISCONTINUED | OUTPATIENT
Start: 2024-10-21 | End: 2024-10-21

## 2024-10-21 RX ADMIN — GLYCOPYRROLATE 0.2 MG: 0.2 INJECTION, SOLUTION INTRAMUSCULAR; INTRAVENOUS at 09:10

## 2024-10-21 RX ADMIN — LIDOCAINE HYDROCHLORIDE 100 MG: 20 INJECTION INTRAVENOUS at 09:10

## 2024-10-21 RX ADMIN — PROPOFOL 150 MCG/KG/MIN: 10 INJECTION, EMULSION INTRAVENOUS at 09:10

## 2024-10-21 RX ADMIN — PROPOFOL 100 MG: 10 INJECTION, EMULSION INTRAVENOUS at 09:10

## 2024-10-21 RX ADMIN — FENTANYL CITRATE 100 MCG: 50 INJECTION, SOLUTION INTRAMUSCULAR; INTRAVENOUS at 09:10

## 2024-10-21 NOTE — ANESTHESIA PREPROCEDURE EVALUATION
10/21/2024  Abhi Pompa is a 53 y.o., male.      Pre-op Assessment    I have reviewed the Patient Summary Reports.     I have reviewed the Nursing Notes. I have reviewed the NPO Status.   I have reviewed the Medications.     Review of Systems  Anesthesia Hx:  No problems with previous Anesthesia                Social:  Smoker       Cardiovascular:                hyperlipidemia                               Pulmonary:        Sleep Apnea                Renal/:  Renal/ Normal                 Hepatic/GI:     GERD                Neurological:  Neurology Normal                                      Endocrine:  Diabetes, well controlled, type 2           Psych:  Psychiatric History                Physical Exam  General: Well nourished, Cooperative, Alert and Oriented    Airway:  Mallampati: II   Mouth Opening: Normal  TM Distance: Normal  Neck ROM: Normal ROM    Anesthesia Plan  Type of Anesthesia, risks & benefits discussed:    Anesthesia Type: Gen ETT, Gen Supraglottic Airway, Gen Natural Airway, MAC  Intra-op Monitoring Plan: Standard ASA Monitors  Post Op Pain Control Plan: multimodal analgesia  Induction:  IV  Airway Plan: Direct, Video and Fiberoptic, Post-Induction  Informed Consent: Informed consent signed with the Patient and all parties understand the risks and agree with anesthesia plan.  All questions answered.   ASA Score: 3    Ready For Surgery From Anesthesia Perspective.   .

## 2024-10-21 NOTE — [employer] PATIENT INSTRUCTIONS
Discharge Summary/Instructions after an Endoscopic Procedure  Patient Name: Abhi Pompa  Patient MRN: [id number]  Patient YOB: 1970  Monday, October 21, 2024  Julian Dunbar MD  Dear patient,  As a result of recent federal legislation (The Federal Cures Act), you may   receive lab or pathology results from your procedure in your [facility]   account before your physician is able to contact you. Your physician or   their representative will relay the results to you with their   recommendations at their soonest availability.  Thank you,  RESTRICTIONS:  During your procedure today, you received medications for sedation.  These   medications may affect your judgment, balance and coordination.  Therefore,   for 24 hours, you have the following restrictions:   - DO NOT drive a car, operate machinery, make legal/financial decisions,   sign important papers or drink alcohol.    ACTIVITY:  Today: no heavy lifting, straining or running due to procedural   sedation/anesthesia.  The following day: return to full activity including work.  DIET:  Eat and drink normally unless instructed otherwise.     TREATMENT FOR COMMON SIDE EFFECTS:  - Mild abdominal pain, nausea, belching, bloating or excessive gas:  rest,   eat lightly and use a heating pad.  - Sore Throat: treat with throat lozenges and/or gargle with warm salt   water.  - Because air was used during the procedure, expelling large amounts of air   from your rectum or belching is normal.  - If a bowel prep was taken, you may not have a bowel movement for 1-3 days.    This is normal.  SYMPTOMS TO WATCH FOR AND REPORT TO YOUR PHYSICIAN:  1. Abdominal pain or bloating, other than gas cramps.  2. Chest pain.  3. Back pain.  4. Signs of infection such as: chills or fever occurring within 24 hours   after the procedure.  5. Rectal bleeding, which would show as bright red, maroon, or black stools.   (A tablespoon of blood from the rectum is not serious,  especially if   hemorrhoids are present.)  6. Vomiting.  7. Weakness or dizziness.  GO DIRECTLY TO THE NEAREST EMERGENCY ROOM IF YOU HAVE ANY OF THE FOLLOWING:      Difficulty breathing              Chills and/or fever over 101 F   Persistent vomiting and/or vomiting blood   Severe abdominal pain   Severe chest pain   Black, tarry stools   Bleeding- more than one tablespoon   Any other symptom or condition that you feel may need urgent attention  Your doctor recommends these additional instructions:  If any biopsies were taken, your doctors clinic will contact you in 1 to 2   weeks with any results.  Follow an antireflux regimen.  This includes:       - Do not lie down for at least 3 to 4 hours after meals.        - Raise the head of the bed 4 to 6 inches.        - Decrease excess weight.        - Avoid citrus juices and other acidic foods, alcohol, chocolate, mints,   coffee and other caffeinated beverages, carbonated beverages, fatty and   fried foods.        - Avoid tight-fitting clothing.        - Avoid cigarettes and other tobacco products.   Continue your present medications.   Take Nexium (esomeprazole) 40 mg by mouth once a day.   Take Pepcid (famotidine) 40 mg by mouth once a day.  OK to resume Ozempic.  For questions, problems or results please call your physician - Julian Dunbar MD at Work:  [phone].  EMERGENCY PHONE NUMBER: [phone], LAB RESULTS: [phone]  IF A COMPLICATION OR EMERGENCY SITUATION ARISES AND YOU ARE UNABLE TO REACH   YOUR PHYSICIAN - GO DIRECTLY TO THE EMERGENCY ROOM.  ___________________________________________  Nurse Signature  ___________________________________________  Patient/Designated Responsible Party Signature  Julian Dunbar MD  10/21/2024 10:18:10 AM  This report has been verified and signed electronically.  Dear patient,  As a result of recent federal legislation (The Federal Cures Act), you may   receive lab or pathology results from your  procedure in your [facility]   account before your physician is able to contact you. Your physician or   their representative will relay the results to you with their   recommendations at their soonest availability.  Thank you.  [employer]

## 2024-10-21 NOTE — TRANSFER OF CARE
"Anesthesia Transfer of Care Note    Patient: Abhi Pompa    Procedure(s) Performed: Procedure(s) (LRB):  EGD (ESOPHAGOGASTRODUODENOSCOPY) (N/A)    Patient location: PACU    Anesthesia Type: general    Transport from OR: Transported from OR on room air with adequate spontaneous ventilation    Post pain: adequate analgesia    Post assessment: no apparent anesthetic complications and tolerated procedure well    Post vital signs: stable    Level of consciousness: lethargic and responds to stimulation    Nausea/Vomiting: no nausea/vomiting    Complications: none    Transfer of care protocol was followed      Last vitals: Visit Vitals  /78 (BP Location: Right arm, Patient Position: Lying)   Pulse 71   Temp 36.8 °C (98.2 °F)   Resp 16   Ht 5' 7" (1.702 m)   Wt 99.8 kg (220 lb)   SpO2 95%   BMI 34.46 kg/m²     "

## 2024-10-21 NOTE — BRIEF OP NOTE
Discharge Note  Short Stay      SUMMARY     Admit Date: 10/21/2024    Attending Physician: Julian Dunbar Jr., MD     Discharge Physician: Julian Dunbar Jr., MD    Discharge Date: 10/21/2024 10:20 AM    Final Diagnosis: Epigastric abdominal pain [R10.13]  Heartburn [R12]  History of Helicobacter pylori infection [Z86.19]  Achalasia [K22.0]    Impression:            - Normal oropharynx.                          - Normal larynx.                          - Normal cricopharyngeus.                          - Normal upper third of esophagus and middle third                          of esophagus.                          - Reflux esophagitis. Biopsied.                          - Z-line regular, 40 cm from the incisors.                          - Patulous lower esophageal sphincter.                          - Normal cardia.                          - Normal gastric fundus and gastric body.                          - Normal antrum and prepyloric region of the                          stomach. Biopsied.                          - Normal pylorus.                          - Normal examined duodenum.                          - Normal major papilla.   Recommendation:        - Discharge patient to home.                          - Await pathology results.                          - Follow an antireflux regimen.                          - Exercise and weight loss.                          - Continue present medications.                          - Use Nexium (esomeprazole) 40 mg PO daily.                          - Use Pepcid (famotidine) 40 mg PO daily.                          - OK to continue Ozempic.                          - Call the [facility] clinic in 2 weeks for reports (if                          you haven't heard from us sooner) [phone].   Julian Dunbar MD   10/21/2024       Disposition: HOME OR SELF CARE    Patient Instructions:   Current Discharge Medication List        CONTINUE these medications which have  "NOT CHANGED    Details   ASHWAGANDHA EXTRACT ORAL       creatine monohydrate (CREATINE ORAL) Take by mouth.      esomeprazole (NEXIUM) 40 MG capsule Take 1 capsule (40 mg total) by mouth before breakfast.  Qty: 90 capsule, Refills: 3    Associated Diagnoses: Gastroesophageal reflux disease with esophagitis without hemorrhage; History of gastritis      famotidine (PEPCID) 40 MG tablet TAKE 1 TABLET BY MOUTH EVERY DAY  Qty: 90 tablet, Refills: 3    Associated Diagnoses: Encounter for monitoring long-term proton pump inhibitor therapy; Gastroesophageal reflux disease with esophagitis without hemorrhage      fish,saf,flx,brg oils/o3,6,9n2 (FISH-FLAX-BORAGE OIL ORAL) Take by mouth.      FLUoxetine 10 MG capsule Take 1 capsule (10 mg total) by mouth once daily.  Qty: 90 capsule, Refills: 1    Associated Diagnoses: Generalized anxiety disorder      JARDIANCE 10 mg tablet TAKE 1 TABLET BY MOUTH EVERY DAY  Qty: 90 tablet, Refills: 0    Associated Diagnoses: Diabetes mellitus without complication      melatonin 10 mg Tab Take 10 mg by mouth nightly.      minoxidil (ROGAINE) 2 % external solution Apply 1 application topically 2 (two) times daily.      simvastatin (ZOCOR) 10 MG tablet TAKE 1 TABLET BY MOUTH EVERY DAY IN THE EVENING  Qty: 90 tablet, Refills: 0    Associated Diagnoses: Mixed hyperlipidemia      testosterone cypionate (DEPOTESTOTERONE CYPIONATE) 200 mg/mL injection INJECT 1 ML (200 MG TOTAL) INTO THE MUSCLE ONCE A WEEK.  Qty: 4 mL, Refills: 4    Comments: Not to exceed 1 additional fills before 11/04/2024  Associated Diagnoses: Male hypogonadism      TURMERIC ORAL Take by mouth.      VITAMIN B COMPLEX ORAL Take 1 tablet by mouth once daily.      blunt needle, disposable 18 x 1 1/2 " Ndle Use as directed with testosterone injections  Qty: 100 each, Refills: 1      cholestyramine (QUESTRAN) 4 gram packet Take 1 packet (4 g total) by mouth once daily.  Qty: 30 packet, Refills: 11    Associated Diagnoses: Diarrhea, " "unspecified type      flash glucose scanning reader (FREESTYLE YAN 2 READER) Misc Use as directed to check blood glucose.  Qty: 1 each, Refills: 0    Associated Diagnoses: Type 2 diabetes mellitus without complication, unspecified whether long term insulin use      !! flash glucose sensor (FREESTYLE YAN 2 SENSOR) Kit Apply to skin Q 14 days  Qty: 2 kit, Refills: 6      !! flash glucose sensor (FREESTYLE YAN 2 SENSOR) Kit Change every 14 days.  Qty: 2 kit, Refills: 11    Associated Diagnoses: Type 2 diabetes mellitus without complication, unspecified whether long term insulin use      !! FREESTYLE YAN 2 SENSOR Kit APPLY TO SKIN EVERY 14 DAYS  Qty: 2 kit, Refills: 6    Comments: PAIENT NEEDS NEW PRESCRIPTION THIS ONE IS       ketoconazole (NIZORAL) 2 % shampoo WASH SCALP AND BEARD AREA AT LEAST 2-3X/WEEK - LET SIT AT LEAST 5 MINUTES PRIOR TO RINSING.  Qty: 120 mL, Refills: 5    Associated Diagnoses: Seborrheic dermatitis      metFORMIN (GLUCOPHAGE-XR) 500 MG ER 24hr tablet TAKE 2 TABLETS BY MOUTH TWICE A DAY WITH MEALS  Qty: 360 tablet, Refills: 3      semaglutide (OZEMPIC) 1 mg/dose (4 mg/3 mL) Inject 1 mg into the skin every 7 days.  Qty: 3 mL, Refills: 11      sildenafil (REVATIO) 20 mg Tab TAKE UP TO 5 TABLETS BY MOUTH DAILY AS NEEDED 30 MINUTES PRIOR TO INTERCOURSE  Qty: 30 tablet, Refills: 5    Associated Diagnoses: Erectile dysfunction due to diseases classified elsewhere      syringe with needle 1 mL 22 gauge x 1 1/2" Syrg Use as directed to administer testosterone injections.  Qty: 100 each, Refills: 1      tadalafiL (CIALIS) 10 MG tablet Take 1 tablet (10 mg total) by mouth daily as needed for Erectile Dysfunction.  Qty: 20 tablet, Refills: 5    Associated Diagnoses: Erectile dysfunction due to diseases classified elsewhere      !! UNABLE TO FIND Amitriptyline 1%/Meloxicam 0.09%/Lidocaine Hcl 2%/ Prilocaine 2%   APPLY 2 GRAMS QID      !! UNABLE TO FIND Beet root       !! - Potential duplicate " medications found. Please discuss with provider.          Discharge Procedure Orders (must include Diet, Follow-up, Activity)    Follow Up:  Follow up with PCP as per your routine.  Please follow an anti reflux diet and a high fiber diet.  Activity as tolerated.    No driving day of procedure.

## 2024-10-21 NOTE — DISCHARGE INSTRUCTIONS
Recovery After Procedural Sedation (Adult)   You have been given medicine by vein to make you sleep during your surgery. This may have included both a pain medicine and sleeping medicine. Most of the effects have worn off. But you may still have some drowsiness for the next 6 to 8 hours.  Home care  Follow these guidelines when you get home:  For the next 8 hours, you should be watched by a responsible adult. This person should make sure your condition is not getting worse.  Don't drink any alcohol for the next 24 hours.  Don't drive, operate dangerous machinery, or make important business or personal decisions during the next 24 hours.  To prevent injury or falls, use caution when standing and walking for at least 24 hours after your procedure.  Note: Your healthcare provider may tell you not to take any medicine by mouth for pain or sleep in the next 4 hours. These medicines may react with the medicines you were given in the hospital. This could cause a much stronger response than usual.  Follow-up care  Follow up with your healthcare provider if you are not alert and back to your usual level of activity within 12 hours.  When to seek medical advice  Call your healthcare provider right away if any of these occur:  Drowsiness gets worse  Weakness or dizziness gets worse  Repeated vomiting  You can't be awakened  Fever  New rash  Pulse last reviewed this educational content on 9/1/2019  © 5033-1873 [employer]. [address] [place] [address], [place] [address]. All rights reserved. This information is not intended as a substitute for professional medical care. Always follow your healthcare professional's instructions         Tips to Control Acid Reflux    To control acid reflux, youll need to make some basic diet and lifestyle changes. The simple steps outlined below may be all youll need to ease discomfort.  Watch what you eat  Avoid fatty foods and spicy foods.  Eat fewer acidic foods, such as citrus  and tomato-based foods. These can increase symptoms.  Limit drinking alcohol, caffeine, and fizzy beverages. All increase acid reflux.  Try limiting chocolate, peppermint, and spearmint. These can worsen acid reflux in some people.  Watch when you eat  Avoid lying down for 3 hours after eating.  Do not snack before going to bed.  Raise your head  Raising your head and upper body by 4 to 6 inches helps limit reflux when youre lying down. Put blocks under the head of your bed frame to raise it.  Other changes  Lose weight, if you need to  Dont exercise near bedtime  Avoid tight-fitting clothes  Limit aspirin and ibuprofen  Stop smoking   Date Last Reviewed: 7/1/2016  © 7535-6865 [employer]. [address] [place] [address], [place] [address]. All rights reserved. This information is not intended as a substitute for professional medical care. Always follow your healthcare professional's instructions.         High-Fiber Diet  Fiber is in fruits, vegetables, cereals, and grains. Fiber passes through your body undigested. A high-fiber diet helps food move through your intestinal tract. The added bulk is helpful in preventing constipation. In people with diverticulosis, fiber helps clean out the pouches along the colon wall. It also prevents new pouches from forming. A high-fiber diet reduces the risk of colon cancer. It also lowers blood cholesterol and prevents high blood sugar in people with diabetes.    The fiber-rich foods listed below should be part of your diet. If you are not used to high-fiber foods, start with 1 or 2 foods from this list. Every 3 to 4 days add a new one to your diet. Do this until you are eating 4 high-fiber foods per day. This should give you 20 to 35 grams of fiber a day. It is also important to drink a lot of water when you are on this diet. You should have 6 to 8 glasses of water a day. Water makes the fiber swell and increases the benefit.  Foods high in dietary fiber  The following  foods are high in dietary fiber:  Breads. Breads made with 100% whole-wheat flour; wilmer, wheat, or rye crackers; whole-grain tortillas, bran muffins.  Cereals. Whole-grain and bran cereals with bran (shredded wheat, wheat flakes, raisin bran, corn bran); oatmeal, rolled oats, granola, and brown rice.  Fruits. Fresh fruits and their edible skins (pears, prunes, raisins, berries, apples, and apricots); bananas, citrus fruit, mangoes, pineapple; and prune juice.  Nuts. Any nuts and seeds.  Vegetables. Best served raw or lightly cooked. All types, especially: green peas, celery, eggplant, potatoes, spinach, broccoli, [place] sprouts, winter squash, carrots, cauliflower, soybeans, lentils, and fresh and dried beans of all kinds.  Other. Popcorn, any spices.  Date Last Reviewed: 8/1/2016  © 1214-3309 [employer]. [address] [place] [id number]. All rights reserved. This information is not intended as a substitute for professional medical care. Always follow your healthcare professional's instructions.

## 2024-10-21 NOTE — ANESTHESIA POSTPROCEDURE EVALUATION
Anesthesia Post Evaluation    Patient: Abhi Pompa    Procedure(s) Performed: Procedure(s) (LRB):  EGD (ESOPHAGOGASTRODUODENOSCOPY) (N/A)    Final Anesthesia Type: general      Patient location during evaluation: PACU  Patient participation: Yes- Able to Participate  Level of consciousness: awake and alert and oriented  Post-procedure vital signs: reviewed and stable  Pain management: adequate  Airway patency: patent    PONV status at discharge: No PONV  Anesthetic complications: no      Cardiovascular status: blood pressure returned to baseline and stable  Respiratory status: unassisted and spontaneous ventilation  Hydration status: euvolemic  Follow-up not needed.              Vitals Value Taken Time   /78 10/21/24 1025   Temp   10/21/24 1304   Pulse 81 10/21/24 1025   Resp 16 10/21/24 1025   SpO2 97 % 10/21/24 1025         Event Time   Out of Recovery 10:35:55         Pain/Moi Score: Moi Score: 10 (10/21/2024 10:25 AM)

## 2024-10-22 VITALS
TEMPERATURE: 98 F | HEIGHT: 67 IN | OXYGEN SATURATION: 97 % | BODY MASS INDEX: 34.53 KG/M2 | RESPIRATION RATE: 16 BRPM | HEART RATE: 81 BPM | SYSTOLIC BLOOD PRESSURE: 125 MMHG | DIASTOLIC BLOOD PRESSURE: 78 MMHG | WEIGHT: 220 LBS

## 2024-10-23 LAB
FINAL PATHOLOGIC DIAGNOSIS: NORMAL
GROSS: NORMAL
Lab: NORMAL

## 2024-10-23 NOTE — PROGRESS NOTES
Please let patient know.  The colon biopsies came out fine.  No evidence of active colitis.      Rec remains the same.  Take meds as directed.   Repeat colonoscopy in 2 yrs. Detail Level: Detailed Add 71059 Cpt? (Important Note: In 2017 The Use Of 75391 Is Being Tracked By Cms To Determine Future Global Period Reimbursement For Global Periods): yes

## 2024-10-24 ENCOUNTER — TELEPHONE (OUTPATIENT)
Dept: GASTROENTEROLOGY | Facility: CLINIC | Age: 54
End: 2024-10-24
Payer: COMMERCIAL

## 2024-10-24 NOTE — TELEPHONE ENCOUNTER
Results sent to patient portal, see below    Per Julian Nick Jr., MD [facility] Staff  Please let patient know.  The colon biopsies came out fine.  No evidence of active colitis.      Rec remains the same.  Take meds as directed.   Repeat colonoscopy in 2 yrs.

## 2024-10-24 NOTE — PROGRESS NOTES
Julian Dunbar Jr., MD [facility] Staff  Please let patient know.  The colon biopsies came out fine.  No evidence of active colitis.      Rec remains the same.  Take meds as directed.   Repeat colonoscopy in 2 yrs.

## 2024-11-25 ENCOUNTER — LAB VISIT (OUTPATIENT)
Dept: LAB | Facility: HOSPITAL | Age: 54
End: 2024-11-25
Attending: INTERNAL MEDICINE
Payer: COMMERCIAL

## 2024-11-25 DIAGNOSIS — E29.1 MALE HYPOGONADISM: ICD-10-CM

## 2024-11-25 DIAGNOSIS — E11.69 HYPERLIPIDEMIA ASSOCIATED WITH TYPE 2 DIABETES MELLITUS: ICD-10-CM

## 2024-11-25 DIAGNOSIS — E78.5 HYPERLIPIDEMIA ASSOCIATED WITH TYPE 2 DIABETES MELLITUS: ICD-10-CM

## 2024-11-25 DIAGNOSIS — E11.9 DIABETES MELLITUS WITHOUT COMPLICATION: ICD-10-CM

## 2024-11-25 LAB
ALBUMIN SERPL BCP-MCNC: 4.1 G/DL (ref 3.5–5.2)
ALP SERPL-CCNC: 58 U/L (ref 40–150)
ALT SERPL W/O P-5'-P-CCNC: 41 U/L (ref 10–44)
ANION GAP SERPL CALC-SCNC: 7 MMOL/L (ref 8–16)
AST SERPL-CCNC: 23 U/L (ref 10–40)
BASOPHILS # BLD AUTO: 0.07 K/UL (ref 0–0.2)
BASOPHILS NFR BLD: 1 % (ref 0–1.9)
BILIRUB SERPL-MCNC: 0.8 MG/DL (ref 0.1–1)
BUN SERPL-MCNC: 20 MG/DL (ref 6–20)
CALCIUM SERPL-MCNC: 8.7 MG/DL (ref 8.7–10.5)
CHLORIDE SERPL-SCNC: 107 MMOL/L (ref 95–110)
CO2 SERPL-SCNC: 22 MMOL/L (ref 23–29)
COMPLEXED PSA SERPL-MCNC: 0.83 NG/ML (ref 0–4)
CREAT SERPL-MCNC: 0.9 MG/DL (ref 0.5–1.4)
DIFFERENTIAL METHOD BLD: ABNORMAL
EOSINOPHIL # BLD AUTO: 0.2 K/UL (ref 0–0.5)
EOSINOPHIL NFR BLD: 2.7 % (ref 0–8)
ERYTHROCYTE [DISTWIDTH] IN BLOOD BY AUTOMATED COUNT: 14.6 % (ref 11.5–14.5)
EST. GFR  (NO RACE VARIABLE): >60 ML/MIN/1.73 M^2
ESTIMATED AVG GLUCOSE: 151 MG/DL (ref 68–131)
GLUCOSE SERPL-MCNC: 136 MG/DL (ref 70–110)
HBA1C MFR BLD: 6.9 % (ref 4–5.6)
HCT VFR BLD AUTO: 52.4 % (ref 40–54)
HGB BLD-MCNC: 17.7 G/DL (ref 14–18)
IMM GRANULOCYTES # BLD AUTO: 0.02 K/UL (ref 0–0.04)
IMM GRANULOCYTES NFR BLD AUTO: 0.3 % (ref 0–0.5)
LYMPHOCYTES # BLD AUTO: 1.6 K/UL (ref 1–4.8)
LYMPHOCYTES NFR BLD: 23.4 % (ref 18–48)
MCH RBC QN AUTO: 30.2 PG (ref 27–31)
MCHC RBC AUTO-ENTMCNC: 33.8 G/DL (ref 32–36)
MCV RBC AUTO: 89 FL (ref 82–98)
MONOCYTES # BLD AUTO: 0.9 K/UL (ref 0.3–1)
MONOCYTES NFR BLD: 13 % (ref 4–15)
NEUTROPHILS # BLD AUTO: 4 K/UL (ref 1.8–7.7)
NEUTROPHILS NFR BLD: 59.6 % (ref 38–73)
NRBC BLD-RTO: 0 /100 WBC
PLATELET # BLD AUTO: 286 K/UL (ref 150–450)
PMV BLD AUTO: 11 FL (ref 9.2–12.9)
POTASSIUM SERPL-SCNC: 4.6 MMOL/L (ref 3.5–5.1)
PROT SERPL-MCNC: 6.7 G/DL (ref 6–8.4)
RBC # BLD AUTO: 5.86 M/UL (ref 4.6–6.2)
SODIUM SERPL-SCNC: 136 MMOL/L (ref 136–145)
TESTOST SERPL-MCNC: 481 NG/DL (ref 304–1227)
WBC # BLD AUTO: 6.71 K/UL (ref 3.9–12.7)

## 2024-11-25 PROCEDURE — 83036 HEMOGLOBIN GLYCOSYLATED A1C: CPT | Performed by: INTERNAL MEDICINE

## 2024-11-25 PROCEDURE — 85025 COMPLETE CBC W/AUTO DIFF WBC: CPT | Performed by: INTERNAL MEDICINE

## 2024-11-25 PROCEDURE — 80053 COMPREHEN METABOLIC PANEL: CPT | Performed by: INTERNAL MEDICINE

## 2024-11-25 PROCEDURE — 84153 ASSAY OF PSA TOTAL: CPT | Performed by: INTERNAL MEDICINE

## 2024-11-25 PROCEDURE — 36415 COLL VENOUS BLD VENIPUNCTURE: CPT | Mod: PO | Performed by: INTERNAL MEDICINE

## 2024-11-25 PROCEDURE — 84403 ASSAY OF TOTAL TESTOSTERONE: CPT | Performed by: INTERNAL MEDICINE

## 2024-12-02 ENCOUNTER — OFFICE VISIT (OUTPATIENT)
Dept: FAMILY MEDICINE | Facility: CLINIC | Age: 54
End: 2024-12-02
Payer: COMMERCIAL

## 2024-12-02 VITALS
OXYGEN SATURATION: 97 % | HEART RATE: 78 BPM | WEIGHT: 222.88 LBS | BODY MASS INDEX: 34.98 KG/M2 | HEIGHT: 67 IN | SYSTOLIC BLOOD PRESSURE: 118 MMHG | DIASTOLIC BLOOD PRESSURE: 86 MMHG

## 2024-12-02 DIAGNOSIS — Z23 NEED FOR INFLUENZA VACCINATION: ICD-10-CM

## 2024-12-02 DIAGNOSIS — G89.29 CHRONIC PAIN OF RIGHT KNEE: ICD-10-CM

## 2024-12-02 DIAGNOSIS — F41.1 GENERALIZED ANXIETY DISORDER: ICD-10-CM

## 2024-12-02 DIAGNOSIS — G47.33 OBSTRUCTIVE SLEEP APNEA SYNDROME: ICD-10-CM

## 2024-12-02 DIAGNOSIS — E11.9 DIABETES MELLITUS WITHOUT COMPLICATION: Primary | ICD-10-CM

## 2024-12-02 DIAGNOSIS — K21.00 GASTROESOPHAGEAL REFLUX DISEASE WITH ESOPHAGITIS WITHOUT HEMORRHAGE: ICD-10-CM

## 2024-12-02 DIAGNOSIS — M25.511 CHRONIC RIGHT SHOULDER PAIN: ICD-10-CM

## 2024-12-02 DIAGNOSIS — D75.1 POLYCYTHEMIA: ICD-10-CM

## 2024-12-02 DIAGNOSIS — M25.561 CHRONIC PAIN OF RIGHT KNEE: ICD-10-CM

## 2024-12-02 DIAGNOSIS — E34.9 TESTOSTERONE DEFICIENCY: ICD-10-CM

## 2024-12-02 DIAGNOSIS — Z00.00 ANNUAL PHYSICAL EXAM: ICD-10-CM

## 2024-12-02 DIAGNOSIS — Z11.3 SCREENING FOR STD (SEXUALLY TRANSMITTED DISEASE): ICD-10-CM

## 2024-12-02 DIAGNOSIS — E78.2 MIXED HYPERLIPIDEMIA: ICD-10-CM

## 2024-12-02 DIAGNOSIS — G89.29 CHRONIC RIGHT SHOULDER PAIN: ICD-10-CM

## 2024-12-02 PROCEDURE — 90656 IIV3 VACC NO PRSV 0.5 ML IM: CPT | Mod: S$GLB,,, | Performed by: INTERNAL MEDICINE

## 2024-12-02 PROCEDURE — 3074F SYST BP LT 130 MM HG: CPT | Mod: CPTII,S$GLB,, | Performed by: INTERNAL MEDICINE

## 2024-12-02 PROCEDURE — 99396 PREV VISIT EST AGE 40-64: CPT | Mod: 25,S$GLB,, | Performed by: INTERNAL MEDICINE

## 2024-12-02 PROCEDURE — 90471 IMMUNIZATION ADMIN: CPT | Mod: S$GLB,,, | Performed by: INTERNAL MEDICINE

## 2024-12-02 PROCEDURE — 3066F NEPHROPATHY DOC TX: CPT | Mod: CPTII,S$GLB,, | Performed by: INTERNAL MEDICINE

## 2024-12-02 PROCEDURE — 3044F HG A1C LEVEL LT 7.0%: CPT | Mod: CPTII,S$GLB,, | Performed by: INTERNAL MEDICINE

## 2024-12-02 PROCEDURE — 1159F MED LIST DOCD IN RCRD: CPT | Mod: CPTII,S$GLB,, | Performed by: INTERNAL MEDICINE

## 2024-12-02 PROCEDURE — 3008F BODY MASS INDEX DOCD: CPT | Mod: CPTII,S$GLB,, | Performed by: INTERNAL MEDICINE

## 2024-12-02 PROCEDURE — 3061F NEG MICROALBUMINURIA REV: CPT | Mod: CPTII,S$GLB,, | Performed by: INTERNAL MEDICINE

## 2024-12-02 PROCEDURE — 99999 PR PBB SHADOW E&M-EST. PATIENT-LVL V: CPT | Mod: PBBFAC,,, | Performed by: INTERNAL MEDICINE

## 2024-12-02 PROCEDURE — 3079F DIAST BP 80-89 MM HG: CPT | Mod: CPTII,S$GLB,, | Performed by: INTERNAL MEDICINE

## 2024-12-02 PROCEDURE — 1160F RVW MEDS BY RX/DR IN RCRD: CPT | Mod: CPTII,S$GLB,, | Performed by: INTERNAL MEDICINE

## 2024-12-02 NOTE — PROGRESS NOTES
Patient ID: Abhi Pompa is a 54 y.o. male.    Chief Complaint: Annual Exam     Assessment and Plan     1. Diabetes mellitus without complication  - Hemoglobin A1C; Future  - Hemoglobin A1C; Future    2. Generalized anxiety disorder- off zoloft in 2020    3. Gastroesophageal reflux disease with esophagitis without hemorrhage    4. Mixed hyperlipidemia  - Lipid Panel; Future    5. Obstructive sleep apnea syndrome    6. Polycythemia    7. Testosterone deficiency  - CBC Auto Differential; Future    8. Chronic right shoulder pain  - Ambulatory referral/consult to Physical/Occupational Therapy; Future    9. Chronic pain of right knee  - Ambulatory referral/consult to Physical/Occupational Therapy; Future    10. Screening for STD (sexually transmitted disease)  - HIV 1/2 Ag/Ab (4th Gen); Future  - Hepatitis C Antibody; Future  - Hepatitis B Surface Antigen; Future  - Hepatitis B Core Antibody, IgM; Future  - C. trachomatis/N. gonorrhoeae by AMP DNA [facility]; Urine    11. Need for influenza vaccination  - influenza (Flulaval, Fluzone, Fluarix) 45 mcg/0.5 mL IM vaccine (> or = 6 mo) 0.5 mL    12. Annual physical exam       Assessment & Plan    IMPRESSION:  - Assessed A1C increase from 6.0 to 6.9, likely due to recent stress and changes in eating habits  - Evaluated recent onset of snoring, possibly related to change in sleeping position due to shoulder pain  - Considered continuation of CPAP therapy despite patient's desire to discontinue, given its benefits for sleep apnea and hematocrit management  - Reviewed testosterone levels, currently at 481, deemed appropriate  - Discussed management of shoulder and knee pain, opting for physical therapy before considering orthopedic intervention  - Evaluated constipation as side effect of Ozempic, recommending increased fiber and water intake  - Will maintain current Ozempic dosage (1mg weekly) due to stable side effects and efficacy  - Continued statin therapy as  preventative measure for cardiovascular health in context of diabetes    PLAN SUMMARY:  - A1C test ordered for 3 and 6 months  - STD panel, CBC, and lipid panel ordered for 6 months  - Referred to physical therapy for right shoulder and right knee pain  - Continue tadalafil 10mg and sildenafil 20mg as needed for erectile dysfunction  - Continue simvastatin 10mg daily for cardiac health  - Continue topical minoxidil for hair loss  - Continue fluoxetine 10mg daily for anxiety  - Continue famotidine 40mg and esomeprazole 40mg for acid reflux  - Continue Jardiance 10mg daily for diabetes management  - Continue testosterone 200mg injection weekly  - Continue Ozempic 1mg injection weekly for diabetes management  - Discontinue metformin  - Flu shot administered in office  - Follow up in December for annual visit  - Follow up in March for diabetic check-up    PATIENT EDUCATION:  - Explained the benefits of continuing CPAP therapy, including its positive effects on sleep apnea and hematocrit levels  - Discussed potential causes of snoring, including stress and sleeping position    ACTION ITEMS/LIFESTYLE:  - Abhi to check feet daily with a mirror for any lesions or ulcers  - Recommend increasing fiber and water intake to manage constipation  - Abhi to continue regular blood donations to manage hematocrit levels         Follow up in about 6 months (around 6/2/2025).   HPI     History of Present Illness    CHIEF COMPLAINT:  Abhi presents for an annual wellness visit and to discuss recent lab results.    HPI:  Abhi reports an increase in A1C level from 6.0 to 6.9, attributed to recent stress from a relationship ending. Blood sugar levels have been unstable lately. Abhi notes recurrence of snoring, requiring him to sleep with his seat reclined for management.    Abhi describes pain in his right shoulder and right knee. A shoulder injury from approximately 30 years ago is causing recent  pain, exacerbated by certain movements. He stopped gym attendance for a few months due to severe pain and injury concerns.    Requesting STD testing Q6 mo due to being back in the dating realm   Abhi reports sexual health issues, including delayed effectiveness of his erectile dysfunction medication, which he attributes to Ozempic. The medication now takes approximately 4 hours to take effect, compared to the previous 20 minutes.    Abhi reports constipation as a side effect of Ozempic for the past 3 months, requiring laxative use once or twice weekly. He has attempted to increase fiber intake to manage this issue.    Abhi notes increased dryness of his feet due to not wearing socks while working from home on hard floors.    Abhi denies any significant weight changes, sinus problems, or vocal changes.      ROS:  General: -fatigue, -weight gain, -weight loss  ENT: +snoring  Gastrointestinal: +constipation, -heartburn  Musculoskeletal: +joint pain  Skin: +dry skin  Psychiatric: +anxiety, -sleep difficulty  Male Genitourinary: +erectile dysfunction         Review of Systems    I personally reviewed past medical, family and social history.     Objective    Vitals:    12/02/24 0918   BP: 118/86   Pulse: 78      Wt Readings from Last 3 Encounters:   12/02/24 0918 101.1 kg (222 lb 14.2 oz)   10/15/24 1435 99.8 kg (220 lb)   10/18/24 0730 99.9 kg (220 lb 3.8 oz)      Body mass index is 34.91 kg/m².     Physical Exam    Cardiovascular: Regular rate. Regular rhythm. No murmurs. No rubs. No gallops. Normal S1, S2.  Respiratory: Normal respiratory effort. Clear to auscultation bilaterally. No rales. No rhonchi. No wheezing.  MSK: Foot/Ankle - Left: No swelling of the left lower extremity.  MSK: Foot/Ankle - Right: No swelling of the right lower extremity.        Reference     : Visit today included increased complexity associated with the care of the episodic problem Diabetes mellitus without  "complication [E11.9] addressed and managing the longitudinal care of the patient due to the serious and/or complex managed problem(s)     Active Problem List with Overview Notes    Diagnosis Date Noted    Polycythemia 01/19/2023    Obstructive sleep apnea syndrome 01/19/2023    Severe obesity (BMI 35.0-39.9) with comorbidity 12/05/2022    Epigastric abdominal pain 06/29/2022    Screening for colon cancer 04/19/2022    Personal history of colonic polyps 04/19/2022    Generalized anxiety disorder- off zoloft in 2020 01/02/2017    Mixed hyperlipidemia 09/15/2016    GERD (gastroesophageal reflux disease) 01/22/2016    Testosterone deficiency 01/22/2016    Diabetes mellitus without complication 07/19/2013     Overview:   ICD-10 Transition          Diabetes Medications               JARDIANCE 10 mg tablet TAKE 1 TABLET BY MOUTH EVERY DAY    metFORMIN (GLUCOPHAGE-XR) 500 MG ER 24hr tablet TAKE 2 TABLETS BY MOUTH TWICE A DAY WITH MEALS    semaglutide (OZEMPIC) 1 mg/dose (4 mg/3 mL) Inject 1 mg into the skin every 7 days.              Hyperlipidemia Medications               fish,saf,flx,brg oils/o3,6,9n2 (FISH-FLAX-BORAGE OIL ORAL) Take by mouth.    simvastatin (ZOCOR) 10 MG tablet TAKE 1 TABLET BY MOUTH EVERY DAY IN THE EVENING    cholestyramine (QUESTRAN) 4 gram packet Take 1 packet (4 g total) by mouth once daily.           Medication List with Changes/Refills   Current Medications    ASHWAGANDHA EXTRACT ORAL        BLUNT NEEDLE, DISPOSABLE 18 X 1 1/2 " NDLE    Use as directed with testosterone injections    CHOLESTYRAMINE (QUESTRAN) 4 GRAM PACKET    Take 1 packet (4 g total) by mouth once daily.    CREATINE MONOHYDRATE (CREATINE ORAL)    Take by mouth.    ESOMEPRAZOLE (NEXIUM) 40 MG CAPSULE    Take 1 capsule (40 mg total) by mouth before breakfast.    FAMOTIDINE (PEPCID) 40 MG TABLET    TAKE 1 TABLET BY MOUTH EVERY DAY    FISH,SAF,FLX,BRG OILS/O3,6,9N2 (FISH-FLAX-BORAGE OIL ORAL)    Take by mouth.    FLASH GLUCOSE " "SCANNING READER (FREESTYLE YAN 2 READER) [facility]    Use as directed to check blood glucose.    FLASH GLUCOSE SENSOR (FREESTYLE YAN 2 SENSOR) KIT    Apply to skin Q 14 days    FLASH GLUCOSE SENSOR (FREESTYLE YAN 2 SENSOR) KIT    Change every 14 days.    FLUOXETINE 10 MG CAPSULE    Take 1 capsule (10 mg total) by mouth once daily.    FREESTYLE YAN 2 SENSOR KIT    APPLY TO SKIN EVERY 14 DAYS    JARDIANCE 10 MG TABLET    TAKE 1 TABLET BY MOUTH EVERY DAY    KETOCONAZOLE (NIZORAL) 2 % SHAMPOO    WASH SCALP AND BEARD AREA AT LEAST 2-3X/WEEK - LET SIT AT LEAST 5 MINUTES PRIOR TO RINSING.    MELATONIN 10 MG TAB    Take 10 mg by mouth nightly.    MINOXIDIL (ROGAINE) 2 % EXTERNAL SOLUTION    Apply 1 application topically 2 (two) times daily.    SEMAGLUTIDE (OZEMPIC) 1 MG/DOSE (4 MG/3 ML)    Inject 1 mg into the skin every 7 days.    SILDENAFIL (REVATIO) 20 MG TAB    TAKE UP TO 5 TABLETS BY MOUTH DAILY AS NEEDED 30 MINUTES PRIOR TO INTERCOURSE    SIMVASTATIN (ZOCOR) 10 MG TABLET    TAKE 1 TABLET BY MOUTH EVERY DAY IN THE EVENING    SYRINGE WITH NEEDLE 1 ML 22 GAUGE X 1 1/2" SYRG    Use as directed to administer testosterone injections.    TADALAFIL (CIALIS) 10 MG TABLET    Take 1 tablet (10 mg total) by mouth daily as needed for Erectile Dysfunction.    TESTOSTERONE CYPIONATE (DEPOTESTOTERONE CYPIONATE) 200 MG/ML INJECTION    INJECT 1 ML (200 MG TOTAL) INTO THE MUSCLE ONCE A WEEK.    TURMERIC ORAL    Take by mouth.    UNABLE TO FIND    Amitriptyline 1%/Meloxicam 0.09%/Lidocaine Hcl 2%/ Prilocaine 2%   APPLY 2 GRAMS QID    UNABLE TO FIND    Beet root    VITAMIN B COMPLEX ORAL    Take 1 tablet by mouth once daily.   Discontinued Medications    METFORMIN (GLUCOPHAGE-XR) 500 MG ER 24HR TABLET    TAKE 2 TABLETS BY MOUTH TWICE A DAY WITH MEALS         This note was generated with the assistance of ambient listening technology. Verbal consent was obtained by the patient and accompanying visitor(s) for the recording of patient " appointment to facilitate this note. I attest to having reviewed and edited the generated note for accuracy, though some syntax or spelling errors may persist. Please contact the author of this note for any clarification.

## 2024-12-16 ENCOUNTER — CLINICAL SUPPORT (OUTPATIENT)
Dept: REHABILITATION | Facility: HOSPITAL | Age: 54
End: 2024-12-16
Attending: INTERNAL MEDICINE
Payer: COMMERCIAL

## 2024-12-16 DIAGNOSIS — R29.898 IMPAIRED FLEXIBILITY OF LOWER EXTREMITY: ICD-10-CM

## 2024-12-16 DIAGNOSIS — G89.29 CHRONIC RIGHT SHOULDER PAIN: ICD-10-CM

## 2024-12-16 DIAGNOSIS — M25.561 CHRONIC PAIN OF RIGHT KNEE: ICD-10-CM

## 2024-12-16 DIAGNOSIS — R68.89 IMPAIRED TOLERANCE OF ACTIVITY: ICD-10-CM

## 2024-12-16 DIAGNOSIS — M25.511 CHRONIC RIGHT SHOULDER PAIN: ICD-10-CM

## 2024-12-16 DIAGNOSIS — G89.29 CHRONIC PAIN OF RIGHT KNEE: ICD-10-CM

## 2024-12-16 DIAGNOSIS — M25.611 DECREASED RIGHT SHOULDER RANGE OF MOTION: Primary | ICD-10-CM

## 2024-12-16 PROCEDURE — 97161 PT EVAL LOW COMPLEX 20 MIN: CPT | Mod: PN

## 2024-12-16 PROCEDURE — 97110 THERAPEUTIC EXERCISES: CPT | Mod: PN

## 2024-12-16 NOTE — PLAN OF CARE
[facility]  Physical Therapy Initial Evaluation    Name: Abhi Pompa  Clinic Number: [id number]    Therapy Diagnosis:   Encounter Diagnoses   Name Primary?    Chronic right shoulder pain     Chronic pain of right knee     Decreased right shoulder range of motion Yes    Impaired flexibility of lower extremity     Impaired tolerance of activity      Physician: Rafita Amezcua DO    Physician Orders: PT Eval and Treat   Medical Diagnosis from Referral:   M25.511,G89.29 (ICD-10-CM) - Chronic right shoulder pain   M25.561,G89.29 (ICD-10-CM) - Chronic pain of right knee   Evaluation Date: 12/16/2024  Authorization Period Expiration: 12/2/2025  Plan of Care Expiration: 2/28/2025 or 12v post eval  Visit # (episodes)/ Visits authorized: 1/ eval (POC 0 / 12)  2nd FOTO visit 5  3rd FOTO visit 10  PTA: 0/5    Time In: 200  Time Out: 250  Total Billable Time: 50 minutes    Precautions: DM  Insurance: Payor: [employer] / Plan: [facility] FEDERAL STANDARD / Product Type: PPO /     Subjective   Date of onset: 2 years  History of current condition - Gennaro reports: he hurt his shoulder and knee during power lift workouts; shoulder 2 years and knee 1 year ago. Squatting with  too narrow; has never healed. Pain has been variable but has been hurting a lot lately. Has only seen his PCP at this point. No imaging done. Wanted to try PT first; does not want surgery. No other treatment has been done; modified workouts and doing resisted band workouts. Pain is not just in his shoulder; travelling into his Right arm; old wrist surgery. Denies neck or back pain. Gets massages 1x week; gets stretched.      Medical History:   Past Medical History:   Diagnosis Date    Achalasia     Arthritis     right ankle    Cataract     Colon polyp     Diabetes mellitus     taking lisinopril preventive to protect the kidneys, no HTN    Dyslipidemia     pt denies    Dysphagia     Former tobacco use     chew    GERD  (gastroesophageal reflux disease)     H. pylori infection     Hypogonadism in male     MELLY (obstructive sleep apnea)     uses cpap       Surgical History:   Abhi Pompa  has a past surgical history that includes Tonsillectomy; Ankle surgery (Right); [place] tooth extraction; Upper gastrointestinal endoscopy (12/11/2015); Myotomy (07/2016); laparoscopic Heller myotomy; Hand surgery (Right); Colonoscopy (N/A, 04/19/2022); Esophagogastroduodenoscopy (N/A, 6/29/2022); and Esophagogastroduodenoscopy (N/A, 10/21/2024).    Medications:   Abhi has a current medication list which includes the following prescription(s): ashwagandha extract, blunt needle, disposable, cholestyramine, creatine monohydrate, esomeprazole, famotidine, fish,saf,flx,brg oils/o3,6,9n2, freestyle kelli 2 reader, freestyle kelli 2 sensor, freestyle kelli 2 sensor, fluoxetine, freestyle kelli 2 sensor, jardiance, ketoconazole, melatonin, minoxidil, ozempic, sildenafil, simvastatin, syringe with needle, tadalafil, testosterone cypionate, turmeric, UNABLE TO FIND, UNABLE TO FIND, and vitamin b complex.    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; ; nice office chair  Prior Level of Function: independent  Current Level of Function: difficult/pain with standing up; raising arm OH, sideways and behind back    Pain:  Shoulder: 0-8/10 range; 4/10 today / Knee: 0-7/10 range; 3/10 today   Location: Right shoulder; a/c into deltoid; radiates down posterior norma and arm; Knee infrapatellar tendon  Description: sharp;  clicking  Aggravating Factors: standing up; raising arm OH, sideways and behind back  Easing Factors: walks 2-3 miles; workouts 2xwk - taking a break; lidocaine; hot bath; compression sleeve and sling    Pts goals: reduce pain; education of what to do at gym    Objective     Posture / IC / ASIS / PSIS: high BMI; excessive abduction; moderate kyphosis and FHP  Palpation: NT    Range of Motion/Strength:      Cervical AROM Screen:  WNL flexion and rotation  Extension severe restriction  SB moderate restriction      Shoulder AROM:   in degrees Left Right Pain/Dysfunction with Movement   Flexion (180 norm)  165  130    Extension (60 norm)  55  35    Abduction (180 norm)  163  115          Internal rotation (T8 norm)  T10  L4    ER at 0° abd (90 norm)  68  66        Gait Without AD   Analysis Slight left trunk lean to decrease Right Weight Bearing        UE MMT:  5/5 norm Left Right   Shoulder Flexion 5/5 4+/5   Shoulder Extension 5/5 5/5   Shoulder Abduction 5/5 4+/5   Serratus Anterior  5/5 4/5   Shoulder IR 5/5 5/5   Shoulder ER  @ 0* Abduction 5/5 5/5   Elbow Flexion  5/5 5/5   Elbow Extension 5/5 5/5   Mid Traps 3+/5 3+/5      LE MMT: TBD       Strength (in pounds, setting II one maximum trial):    Left Right    II 80 lbs 100 lbs    Normal  Average Values  Female Right Left Male: Right Left   20-29 66 lbs 62 lbs         94 lbs 86 lbs   30-39 68 lbs 64 lbs 90 lbs 82 lbs   40-49 64 lbs 62 lbs 80 lbs 74 lbs   50-59 62 lbs 57 lbs 72 lbs 65 lbs   60-69 53 lbs 51 lbs 63 lbs 56 lbs   70+ 44 lbs 42 lbs 54 lbs 48 lbs        Flexibility Left Right   Hamstrings @ 90/90 (-20 norm) -50 degrees -50 degrees     Cervical musculature flexibility: (restriction: mild, moderate, severe grading)   Muscle Flexibility   Upper trapezius severe   pectoralis severe     Innominates: LLD (leg length discrepancy) / Supine to Sit: Right shorter in supine    See FOTO score in Media section of EPIC       TREATMENT   Treatment Time In:  "230  Treatment Time Out: 250  Total Treatment time separate from Evaluation: 20 minutes    Gennaro received therapeutic exercises to develop strength, ROM, and flexibility for 15 minutes including:  NMRE utilized to activate appropriate mm to improve posture, shoulder stabilization, smooth GH and scapula glide: 5 minutes     Combo treat: Right shoulder impingement / Right knee infrapatellar pn    SEATED:  Shoulder retro roll, 10x  Shoulder squeeze, [id number]  Bilateral shoulder External Rotation (add resist next), [id number]  RIGHT Trapezius stretch holding onto sink (//bars), [id number]    TABLE:  Prone quad stretch with strap, [id number] Right         Add NEXT visit: Still needs knee protocol  Nerve glides   Forearm stretches  Doorway stretch, Low V arms, 3x20s  AAROM with wand: Extension / Abduction / Supine Flexion  Isometric Flx / Abd  Active tubing External Rotation / Internal Rotation   Manual tx   Chin tucks with chin overpressure, 10x5s  Post deltoid stretch across body, 3x15s Bilateral  Blue or higher tube  Shoulder Extension, 20-30x  Sidelying Horiz add mobilization (post capsule stretch), 4x10s  Seated Willow depression with small ball and 4" footstool, [id number]  FUTURE visits:  Retest innominates  Red Theraband  Serratus Anterior reach or wand landmines  Cervical retraction with Yellow Theraband   Cable Column: Willow ext / Adduction / Lat pull down  Prone I / T / Y  Bodyblade  Rhythmic Stab  Thoracic Mobility: Open books, 5x10s / Thoracic ext supine over 1/2 Foam Roller   Pec stretch with Foam Roller   S/L Sleeper stretch, 4x10s B  Self massage with TB     Home Exercises and Patient Education Provided    Education provided:   - daily HEP    Written Home Exercises Provided: yes.  Exercises were reviewed and Gennaro was able to demonstrate them prior to the end of the session.  Gennaro demonstrated good  understanding of the education provided.     See EMR under Patient Instructions for exercises provided " 12/16/2024.    Assessment   Abhi is a 54 y.o. male referred to outpatient Physical Therapy with a medical diagnosis of right shoulder and knee pain. Pt presents with right shoulder impingement sxs, posture deficits creating poor GH glide, decreased Right Upper Extremity and periscapular muscle strength, decreased pectoralis and Upper Trapezius flexibility, decreased Right hip flexor and quadricep flexibility, gait deficits and impaired function.    Pt prognosis is Good.   Pt will benefit from skilled outpatient Physical Therapy to address the deficits stated above and in the chart below, provide pt/family education, and to maximize pt's level of independence.     Plan of care discussed with patient: Yes  Pt's spiritual, cultural and educational needs considered and patient is agreeable to the plan of care and goals as stated below:     Anticipated Barriers for therapy: chronicity of condition      Medical Necessity is demonstrated by the following  History  Co-morbidities and personal factors that may impact the plan of care [] LOW: no personal factors / co-morbidities  [x] MODERATE: 1-2 personal factors / co-morbidities  [] HIGH: 3+ personal factors / co-morbidities    Moderate / High Support Documentation:   Co-morbidities affecting plan of care: see precautions    Personal Factors:   age  coping style  lifestyle     Examination  Body Structures and Functions, activity limitations and participation restrictions that may impact the plan of care [] LOW: addressing 1-2 elements  [x] MODERATE: 3+ elements  [] HIGH: 4+ elements (please support below)    Moderate / High Support Documentation: Range of Motion, flexibility, strength     Clinical Presentation [x] LOW: stable  [] MODERATE: Evolving  [] HIGH: Unstable     Decision Making/ Complexity Score: low          Goals:  Short Term GOALS: 3 weeks. Pt agrees with goals set.  1. Patient demonstrates compliance with HEP.   2. Patient demonstrates independence with  Postural Awareness while sitting and standing.   3. Patient demonstrates decreased pain level of 3/10 while performing daily activities.  4. Patient will reduce RUE radiculopathy frequency and intensity.  5. Patient will improve Right quadricep flexibility to moderate restriction.     Long Term GOALS: 6 weeks. Pt agrees with goals set.  1. Patient demonstrates increased Right Upper Extremity AROM equal to Left Upper Extremity.  2. Patient demonstrates increased Right quadricep flexibility to mild restriction.  3. Patient demonstrates increased RUE strength by 1/2 grade or greater to improve tolerance to home chores.  4. Patient demonstrates independence with body mechanics while working.  5. Patient demonstrates independence with HEP.   6. Patient will improve FOTO function score by 25% to show improvement in condition and functional mobility.        Plan   Plan of care Certification: 12/16/2024 to 3/28/2025.    Outpatient Physical Therapy 2 times weekly for 6 weeks to include the following interventions: Electrical Stimulation ,, Gait Training, Iontophoresis (with Dex), Manual Therapy, Moist Heat/ Ice, Neuromuscular Re-ed, Orthotic Management and Training, Patient Education, Self Care, Therapeutic Activities, Therapeutic Exercise, and Ultrasound.     Letty Powers, PT

## 2024-12-17 ENCOUNTER — PATIENT MESSAGE (OUTPATIENT)
Dept: REHABILITATION | Facility: HOSPITAL | Age: 54
End: 2024-12-17
Payer: COMMERCIAL

## 2024-12-17 PROBLEM — M25.611 DECREASED RIGHT SHOULDER RANGE OF MOTION: Status: ACTIVE | Noted: 2024-12-17

## 2024-12-17 PROBLEM — R68.89 IMPAIRED TOLERANCE OF ACTIVITY: Status: ACTIVE | Noted: 2024-12-17

## 2024-12-17 PROBLEM — R29.898 IMPAIRED FLEXIBILITY OF LOWER EXTREMITY: Status: ACTIVE | Noted: 2024-12-17

## 2024-12-24 DIAGNOSIS — L21.9 SEBORRHEIC DERMATITIS: ICD-10-CM

## 2024-12-25 RX ORDER — KETOCONAZOLE 20 MG/ML
SHAMPOO, SUSPENSION TOPICAL
Qty: 120 ML | Refills: 5 | Status: SHIPPED | OUTPATIENT
Start: 2024-12-25

## 2025-01-03 ENCOUNTER — CLINICAL SUPPORT (OUTPATIENT)
Dept: REHABILITATION | Facility: HOSPITAL | Age: 55
End: 2025-01-03
Payer: COMMERCIAL

## 2025-01-03 DIAGNOSIS — R29.898 IMPAIRED FLEXIBILITY OF LOWER EXTREMITY: ICD-10-CM

## 2025-01-03 DIAGNOSIS — R68.89 IMPAIRED TOLERANCE OF ACTIVITY: ICD-10-CM

## 2025-01-03 DIAGNOSIS — M25.611 DECREASED RIGHT SHOULDER RANGE OF MOTION: Primary | ICD-10-CM

## 2025-01-03 PROCEDURE — 97530 THERAPEUTIC ACTIVITIES: CPT | Mod: PN

## 2025-01-03 PROCEDURE — 97110 THERAPEUTIC EXERCISES: CPT | Mod: PN

## 2025-01-03 PROCEDURE — 97112 NEUROMUSCULAR REEDUCATION: CPT | Mod: PN

## 2025-01-03 NOTE — PROGRESS NOTES
[facility]   Physical Therapy Treatment Note      Name: Abhi Pompa  [facility] Number: [id number]    Therapy Diagnosis:   Encounter Diagnoses   Name Primary?    Decreased right shoulder range of motion Yes    Impaired flexibility of lower extremity     Impaired tolerance of activity      Physician: Rafita Amezcua DO    Visit Date: 1/3/2025    Physician Orders: PT Eval and Treat   Medical Diagnosis from Referral:   M25.511,G89.29 (ICD-10-CM) - Chronic right shoulder pain   M25.561,G89.29 (ICD-10-CM) - Chronic pain of right knee   Evaluation Date: 12/16/2024  Authorization Period Expiration: 12/2/2025  Plan of Care Expiration: 2/28/2025 or 12v post eval  Visit # (episodes)/ Visits authorized: 2 / eval +20 (POC 1 / 12)  2nd FOTO visit 5  3rd FOTO visit 10  PTA: 0/5     Time In: 130  Time Out: 225  Total Billable Time: 55 minutes     Precautions: DM  Insurance: Payor: [employer] / Plan: [employer] FEDERAL STANDARD / Product Type: PPO /     Subjective     Pt reports: he has been focusing on posture, sleeping with arm support, exercises, not holding arm close have all been helpful. Not taking ibuprofen daily. Sxs coming down the arm have stopped but still gets sore in Right forearm.    He was somewhat compliant with home exercise program.  Response to previous treatment: positive  Functional change: none reported    Pain: 3-4/10 shoulder; 1-2 knee  Location: Right shoulder; a/c into deltoid; radiates down posterior norma and arm; Knee infrapatellar tendon     Objective      Objective Measures updated at progress report unless specified.     Treatment     Combo treat: Right shoulder impingement / Right knee infrapatellar pn    Gennaro received the treatments listed below:      Gennaro received therapeutic exercises to develop strength, ROM, and flexibility for 10 minutes including:  NMRE utilized to activate appropriate mm to improve posture, shoulder stabilization, smooth GH and  "scapula glide: 15 minutes      SEATED:  Shoulder retro roll, 10x  Shoulder squeeze, [id number]  Red Theraband Bilateral shoulder External Rotation, 13x7wni  RIGHT Trapezius stretch holding onto sink (//bars), [id number]  Forearm stretches, 4-way, [id number]    TABLE:  Prone quad stretch with strap, [id number] Right       therapeutic activities to improve dynamic functional performance for 30  minutes, including:   STAND:   Cable Column: Adduction, 7# / Tricep Extension, 7#, 20-30x  Bicep curls, 5#, 20-30x  Blue tube Extension / Rows / Internal Rotation, 20-30x  Yellow tube External Rotation, 20-30x  Isometric Flx / Abd, 10i4amq        Add NEXT visit: Still needs knee protocol  Cable Column lat pull downs  Doorway stretch, Low V arms, 3x20s  AAROM with wand: Extension / Abduction / Supine Flexion  Bodyblad  Manual tx   Chin tucks with chin overpressure, 10x5s  Post deltoid stretch across body, 3x15s Bilateral  Sidelying Horiz add mobilization (post capsule stretch), 4x10s  Seated Willow depression with small ball and 4" footstool, [id number]  FUTURE visits:  Retest innominates  Red Theraband  Serratus Anterior reach or wand landmines  Cervical retraction with Yellow Theraband   Cable Column: Lat pull down  Prone I / T / Y  Bodyblade  Rhythmic Stab  Thoracic Mobility: Open books, 5x10s / Thoracic ext supine over 1/2 Foam Roller   Pec stretch with Foam Roller   S/L Sleeper stretch, 4x10s B  Self massage with TB        Patient Education and Home Exercises       Education provided:   - daily HEP    Written Home Exercises Provided: Patient instructed to cont prior HEP. Exercises were reviewed and Gennaro was able to demonstrate them prior to the end of the session.  Gennaro demonstrated good  understanding of the education provided. See EMR under Patient Instructions for exercises provided during therapy sessions    Assessment     Pt demonstrates variable levels of pain, decreased Range of Motion and kyphotic posture. Pt able to perform all " therex given with minimal pn provocation. Focus on posture and periscapular strengthening to improve Right GH glide and reduce impingement. Continue to progress as tolerated.     Gennaro Is progressing well towards his goals.   Pt prognosis is Good.     Pt will continue to benefit from skilled outpatient physical therapy to address the deficits listed in the problem list box on initial evaluation, provide pt/family education and to maximize pt's level of independence in the home and community environment.     Pt's spiritual, cultural and educational needs considered and pt agreeable to plan of care and goals.     Anticipated barriers to physical therapy: chronicity of condition    Goals:   Short Term GOALS: 3 weeks. Pt agrees with goals set.  1. Patient demonstrates compliance with HEP.   2. Patient demonstrates independence with Postural Awareness while sitting and standing.   3. Patient demonstrates decreased pain level of 3/10 while performing daily activities.  4. Patient will reduce RUE radiculopathy frequency and intensity.  5. Patient will improve Right quadricep flexibility to moderate restriction.     Long Term GOALS: 6 weeks. Pt agrees with goals set.  1. Patient demonstrates increased Right Upper Extremity AROM equal to Left Upper Extremity.  2. Patient demonstrates increased Right quadricep flexibility to mild restriction.  3. Patient demonstrates increased RUE strength by 1/2 grade or greater to improve tolerance to home chores.  4. Patient demonstrates independence with body mechanics while working.  5. Patient demonstrates independence with HEP.   6. Patient will improve FOTO function score by 25% to show improvement in condition and functional mobility.        Plan     Continue PT as deemed per POC: end Range of Motion, shoulder stabilization, GH setting    Letty Powesr DPT

## 2025-01-06 ENCOUNTER — PATIENT MESSAGE (OUTPATIENT)
Dept: REHABILITATION | Facility: HOSPITAL | Age: 55
End: 2025-01-06
Payer: COMMERCIAL

## 2025-01-07 ENCOUNTER — CLINICAL SUPPORT (OUTPATIENT)
Dept: REHABILITATION | Facility: HOSPITAL | Age: 55
End: 2025-01-07
Payer: COMMERCIAL

## 2025-01-07 DIAGNOSIS — M25.611 DECREASED RIGHT SHOULDER RANGE OF MOTION: Primary | ICD-10-CM

## 2025-01-07 DIAGNOSIS — R29.898 IMPAIRED FLEXIBILITY OF LOWER EXTREMITY: ICD-10-CM

## 2025-01-07 DIAGNOSIS — R68.89 IMPAIRED TOLERANCE OF ACTIVITY: ICD-10-CM

## 2025-01-07 DIAGNOSIS — E11.9 DIABETES MELLITUS WITHOUT COMPLICATION: ICD-10-CM

## 2025-01-07 PROCEDURE — 97110 THERAPEUTIC EXERCISES: CPT | Mod: PN

## 2025-01-07 PROCEDURE — 97112 NEUROMUSCULAR REEDUCATION: CPT | Mod: PN

## 2025-01-07 PROCEDURE — 97530 THERAPEUTIC ACTIVITIES: CPT | Mod: PN

## 2025-01-07 RX ORDER — EMPAGLIFLOZIN 10 MG/1
10 TABLET, FILM COATED ORAL
Qty: 90 TABLET | Refills: 1 | Status: SHIPPED | OUTPATIENT
Start: 2025-01-07

## 2025-01-07 NOTE — TELEPHONE ENCOUNTER
Care Due:                  Date            Visit Type   Department     Provider  --------------------------------------------------------------------------------                                EP -                              [facility]  Last Visit: 12-      CARE ([facility])   CATHLEEN Amezcua                              EP -                              PRIMARY      [facility]  Next Visit: 06-      CARE ([facility])   CATHLEEN Amezcua                                                            Last  Test          Frequency    Reason                     Performed    Due Date  --------------------------------------------------------------------------------    Lipid Panel.  12 months..  simvastatin..............  12- 12-    [employer] Embedded Care Due Messages. Reference number: [id number].   1/07/2025 12:22:08 AM CST

## 2025-01-07 NOTE — TELEPHONE ENCOUNTER
Provider Staff:  Action required for this patient    Requires labs      Please see care gap opportunities below in Care Due Message.    Thanks!  [facility]     Appointments      Date Provider   Last Visit   12/2/2024 Rafita Amezcua, DO   Next Visit   6/2/2025 Rafita Amezcua, DO     Refill Decision Note   Abhi Pompa  is requesting a refill authorization.  Brief Assessment and Rationale for Refill:  Approve     Medication Therapy Plan:        Comments:     Note composed:8:34 AM 01/07/2025

## 2025-01-07 NOTE — PROGRESS NOTES
[facility]   Physical Therapy Treatment Note      Name: Abhi Pompa  [facility] Number: [id number]    Therapy Diagnosis:   Encounter Diagnoses   Name Primary?    Decreased right shoulder range of motion Yes    Impaired flexibility of lower extremity     Impaired tolerance of activity        Physician: Rafita Amezcua DO    Visit Date: 1/7/2025    Physician Orders: PT Eval and Treat   Medical Diagnosis from Referral:   M25.511,G89.29 (ICD-10-CM) - Chronic right shoulder pain   M25.561,G89.29 (ICD-10-CM) - Chronic pain of right knee   Evaluation Date: 12/16/2024  Authorization Period Expiration: 12/2/2025  Plan of Care Expiration: 2/28/2025 or 12v post eval  Visit # (episodes)/ Visits authorized: 3 / eval +20 (POC 2 / 12)  2nd FOTO visit 5  3rd FOTO visit 10  PTA: 0/5     Time In: 200  Time Out: 255  Total Billable Time: 55 minutes     Precautions: DM  Insurance: Payor: [employer] / Plan: [employer] FEDERAL STANDARD / Product Type: PPO /     Subjective     Pt reports: felt he worked his shoulder after last visit. No pain in his knee since doing the quad stretch.    He was somewhat compliant with home exercise program.  Response to previous treatment: positive  Functional change: none reported    Pain: 3-4/10 shoulder; 0 knee  Location: Right shoulder; a/c into deltoid; radiates down posterior norma and arm; Knee infrapatellar tendon     Objective      Objective Measures updated at progress report unless specified.     Treatment     Combo treat: Right shoulder impingement / Right knee infrapatellar pn    Gennaro received the treatments listed below:      Gennaro received therapeutic exercises to develop strength, ROM, and flexibility for 10 minutes including:  NMRE utilized to activate appropriate mm to improve posture, shoulder stabilization, smooth GH and scapula glide: 15 minutes     SEATED:  Red Theraband Bilateral shoulder External Rotation, 76o6fjd  +Cervical retraction with  "Yellow Theraband, 2x10, 5 sec hold (hep)    NOT PERFORMED   Shoulder retro roll, 10x  Shoulder squeeze, 02s0cfi  Forearm stretches, 4-way, 8p92owt    TABLE:  Prone quad stretch with strap, 3x98vni Right       therapeutic activities to improve dynamic functional performance for 30  minutes, including:   STAND:   Cable Column: Adduction, 7# / Tricep Extension, 7#, 20-30x  Bicep curls, 5#, 20-30x  Blue tube Extension / Rows / Internal Rotation, 20-30x  Yellow tube External Rotation, 20-30x  Isometric Flx / Abd, 60e5gha    NEW TODAY  RIGHT Trapezius stretch holding onto sink (//bars), 6z40jvi  Doorway stretch: Single arm 90 seg, turn away instead of stepping forward (hep)  Scaption AARange of Motion: table and rail, 2x10 (wand was too painful) - try wall next  Bodyblade  Sidelying Horiz add mobilization (post capsule stretch), 5x10s  Sidelying Sleeper stretch, 1e86nxp (hep)  Seated Willow depression with small ball and 4" footstool, 18l3gwr (try other for home)  Standing Weight Bearing: hands on table - 1. Scap depression, 2x10, 5sec / 2. Alternate taps, 20-30x        Add NEXT visit: Still needs knee protocol  Cable Column lat pull downs  AAROM with wand: Extension / Abduction / Supine Flexion  Supine horizontal abduction  Manual tx   FUTURE visits:  Retest innominates  Red Theraband Serratus Anterior reach or wand landmines  Cable Column: Lat pull down  Rhythmic Stab  Thoracic Mobility: Open books, 5x10s / Thoracic ext supine over 1/2 Foam Roller   Pec stretch with Foam Roller   Self massage with TB        Patient Education and Home Exercises       Education provided:   - daily HEP    Written Home Exercises Provided: Patient instructed to cont prior HEP. Exercises were reviewed and Gennaro was able to demonstrate them prior to the end of the session.  Gennaro demonstrated good  understanding of the education provided. See EMR under Patient Instructions for exercises provided during therapy sessions    Assessment     Pt " demonstrates variable levels of pain, decreased Range of Motion and kyphotic posture. Pt able to perform all therex given with minimal pn provocation. Focus on posture and periscapular strengthening to improve Right GH glide and reduce impingement. Added pectoralis and posterior capsule stretching to reduce anterior/internal rotation of Right shoulder. Continue to progress as tolerated.     Gennaro Is progressing well towards his goals.   Pt prognosis is Good.     Pt will continue to benefit from skilled outpatient physical therapy to address the deficits listed in the problem list box on initial evaluation, provide pt/family education and to maximize pt's level of independence in the home and community environment.     Pt's spiritual, cultural and educational needs considered and pt agreeable to plan of care and goals.     Anticipated barriers to physical therapy: chronicity of condition    Goals:   Short Term GOALS: 3 weeks. Pt agrees with goals set.  1. Patient demonstrates compliance with HEP.   2. Patient demonstrates independence with Postural Awareness while sitting and standing.   3. Patient demonstrates decreased pain level of 3/10 while performing daily activities.  4. Patient will reduce RUE radiculopathy frequency and intensity.  5. Patient will improve Right quadricep flexibility to moderate restriction.     Long Term GOALS: 6 weeks. Pt agrees with goals set.  1. Patient demonstrates increased Right Upper Extremity AROM equal to Left Upper Extremity.  2. Patient demonstrates increased Right quadricep flexibility to mild restriction.  3. Patient demonstrates increased RUE strength by 1/2 grade or greater to improve tolerance to home chores.  4. Patient demonstrates independence with body mechanics while working.  5. Patient demonstrates independence with HEP.   6. Patient will improve FOTO function score by 25% to show improvement in condition and functional mobility.        Plan     Continue PT as deemed  per POC: end Range of Motion, shoulder stabilization, GH setting    BAKARI JaraT

## 2025-01-08 ENCOUNTER — OFFICE VISIT (OUTPATIENT)
Dept: OPTOMETRY | Facility: CLINIC | Age: 55
End: 2025-01-08
Payer: COMMERCIAL

## 2025-01-08 DIAGNOSIS — E11.9 DIABETES MELLITUS TYPE 2 WITHOUT RETINOPATHY: Primary | ICD-10-CM

## 2025-01-08 DIAGNOSIS — H04.123 DRY EYES, BILATERAL: ICD-10-CM

## 2025-01-08 DIAGNOSIS — Z13.5 GLAUCOMA SCREENING: ICD-10-CM

## 2025-01-08 DIAGNOSIS — H52.4 MYOPIA WITH ASTIGMATISM AND PRESBYOPIA, BILATERAL: ICD-10-CM

## 2025-01-08 DIAGNOSIS — H52.203 MYOPIA WITH ASTIGMATISM AND PRESBYOPIA, BILATERAL: ICD-10-CM

## 2025-01-08 DIAGNOSIS — H52.13 MYOPIA WITH ASTIGMATISM AND PRESBYOPIA, BILATERAL: ICD-10-CM

## 2025-01-08 DIAGNOSIS — H02.403 BLEPHAROPTOSIS, ACQUIRED, BILATERAL: ICD-10-CM

## 2025-01-08 DIAGNOSIS — H43.393 VITREOUS FLOATERS, BILATERAL: ICD-10-CM

## 2025-01-08 PROCEDURE — 2023F DILAT RTA XM W/O RTNOPTHY: CPT | Mod: CPTII,S$GLB,, | Performed by: OPTOMETRIST

## 2025-01-08 PROCEDURE — 99999 PR PBB SHADOW E&M-EST. PATIENT-LVL II: CPT | Mod: PBBFAC,,, | Performed by: OPTOMETRIST

## 2025-01-08 PROCEDURE — 92015 DETERMINE REFRACTIVE STATE: CPT | Mod: S$GLB,,, | Performed by: OPTOMETRIST

## 2025-01-08 PROCEDURE — 92014 COMPRE OPH EXAM EST PT 1/>: CPT | Mod: S$GLB,,, | Performed by: OPTOMETRIST

## 2025-01-08 NOTE — PROGRESS NOTES
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 Takes visual breaks . Hs driving is hard knows   he has cataracts   Using systane about 1x per day 3x per week   Denies F/F   Hemoglobin A1C       Date                     Value               Ref Range             Status                11/25/2024               6.9 (H)             4.0 - 5.6 %           Final                Last edited by Lourdes Bartlett on 1/8/2025  3:49 PM.            Assessment /Plan     For exam results, see Encounter Report.    Diabetes mellitus type 2 without retinopathy    Blepharoptosis, acquired, bilateral    Vitreous floaters, bilateral    Glaucoma screening    Dry eyes, bilateral    Myopia with astigmatism and presbyopia, bilateral      No luana/ no csme, gave Diabetic Retinopathy info, advise tight control glucose, BP---Advise annual dilated fundus exam  Moderate d-chalasis OU, mildly vis sig, pt notes fhx same---monitor, will probably need sx in future   RD precautions given and reviewed. Patient knows to call/ message if any further changes in symptoms occur.  Not suspect  Gradually worsening w/ long hours on computer / documents ---gave otc ATs suggestions try bid -tid lamont in pm ---may have rosacea component in future   Gave copy of specs for near only / intermediate        Discussed and educated patient on current findings /plan.  RTC 1 year, prn if any changes / issues

## 2025-01-09 ENCOUNTER — CLINICAL SUPPORT (OUTPATIENT)
Dept: REHABILITATION | Facility: HOSPITAL | Age: 55
End: 2025-01-09
Payer: COMMERCIAL

## 2025-01-09 DIAGNOSIS — R29.898 IMPAIRED FLEXIBILITY OF LOWER EXTREMITY: ICD-10-CM

## 2025-01-09 DIAGNOSIS — R68.89 IMPAIRED TOLERANCE OF ACTIVITY: ICD-10-CM

## 2025-01-09 DIAGNOSIS — M25.611 DECREASED RIGHT SHOULDER RANGE OF MOTION: Primary | ICD-10-CM

## 2025-01-09 PROCEDURE — 97112 NEUROMUSCULAR REEDUCATION: CPT | Mod: PN

## 2025-01-09 PROCEDURE — 97110 THERAPEUTIC EXERCISES: CPT | Mod: PN

## 2025-01-09 PROCEDURE — 97530 THERAPEUTIC ACTIVITIES: CPT | Mod: PN

## 2025-01-09 NOTE — PROGRESS NOTES
[facility]   Physical Therapy Treatment Note      Name: Abhi Pompa  [facility] Number: [id number]    Therapy Diagnosis:   Encounter Diagnoses   Name Primary?    Decreased right shoulder range of motion Yes    Impaired flexibility of lower extremity     Impaired tolerance of activity        Physician: Rafita Amezcua DO    Visit Date: 1/9/2025    Physician Orders: PT Eval and Treat   Medical Diagnosis from Referral:   M25.511,G89.29 (ICD-10-CM) - Chronic right shoulder pain   M25.561,G89.29 (ICD-10-CM) - Chronic pain of right knee   Evaluation Date: 12/16/2024  Authorization Period Expiration: 12/2/2025  Plan of Care Expiration: 2/28/2025 or 12v post eval  Visit # (episodes)/ Visits authorized: 4 / eval +20 (POC 3 / 12)  2nd FOTO visit 5  3rd FOTO visit 10  PTA: 0/5     Time In: 400  Time Out: 455  Total Billable Time: 55 minutes     Precautions: DM  Insurance: Payor: [employer] / Plan: [employer] FEDERAL STANDARD / Product Type: PPO /     Subjective     Pt reports: he was really sore after last session. Shoulder hurting after putting up decorations.     He was somewhat compliant with home exercise program.  Response to previous treatment: positive  Functional change: none reported    Pain: 3-4/10 shoulder; 0 knee  Location: Right shoulder; a/c into deltoid; radiates down posterior norma and arm; Knee infrapatellar tendon     Objective      Objective Measures updated at progress report unless specified.     Treatment     Combo treat: Right shoulder impingement / Right knee infrapatellar pn    Gennaro received the treatments listed below:      Gennaro received therapeutic exercises to develop strength, ROM, and flexibility for 10 minutes including:  NMRE utilized to activate appropriate mm to improve posture, shoulder stabilization, smooth GH and scapula glide: 15 minutes     SEATED:  Red Theraband Bilateral shoulder External Rotation, 32m7wym  Cervical retraction with Yellow  Theraband, 2x10, 5 sec hold (hep)   Shoulder squeeze, 26u6dom  Forearm stretches, 4-way, 8k76fnz    TABLE:  Prone quad stretch with strap, 4g94hcm Right       therapeutic activities to improve dynamic functional performance for 30  minutes, including:   STAND:   Cable Column: Adduction, 7# / Tricep Extension, 7#, 20-30x  Bicep curls, 5#, 20-30x    Blue tube Extension / Rows / Internal Rotation, 2x15  Yellow tube External Rotation, 2x15  Low trap depression. Towel roll on counter  Isometric Flx / Abd, 81m5fbp      RIGHT Trapezius stretch holding onto sink (//bars), 2x01dnh  Doorway stretch: Single arm 90 seg, turn away instead of stepping forward (hep)    Scaption AARange of Motion: table and rail, 2x10 (wand was too painful) - try wall next  Bodyblade  Sidelying Horiz add mobilization (post capsule stretch), 5x10s  Sidelying Sleeper stretch, 0g12jyr (hep)  Seated Willow depression with towel and counter, 13q3imr (hep)  Standing Weight Bearing: hands on table - 1. Scap depression, 2x10, 5sec / 2. Alternate taps, 20-30x        Add NEXT visit: Still needs knee protocol  Cable Column lat pull downs  AAROM with wand: Extension / Abduction / Supine Flexion  Supine horizontal abduction  Manual tx   FUTURE visits:  Retest innominates  Red Theraband Serratus Anterior reach or wand landmines  Cable Column: Lat pull down  Rhythmic Stab  Thoracic Mobility: Open books, 5x10s / Thoracic ext supine over 1/2 Foam Roller   Pec stretch with Foam Roller   Self massage with TB        Patient Education and Home Exercises       Education provided:   - daily HEP    Written Home Exercises Provided: Patient instructed to cont prior HEP. Exercises were reviewed and Gennaro was able to demonstrate them prior to the end of the session.  Gennaro demonstrated good  understanding of the education provided. See EMR under Patient Instructions for exercises provided during therapy sessions    Assessment     Pt demonstrates variable levels of pain,  decreased Range of Motion and kyphotic posture. Pt able to perform all therex given with minimal pn provocation. Focus on posture and periscapular strengthening to improve Right GH glide and reduce impingement. Added low trap strengthening to reduce Upper Trapezius dominance and improve GH setting. Continue to progress as tolerated.     Gennaro Is progressing well towards his goals.   Pt prognosis is Good.     Pt will continue to benefit from skilled outpatient physical therapy to address the deficits listed in the problem list box on initial evaluation, provide pt/family education and to maximize pt's level of independence in the home and community environment.     Pt's spiritual, cultural and educational needs considered and pt agreeable to plan of care and goals.     Anticipated barriers to physical therapy: chronicity of condition    Goals:   Short Term GOALS: 3 weeks. Pt agrees with goals set.  1. Patient demonstrates compliance with HEP.   2. Patient demonstrates independence with Postural Awareness while sitting and standing.   3. Patient demonstrates decreased pain level of 3/10 while performing daily activities.  4. Patient will reduce RUE radiculopathy frequency and intensity.  5. Patient will improve Right quadricep flexibility to moderate restriction.     Long Term GOALS: 6 weeks. Pt agrees with goals set.  1. Patient demonstrates increased Right Upper Extremity AROM equal to Left Upper Extremity.  2. Patient demonstrates increased Right quadricep flexibility to mild restriction.  3. Patient demonstrates increased RUE strength by 1/2 grade or greater to improve tolerance to home chores.  4. Patient demonstrates independence with body mechanics while working.  5. Patient demonstrates independence with HEP.   6. Patient will improve FOTO function score by 25% to show improvement in condition and functional mobility.        Plan     Continue PT as deemed per POC: end Range of Motion, shoulder stabilization,   setting    BAKARI JaraT

## 2025-01-14 ENCOUNTER — CLINICAL SUPPORT (OUTPATIENT)
Dept: REHABILITATION | Facility: HOSPITAL | Age: 55
End: 2025-01-14
Payer: COMMERCIAL

## 2025-01-14 DIAGNOSIS — M25.611 DECREASED RIGHT SHOULDER RANGE OF MOTION: Primary | ICD-10-CM

## 2025-01-14 DIAGNOSIS — R29.898 IMPAIRED FLEXIBILITY OF LOWER EXTREMITY: ICD-10-CM

## 2025-01-14 DIAGNOSIS — R68.89 IMPAIRED TOLERANCE OF ACTIVITY: ICD-10-CM

## 2025-01-14 PROCEDURE — 97110 THERAPEUTIC EXERCISES: CPT | Mod: PN

## 2025-01-14 PROCEDURE — 97112 NEUROMUSCULAR REEDUCATION: CPT | Mod: PN

## 2025-01-14 PROCEDURE — 97530 THERAPEUTIC ACTIVITIES: CPT | Mod: PN

## 2025-01-14 NOTE — PROGRESS NOTES
[facility]   Physical Therapy Treatment Note      Name: Abhi Pompa  [facility] Number: [id number]    Therapy Diagnosis:   Encounter Diagnoses   Name Primary?    Decreased right shoulder range of motion Yes    Impaired flexibility of lower extremity     Impaired tolerance of activity        Physician: Rafita Amezcua DO    Visit Date: 1/14/2025    Physician Orders: PT Eval and Treat   Medical Diagnosis from Referral:   M25.511,G89.29 (ICD-10-CM) - Chronic right shoulder pain   M25.561,G89.29 (ICD-10-CM) - Chronic pain of right knee   Evaluation Date: 12/16/2024  Authorization Period Expiration: 12/2/2025  Plan of Care Expiration: 2/28/2025 or 12v post eval  Visit # (episodes)/ Visits authorized: 5 / eval +20 (POC 4 / 12)  2nd FOTO visit 5 - 1/14/2025  3rd FOTO visit 10  PTA: 0/5     Time In: 200  Time Out: 255  Total Billable Time: 55 minutes     Precautions: DM  Insurance: Payor: [employer] / Plan: [employer] FEDERAL STANDARD / Product Type: PPO /     Subjective     Pt reports: unable to bench at this point; push ups are hurting. Anything else he can do to work his chest that will not be painful?    He was somewhat compliant with home exercise program.  Response to previous treatment: positive  Functional change: none reported    Pain: 2/10 shoulder; 0 knee  Location: Right shoulder; a/c into deltoid; radiates down posterior norma and arm; Knee infrapatellar tendon     Objective      Objective Measures updated at progress report unless specified.     Treatment     Combo treat: Right shoulder impingement / Right knee infrapatellar pn    Gennaro received the treatments listed below:      Gennaro received therapeutic exercises to develop strength, ROM, and flexibility for 10 minutes including:  NMRE utilized to activate appropriate mm to improve posture, shoulder stabilization, smooth GH and scapula glide: 15 minutes     SEATED:  Red Theraband Bilateral shoulder External Rotation,  95j3fsa  Cervical retraction with Yellow Theraband, 2x10, 5 sec hold (hep)   Shoulder squeeze, 44u9hif  Forearm stretches, 4-way, 8x01mzo    TABLE:  Prone quad stretch with strap, 3r30lkj Right       therapeutic activities to improve dynamic functional performance for 30  minutes, including:   STAND:   Cable Column: Adduction, 7# / Tricep Extension, 7#, 20-30x  Bicep curls, 5#, 20-30x    Blue tube Extension / Rows / Internal Rotation, 2x15  Yellow tube External Rotation, 2x15  Low trap depression. Towel roll on counter 03g4gxa  Isometric Flx / Abd, 07e9tlo - +Active today, 10x 3 planes    RIGHT Trapezius stretch holding onto sink (//bars), 9r31blh  Doorway stretch: Single arm 90 seg, turn away instead of stepping forward (hep)    Scaption AARange of Motion: table and rail, 2x10 (wand was too painful) - try wall next  Bodyblade  Sidelying Horiz add mobilization (post capsule stretch), 5x10s  Sidelying Sleeper stretch, 1e06ljn (hep)  Seated Willow depression with towel and counter, 08f8hcz (hep)    Standing Weight Bearing: hands on table - 1. Scap depression, 2x10, 5sec / 2. Alternate taps, 20-30x / Reaches /   +Planks on Bosu Ball, 0u09dat        Add NEXT visit: Still needs knee protocol  Cable Column lat pull downs  AAROM with wand: Extension / Abduction / Supine Flexion  Supine horizontal abduction  Manual tx   FUTURE visits:  Retest innominates  Red Theraband Serratus Anterior reach or wand landmines  Cable Column: Lat pull down  Thoracic Mobility: Open books, 5x10s / Thoracic ext supine over 1/2 Foam Roller   Pec stretch with Foam Roller   Self massage with TB        Patient Education and Home Exercises       Education provided:   - daily HEP    Written Home Exercises Provided: Patient instructed to cont prior HEP. Exercises were reviewed and Gennaro was able to demonstrate them prior to the end of the session.  Gennaro demonstrated good  understanding of the education provided. See EMR under Patient Instructions for  exercises provided during therapy sessions    Assessment     Pt demonstrates variable levels of pain, decreased Range of Motion and kyphotic posture. Pt able to perform all therex given with minimal pn provocation. Focus on posture and periscapular strengthening to improve Right GH glide and reduce impingement. Reviewed low trap strengthening to reduce Upper Trapezius dominance and improve GH setting. Started AROM in flex/scap/abd planes; slight pain; mod to max cues required for shoulder setting. Continue to progress as tolerated.     Gennaro Is progressing well towards his goals.   Pt prognosis is Good.     Pt will continue to benefit from skilled outpatient physical therapy to address the deficits listed in the problem list box on initial evaluation, provide pt/family education and to maximize pt's level of independence in the home and community environment.     Pt's spiritual, cultural and educational needs considered and pt agreeable to plan of care and goals.     Anticipated barriers to physical therapy: chronicity of condition    Goals:   Short Term GOALS: 3 weeks. Pt agrees with goals set. PROGRESS  1. Patient demonstrates compliance with HEP.   2. Patient demonstrates independence with Postural Awareness while sitting and standing.   3. Patient demonstrates decreased pain level of 3/10 while performing daily activities.  4. Patient will reduce RUE radiculopathy frequency and intensity.  5. Patient will improve Right quadricep flexibility to moderate restriction.     Long Term GOALS: 6 weeks. Pt agrees with goals set. PROGRESS   1. Patient demonstrates increased Right Upper Extremity AROM equal to Left Upper Extremity.  2. Patient demonstrates increased Right quadricep flexibility to mild restriction.  3. Patient demonstrates increased RUE strength by 1/2 grade or greater to improve tolerance to home chores.  4. Patient demonstrates independence with body mechanics while working.  5. Patient demonstrates  independence with HEP.   6. Patient will improve FOTO function score by 25% to show improvement in condition and functional mobility.        Plan     Continue PT as deemed per POC: end Range of Motion, shoulder stabilization, GH setting    Letty Powers DPT

## 2025-01-16 ENCOUNTER — CLINICAL SUPPORT (OUTPATIENT)
Dept: REHABILITATION | Facility: HOSPITAL | Age: 55
End: 2025-01-16
Payer: COMMERCIAL

## 2025-01-16 DIAGNOSIS — R29.898 IMPAIRED FLEXIBILITY OF LOWER EXTREMITY: ICD-10-CM

## 2025-01-16 DIAGNOSIS — M25.611 DECREASED RIGHT SHOULDER RANGE OF MOTION: Primary | ICD-10-CM

## 2025-01-16 DIAGNOSIS — R68.89 IMPAIRED TOLERANCE OF ACTIVITY: ICD-10-CM

## 2025-01-16 PROCEDURE — 97112 NEUROMUSCULAR REEDUCATION: CPT | Mod: PN,CQ

## 2025-01-16 PROCEDURE — 97530 THERAPEUTIC ACTIVITIES: CPT | Mod: PN,CQ

## 2025-01-16 PROCEDURE — 97110 THERAPEUTIC EXERCISES: CPT | Mod: PN,CQ

## 2025-01-16 NOTE — PROGRESS NOTES
[facility]   Physical Therapy Treatment Note      Name: Abhi Pompa  [facility] Number: [id number]    Therapy Diagnosis:   Encounter Diagnoses   Name Primary?    Decreased right shoulder range of motion Yes    Impaired flexibility of lower extremity     Impaired tolerance of activity          Physician: Rafita Amezcua DO    Visit Date: 1/16/2025    Physician Orders: PT Eval and Treat   Medical Diagnosis from Referral:   M25.511,G89.29 (ICD-10-CM) - Chronic right shoulder pain   M25.561,G89.29 (ICD-10-CM) - Chronic pain of right knee   Evaluation Date: 12/16/2024  Authorization Period Expiration: 12/2/2025  Plan of Care Expiration: 2/28/2025 or 12v post eval  Visit # (episodes)/ Visits authorized: 6 / eval +20 (POC 5 / 12)  2nd FOTO visit 5 - 1/14/2025  3rd FOTO visit 10  PTA: 1/5     Time In: 2:05  Time Out: 3:00  Total Billable Time: 55 minutes     Precautions: DM  Insurance: Payor: [employer] / Plan: [employer] FEDERAL STANDARD / Product Type: PPO /     Subjective     Pt reports: continued pain into the shoulder that becomes sharp with movement. Overall feeling better primarily at night with less aching and pain with turning in bed.    He was somewhat compliant with home exercise program.  Response to previous treatment: positive  Functional change: none reported    Pain: 2/10 shoulder; 0 knee  Location: Right shoulder; a/c into deltoid; radiates down posterior norma and arm; Knee infrapatellar tendon     Objective      Objective Measures updated at progress report unless specified.     Treatment     Combo treat: Right shoulder impingement / Right knee infrapatellar pn    Gennaro received the treatments listed below:      Gennaro received therapeutic exercises to develop strength, ROM, and flexibility for 10 minutes including:  NMRE utilized to activate appropriate mm to improve posture, shoulder stabilization, smooth GH and scapula glide: 15 minutes     SEATED:  Jim Alvarez  Bilateral shoulder External Rotation, 72m1mzv  Cervical retraction with Yellow Theraband, 2x10, 5 sec hold (hep)   Shoulder squeeze, 08v8bvn  Forearm stretches, 4-way, 2w80gvq    TABLE:  Prone quad stretch with strap, 4o94fqd Right       therapeutic activities to improve dynamic functional performance for 30  minutes, including:   STAND:   Cable Column: Adduction, 7# / Tricep Extension, 7#, 20-30x  Bicep curls, 5#, 20-30x    Blue tube Extension / Rows / Internal Rotation, 2x15  Yellow tube External Rotation, 2x15  Low trap depression. Towel roll on counter 76z7jok  Isometric Flx / Abd, 07j4vpt - +Active today, 10x 3 planes    RIGHT Trapezius stretch holding onto sink (//bars), 1e08lgk  Doorway stretch: Single arm 90 seg, turn away instead of stepping forward (hep)    Scaption AARange of Motion: wall slide, 2x10  Bodyblade  Sidelying Horiz add mobilization (post capsule stretch), 5x10s - NOT PERFORMED  Sidelying Sleeper stretch, 6l89qmm (hep) - NOT PERFORMED  Seated Willow depression with towel and counter, 41n0sau (hep)    Standing Weight Bearing: hands on table - 1. Scap depression, 2x10, 5sec / 2. Alternate taps, 20-30x / Reaches (NP) /   Planks on Bosu Ball, 8h27pse        Add NEXT visit: Still needs knee protocol  Cable Column lat pull downs  AAROM with wand: Extension / Abduction / Supine Flexion  Supine horizontal abduction  Manual tx   FUTURE visits:  Retest innominates  Red Theraband Serratus Anterior reach or wand landmines  Cable Column: Lat pull down  Thoracic Mobility: Open books, 5x10s / Thoracic ext supine over 1/2 Foam Roller   Pec stretch with Foam Roller   Self massage with TB        Patient Education and Home Exercises       Education provided:   - daily HEP    Written Home Exercises Provided: Patient instructed to cont prior HEP. Exercises were reviewed and Gennaro was able to demonstrate them prior to the end of the session.  Gennaro demonstrated good  understanding of the education provided. See EMR  under Patient Instructions for exercises provided during therapy sessions    Assessment     Pt presents to clinic with mild anterior shoulder discomfort but no provocation throughout treatment session. Continues with mild kyphotic posture but did improve with cues for proper scapular setting and neck position. Emphasis throughout session remains on scapular stabilization without pectoral compensation. Progression to R UE wall slide for posture and periscapular strengthening to improve serratus anterior activation, Right GH glide and reduce impingement. Continue to progress as tolerated.     Gennaro Is progressing well towards his goals.   Pt prognosis is Good.     Pt will continue to benefit from skilled outpatient physical therapy to address the deficits listed in the problem list box on initial evaluation, provide pt/family education and to maximize pt's level of independence in the home and community environment.     Pt's spiritual, cultural and educational needs considered and pt agreeable to plan of care and goals.     Anticipated barriers to physical therapy: chronicity of condition    Goals:   Short Term GOALS: 3 weeks. Pt agrees with goals set. PROGRESS  1. Patient demonstrates compliance with HEP.   2. Patient demonstrates independence with Postural Awareness while sitting and standing.   3. Patient demonstrates decreased pain level of 3/10 while performing daily activities.  4. Patient will reduce RUE radiculopathy frequency and intensity.  5. Patient will improve Right quadricep flexibility to moderate restriction.     Long Term GOALS: 6 weeks. Pt agrees with goals set. PROGRESS   1. Patient demonstrates increased Right Upper Extremity AROM equal to Left Upper Extremity.  2. Patient demonstrates increased Right quadricep flexibility to mild restriction.  3. Patient demonstrates increased RUE strength by 1/2 grade or greater to improve tolerance to home chores.  4. Patient demonstrates independence with body  mechanics while working.  5. Patient demonstrates independence with HEP.   6. Patient will improve FOTO function score by 25% to show improvement in condition and functional mobility.        Plan     Continue PT as deemed per POC: end Range of Motion, shoulder stabilization, GH setting    Fe Graff, PTA           No

## 2025-01-23 ENCOUNTER — CLINICAL SUPPORT (OUTPATIENT)
Dept: REHABILITATION | Facility: HOSPITAL | Age: 55
End: 2025-01-23
Payer: COMMERCIAL

## 2025-01-23 DIAGNOSIS — R68.89 IMPAIRED TOLERANCE OF ACTIVITY: ICD-10-CM

## 2025-01-23 DIAGNOSIS — M25.611 DECREASED RIGHT SHOULDER RANGE OF MOTION: Primary | ICD-10-CM

## 2025-01-23 DIAGNOSIS — R29.898 IMPAIRED FLEXIBILITY OF LOWER EXTREMITY: ICD-10-CM

## 2025-01-23 PROCEDURE — 97530 THERAPEUTIC ACTIVITIES: CPT | Mod: PN,CQ

## 2025-01-23 PROCEDURE — 97112 NEUROMUSCULAR REEDUCATION: CPT | Mod: PN,CQ

## 2025-01-23 PROCEDURE — 97110 THERAPEUTIC EXERCISES: CPT | Mod: PN,CQ

## 2025-01-23 NOTE — PROGRESS NOTES
[facility]   Physical Therapy Treatment Note      Name: Abhi Pompa  [facility] Number: [id number]    Therapy Diagnosis:   Encounter Diagnoses   Name Primary?    Decreased right shoulder range of motion Yes    Impaired flexibility of lower extremity     Impaired tolerance of activity        Physician: Rafita Amezcua DO    Visit Date: 1/23/2025    Physician Orders: PT Eval and Treat   Medical Diagnosis from Referral:   M25.511,G89.29 (ICD-10-CM) - Chronic right shoulder pain   M25.561,G89.29 (ICD-10-CM) - Chronic pain of right knee   Evaluation Date: 12/16/2024  Authorization Period Expiration: 12/2/2025  Plan of Care Expiration: 2/28/2025 or 12v post eval  Visit # (episodes)/ Visits authorized: 6 / eval +20 (POC 5 / 12)  2nd FOTO visit 5 - 1/14/2025  3rd FOTO visit 10  PTA: 1/5     Time In: 2:08  Time Out: 3:03  Total Billable Time: 55 minutes     Precautions: DM  Insurance: Payor: [employer] / Plan: [employer] FEDERAL STANDARD / Product Type: PPO /     Subjective     Pt reports: doing better since last therapy session, sleeping a bit better as well. States his days don't feel like they revolve around his shoulder pain lately.    He was somewhat compliant with home exercise program.  Response to previous treatment: positive  Functional change: none reported    Pain: 2/10 shoulder; 0 knee  Location: Right shoulder; a/c into deltoid; radiates down posterior norma and arm; Knee infrapatellar tendon     Objective      Objective Measures updated at progress report unless specified.     Treatment     Combo treat: Right shoulder impingement / Right knee infrapatellar pn    Gennaro received the treatments listed below:      Gennaro received therapeutic exercises to develop strength, ROM, and flexibility for 10 minutes including:  NMRE utilized to activate appropriate mm to improve posture, shoulder stabilization, smooth GH and scapula glide: 15 minutes     SEATED:  Red Therabantiffany Bilateral  "shoulder External Rotation, 53c3awk  Cervical retraction with Red Theraband, 2x10, 5 sec hold (hep)   Shoulder squeeze, 38s4cbo  Forearm stretches, 4-way, 9q19eog    TABLE:  Prone quad stretch with strap, 9n67iti Right       therapeutic activities to improve dynamic functional performance for 30  minutes, including:   STAND:   Cable Column: Extension, 7# / Adduction, 7# / Tricep Extension, 7#, 20-30x  Bicep curls, 5#, 20-30x    Blue tube  Rows / Internal Rotation, 2x15- NOT PERFORMED  Yellow tube External Rotation, 2x15  Low trap depression. Towel roll on counter 09d6auo  Isometric Flx / Abd, 68q8ygp - +Active today, 10x 3 planes    RIGHT Trapezius stretch holding onto sink (//bars), 8d12owf  Doorway stretch: Single arm 90 seg, turn away instead of stepping forward (hep)    Scaption AARange of Motion: wall slide with towel, 2x10  Bodyblade multi-direction x 30" each  Sidelying Horiz add mobilization (post capsule stretch), 5x10s - NOT PERFORMED  Sidelying Sleeper stretch, 2f73vln (hep) - NOT PERFORMED  Seated Willow depression with towel and counter, 08d2vyc (hep)    Standing Weight Bearing: hands on table - 1. Scap depression, 2x10, 5sec / 2. Alternate taps, 20-30x / Reaches (NP) /   Planks on Bosu Ball, 9e44gec        Add NEXT visit: Still needs knee protocol  Cable Column lat pull downs  AAROM with wand: Extension / Abduction / Supine Flexion  Supine horizontal abduction  Manual tx   FUTURE visits:  Retest innominates  Red Theraband Serratus Anterior reach or wand landmines  Cable Column: Lat pull down  Thoracic Mobility: Open books, 5x10s / Thoracic ext supine over 1/2 Foam Roller   Pec stretch with Foam Roller   Self massage with TB        Patient Education and Home Exercises       Education provided:   - daily HEP    Written Home Exercises Provided: Patient instructed to cont prior HEP. Exercises were reviewed and Gennaro was able to demonstrate them prior to the end of the session.  Gennaro demonstrated good  " understanding of the education provided. See EMR under Patient Instructions for exercises provided during therapy sessions    Assessment     Pt with good tolerance to progression of exercises, able to increase resistance for shoulder extensions. Occasional cues with resisted exercises to maintain proper scapular positioning and avoid protraction. Improving tolerance to weight bearing with planks and shoulder taps. Improved serratus anterior activation with wall slides, no pain noted. Patient with good understanding for self-limiting ROM to avoid painful ROM. Continue to progress as tolerated.     Gennaro Is progressing well towards his goals.   Pt prognosis is Good.     Pt will continue to benefit from skilled outpatient physical therapy to address the deficits listed in the problem list box on initial evaluation, provide pt/family education and to maximize pt's level of independence in the home and community environment.     Pt's spiritual, cultural and educational needs considered and pt agreeable to plan of care and goals.     Anticipated barriers to physical therapy: chronicity of condition    Goals:   Short Term GOALS: 3 weeks. Pt agrees with goals set. PROGRESS  1. Patient demonstrates compliance with HEP.   2. Patient demonstrates independence with Postural Awareness while sitting and standing.   3. Patient demonstrates decreased pain level of 3/10 while performing daily activities.  4. Patient will reduce RUE radiculopathy frequency and intensity.  5. Patient will improve Right quadricep flexibility to moderate restriction.     Long Term GOALS: 6 weeks. Pt agrees with goals set. PROGRESS   1. Patient demonstrates increased Right Upper Extremity AROM equal to Left Upper Extremity.  2. Patient demonstrates increased Right quadricep flexibility to mild restriction.  3. Patient demonstrates increased RUE strength by 1/2 grade or greater to improve tolerance to home chores.  4. Patient demonstrates independence  with body mechanics while working.  5. Patient demonstrates independence with HEP.   6. Patient will improve FOTO function score by 25% to show improvement in condition and functional mobility.        Plan     Continue PT as deemed per POC: end Range of Motion, shoulder stabilization, GH setting    Fe Graff, PTA

## 2025-01-28 ENCOUNTER — CLINICAL SUPPORT (OUTPATIENT)
Dept: REHABILITATION | Facility: HOSPITAL | Age: 55
End: 2025-01-28
Payer: COMMERCIAL

## 2025-01-28 DIAGNOSIS — M25.611 DECREASED RIGHT SHOULDER RANGE OF MOTION: Primary | ICD-10-CM

## 2025-01-28 DIAGNOSIS — R29.898 IMPAIRED FLEXIBILITY OF LOWER EXTREMITY: ICD-10-CM

## 2025-01-28 DIAGNOSIS — R68.89 IMPAIRED TOLERANCE OF ACTIVITY: ICD-10-CM

## 2025-01-28 PROCEDURE — 97530 THERAPEUTIC ACTIVITIES: CPT | Mod: PN

## 2025-01-28 PROCEDURE — 97110 THERAPEUTIC EXERCISES: CPT | Mod: PN

## 2025-01-28 PROCEDURE — 97112 NEUROMUSCULAR REEDUCATION: CPT | Mod: PN

## 2025-01-28 NOTE — PROGRESS NOTES
[facility]   Physical Therapy Treatment Note      Name: Abhi Pompa  [facility] Number: [id number]    Therapy Diagnosis:   Encounter Diagnoses   Name Primary?    Decreased right shoulder range of motion Yes    Impaired flexibility of lower extremity     Impaired tolerance of activity        Physician: Rafita Amezcua DO    Visit Date: 1/28/2025    Physician Orders: PT Eval and Treat   Medical Diagnosis from Referral:   M25.511,G89.29 (ICD-10-CM) - Chronic right shoulder pain   M25.561,G89.29 (ICD-10-CM) - Chronic pain of right knee   Evaluation Date: 12/16/2024  Authorization Period Expiration: 12/2/2025  Plan of Care Expiration: 2/28/2025 or 12v post eval  Visit # (episodes)/ Visits authorized: 8 / eval +20 (POC 7 / 12)  2nd FOTO visit 5 - 1/14/2025  3rd FOTO visit 10  PTA: 1/5     Time In: 2:05  Time Out: 2:58  Total Billable Time: 53 minutes     Precautions: DM  Insurance: Payor: [employer] / Plan: [employer] FEDERAL STANDARD / Product Type: PPO /     Subjective     Pt reports: he is doing really well. Understanding better what he can and can't do in the gym.    He was somewhat compliant with home exercise program.  Response to previous treatment: positive  Functional change: none reported    Pain: 2/10 shoulder; 0 knee  Location: Right shoulder; a/c into deltoid; radiates down posterior norma and arm; Knee infrapatellar tendon     Objective      Objective Measures updated at progress report unless specified.     Treatment     Combo treat: Right shoulder impingement / Right knee infrapatellar pn    Gennaro received the treatments listed below:      Gennaro received therapeutic exercises to develop strength, ROM, and flexibility for 10 minutes including:  NMRE utilized to activate appropriate mm to improve posture, shoulder stabilization, smooth GH and scapula glide: 15 minutes     SEATED:  Red Theraband Bilateral shoulder External Rotation, 85g6cil  Cervical retraction with Red  "Theraband, 2x10, 5 sec hold (hep)   Shoulder squeeze, 68v0zdp - NOT PERFORMED   Forearm stretches, 4-way, 4r38qmm    TABLE:  Prone quad stretch with strap, 6l75yua Right       therapeutic activities to improve dynamic functional performance for 28 minutes, including:   STAND:   Cable Column: Extension, 7# / Adduction, 7# / Tricep Extension, 7#, 20-30x / +Lat pull downs, 13#  Bicep curls, 9#, 20-30x    Blue tube  Rows / Internal Rotation, 2x15- NOT PERFORMED  Blue tube External Rotation, 2x15  Low trap depression. Towel roll on counter 80y2olr, check his alignment  Isometric Flx / Abd, 63h4jtv - +Active today, 10x 3 planes    RIGHT Trapezius stretch holding onto sink (//bars), 7q71hsi  Doorway stretch: Single arm 90 seg, turn away instead of stepping forward (hep)    Scaption AARange of Motion: wall slide with towel, 2x10  Bodyblade: A-P / Med-Lat / 45 deg abd 2 x 30" each    NOT PERFORMED   Sidelying Horiz add mobilization (post capsule stretch), 5x10s - NOT PERFORMED  Sidelying Sleeper stretch, 8i04kdk (hep) - NOT PERFORMED  Standing Weight Bearing: hands on table - 1. Scap depression, 2x10, 5sec / 2. Alternate taps, 20-30x / Reaches (NP) /   Planks on Bosu Ball, 0t74nul        Add NEXT visit: Still needs knee protocol  AAROM with wand: Extension / Abduction / Supine Flexion  Supine horizontal abduction  Manual tx   FUTURE visits:  Retest innominates  Red Theraband Serratus Anterior reach or wand landmines  Thoracic Mobility: Open books, 5x10s / Thoracic ext supine over 1/2 Foam Roller   Pec stretch with Foam Roller   Self massage with TB        Patient Education and Home Exercises       Education provided:   - daily HEP    Written Home Exercises Provided: Patient instructed to cont prior HEP. Exercises were reviewed and Gennaro was able to demonstrate them prior to the end of the session.  Gennaro demonstrated good  understanding of the education provided. See EMR under Patient Instructions for exercises provided " during therapy sessions    Assessment     Pt demonstrates variable levels of pain, decreased Range of Motion and kyphotic posture. Pt able to perform all therex given with minimal pn provocation. Focus on posture and periscapular strengthening to improve Right GH glide and reduce impingement. Reviewed low trap strengthening to reduce Upper Trapezius dominance and improve GH setting. Given AROM in flex/scap/abd planes for HEP. Continue to progress as tolerated.     Gennaro Is progressing well towards his goals.   Pt prognosis is Good.     Pt will continue to benefit from skilled outpatient physical therapy to address the deficits listed in the problem list box on initial evaluation, provide pt/family education and to maximize pt's level of independence in the home and community environment.     Pt's spiritual, cultural and educational needs considered and pt agreeable to plan of care and goals.     Anticipated barriers to physical therapy: chronicity of condition    Goals:   Short Term GOALS: 3 weeks. Pt agrees with goals set. PROGRESS 1/28/2025  1. Patient demonstrates compliance with HEP.   2. Patient demonstrates independence with Postural Awareness while sitting and standing.   3. Patient demonstrates decreased pain level of 3/10 while performing daily activities.  4. Patient will reduce RUE radiculopathy frequency and intensity.  5. Patient will improve Right quadricep flexibility to moderate restriction.     Long Term GOALS: 6 weeks. Pt agrees with goals set. PROGRESS 1/28/2025  1. Patient demonstrates increased Right Upper Extremity AROM equal to Left Upper Extremity.  2. Patient demonstrates increased Right quadricep flexibility to mild restriction.  3. Patient demonstrates increased RUE strength by 1/2 grade or greater to improve tolerance to home chores.  4. Patient demonstrates independence with body mechanics while working.  5. Patient demonstrates independence with HEP.   6. Patient will improve FOTO  function score by 25% to show improvement in condition and functional mobility.        Plan     Continue PT as deemed per POC: end Range of Motion, shoulder stabilization, GH setting    Letty Powers, PT

## 2025-02-06 ENCOUNTER — PATIENT MESSAGE (OUTPATIENT)
Dept: FAMILY MEDICINE | Facility: CLINIC | Age: 55
End: 2025-02-06
Payer: COMMERCIAL

## 2025-02-06 ENCOUNTER — CLINICAL SUPPORT (OUTPATIENT)
Dept: REHABILITATION | Facility: HOSPITAL | Age: 55
End: 2025-02-06
Payer: COMMERCIAL

## 2025-02-06 DIAGNOSIS — R68.89 IMPAIRED TOLERANCE OF ACTIVITY: ICD-10-CM

## 2025-02-06 DIAGNOSIS — R29.898 IMPAIRED FLEXIBILITY OF LOWER EXTREMITY: ICD-10-CM

## 2025-02-06 DIAGNOSIS — M25.611 DECREASED RIGHT SHOULDER RANGE OF MOTION: Primary | ICD-10-CM

## 2025-02-06 PROCEDURE — 97112 NEUROMUSCULAR REEDUCATION: CPT | Mod: PN

## 2025-02-06 PROCEDURE — 97530 THERAPEUTIC ACTIVITIES: CPT | Mod: PN

## 2025-02-06 PROCEDURE — 97110 THERAPEUTIC EXERCISES: CPT | Mod: PN

## 2025-02-06 NOTE — PROGRESS NOTES
[facility]   Physical Therapy Treatment Note      Name: Abhi Pompa  [facility] Number: [id number]    Therapy Diagnosis:   Encounter Diagnoses   Name Primary?    Decreased right shoulder range of motion Yes    Impaired flexibility of lower extremity     Impaired tolerance of activity        Physician: Rafita Amezcua DO    Visit Date: 2/6/2025    Physician Orders: PT Eval and Treat   Medical Diagnosis from Referral:   M25.511,G89.29 (ICD-10-CM) - Chronic right shoulder pain   M25.561,G89.29 (ICD-10-CM) - Chronic pain of right knee   Evaluation Date: 12/16/2024  Authorization Period Expiration: 12/2/2025  Plan of Care Expiration: 2/28/2025 or 12v post eval  Visit # (episodes)/ Visits authorized: 9 / eval +20 (POC 8 / 12)  2nd FOTO visit 5 - 1/14/2025  3rd FOTO visit 10  PTA: 1/5     Time In: 2:05  Time Out: 2:58  Total Billable Time: 53 minutes     Precautions: DM  Insurance: Payor: [employer] / Plan: [employer] FEDERAL STANDARD / Product Type: PPO /     Subjective     Pt reports: he is feeling good; still a little pain when sleeping.     He was somewhat compliant with home exercise program.  Response to previous treatment: positive  Functional change: none reported    Pain: 2/10 shoulder; 0 knee  Location: Right shoulder; a/c into deltoid; radiates down posterior norma and arm; Knee infrapatellar tendon     Objective      Objective Measures updated at progress report unless specified.     Treatment     Combo treat: Right shoulder impingement / Right knee infrapatellar pn    Gennaro received the treatments listed below:      Gennaro received therapeutic exercises to develop strength, ROM, and flexibility for 10 minutes including:  NMRE utilized to activate appropriate mm to improve posture, shoulder stabilization, smooth GH and scapula glide: 15 minutes     SEATED:  Red Theraband Bilateral shoulder External Rotation, 66r5wak  Cervical retraction with Red Theraband, 2x10, 5 sec hold  "(hep)     Shoulder squeeze, 66j2tux - NOT PERFORMED   Forearm stretches, 4-way, 7e10zyq    TABLE:  Prone quad stretch with strap, 6o01gqc Right       therapeutic activities to improve dynamic functional performance for 28 minutes, including:   STAND:   Cable Column: Extension, 7# / Adduction, 7# / Tricep Extension, 7#, 20-30x / Lat pull downs, 13#  Bicep curls, 10#, 20-30x    Blue tube Internal Rotation, 2x15  Blue tube External Rotation, 2x15  Low trap depression. Towel roll on counter 45c0bnk, check his alignment  ACTIVE: Flx / Abd, 18j1woa, 2#, 10x 3 planes    RIGHT Trapezius stretch holding onto sink (//bars), 4f12knt  Doorway stretch: Single arm 90 seg, turn away instead of stepping forward (hep)    Standing Weight Bearing: hands on table - 1. Scap depression, 2x10, 5sec / 2. Alternate taps, 20-30x / Reaches (NP) /   Planks on Bosu Ball, 5g45qgg    NOT PERFORMED   Scaption AARange of Motion: wall slide with towel, 2x10  Bodyblade: A-P / Med-Lat / 45 deg abd 2 x 30" each  Sidelying Horiz add mobilization (post capsule stretch), 5x10s - NOT PERFORMED  Sidelying Sleeper stretch, 8q39tuh (hep) - NOT PERFORMED        Add NEXT visit: Still needs knee protocol  Open books, 5x10s  Supine horizontal abduction  Manual tx   FUTURE visits:  Retest innominates  Red Theraband Serratus Anterior reach or wand landmines  Thoracic Mobility:  / Thoracic ext supine over 1/2 Foam Roller   Pec stretch with Foam Roller   Self massage with TB        Patient Education and Home Exercises       Education provided:   - daily HEP    Written Home Exercises Provided: Patient instructed to cont prior HEP. Exercises were reviewed and Gennaro was able to demonstrate them prior to the end of the session.  Gennaro demonstrated good  understanding of the education provided. See EMR under Patient Instructions for exercises provided during therapy sessions    Assessment     Pt demonstrates lower levels of pain overall, decreased Range of Motion and " kyphotic posture. Pt able to perform all therex given with minimal pn provocation. Focus on posture and periscapular strengthening to improve Right GH glide and reduce impingement. Added 2# to AROM in flex/scap/abd planes for HEP. Continue to progress as tolerated.     Gennaro Is progressing well towards his goals.   Pt prognosis is Good.     Pt will continue to benefit from skilled outpatient physical therapy to address the deficits listed in the problem list box on initial evaluation, provide pt/family education and to maximize pt's level of independence in the home and community environment.     Pt's spiritual, cultural and educational needs considered and pt agreeable to plan of care and goals.     Anticipated barriers to physical therapy: chronicity of condition    Goals:   Short Term GOALS: 3 weeks. Pt agrees with goals set. PROGRESS 2/6/2025  1. Patient demonstrates compliance with HEP.   2. Patient demonstrates independence with Postural Awareness while sitting and standing.   3. Patient demonstrates decreased pain level of 3/10 while performing daily activities.  4. Patient will reduce RUE radiculopathy frequency and intensity.  5. Patient will improve Right quadricep flexibility to moderate restriction.     Long Term GOALS: 6 weeks. Pt agrees with goals set. PROGRESS 2/6/2025  1. Patient demonstrates increased Right Upper Extremity AROM equal to Left Upper Extremity.  2. Patient demonstrates increased Right quadricep flexibility to mild restriction.  3. Patient demonstrates increased RUE strength by 1/2 grade or greater to improve tolerance to home chores.  4. Patient demonstrates independence with body mechanics while working.  5. Patient demonstrates independence with HEP.   6. Patient will improve FOTO function score by 25% to show improvement in condition and functional mobility.        Plan     Continue PT as deemed per POC: end Range of Motion, shoulder stabilization, GH setting    Letty Powers  PT

## 2025-02-11 ENCOUNTER — OFFICE VISIT (OUTPATIENT)
Dept: FAMILY MEDICINE | Facility: CLINIC | Age: 55
End: 2025-02-11
Payer: COMMERCIAL

## 2025-02-11 ENCOUNTER — CLINICAL SUPPORT (OUTPATIENT)
Dept: REHABILITATION | Facility: HOSPITAL | Age: 55
End: 2025-02-11
Payer: COMMERCIAL

## 2025-02-11 DIAGNOSIS — R19.7 INTERMITTENT DIARRHEA: Primary | ICD-10-CM

## 2025-02-11 DIAGNOSIS — R68.89 IMPAIRED TOLERANCE OF ACTIVITY: ICD-10-CM

## 2025-02-11 DIAGNOSIS — R29.898 IMPAIRED FLEXIBILITY OF LOWER EXTREMITY: ICD-10-CM

## 2025-02-11 DIAGNOSIS — M25.611 DECREASED RIGHT SHOULDER RANGE OF MOTION: Primary | ICD-10-CM

## 2025-02-11 DIAGNOSIS — E66.01 SEVERE OBESITY (BMI 35.0-39.9) WITH COMORBIDITY: ICD-10-CM

## 2025-02-11 PROCEDURE — 97530 THERAPEUTIC ACTIVITIES: CPT | Mod: PN

## 2025-02-11 PROCEDURE — 1160F RVW MEDS BY RX/DR IN RCRD: CPT | Mod: CPTII,95,, | Performed by: INTERNAL MEDICINE

## 2025-02-11 PROCEDURE — 97112 NEUROMUSCULAR REEDUCATION: CPT | Mod: PN

## 2025-02-11 PROCEDURE — G2211 COMPLEX E/M VISIT ADD ON: HCPCS | Mod: 95,,, | Performed by: INTERNAL MEDICINE

## 2025-02-11 PROCEDURE — 98005 SYNCH AUDIO-VIDEO EST LOW 20: CPT | Mod: 95,,, | Performed by: INTERNAL MEDICINE

## 2025-02-11 PROCEDURE — 1159F MED LIST DOCD IN RCRD: CPT | Mod: CPTII,95,, | Performed by: INTERNAL MEDICINE

## 2025-02-11 NOTE — PROGRESS NOTES
[facility]   Physical Therapy Treatment Note      Name: Abhi Pompa  [facility] Number: [id number]    Therapy Diagnosis:   Encounter Diagnoses   Name Primary?    Decreased right shoulder range of motion Yes    Impaired flexibility of lower extremity     Impaired tolerance of activity        Physician: Rafita Amezcua DO    Visit Date: 2/11/2025    Physician Orders: PT Eval and Treat   Medical Diagnosis from Referral:   M25.511,G89.29 (ICD-10-CM) - Chronic right shoulder pain   M25.561,G89.29 (ICD-10-CM) - Chronic pain of right knee   Evaluation Date: 12/16/2024  Authorization Period Expiration: 12/2/2025  Plan of Care Expiration: 2/28/2025 or 12v post eval  Visit # (episodes)/ Visits authorized: 10 / eval +20 (POC 9 / 12)  2nd FOTO visit 5 - 1/14/2025  3rd FOTO visit 10  PTA: 1/5     Time In: 2:05  Time Out: 2:58  Total Billable Time: 53 minutes     Precautions: DM  Insurance: Payor: [employer] / Plan: [employer] FEDERAL STANDARD / Product Type: PPO /     Subjective     Pt reports: he is feeling good; PT has been a game changer.    He was somewhat compliant with home exercise program.  Response to previous treatment: positive  Functional change: none reported    Pain: 2/10 shoulder; 0 knee  Location: Right shoulder; a/c into deltoid; radiates down posterior norma and arm; Knee infrapatellar tendon     Objective      Objective Measures updated at progress report unless specified.     Treatment     Combo treat: Right shoulder impingement / Right knee infrapatellar pn    Gennaro received the treatments listed below:      Gennaro received therapeutic exercises to develop strength, ROM, and flexibility for 00 minutes including:  NMRE utilized to activate appropriate mm to improve posture, shoulder stabilization, smooth GH and scapula glide: 10 minutes     SEATED:  Red Theraband Bilateral shoulder External Rotation, 80e3zms  Cervical retraction with Red Theraband, 2x10, 5 sec hold (hep)  "    Shoulder squeeze, 17c8xlw - NOT PERFORMED   Forearm stretches, 4-way, 3d05sqg    TABLE:  Prone quad stretch with strap, 7m67hqc Right       therapeutic activities to improve dynamic functional performance for 43 minutes, including:   STAND:   Low trap depression. Towel roll on counter 01q5hye, check his alignment  Cable Column: Extension, 10# / Adduction, 7# / Tricep Extension, 7#, 20-30x / Lat pull downs, 13#  Bicep curls, 10#, 20-30x    Green tube Internal Rotation, 2x15  Blue tube External Rotation, 2x15  Standing Weight Bearing: hands on table - 1. Scap depression, 2x10, 5sec / 2. Alternate taps, 20-30x / Reaches (NP) /   Planks on Bosu Ball, 2i71uxh  ACTIVE: Flx / Abd, 2x10, 2#    Bodyblade: A-P / Med-Lat / 45 deg abd 2 x 30" each    RIGHT Trapezius stretch holding onto sink (//bars), 5r88jcc  Doorway stretch: Single arm 90 seg, turn away instead of stepping forward (hep)          NOT PERFORMED   Scaption AARange of Motion: wall slide with towel, 2x10  Sidelying Horiz add mobilization (post capsule stretch), 5x10s - NOT PERFORMED  Sidelying Sleeper stretch, 2h27leo (hep) - NOT PERFORMED        Add NEXT visit: Still needs knee protocol  Open books, 5x10s  Supine horizontal abduction  Manual tx   FUTURE visits:  Retest innominates  Red Theraband Serratus Anterior reach or wand landmines  Thoracic Mobility:  / Thoracic ext supine over 1/2 Foam Roller   Pec stretch with Foam Roller   Self massage with TB        Patient Education and Home Exercises       Education provided:   - daily HEP    Written Home Exercises Provided: Patient instructed to cont prior HEP. Exercises were reviewed and Gennaro was able to demonstrate them prior to the end of the session.  Gennaro demonstrated good  understanding of the education provided. See EMR under Patient Instructions for exercises provided during therapy sessions    Assessment     Pt demonstrates lower levels of pain overall, decreased Range of Motion and kyphotic posture. " Pt able to perform all therex given with minimal pn provocation. Focus on posture and periscapular strengthening to improve Right GH glide and reduce impingement. Continues to require cues for head and neck alignment while performing exercises. Given full HEP today; scanned in media. Continue to progress as tolerated.     Gennaro Is progressing well towards his goals.   Pt prognosis is Good.     Pt will continue to benefit from skilled outpatient physical therapy to address the deficits listed in the problem list box on initial evaluation, provide pt/family education and to maximize pt's level of independence in the home and community environment.     Pt's spiritual, cultural and educational needs considered and pt agreeable to plan of care and goals.     Anticipated barriers to physical therapy: chronicity of condition    Goals:   Short Term GOALS: 3 weeks. Pt agrees with goals set. PROGRESS 2/11/2025  1. Patient demonstrates compliance with HEP.   2. Patient demonstrates independence with Postural Awareness while sitting and standing.   3. Patient demonstrates decreased pain level of 3/10 while performing daily activities.  4. Patient will reduce RUE radiculopathy frequency and intensity.  5. Patient will improve Right quadricep flexibility to moderate restriction.     Long Term GOALS: 6 weeks. Pt agrees with goals set. PROGRESS 2/11/2025  1. Patient demonstrates increased Right Upper Extremity AROM equal to Left Upper Extremity.  2. Patient demonstrates increased Right quadricep flexibility to mild restriction.  3. Patient demonstrates increased RUE strength by 1/2 grade or greater to improve tolerance to home chores.  4. Patient demonstrates independence with body mechanics while working.  5. Patient demonstrates independence with HEP.   6. Patient will improve FOTO function score by 25% to show improvement in condition and functional mobility.        Plan     Continue PT as deemed per POC: end Range of Motion,  shoulder stabilization, GH setting    Letty Powers, PT

## 2025-02-11 NOTE — PROGRESS NOTES
" Patient ID: Abhi Pompa is a 54 y.o. male.    Chief Complaint: No chief complaint on file.    Visit type: AUDIOVISUAL VIRTUAL    Assessment and Plan     1. Severe obesity (BMI 35.0-39.9) with comorbidity    2. Intermittent diarrhea     Letter provided in chart     HPI     Past history of type II diabetes, generalized anxiety disorder, GERD, mix hyperlipidemia, obstructive sleep apnea, low testosterone    Virtual for a letter  He has bouts of sudden unpredictable diarrhea and polyuria which will preclude him from long commutes. Needs letter stating this so he can continue to work from home. Cholestryramine was marginally effective. Weight is stable.     Review of Systems   Constitutional:  Positive for fatigue.   Cardiovascular:  Negative for chest pain.   Endocrine: Positive for polydipsia and polyuria. Negative for polyphagia.   Skin:  Negative for pallor.   Neurological:  Negative for dizziness, tremors, seizures, speech difficulty, weakness and headaches.   Psychiatric/Behavioral:  Negative for confusion. The patient is not nervous/anxious.        I personally reviewed past medical, family and social history.     Wt Readings from Last 3 Encounters:   12/02/24 101.1 kg (222 lb 14.2 oz)   10/15/24 99.8 kg (220 lb)   10/18/24 99.9 kg (220 lb 3.8 oz)        Diabetes Medications               JARDIANCE 10 mg tablet TAKE 1 TABLET BY MOUTH EVERY DAY    semaglutide (OZEMPIC) 1 mg/dose (4 mg/3 mL) Inject 1 mg into the skin every 7 days.           COPD Medications       No Active Medications           Medication List with Changes/Refills   Current Medications    ASHWAGANDHA EXTRACT ORAL        BLUNT NEEDLE, DISPOSABLE 18 X 1 1/2 " NDLE    Use as directed with testosterone injections    CHOLESTYRAMINE (QUESTRAN) 4 GRAM PACKET    Take 1 packet (4 g total) by mouth once daily.    CREATINE MONOHYDRATE (CREATINE ORAL)    Take by mouth.    ESOMEPRAZOLE (NEXIUM) 40 MG CAPSULE    Take 1 capsule (40 mg total) by mouth before " "breakfast.    FAMOTIDINE (PEPCID) 40 MG TABLET    TAKE 1 TABLET BY MOUTH EVERY DAY    FISH,SAF,FLX,BRG OILS/O3,6,9N2 (FISH-FLAX-BORAGE OIL ORAL)    Take by mouth.    FLASH GLUCOSE SCANNING READER (FREESTYLE YAN 2 READER) [facility] [place]    Use as directed to check blood glucose.    FLASH GLUCOSE SENSOR (FREESTYLE YAN 2 SENSOR) KIT    Apply to skin Q 14 days    FLASH GLUCOSE SENSOR (FREESTYLE YAN 2 SENSOR) KIT    Change every 14 days.    FLUOXETINE 10 MG CAPSULE    Take 1 capsule (10 mg total) by mouth once daily.    FREESTYLE YAN 2 SENSOR KIT    APPLY TO SKIN EVERY 14 DAYS    JARDIANCE 10 MG TABLET    TAKE 1 TABLET BY MOUTH EVERY DAY    KETOCONAZOLE (NIZORAL) 2 % SHAMPOO    WASH SCALP AND BEARD AREA AT LEAST 2-3 TINES A WEEK - LET SIT AT LEAST 5 MINUTES PRIOR TO RINSING    MELATONIN 10 MG TAB    Take 10 mg by mouth nightly.    MINOXIDIL (ROGAINE) 2 % EXTERNAL SOLUTION    Apply 1 application topically 2 (two) times daily.    SEMAGLUTIDE (OZEMPIC) 1 MG/DOSE (4 MG/3 ML)    Inject 1 mg into the skin every 7 days.    SILDENAFIL (REVATIO) 20 MG TAB    TAKE UP TO 5 TABLETS BY MOUTH DAILY AS NEEDED 30 MINUTES PRIOR TO INTERCOURSE    SIMVASTATIN (ZOCOR) 10 MG TABLET    TAKE 1 TABLET BY MOUTH EVERY DAY IN THE EVENING    SYRINGE WITH NEEDLE 1 ML 22 GAUGE X 1 1/2" SYRG    Use as directed to administer testosterone injections.    TADALAFIL (CIALIS) 10 MG TABLET    Take 1 tablet (10 mg total) by mouth daily as needed for Erectile Dysfunction.    TESTOSTERONE CYPIONATE (DEPOTESTOTERONE CYPIONATE) 200 MG/ML INJECTION    INJECT 1 ML (200 MG TOTAL) INTO THE MUSCLE ONCE A WEEK.    TURMERIC ORAL    Take by mouth.    UNABLE TO FIND    Amitriptyline 1%/Meloxicam 0.09%/Lidocaine Hcl 2%/ Prilocaine 2%   APPLY 2 GRAMS QID    UNABLE TO FIND    Beet root    VITAMIN B COMPLEX ORAL    Take 1 tablet by mouth once daily.          The patient location is:  [place]  The chief complaint leading to consultation is: Intermittent diarrhea [R19.7]   Face " to Face time with patient: 19 minutes  minutes of total time spent on the encounter, which includes face to face time and   non-face to face time preparing to see the patient (eg, review of tests), Obtaining and/or   reviewing separately obtained history, Documenting clinical information in the electronic   or other health record, Independently interpreting results (not separately reported) and   communicating results to the patient/family/caregiver, or   Care coordination (not separately reported).     Each patient to whom he or she provides medical services by telemedicine is:    (1) informed of the relationship between the physician and patient and the respective   role of any other health care provider with respect to management of the patient; and   (2) notified that he or she may decline to receive medical services by telemedicine and   may withdraw from such care at any time.    I personally reviewed past medical, family and social history.    : Visit today included increased complexity associated with the care of the episodic problem Intermittent diarrhea [R19.7] addressed and managing the longitudinal care of the patient due to the serious and/or complex managed problem(s)     Active Problem List with Overview Notes    Diagnosis Date Noted    Intermittent diarrhea 02/11/2025    Obstructive sleep apnea syndrome 01/19/2023    Generalized anxiety disorder- off zoloft in 2020 01/02/2017    Mixed hyperlipidemia 09/15/2016    GERD (gastroesophageal reflux disease) 01/22/2016    Testosterone deficiency 01/22/2016    Diabetes mellitus without complication 07/19/2013     Overview:   ICD-10 Transition      Decreased right shoulder range of motion 12/17/2024    Impaired flexibility of lower extremity 12/17/2024    Impaired tolerance of activity 12/17/2024    Polycythemia 01/19/2023    Severe obesity (BMI 35.0-39.9) with comorbidity 12/05/2022    Epigastric abdominal pain 06/29/2022    Screening for colon cancer  04/19/2022    Personal history of colonic polyps 04/19/2022              Answers submitted by the patient for this visit:  Diabetes Questionnaire (Submitted on 2/11/2025)  Chief Complaint: Diabetes problem  Diabetes type: type 2  MedicAlert ID: No  Disease duration: 11 Years  blurred vision: No  foot paresthesias: No  foot ulcerations: No  visual change: No  weight loss: No  Symptom course: stable  hunger: No  mood changes: Yes  sleepiness: No  sweats: No  blackouts: No  hospitalization: No  nocturnal hypoglycemia: Yes  required assistance: No  required glucagon: No  CVA: No  heart disease: No  impotence: Yes  nephropathy: No  peripheral neuropathy: No  PVD: No  retinopathy: No  autonomic neuropathy: No  CAD risks: obesity, stress, diabetes mellitus, male sex  Current treatments: oral agent (dual therapy)  Treatment compliance: all of the time  Home blood tests: 5+ x per day  Monitoring compliance: excellent  Blood glucose trend: fluctuating dramatically  Weight trend: decreasing steadily  Current diet: diabetic, generally healthy  Meal planning: ADA exchanges, avoidance of concentrated sweets, carbohydrate counting  Exercise: three times a week  Dietitian visit: Yes  Eye exam current: Yes  Sees podiatrist: Yes

## 2025-02-12 ENCOUNTER — PATIENT MESSAGE (OUTPATIENT)
Dept: FAMILY MEDICINE | Facility: CLINIC | Age: 55
End: 2025-02-12
Payer: COMMERCIAL

## 2025-02-18 ENCOUNTER — CLINICAL SUPPORT (OUTPATIENT)
Dept: REHABILITATION | Facility: HOSPITAL | Age: 55
End: 2025-02-18
Payer: COMMERCIAL

## 2025-02-18 DIAGNOSIS — R68.89 IMPAIRED TOLERANCE OF ACTIVITY: ICD-10-CM

## 2025-02-18 DIAGNOSIS — R29.898 IMPAIRED FLEXIBILITY OF LOWER EXTREMITY: ICD-10-CM

## 2025-02-18 DIAGNOSIS — M25.611 DECREASED RIGHT SHOULDER RANGE OF MOTION: Primary | ICD-10-CM

## 2025-02-18 NOTE — PROGRESS NOTES
[facility]   Physical Therapy Treatment Note      Name: Abhi Pompa  [facility] Number: [id number]    Therapy Diagnosis:   Encounter Diagnoses   Name Primary?    Decreased right shoulder range of motion Yes    Impaired flexibility of lower extremity     Impaired tolerance of activity          Physician: Rafita Amezcua DO    Visit Date: 2/18/2025    Physician Orders: PT Eval and Treat   Medical Diagnosis from Referral:   M25.511,G89.29 (ICD-10-CM) - Chronic right shoulder pain   M25.561,G89.29 (ICD-10-CM) - Chronic pain of right knee   Evaluation Date: 12/16/2024  Authorization Period Expiration: 12/2/2025  Plan of Care Expiration: 2/28/2025 or 12v post eval  Visit # (episodes)/ Visits authorized: 11 / eval +20 (POC 10 / 12)  2nd FOTO visit 5 - 1/14/2025  3rd FOTO visit 10 - 2/18/25  PTA: 1/5     Time In: 305  Time Out: 358  Total Billable Time: 53 minutes     Precautions: DM  Insurance: Payor: [employer] / Plan: [facility] FEDERAL STANDARD / Product Type: PPO /     Subjective     Pt reports: missed last week due to travel. Babatunde flared up his shoulder. Pt reports functioning at 80% normal function. PT has helped him with reducing pain, pn mgt, posture aware, modifications. Discussed discharging in 2 visits.     He was somewhat compliant with home exercise program   Response to previous treatment: positive  Functional change: none reported    Pain: 0-8/ 10 with reaching; 3/10 shoulder  Location: Right shoulder; a/c into deltoid; radiates down posterior norma and arm; Knee infrapatellar tendon     Objective      Therapeutic Activity: Updated measurements, 10 minutes    Cervical AROM Screen:  WNL flexion and rotation  Extension severe restriction->SAME  SB moderate restriction->SAME        Shoulder AROM:   in degrees Left Right Pain/Dysfunction with Movement   Flexion (180 norm)  165  130->170     Extension (60 norm)  55  35->55     Abduction (180 norm)  163  115->150              "  Internal rotation (T8 norm)  T10  L4->L1     ER at 0° abd (90 norm)  68  66           Gait Without AD   Analysis Slight left trunk lean to decrease Right Weight Bearing          UE MMT:  5/5 norm Left Right   Shoulder Flexion 5/5 4+/5->5/5   Shoulder Extension 5/5 5/5   Shoulder Abduction 5/5 4+/5->5/5   Serratus Anterior  5/5 4/5->5/5   Shoulder IR 5/5 5/5   Shoulder ER  @ 0* Abduction 5/5 5/5   Elbow Flexion  5/5 5/5   Elbow Extension 5/5 5/5   Mid Traps 3+/5 3+/5      LE MMT: TBD      See Updated FOTO score in Media section of EPIC    Treatment     Combo treat: Right shoulder impingement / Right knee infrapatellar pn    Gennaro received the treatments listed below:      Gennaro received therapeutic exercises to develop strength, ROM, and flexibility for 10 minutes including:  NMRE utilized to activate appropriate mm to improve posture, shoulder stabilization, smooth GH and scapula glide: 10 minutes     +Cervical extension  +Upper Trapezius stretch  +Sidelying horiz add    SEATED:  Red Theraband Bilateral shoulder External Rotation, 64h2ffx  Cervical retraction with Red Theraband, 2x10, 5 sec hold (hep)     Shoulder squeeze, 63l6tbs - NOT PERFORMED   Forearm stretches, 4-way, 3m39agp    TABLE:  Prone quad stretch with strap, 9w30yct Right       therapeutic activities to improve dynamic functional performance for 23 minutes, including:   STAND:   Low trap depression. Towel roll on counter 83k7guz, check his alignment  Cable Column: Extension, 10# / Adduction, 7# / Tricep Extension, 7#, 20-30x / Lat pull downs, 13#  Bicep curls, 10#, 20-30x    Green tube Internal Rotation, 2x15  Blue tube External Rotation, 2x15  Standing Weight Bearing: hands on table - 1. Scap depression, 2x10, 5sec / 2. Alternate taps, 20-30x / Reaches (NP) /   Planks on Bosu Ball, 4f49vra  ACTIVE: Flx / Abd, 2x10, 2#    Bodyblade: A-P / Med-Lat / 45 deg abd 2 x 30" each    RIGHT Trapezius stretch holding onto sink (//bars), 8r43mjq  Doorway " stretch: Single arm 90 seg, turn away instead of stepping forward (hep)          NOT PERFORMED   Scaption AARange of Motion: wall slide with towel, 2x10  Sidelying Horiz add mobilization (post capsule stretch), 5x10s - NOT PERFORMED  Sidelying Sleeper stretch, 9n81vhr (hep) - NOT PERFORMED        Add NEXT visit: Still needs knee protocol  Open books, 5x10s  Supine horizontal abduction  Manual tx   FUTURE visits:  Retest innominates  Red Theraband Serratus Anterior reach or wand landmines  Thoracic Mobility:  / Thoracic ext supine over 1/2 Foam Roller   Pec stretch with Foam Roller   Self massage with TB        Patient Education and Home Exercises       Education provided:   - daily HEP    Written Home Exercises Provided: Patient instructed to cont prior HEP. Exercises were reviewed and Gennaro was able to demonstrate them prior to the end of the session.  Gennaro demonstrated good  understanding of the education provided. See EMR under Patient Instructions for exercises provided during therapy sessions    Assessment     Pt demonstrates lower levels of pain overall, and significant clinical gains since evaluation. However pain and impingement deficits remain; suspected supraspinatus tear. Pt able to perform all therex given with minimal pn provocation. Focus on posture and periscapular strengthening to improve Right GH glide and reduce impingement. Continues to require cues for head and neck alignment while performing exercises. Continue to progress as tolerated.     Gennaro Is progressing well towards his goals.   Pt prognosis is Good.     Pt will continue to benefit from skilled outpatient physical therapy to address the deficits listed in the problem list box on initial evaluation, provide pt/family education and to maximize pt's level of independence in the home and community environment.     Pt's spiritual, cultural and educational needs considered and pt agreeable to plan of care and goals.     Anticipated barriers  to physical therapy: chronicity of condition    Goals:   Short Term GOALS: 3 weeks. Pt agrees with goals set. PROGRESS 2/18/2025  1. Patient demonstrates compliance with HEP.   2. Patient demonstrates independence with Postural Awareness while sitting and standing.   3. Patient demonstrates decreased pain level of 3/10 while performing daily activities.  4. Patient will reduce RUE radiculopathy frequency and intensity.  5. Patient will improve Right quadricep flexibility to moderate restriction.     Long Term GOALS: 6 weeks. Pt agrees with goals set. PROGRESS 2/18/2025  1. Patient demonstrates increased Right Upper Extremity AROM equal to Left Upper Extremity.  2. Patient demonstrates increased Right quadricep flexibility to mild restriction.  3. Patient demonstrates increased RUE strength by 1/2 grade or greater to improve tolerance to home chores.  4. Patient demonstrates independence with body mechanics while working.  5. Patient demonstrates independence with HEP.   6. Patient will improve FOTO function score by 25% to show improvement in condition and functional mobility.        Plan     Continue PT as deemed per POC: end Range of Motion, shoulder stabilization, GH setting    Letty Powers, PT

## 2025-02-18 NOTE — PATIENT INSTRUCTIONS
Cervical extension, look up to ceiling, 5x    Upper Trapezius stretch, sit tall, ear to shoulder, 6q30fsx    Sidelying horizontal adduction:  lie on RIGHT side, lift elbow (shoulder out of ear), 90 degree arm, 5x, 10sec hol

## 2025-02-21 ENCOUNTER — CLINICAL SUPPORT (OUTPATIENT)
Dept: REHABILITATION | Facility: HOSPITAL | Age: 55
End: 2025-02-21
Payer: COMMERCIAL

## 2025-02-21 DIAGNOSIS — R68.89 IMPAIRED TOLERANCE OF ACTIVITY: ICD-10-CM

## 2025-02-21 DIAGNOSIS — M25.611 DECREASED RIGHT SHOULDER RANGE OF MOTION: Primary | ICD-10-CM

## 2025-02-21 DIAGNOSIS — R29.898 IMPAIRED FLEXIBILITY OF LOWER EXTREMITY: ICD-10-CM

## 2025-02-21 DIAGNOSIS — F41.1 GENERALIZED ANXIETY DISORDER: ICD-10-CM

## 2025-02-21 PROCEDURE — 97110 THERAPEUTIC EXERCISES: CPT | Mod: PN

## 2025-02-21 PROCEDURE — 97530 THERAPEUTIC ACTIVITIES: CPT | Mod: PN

## 2025-02-21 PROCEDURE — 97112 NEUROMUSCULAR REEDUCATION: CPT | Mod: PN

## 2025-02-21 RX ORDER — FLUOXETINE 10 MG/1
10 CAPSULE ORAL
Qty: 90 CAPSULE | Refills: 3 | Status: SHIPPED | OUTPATIENT
Start: 2025-02-21

## 2025-02-21 NOTE — TELEPHONE ENCOUNTER
No care due was identified.  [facility] Embedded Care Due Messages. Reference number: [id number].   2/21/2025 12:16:04 AM CST

## 2025-02-21 NOTE — PROGRESS NOTES
[facility]   Physical Therapy Treatment Note      Name: Abhi Pompa  [facility] Number: [id number]    Therapy Diagnosis:   Encounter Diagnoses   Name Primary?    Decreased right shoulder range of motion Yes    Impaired flexibility of lower extremity     Impaired tolerance of activity          Physician: Rafita Amezcua DO    Visit Date: 2/21/2025    Physician Orders: PT Eval and Treat   Medical Diagnosis from Referral:   M25.511,G89.29 (ICD-10-CM) - Chronic right shoulder pain   M25.561,G89.29 (ICD-10-CM) - Chronic pain of right knee   Evaluation Date: 12/16/2024  Authorization Period Expiration: 12/2/2025  Plan of Care Expiration: 2/28/2025 or 12v post eval  Visit # (episodes)/ Visits authorized: 12 / eval +20 (POC 11 / 12)  2nd FOTO visit 5 - 1/14/2025  3rd FOTO visit 10 - 2/18/25  PTA: 1/5     Time In: 903  Time Out: 956  Total Billable Time: 53 minutes     Precautions: DM  Insurance: Payor: [employer] / Plan: [employer] FEDERAL STANDARD / Product Type: PPO /     Subjective     Pt reports: is feeling better and has not had to take any medications.     He was somewhat compliant with home exercise program   Response to previous treatment: positive  Functional change: none reported    Pain: 0-8/ 10 with reaching; 2/10 shoulder  Location: Right shoulder; a/c into deltoid; radiates down posterior norma and arm; Knee infrapatellar tendon     Objective      Cervical AROM Screen:  WNL flexion and rotation  Extension severe restriction->SAME  SB moderate restriction->SAME        Shoulder AROM:   in degrees Left Right Pain/Dysfunction with Movement   Flexion (180 norm)  165  130->170     Extension (60 norm)  55  35->55     Abduction (180 norm)  163  115->150               Internal rotation (T8 norm)  T10  L4->L1     ER at 0° abd (90 norm)  68  66           Gait Without AD   Analysis Slight left trunk lean to decrease Right Weight Bearing          UE MMT:  5/5 norm Left Right   Shoulder  "Flexion 5/5 4+/5->5/5   Shoulder Extension 5/5 5/5   Shoulder Abduction 5/5 4+/5->5/5   Serratus Anterior  5/5 4/5->5/5   Shoulder IR 5/5 5/5   Shoulder ER  @ 0* Abduction 5/5 5/5   Elbow Flexion  5/5 5/5   Elbow Extension 5/5 5/5   Mid Traps 3+/5 3+/5        Treatment     Combo treat: Right shoulder impingement / Right knee infrapatellar pn    Gennaro received the treatments listed below:      Gennaro received therapeutic exercises to develop strength, ROM, and flexibility for 30 minutes including:  NMRE utilized to activate appropriate mm to improve posture, shoulder stabilization, smooth GH and scapula glide: 15 minutes     +SEATED:  Manual Hot Pack with diaphragmatic breathing, 5 minute  Shoulder retro rolls  T/s Extension stretch  Upper Trapezius stretch, 0k37hqu  Ant scalene stretch, 5c30mcf  Red Theraband Bilateral shoulder External Rotation, 94g2ubp  Cervical retraction with Red Theraband, 2x10, 5 sec hold (hep)   C/s AROM: Flx-Ext / Rot / BSB    Sidelying horiz add    SEATED:  Shoulder squeeze, 61d7vil - NOT PERFORMED   Forearm stretches, 4-way, 5i38pby    TABLE:  Prone quad stretch with strap, 5s10spy Right       therapeutic activities to improve dynamic functional performance for 10 minutes, including:   STAND:   Low trap depression. Towel roll on counter 62e5tyy, check his alignment  Bodyblade: A-P / Med-Lat / 45 deg abd 2 x 30" each  Doorway stretch: Single arm 90 seg, turn away instead of stepping forward (hep)    Cable Column: Extension, 10# / Adduction, 7# / Tricep Extension, 7#, 20-30x / Lat pull downs, 13#  Bicep curls, 10#, 20-30x    Green tube Internal Rotation, 2x15  Blue tube External Rotation, 2x15  Standing Weight Bearing: hands on table - 1. Scap depression, 2x10, 5sec / 2. Alternate taps, 20-30x / Reaches (NP) /   Planks on Bosu Ball, 2c29jlh  ACTIVE: Flx / Abd, 2x10, 2#  RIGHT Trapezius stretch holding onto sink (//bars), 3y29unk          NOT PERFORMED   Scaption AARange of Motion: wall slide " with towel, 2x10  Sidelying Horiz add mobilization (post capsule stretch), 5x10s - NOT PERFORMED  Sidelying Sleeper stretch, 8x63oky (hep) - NOT PERFORMED        Add NEXT visit: Still needs knee protocol  Open books, 5x10s  Supine horizontal abduction  Manual tx   FUTURE visits:  Retest innominates  Red Theraband Serratus Anterior reach or wand landmines  Thoracic Mobility:  / Thoracic ext supine over 1/2 Foam Roller   Pec stretch with Foam Roller   Self massage with TB        Patient Education and Home Exercises       Education provided:   - daily HEP    Written Home Exercises Provided: Patient instructed to cont prior HEP. Exercises were reviewed and Gennaro was able to demonstrate them prior to the end of the session.  Gennaro demonstrated good  understanding of the education provided. See EMR under Patient Instructions for exercises provided during therapy sessions    Assessment     Pt demonstrates lower levels of pain overall, and significant clinical gains since evaluation. However pain and impingement deficits remain; suspected supraspinatus tear. Pt able to perform all therex given with minimal pn provocation. Today we worked on decreasing Upper Trapezius dominance and cervical mobility; stiffness > on Right side. Continue to progress as tolerated.     Gennaro Is progressing well towards his goals.   Pt prognosis is Good.     Pt will continue to benefit from skilled outpatient physical therapy to address the deficits listed in the problem list box on initial evaluation, provide pt/family education and to maximize pt's level of independence in the home and community environment.     Pt's spiritual, cultural and educational needs considered and pt agreeable to plan of care and goals.     Anticipated barriers to physical therapy: chronicity of condition    Goals:   Short Term GOALS: 3 weeks. Pt agrees with goals set. PROGRESS 2/21/2025  1. Patient demonstrates compliance with HEP.   2. Patient demonstrates  independence with Postural Awareness while sitting and standing.   3. Patient demonstrates decreased pain level of 3/10 while performing daily activities.  4. Patient will reduce RUE radiculopathy frequency and intensity.  5. Patient will improve Right quadricep flexibility to moderate restriction.     Long Term GOALS: 6 weeks. Pt agrees with goals set. PROGRESS 2/21/2025  1. Patient demonstrates increased Right Upper Extremity AROM equal to Left Upper Extremity.  2. Patient demonstrates increased Right quadricep flexibility to mild restriction.  3. Patient demonstrates increased RUE strength by 1/2 grade or greater to improve tolerance to home chores.  4. Patient demonstrates independence with body mechanics while working.  5. Patient demonstrates independence with HEP.   6. Patient will improve FOTO function score by 25% to show improvement in condition and functional mobility.        Plan     Continue PT as deemed per POC: end Range of Motion, shoulder stabilization, GH setting    Letty Powers, PT

## 2025-02-21 NOTE — TELEPHONE ENCOUNTER
Refill Decision Note   Juanedson Pompa  is requesting a refill authorization.  Brief Assessment and Rationale for Refill:  Approve     Medication Therapy Plan:         Comments:     Note composed:3:25 AM 02/21/2025

## 2025-02-27 ENCOUNTER — CLINICAL SUPPORT (OUTPATIENT)
Dept: REHABILITATION | Facility: HOSPITAL | Age: 55
End: 2025-02-27
Payer: COMMERCIAL

## 2025-02-27 DIAGNOSIS — R68.89 IMPAIRED TOLERANCE OF ACTIVITY: ICD-10-CM

## 2025-02-27 DIAGNOSIS — M25.611 DECREASED RIGHT SHOULDER RANGE OF MOTION: Primary | ICD-10-CM

## 2025-02-27 DIAGNOSIS — R29.898 IMPAIRED FLEXIBILITY OF LOWER EXTREMITY: ICD-10-CM

## 2025-02-27 PROCEDURE — 97112 NEUROMUSCULAR REEDUCATION: CPT | Mod: PN

## 2025-02-27 PROCEDURE — 97530 THERAPEUTIC ACTIVITIES: CPT | Mod: PN

## 2025-02-27 PROCEDURE — 97110 THERAPEUTIC EXERCISES: CPT | Mod: PN

## 2025-02-27 NOTE — PROGRESS NOTES
[facility]   Physical Therapy Treatment Note & Outpatient Discharge Summary     Name: Abhi Pompa  Clinic Number: [id number]    Therapy Diagnosis:   Encounter Diagnoses   Name Primary?    Decreased right shoulder range of motion Yes    Impaired flexibility of lower extremity     Impaired tolerance of activity            Physician: Rafita Amezcua DO    Visit Date: 2/27/2025    Physician Orders: PT Eval and Treat   Medical Diagnosis from Referral:   M25.511,G89.29 (ICD-10-CM) - Chronic right shoulder pain   M25.561,G89.29 (ICD-10-CM) - Chronic pain of right knee   Evaluation Date: 12/16/2024  Authorization Period Expiration: 12/2/2025  Plan of Care Expiration: 2/28/2025 or 12v post eval  Visit # (episodes)/ Visits authorized: 13 / eval +20 (POC 12 / 12)  2nd FOTO visit 5 - 1/14/2025  3rd FOTO visit 10 - 2/18/25  PTA: 1/5     Time In: 400  Time Out: 433  Total Billable Time: 33 minutes     Precautions: DM  Insurance: Payor: [employer] / Plan: [email or web address] FEDERAL STANDARD / Product Type: PPO /     Subjective     Pt reports: he just wants to consult on what he needs to focus on. Has already worked out and does not want to exercise. Pt is ready to be discharged and work on independent HEP.     He was somewhat compliant with home exercise program   Response to previous treatment: positive  Functional change: none reported    Pain: 0-8/ 10 with reaching; 2/10 shoulder  Location: Right shoulder; a/c into deltoid; radiates down posterior norma and arm; Knee infrapatellar tendon     Objective      Cervical AROM Screen:  WNL flexion and rotation  Extension severe restriction->SAME  SB moderate restriction->SAME        Shoulder AROM:   in degrees Left Right Pain/Dysfunction with Movement   Flexion (180 norm)  165  130->170     Extension (60 norm)  55  35->55     Abduction (180 norm)  163  115->150               Internal rotation (T8 norm)  T10  L4->L1     ER at 0° abd (90 norm)  68  66    "        Gait Without AD   Analysis Slight left trunk lean to decrease Right Weight Bearing          UE MMT:  5/5 norm Left Right   Shoulder Flexion 5/5 4+/5->5/5   Shoulder Extension 5/5 5/5   Shoulder Abduction 5/5 4+/5->5/5   Serratus Anterior  5/5 4/5->5/5   Shoulder IR 5/5 5/5   Shoulder ER  @ 0* Abduction 5/5 5/5   Elbow Flexion  5/5 5/5   Elbow Extension 5/5 5/5   Mid Traps 3+/5 3+/5        Treatment     Combo treat: Right shoulder impingement / Right knee infrapatellar pn    Gennaro received the treatments listed below:      Gennaro received therapeutic exercises to develop strength, ROM, and flexibility for 10 minutes including:  NMRE utilized to activate appropriate mm to improve posture, shoulder stabilization, smooth GH and scapula glide: 10 minutes     +SEATED:  Manual Hot Pack with diaphragmatic breathing, 5 minute  Shoulder retro rolls  T/s Extension stretch  Upper Trapezius stretch, 9u06csv  Ant scalene stretch, 5d69svq  Red Theraband Bilateral shoulder External Rotation, 62x8dwp  Cervical retraction with Red Theraband, 2x10, 5 sec hold (hep)   C/s AROM: Flx-Ext / Rot / BSB    Sidelying horiz add    SEATED:  Shoulder squeeze, 84f1snq - NOT PERFORMED   Forearm stretches, 4-way, 7a50jnk    TABLE:  Prone quad stretch with strap, 8a58lzj Right       therapeutic activities to improve dynamic functional performance for 13 minutes, including:     +Yellow Theraband wall clocks, 20x alternate    STAND:   Low trap depression. Towel roll on counter 10q0pbg, check his alignment  Bodyblade: A-P / Med-Lat / 45 deg abd 2 x 30" each  Doorway stretch: Single arm 90 seg, turn away instead of stepping forward (hep)    Cable Column: Extension, 10# / Adduction, 7# / Tricep Extension, 7#, 20-30x / Lat pull downs, 13#  Bicep curls, 10#, 20-30x    Green tube Internal Rotation, 2x15  Blue tube External Rotation, 2x15  Standing Weight Bearing: hands on table - 1. Scap depression, 2x10, 5sec / 2. Alternate taps, 20-30x / Reaches " (NP) /   Planks on Bosu Ball, 0v58lar  ACTIVE: Flx / Abd, 2x10, 2#  RIGHT Trapezius stretch holding onto sink (//bars), 6p92eej          NOT PERFORMED   Scaption AARange of Motion: wall slide with towel, 2x10  Sidelying Horiz add mobilization (post capsule stretch), 5x10s - NOT PERFORMED  Sidelying Sleeper stretch, 4v87rbw (hep) - NOT PERFORMED        Add NEXT visit: Still needs knee protocol  Open books, 5x10s  Supine horizontal abduction  Manual tx   FUTURE visits:  Retest innominates  Red Theraband Serratus Anterior reach or wand landmines  Thoracic Mobility:  / Thoracic ext supine over 1/2 Foam Roller   Pec stretch with Foam Roller   Self massage with TB        Patient Education and Home Exercises       Education provided:   - daily HEP    Written Home Exercises Provided: Patient instructed to cont prior HEP. Exercises were reviewed and Gennaro was able to demonstrate them prior to the end of the session.  Gennaro demonstrated good  understanding of the education provided. See EMR under Patient Instructions for exercises provided during therapy sessions    Assessment     Pt demonstrates lower levels of pain overall, and significant clinical gains since evaluation. However pain and impingement deficits remain; suspected supraspinatus tear. Pt able to perform all therex given with minimal pn provocation. Today we discussed most important exercises to continue in different parts of his day; work, home, gym. Added Yellow Theraband wall clocks. Pt has reached max rehab potential at this time and is ready for discharge and to work independently on HEP.   Gennaro Is progressing well towards his goals.   Pt prognosis is Good.       Pt's spiritual, cultural and educational needs considered and pt agreeable to plan of care and goals.     Anticipated barriers to physical therapy: chronicity of condition    Goals:   Short Term GOALS: 3 weeks. Pt agrees with goals set. PROGRESS 2/27/2025  1. Patient demonstrates compliance with  HEP.   2. Patient demonstrates independence with Postural Awareness while sitting and standing.   3. Patient demonstrates decreased pain level of 3/10 while performing daily activities.  4. Patient will reduce RUE radiculopathy frequency and intensity.  5. Patient will improve Right quadricep flexibility to moderate restriction.     Long Term GOALS: 6 weeks. Pt agrees with goals set. PROGRESS 2/27/2025  1. Patient demonstrates increased Right Upper Extremity AROM equal to Left Upper Extremity.  2. Patient demonstrates increased Right quadricep flexibility to mild restriction.  3. Patient demonstrates increased RUE strength by 1/2 grade or greater to improve tolerance to home chores.  4. Patient demonstrates independence with body mechanics while working.  5. Patient demonstrates independence with HEP.   6. Patient will improve FOTO function score by 25% to show improvement in condition and functional mobility.        Plan     D/c today    Letty Powers, PT

## 2025-03-07 ENCOUNTER — PATIENT MESSAGE (OUTPATIENT)
Dept: FAMILY MEDICINE | Facility: CLINIC | Age: 55
End: 2025-03-07
Payer: COMMERCIAL

## 2025-03-07 DIAGNOSIS — E29.1 MALE HYPOGONADISM: ICD-10-CM

## 2025-03-10 DIAGNOSIS — E11.9 TYPE 2 DIABETES MELLITUS WITHOUT RETINOPATHY: Primary | ICD-10-CM

## 2025-03-10 RX ORDER — TESTOSTERONE CYPIONATE 200 MG/ML
200 INJECTION, SOLUTION INTRAMUSCULAR WEEKLY
Qty: 4 ML | Refills: 4 | Status: SHIPPED | OUTPATIENT
Start: 2025-03-10 | End: 2025-09-08

## 2025-03-10 NOTE — TELEPHONE ENCOUNTER
Care Due:                  Date            Visit Type   Department     Provider  --------------------------------------------------------------------------------                                ESTABLISHED                              PATIENT -    [facility]  Last Visit: 02-      VIRTUAL      CATHLEEN Amezcua                               -                              PRIMARY      [facility]  Next Visit: 06-      CARE ([facility])   MEDICINE       Rafita Amezcua                                                            Last  Test          Frequency    Reason                     Performed    Due Date  --------------------------------------------------------------------------------    HBA1C.......  6 months...  LUIS EDUARDO semaglutide...  11- 05-    Lipid Panel.  12 months..  simvastatin..............  12- 12-    [facility] Embedded Care Due Messages. Reference number: [id number].   3/10/2025 2:27:23 PM CDT

## 2025-03-17 ENCOUNTER — RESULTS FOLLOW-UP (OUTPATIENT)
Dept: FAMILY MEDICINE | Facility: CLINIC | Age: 55
End: 2025-03-17

## 2025-03-17 ENCOUNTER — LAB VISIT (OUTPATIENT)
Dept: LAB | Facility: HOSPITAL | Age: 55
End: 2025-03-17
Attending: INTERNAL MEDICINE
Payer: COMMERCIAL

## 2025-03-17 DIAGNOSIS — E11.9 DIABETES MELLITUS WITHOUT COMPLICATION: ICD-10-CM

## 2025-03-17 LAB
ESTIMATED AVG GLUCOSE: 143 MG/DL (ref 68–131)
HBA1C MFR BLD: 6.6 % (ref 4–5.6)

## 2025-03-17 PROCEDURE — 83036 HEMOGLOBIN GLYCOSYLATED A1C: CPT | Performed by: INTERNAL MEDICINE

## 2025-03-17 PROCEDURE — 36415 COLL VENOUS BLD VENIPUNCTURE: CPT | Mod: PO | Performed by: INTERNAL MEDICINE

## 2025-03-21 ENCOUNTER — NUTRITION (OUTPATIENT)
Dept: DIABETES | Facility: CLINIC | Age: 55
End: 2025-03-21
Payer: COMMERCIAL

## 2025-03-21 VITALS — BODY MASS INDEX: 34.19 KG/M2 | HEIGHT: 67 IN | WEIGHT: 217.81 LBS

## 2025-03-21 DIAGNOSIS — E11.9 TYPE 2 DIABETES MELLITUS WITHOUT RETINOPATHY: ICD-10-CM

## 2025-03-21 PROCEDURE — 99999 PR PBB SHADOW E&M-EST. PATIENT-LVL II: CPT | Mod: PBBFAC,,, | Performed by: DIETITIAN, REGISTERED

## 2025-03-21 NOTE — PROGRESS NOTES
"Diabetes Care Specialist Progress Note  Author: Leslie Ford RD, [facility]  Date: 3/21/2025    Intake    Program Intake  Reason for Diabetes Program Visit:: Initial Diabetes Assessment (Patient seen every 6 months for DM care--last initial Feb 2024)  Current diabetes risk level:: low  In the last month, have you used the ER or been admitted to the hospital: No  Permission to speak with others about care:: no    Current Diabetes Treatment: Oral Medications, DM Injectables  Oral Medication Type/Dose: Jardiance 10 mg (1 tablet daily)  DM Injectables Type/Dose: Ozempic 1 mg weekly (Wednesdays)    Continuous Glucose Monitoring  Patient has CGM: Yes  Personal CGM type:: Freestyle Amena 2--wears sporadically  GMI Value:  (wears sporadically--not enough CGM data to calculate recent GMI)    Lab Results   Component Value Date    HGBA1C 6.6 (H) 03/17/2025     Weight: 98.8 kg (217 lb 13 oz)   Height: 5' 7" (170.2 cm)   Body mass index is 34.11 kg/m².    Lifestyle Coping Support & Clinical    Lifestyle/Coping/Support  Compared to other people your age, how would you rate your health?: Excellent  Does anyone in your family have diabetes or does anyone in your family support you in your diabetes care?: Mother with diabetes. Patient and girlfriend both support diabetes care.  List anything about Diabetes that causes you stress?: Patient confident in managing his diabetes care  How do you deal with stress/distress?: Patient sees therapist for overall health  Learning Barriers:: None  Culture or Zoroastrian beliefs that may impact ability to access healthcare: No  Psychosocial/Coping Skills Assessment Completed: : Yes  Assessment indicates:: Adequate understanding  Area of need?: No    Problem Review  Active Comorbidities: Hyperlipidemia/Dyslipidemia    Diabetes Self-Management Skills Assessment    Medication Skills Assessment  Patient is able to identify current diabetes medications, dosages, and appropriate timing of medications.: " yes  Patient reports problems or concerns with current medication regimen.: no  Patient is  aware that some diabetes medications can cause low blood sugar?: Yes  Medication Skills Assessment Completed:: Yes  Assessment indicates:: Adequate understanding  Area of need?: No    Diabetes Disease Process/Treatment Options  Diabetes Type?: Type II  When were you diagnosed?: Diagnosed approx 2016 with labs  If previous diabetes education, when/where:: Patient is seen every 6 months by diabetes care specialist at [facility]--last seen by me on  What are your goals for this education session?: Continue to work on improving diabetes control  Is patient aware of what causes diabetes?: Yes  Does patient understand the pathophysiology of diabetes?: Yes  Diabetes Disease Process/Treatment Options: Skills Assessment Completed: Yes  Assessment indicates:: Adequate understanding  Area of need?: No    Nutrition/Healthy Eating  Meal Plan 24 Hour Recall - Lunch: large salad with protein  Meal Plan 24 Hour Recall - Snack: banana, peanut butter  Meal Plan 24 Hour Recall - Beverage: water  Who shops/cooks?: Significant other grocery shops and patient prepares evening meal  Patient can identify foods that impact blood sugar.: yes  Nutrition/Healthy Eating Skills Assessment Completed:: Yes  Assessment indicates:: Adequate understanding  Area of need?: No    Physical Activity/Exercise  Patient's daily activity level:: moderately active  Patient formally exercises outside of work.: yes  Frequency: four or more times a week (currently PT 2 times a week for knee/shoulder, walking 2 times a week (plans to increase frequency))  Patient can identify forms of physical activity.: yes  Physical Activity/Exercise Skills Assessment Completed: : Yes  Assessment indicates:: Adequate understanding  Area of need?: Yes    Home Blood Glucose Monitoring  Patient states that blood sugar is checked at home daily.: yes  Monitoring Method:: personal continuous  glucose monitor  Personal CGM type:: Freestyle Amena 2--wears sporadically   What is your current Time in Range?: 76%  What is your A1c Target?: <6.5%    Freestyle Amena 2 ([place]) download (2/9/2025 to 2/22/2025): See media file for full report. Patient uses Freestyle Amena sporadically as means to stay motivated to control his diabetes. Wears device for 2 weeks approx every other month. 76% of glucose values are in target range. No episodes of hypoglycemia noted in the download.          Home Blood Glucose Monitoring Skills Assessment Completed: : Yes  Assessment indicates:: Adequate understanding (Patient uses Freestyle Amena intermittently as means to remain aware of glucose control)  Area of need?: No    Acute Complications  Have you ever had hypoglycemia (low BG 70 or less)?: yes  How often and what are your symptoms?: Rare--was happening after strenuous workouts but no longer occurring as workout intensity has changed  How do you treat hypoglycemia?: aware to treat with 15 gm simple carb  Have you ever had hyperglycemia (high  or more)?: no   Do you know the symptoms of high blood sugar and how to treat: Reviewed symptoms and importance of notifying health care provider if symptoms arise  Have you ever had DKA?: no  Do you ever test for ketones?: no  Acute Complications Skills Assessment Completed: : Yes  Assessment indicates:: Adequate understanding  Area of need?: No    Chronic Complications  Reviewed health maintenance: yes  Have you completed your annual diabetes maintenance labwork? : yes  Do you examine your feet daily?: yes  Do you see a Dentist?: yes  Do you see an eye doctor?: yes  Eye doctor date of last visit:: Jan 2025--[facility] Dr. Linson  Chronic Complications Skills Assessment Completed: : Yes  Assessment indicates:: Adequate understanding  Area of need?: No    Assessment Summary and Plan    Based on today's diabetes care assessment, the following areas of need were identified:       Identified Areas of Need      Medication/Current Diabetes Treatment: No   Lifestyle Coping/Support: No   Diabetes Disease Process/Treatment Options: No   Nutrition/Healthy Eating: No    Physical Activity/Exercise: Yes --see care plan   Home Blood Glucose Monitoring: No    Acute Complications: No    Chronic Complications: No     Today's interventions were provided through individual discussion, instruction, and written materials were provided.      Patient verbalized understanding of instruction and written materials.  Pt was able to return back demonstration of instructions today. Patient understood key points, needs reinforcement and further instruction.     Diabetes Self-Management Care Plan:    Today's Diabetes Self-Management Care Plan was developed with Abhi's input. Abhi has agreed to work toward the following goal(s) to improve his/her overall diabetes control.      Care Plan: Diabetes Management   Updates made since 3/21/2024 12:00 AM     Problem: Physical Activity and Exercise         Long-Range Goal: Patient will increase frequency of walking to 4+ days a week (currently walking 2 days a week).    Start Date: 3/21/2025   Priority: Medium   Barriers: No Barriers Identified        Task: Discussed role of physical activity on reducing insulin resistance and improvement in overall glycemic control. Completed 3/21/2025        Task: Discussed role of physical activity as it relates to weight loss Completed 3/21/2025        Task: Offered suggestions on how patient could increase their regular physical activity Completed 3/21/2025        Follow Up Plan     Follow up in 6 months (on 4/18/2025) for continued DSMES on 3/21/25--patient seen every 6 months by diabetes care specialist as means to continue good control of diabetes. Currently on Jardiance 10 mg daily and Ozempic 1 mg weekly. Most recent Hgb A1c at goal improved from 6.9% (Nov 2024) to now 6.6% (March 2025) without hypoglycemia. Patient  maintaining weight between 215-220 lbs, personal goal to be 210-215 lbs. Continue walking for aerobic exercise, PT 2 times a week for knee/shoulder, and also goes to gym for light weight training (maintenance now and no longer trying to build muscle). Doing well limiting portions at meals and has been cooking at home more.  Next Hgb A1c scheduled 5/26/25 with PCP follow up on 6/2/25.     Today's care plan and follow up schedule was discussed with patient.  Abhi verbalized understanding of the care plan, goals, and agrees to follow up plan.        The patient was encouraged to communicate with his/her health care provider/physician and care team regarding his/her condition(s) and treatment.  I provided the patient with my contact information today and encouraged to contact me via phone or [facility] Patient Portal as needed.     Length of Visit   Total Time: 45 Minutes

## 2025-04-09 DIAGNOSIS — E78.2 MIXED HYPERLIPIDEMIA: ICD-10-CM

## 2025-04-09 RX ORDER — SIMVASTATIN 10 MG/1
10 TABLET, FILM COATED ORAL NIGHTLY
Qty: 90 TABLET | Refills: 3 | Status: SHIPPED | OUTPATIENT
Start: 2025-04-09

## 2025-04-09 NOTE — TELEPHONE ENCOUNTER
No care due was identified.  Health [facility] Embedded Care Due Messages. Reference number: [id number].   4/09/2025 12:15:51 AM CDT

## 2025-04-09 NOTE — TELEPHONE ENCOUNTER
Refill Routing Note   Medication(s) are not appropriate for processing by [facility] for the following reason(s):        Required labs outdated    [facility] action(s):  Defer               Appointments  past 12m or future 3m with PCP    Date Provider   Last Visit   2/11/2025 Rafita Amezcua, DO   Next Visit   6/2/2025 Rafita Amezcua, DO   ED visits in past 90 days: 0        Note composed:7:59 AM 04/09/2025

## 2025-04-21 ENCOUNTER — PATIENT MESSAGE (OUTPATIENT)
Dept: FAMILY MEDICINE | Facility: CLINIC | Age: 55
End: 2025-04-21
Payer: COMMERCIAL

## 2025-04-22 DIAGNOSIS — N52.1 ERECTILE DYSFUNCTION DUE TO DISEASES CLASSIFIED ELSEWHERE: ICD-10-CM

## 2025-04-22 RX ORDER — TADALAFIL 10 MG/1
10 TABLET ORAL
Qty: 20 TABLET | Refills: 11 | Status: SHIPPED | OUTPATIENT
Start: 2025-04-22

## 2025-04-22 NOTE — TELEPHONE ENCOUNTER
Refill Routing Note   Medication(s) are not appropriate for processing by [facility] for the following reason(s):        Outside of protocol    [facility] action(s):  Route               Appointments  past 12m or future 3m with PCP    Date Provider   Last Visit   2/11/2025 Rafita Amezcua, DO   Next Visit   6/2/2025 Rafita Amezcua, DO   ED visits in past 90 days: 0        Note composed:2:04 PM 04/22/2025

## 2025-04-22 NOTE — TELEPHONE ENCOUNTER
No care due was identified.  Health [facility] Embedded Care Due Messages. Reference number: [id number].   4/22/2025 8:15:44 AM CDT

## 2025-05-02 ENCOUNTER — OFFICE VISIT (OUTPATIENT)
Dept: FAMILY MEDICINE | Facility: CLINIC | Age: 55
End: 2025-05-02
Payer: COMMERCIAL

## 2025-05-02 DIAGNOSIS — K52.9 CHRONIC DIARRHEA: Primary | ICD-10-CM

## 2025-05-02 PROCEDURE — 1160F RVW MEDS BY RX/DR IN RCRD: CPT | Mod: CPTII,95,, | Performed by: INTERNAL MEDICINE

## 2025-05-02 PROCEDURE — 3044F HG A1C LEVEL LT 7.0%: CPT | Mod: CPTII,95,, | Performed by: INTERNAL MEDICINE

## 2025-05-02 PROCEDURE — 98004 SYNCH AUDIO-VIDEO EST SF 10: CPT | Mod: 95,,, | Performed by: INTERNAL MEDICINE

## 2025-05-02 PROCEDURE — 1159F MED LIST DOCD IN RCRD: CPT | Mod: CPTII,95,, | Performed by: INTERNAL MEDICINE

## 2025-05-02 NOTE — PROGRESS NOTES
" Patient ID: Abhi Pompa is a 54 y.o. male.    Chief Complaint: No chief complaint on file.    Visit type: AUDIOVISUAL VIRTUAL    Assessment and plan   Chronic diarrhea    Continue cholestyramine.  Letter for reasonable accommodations provided     History of present illness     He is a 54-year-old male with a past medical history of type 2 diabetes, obstructive sleep apnea, hyperlipidemia, generalized anxiety disorder, chronic diarrhea, and polyuria    Follow-up chronic diarrhea and polyuria.  Has difficulty making long trips across the [facility] in the setting of chronic diarrhea with fecal urgency.  Requesting reasonable accommodation to work from home instead of commuting to the [place].  Working from home will allow him to use the restroom when needed.    Review of Systems   Constitutional:  Positive for fatigue.   Cardiovascular:  Negative for chest pain.   Endocrine: Positive for polydipsia, polyphagia and polyuria.   Skin:  Negative for pallor.   Neurological:  Positive for weakness. Negative for dizziness, tremors, seizures, speech difficulty and headaches.   Psychiatric/Behavioral:  Negative for confusion. The patient is nervous/anxious.        I personally reviewed past medical, family and social history.        Reference     Wt Readings from Last 3 Encounters:   03/21/25 98.8 kg (217 lb 13 oz)   12/02/24 101.1 kg (222 lb 14.2 oz)   10/15/24 99.8 kg (220 lb)        Diabetes Medications              JARDIANCE 10 mg tablet TAKE 1 TABLET BY MOUTH EVERY DAY    semaglutide (OZEMPIC) 1 mg/dose (4 mg/3 mL) Inject 1 mg into the skin every 7 days.           COPD Medications       No Active Medications           Medication List with Changes/Refills   Current Medications    ASHWAGANDHA EXTRACT ORAL        BLUNT NEEDLE, DISPOSABLE 18 X 1 1/2 " NDLE    Use as directed with testosterone injections    CHOLESTYRAMINE (QUESTRAN) 4 GRAM PACKET    Take 1 packet (4 g total) by mouth once daily.    CREATINE " "MONOHYDRATE (CREATINE ORAL)    Take by mouth.    ESOMEPRAZOLE (NEXIUM) 40 MG CAPSULE    Take 1 capsule (40 mg total) by mouth before breakfast.    FAMOTIDINE (PEPCID) 40 MG TABLET    TAKE 1 TABLET BY MOUTH EVERY DAY    FISH,SAF,FLX,BRG OILS/O3,6,9N2 (FISH-FLAX-BORAGE OIL ORAL)    Take by mouth.    FLASH GLUCOSE SCANNING READER (FREESTYLE YAN 2 READER) [facility]    Use as directed to check blood glucose.    FLASH GLUCOSE SENSOR (FREESTYLE YAN 2 SENSOR) KIT    Apply to skin Q 14 days    FLASH GLUCOSE SENSOR (FREESTYLE YAN 2 SENSOR) KIT    Change every 14 days.    FLUOXETINE 10 MG CAPSULE    TAKE 1 CAPSULE BY MOUTH EVERY DAY    FREESTYLE YAN 2 SENSOR KIT    APPLY TO SKIN EVERY 14 DAYS    JARDIANCE 10 MG TABLET    TAKE 1 TABLET BY MOUTH EVERY DAY    KETOCONAZOLE (NIZORAL) 2 % SHAMPOO    WASH SCALP AND BEARD AREA AT LEAST 2-3 TINES A WEEK - LET SIT AT LEAST 5 MINUTES PRIOR TO RINSING    MELATONIN 10 MG TAB    Take 10 mg by mouth nightly.    MINOXIDIL (ROGAINE) 2 % EXTERNAL SOLUTION    Apply 1 application topically 2 (two) times daily.    SEMAGLUTIDE (OZEMPIC) 1 MG/DOSE (4 MG/3 ML)    Inject 1 mg into the skin every 7 days.    SILDENAFIL (REVATIO) 20 MG TAB    TAKE UP TO 5 TABLETS BY MOUTH DAILY AS NEEDED 30 MINUTES PRIOR TO INTERCOURSE    SIMVASTATIN (ZOCOR) 10 MG TABLET    TAKE 1 TABLET BY MOUTH EVERY DAY IN THE EVENING    SYRINGE WITH NEEDLE 1 ML 22 GAUGE X 1 1/2" SYRG    Use as directed to administer testosterone injections.    TADALAFIL (CIALIS) 10 MG TABLET    TAKE 1 TABLET BY MOUTH DAILY AS NEEDED FOR ERECTILE DYSFUNCTION    TESTOSTERONE CYPIONATE (DEPOTESTOTERONE CYPIONATE) 200 MG/ML INJECTION    Inject 1 mL (200 mg total) into the muscle once a week.    TURMERIC ORAL    Take by mouth.    UNABLE TO FIND    Amitriptyline 1%/Meloxicam 0.09%/Lidocaine Hcl 2%/ Prilocaine 2%   APPLY 2 GRAMS QID    UNABLE TO FIND    Beet root    VITAMIN B COMPLEX ORAL    Take 1 tablet by mouth once daily.          The patient " location is:  [place]  The chief complaint leading to consultation is: Chronic diarrhea [K52.9]   Face to Face time with patient:  10 minutes  minutes of total time spent on the encounter, which includes face to face time and   non-face to face time preparing to see the patient (eg, review of tests), Obtaining and/or   reviewing separately obtained history, Documenting clinical information in the electronic   or other health record, Independently interpreting results (not separately reported) and   communicating results to the patient/family/caregiver, or   Care coordination (not separately reported).     Each patient to whom he or she provides medical services by telemedicine is:    (1) informed of the relationship between the physician and patient and the respective   role of any other health care provider with respect to management of the patient; and   (2) notified that he or she may decline to receive medical services by telemedicine and   may withdraw from such care at any time.    I personally reviewed past medical, family and social history.              Answers submitted by the patient for this visit:  Diabetes Questionnaire (Submitted on 5/2/2025)  Chief Complaint: Diabetes problem  Diabetes type: type 2  MedicAlert ID: No  Disease duration: 10 Years  blurred vision: Yes  foot paresthesias: No  foot ulcerations: No  visual change: Yes  weight loss: No  Symptom course: stable  hunger: Yes  mood changes: Yes  sleepiness: No  sweats: No  blackouts: No  hospitalization: No  nocturnal hypoglycemia: No  required assistance: No  required glucagon: No  CVA: No  heart disease: No  impotence: Yes  nephropathy: No  peripheral neuropathy: No  PVD: No  retinopathy: No  autonomic neuropathy: No  CAD risks: obesity, stress, diabetes mellitus, male sex  Current treatments: diet, oral agent (dual therapy)  Treatment compliance: all of the time  Home blood tests: 5+ x per day  Monitoring compliance: excellent  Blood  glucose trend: fluctuating minimally  Weight trend: decreasing steadily  Current diet: diabetic, generally healthy  Meal planning: ADA exchanges, avoidance of concentrated sweets, carbohydrate counting  Exercise: three times a week  Dietitian visit: Yes  Sees podiatrist: No

## 2025-05-02 NOTE — PROGRESS NOTES
" Patient ID: Abhi Pompa is a 54 y.o. male.    Chief Complaint: No chief complaint on file.    Visit type: AUDIOVISUAL VIRTUAL    Assessment and plan   There are no diagnoses linked to this encounter.    History of present illness     Diabetes  Diarrhea   Reasonable    Review of Systems    I personally reviewed past medical, family and social history.        Reference     Wt Readings from Last 3 Encounters:   03/21/25 98.8 kg (217 lb 13 oz)   12/02/24 101.1 kg (222 lb 14.2 oz)   10/15/24 99.8 kg (220 lb)        Diabetes Medications              JARDIANCE 10 mg tablet TAKE 1 TABLET BY MOUTH EVERY DAY    semaglutide (OZEMPIC) 1 mg/dose (4 mg/3 mL) Inject 1 mg into the skin every 7 days.           COPD Medications       No Active Medications           Medication List with Changes/Refills   Current Medications    ASHWAGANDHA EXTRACT ORAL        BLUNT NEEDLE, DISPOSABLE 18 X 1 1/2 " NDLE    Use as directed with testosterone injections    CHOLESTYRAMINE (QUESTRAN) 4 GRAM PACKET    Take 1 packet (4 g total) by mouth once daily.    CREATINE MONOHYDRATE (CREATINE ORAL)    Take by mouth.    ESOMEPRAZOLE (NEXIUM) 40 MG CAPSULE    Take 1 capsule (40 mg total) by mouth before breakfast.    FAMOTIDINE (PEPCID) 40 MG TABLET    TAKE 1 TABLET BY MOUTH EVERY DAY    FISH,SAF,FLX,BRG OILS/O3,6,9N2 (FISH-FLAX-BORAGE OIL ORAL)    Take by mouth.    FLASH GLUCOSE SCANNING READER (FREESTYLE YAN 2 READER) [facility] [place]    Use as directed to check blood glucose.    FLASH GLUCOSE SENSOR (FREESTYLE YAN 2 SENSOR) KIT    Apply to skin Q 14 days    FLASH GLUCOSE SENSOR (FREESTYLE YAN 2 SENSOR) KIT    Change every 14 days.    FLUOXETINE 10 MG CAPSULE    TAKE 1 CAPSULE BY MOUTH EVERY DAY    FREESTYLE YAN 2 SENSOR KIT    APPLY TO SKIN EVERY 14 DAYS    JARDIANCE 10 MG TABLET    TAKE 1 TABLET BY MOUTH EVERY DAY    KETOCONAZOLE (NIZORAL) 2 % SHAMPOO    WASH SCALP AND BEARD AREA AT LEAST 2-3 TINES A WEEK - LET SIT AT LEAST 5 MINUTES PRIOR TO RINSING " "   MELATONIN 10 MG TAB    Take 10 mg by mouth nightly.    MINOXIDIL (ROGAINE) 2 % EXTERNAL SOLUTION    Apply 1 application topically 2 (two) times daily.    SEMAGLUTIDE (OZEMPIC) 1 MG/DOSE (4 MG/3 ML)    Inject 1 mg into the skin every 7 days.    SILDENAFIL (REVATIO) 20 MG TAB    TAKE UP TO 5 TABLETS BY MOUTH DAILY AS NEEDED 30 MINUTES PRIOR TO INTERCOURSE    SIMVASTATIN (ZOCOR) 10 MG TABLET    TAKE 1 TABLET BY MOUTH EVERY DAY IN THE EVENING    SYRINGE WITH NEEDLE 1 ML 22 GAUGE X 1 1/2" SYRG    Use as directed to administer testosterone injections.    TADALAFIL (CIALIS) 10 MG TABLET    TAKE 1 TABLET BY MOUTH DAILY AS NEEDED FOR ERECTILE DYSFUNCTION    TESTOSTERONE CYPIONATE (DEPOTESTOTERONE CYPIONATE) 200 MG/ML INJECTION    Inject 1 mL (200 mg total) into the muscle once a week.    TURMERIC ORAL    Take by mouth.    UNABLE TO FIND    Amitriptyline 1%/Meloxicam 0.09%/Lidocaine Hcl 2%/ Prilocaine 2%   APPLY 2 GRAMS QID    UNABLE TO FIND    Beet root    VITAMIN B COMPLEX ORAL    Take 1 tablet by mouth once daily.          The patient location is:  [place]  The chief complaint leading to consultation is: No primary diagnosis found.   Face to Face time with patient: ***  minutes of total time spent on the encounter, which includes face to face time and   non-face to face time preparing to see the patient (eg, review of tests), Obtaining and/or   reviewing separately obtained history, Documenting clinical information in the electronic   or other health record, Independently interpreting results (not separately reported) and   communicating results to the patient/family/caregiver, or   Care coordination (not separately reported).     Each patient to whom he or she provides medical services by telemedicine is:    (1) informed of the relationship between the physician and patient and the respective   role of any other health care provider with respect to management of the patient; and   (2) notified that he or she may " decline to receive medical services by telemedicine and   may withdraw from such care at any time.    I personally reviewed past medical, family and social history.

## 2025-05-02 NOTE — LETTER
May 2, 2025      [facility]  [address]  [place] [address]  Phone: [phone]  Fax: [phone]       Patient: Abhi Pompa   YOB: 1970  Date of Visit: 05/02/2025    To Whom It May Concern:    I am writing to request reasonable medical accommodations for Mr. Pompa. The patient has been diagnosed with chronic diarrhea, type 2 diabetes, obesity, polyuria and hyperlipidemia. He experiences frequent episodes of fecal urgency, which are unpredictable and severe. This condition has persisted for over a year and is expected to continue indefinitely. 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 Without immediate access to restroom facilities, the patient faces significant distress and potential accidents, making a 30-[place] commute [place] bridge [place] stops highly impractical and unsafe. Allowing him to work from home would provide immediate access to restroom facilities, reduce stress, and enable better management of their chronic conditions. This would accommodate the patient's need for regular medical appointments and allow for a work schedule that aligns with their health needs.  The need for these accommodations is supported by evidence indicating that flexible work arrangements, including telework, are effective in managing chronic conditions and preventing work disability.    Thank you for considering this request. Please feel free to contact me for any further information or clarification.    Sincerely,     Rafita Amezcua, DO

## 2025-05-03 ENCOUNTER — PATIENT MESSAGE (OUTPATIENT)
Dept: FAMILY MEDICINE | Facility: CLINIC | Age: 55
End: 2025-05-03
Payer: COMMERCIAL

## 2025-05-03 PROBLEM — K52.9 CHRONIC DIARRHEA: Status: ACTIVE | Noted: 2025-02-11

## 2025-05-26 ENCOUNTER — LAB VISIT (OUTPATIENT)
Dept: LAB | Facility: HOSPITAL | Age: 55
End: 2025-05-26
Attending: INTERNAL MEDICINE
Payer: COMMERCIAL

## 2025-05-26 DIAGNOSIS — E11.9 DIABETES MELLITUS WITHOUT COMPLICATION: ICD-10-CM

## 2025-05-26 DIAGNOSIS — E34.9 TESTOSTERONE DEFICIENCY: ICD-10-CM

## 2025-05-26 DIAGNOSIS — Z11.3 SCREENING FOR STD (SEXUALLY TRANSMITTED DISEASE): ICD-10-CM

## 2025-05-26 DIAGNOSIS — E78.2 MIXED HYPERLIPIDEMIA: ICD-10-CM

## 2025-05-26 LAB
ABSOLUTE EOSINOPHIL (OHS): 0.25 K/UL
ABSOLUTE MONOCYTE (OHS): 0.92 K/UL (ref 0.3–1)
ABSOLUTE NEUTROPHIL COUNT (OHS): 4.11 K/UL (ref 1.8–7.7)
BASOPHILS # BLD AUTO: 0.1 K/UL
BASOPHILS NFR BLD AUTO: 1.4 %
CHOLEST SERPL-MCNC: 185 MG/DL (ref 120–199)
CHOLEST/HDLC SERPL: 5.6 {RATIO} (ref 2–5)
EAG (OHS): 140 MG/DL (ref 68–131)
ERYTHROCYTE [DISTWIDTH] IN BLOOD BY AUTOMATED COUNT: 12.5 % (ref 11.5–14.5)
HBA1C MFR BLD: 6.5 % (ref 4–5.6)
HBV CORE IGM SERPL QL IA: NORMAL
HBV SURFACE AG SERPL QL IA: NORMAL
HCT VFR BLD AUTO: 49.6 % (ref 40–54)
HCV AB SERPL QL IA: NORMAL
HDLC SERPL-MCNC: 33 MG/DL (ref 40–75)
HDLC SERPL: 17.8 % (ref 20–50)
HGB BLD-MCNC: 16.2 GM/DL (ref 14–18)
HIV 1+2 AB+HIV1 P24 AG SERPL QL IA: NORMAL
IMM GRANULOCYTES # BLD AUTO: 0.02 K/UL (ref 0–0.04)
IMM GRANULOCYTES NFR BLD AUTO: 0.3 % (ref 0–0.5)
LDLC SERPL CALC-MCNC: 108.8 MG/DL (ref 63–159)
LYMPHOCYTES # BLD AUTO: 1.79 K/UL (ref 1–4.8)
MCH RBC QN AUTO: 29.6 PG (ref 27–31)
MCHC RBC AUTO-ENTMCNC: 32.7 G/DL (ref 32–36)
MCV RBC AUTO: 91 FL (ref 82–98)
NONHDLC SERPL-MCNC: 152 MG/DL
NUCLEATED RBC (/100WBC) (OHS): 0 /100 WBC
PLATELET # BLD AUTO: 344 K/UL (ref 150–450)
PMV BLD AUTO: 11.1 FL (ref 9.2–12.9)
RBC # BLD AUTO: 5.48 M/UL (ref 4.6–6.2)
RELATIVE EOSINOPHIL (OHS): 3.5 %
RELATIVE LYMPHOCYTE (OHS): 24.9 % (ref 18–48)
RELATIVE MONOCYTE (OHS): 12.8 % (ref 4–15)
RELATIVE NEUTROPHIL (OHS): 57.1 % (ref 38–73)
TRIGL SERPL-MCNC: 216 MG/DL (ref 30–150)
WBC # BLD AUTO: 7.19 K/UL (ref 3.9–12.7)

## 2025-05-26 PROCEDURE — 83036 HEMOGLOBIN GLYCOSYLATED A1C: CPT

## 2025-05-26 PROCEDURE — 86705 HEP B CORE ANTIBODY IGM: CPT

## 2025-05-26 PROCEDURE — 87340 HEPATITIS B SURFACE AG IA: CPT

## 2025-05-26 PROCEDURE — 86803 HEPATITIS C AB TEST: CPT

## 2025-05-26 PROCEDURE — 85025 COMPLETE CBC W/AUTO DIFF WBC: CPT

## 2025-05-26 PROCEDURE — 87389 HIV-1 AG W/HIV-1&-2 AB AG IA: CPT

## 2025-05-26 PROCEDURE — 36415 COLL VENOUS BLD VENIPUNCTURE: CPT | Mod: PO

## 2025-05-26 PROCEDURE — 80061 LIPID PANEL: CPT

## 2025-06-02 ENCOUNTER — OFFICE VISIT (OUTPATIENT)
Dept: FAMILY MEDICINE | Facility: CLINIC | Age: 55
End: 2025-06-02
Payer: COMMERCIAL

## 2025-06-02 VITALS
HEART RATE: 78 BPM | WEIGHT: 224.88 LBS | HEIGHT: 67 IN | BODY MASS INDEX: 35.29 KG/M2 | SYSTOLIC BLOOD PRESSURE: 122 MMHG | OXYGEN SATURATION: 97 % | DIASTOLIC BLOOD PRESSURE: 80 MMHG

## 2025-06-02 DIAGNOSIS — G47.33 OBSTRUCTIVE SLEEP APNEA SYNDROME: ICD-10-CM

## 2025-06-02 DIAGNOSIS — E11.9 DIABETES MELLITUS WITHOUT COMPLICATION: Primary | ICD-10-CM

## 2025-06-02 DIAGNOSIS — F41.1 GENERALIZED ANXIETY DISORDER: ICD-10-CM

## 2025-06-02 DIAGNOSIS — K21.00 GASTROESOPHAGEAL REFLUX DISEASE WITH ESOPHAGITIS WITHOUT HEMORRHAGE: ICD-10-CM

## 2025-06-02 DIAGNOSIS — Z12.83 SKIN CANCER SCREENING: ICD-10-CM

## 2025-06-02 DIAGNOSIS — E78.2 MIXED HYPERLIPIDEMIA: ICD-10-CM

## 2025-06-02 DIAGNOSIS — E34.9 TESTOSTERONE DEFICIENCY: ICD-10-CM

## 2025-06-02 DIAGNOSIS — N52.1 ERECTILE DYSFUNCTION DUE TO DISEASES CLASSIFIED ELSEWHERE: ICD-10-CM

## 2025-06-02 PROCEDURE — 3079F DIAST BP 80-89 MM HG: CPT | Mod: CPTII,S$GLB,, | Performed by: INTERNAL MEDICINE

## 2025-06-02 PROCEDURE — 1160F RVW MEDS BY RX/DR IN RCRD: CPT | Mod: CPTII,S$GLB,, | Performed by: INTERNAL MEDICINE

## 2025-06-02 PROCEDURE — 99999 PR PBB SHADOW E&M-EST. PATIENT-LVL V: CPT | Mod: PBBFAC,,, | Performed by: INTERNAL MEDICINE

## 2025-06-02 PROCEDURE — 3008F BODY MASS INDEX DOCD: CPT | Mod: CPTII,S$GLB,, | Performed by: INTERNAL MEDICINE

## 2025-06-02 PROCEDURE — 3074F SYST BP LT 130 MM HG: CPT | Mod: CPTII,S$GLB,, | Performed by: INTERNAL MEDICINE

## 2025-06-02 PROCEDURE — 99214 OFFICE O/P EST MOD 30 MIN: CPT | Mod: S$GLB,,, | Performed by: INTERNAL MEDICINE

## 2025-06-02 PROCEDURE — 3044F HG A1C LEVEL LT 7.0%: CPT | Mod: CPTII,S$GLB,, | Performed by: INTERNAL MEDICINE

## 2025-06-02 PROCEDURE — 1159F MED LIST DOCD IN RCRD: CPT | Mod: CPTII,S$GLB,, | Performed by: INTERNAL MEDICINE

## 2025-06-02 RX ORDER — SILDENAFIL CITRATE 20 MG/1
TABLET ORAL
Qty: 30 TABLET | Refills: 5 | Status: SHIPPED | OUTPATIENT
Start: 2025-06-02

## 2025-06-05 ENCOUNTER — TELEPHONE (OUTPATIENT)
Dept: DERMATOLOGY | Facility: CLINIC | Age: 55
End: 2025-06-05
Payer: COMMERCIAL

## 2025-07-15 NOTE — PROGRESS NOTES
Subjective     Patient ID: Abhi Pompa is a 52 y.o. male.    Chief Complaint: Polycythemia    HPIMr. Aletha is a 52-year-old man referred for evaluation for polycythemia.  His JAK2 mutation was negative and his polycythemia was attributed to his testosterone injection and a documented sleep apnea that he has.  has been on testosterone injections since 2014, currently on a weekly basis.     He comes in to review his labs  He denies any nausea, vomiting, diarrhea, constipation, abdominal pain, weight loss or loss of appetite, chest pain, shortness of breath, leg swelling, fatigue, pain, headache, dizziness, or mood changes.       Review of Systems   Constitutional:  Negative for appetite change, fatigue and unexpected weight change.   HENT:  Negative for mouth sores.    Eyes:  Negative for visual disturbance.   Respiratory:  Negative for cough and shortness of breath.    Cardiovascular:  Negative for chest pain.   Gastrointestinal:  Negative for abdominal pain and diarrhea.   Genitourinary:  Negative for frequency.   Musculoskeletal:  Negative for back pain.   Integumentary:  Negative for rash.   Neurological:  Negative for headaches.   Hematological:  Negative for adenopathy.   Psychiatric/Behavioral:  The patient is not nervous/anxious.    All other systems reviewed and are negative.       Objective     Physical Exam      LABS:  WBC   Date Value Ref Range Status   07/28/2023 13.34 (H) 3.90 - 12.70 K/uL Final     Hemoglobin   Date Value Ref Range Status   07/28/2023 18.6 (H) 14.0 - 18.0 g/dL Final     Hematocrit   Date Value Ref Range Status   07/28/2023 54.3 (H) 40.0 - 54.0 % Final     Platelets   Date Value Ref Range Status   07/28/2023 319 150 - 450 K/uL Final     Gran # (ANC)   Date Value Ref Range Status   07/28/2023 9.9 (H) 1.8 - 7.7 K/uL Final     Comment:     The ANC is based on a white cell differential from an   automated cell counter. It has not been microscopically   reviewed for the presence of  abnormal cells. Clinical   correlation is required.         Chemistry        Component Value Date/Time     06/12/2023 0718    K 4.2 06/12/2023 0718     06/12/2023 0718    CO2 23 06/12/2023 0718    BUN 10 06/12/2023 0718    CREATININE 0.8 06/12/2023 0718     (H) 06/12/2023 0718        Component Value Date/Time    CALCIUM 8.8 06/12/2023 0718    ALKPHOS 54 (L) 06/12/2023 0718    AST 21 06/12/2023 0718    ALT 32 06/12/2023 0718    BILITOT 0.9 06/12/2023 0718    ESTGFRAFRICA >60.0 04/18/2022 0741    EGFRNONAA >60.0 04/18/2022 0741             Assessment and Plan     1. Polycythemia    Route Chart for Scheduling    Med Onc Chart Routing      Follow up with physician 3 months. Schedule CBC and see Dr. 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 He will let us know if he wants it set up with us.  Will have him see Dr. Espinosa in 3 months with lab     Above care plan was discussed with patient  and all questions were addressed to his satisfaction                 Structured MSE Structured MSE

## 2025-07-18 DIAGNOSIS — E11.9 DIABETES MELLITUS WITHOUT COMPLICATION: ICD-10-CM

## 2025-07-18 RX ORDER — EMPAGLIFLOZIN 10 MG/1
10 TABLET, FILM COATED ORAL
Qty: 90 TABLET | Refills: 1 | Status: SHIPPED | OUTPATIENT
Start: 2025-07-18

## 2025-07-18 NOTE — TELEPHONE ENCOUNTER
No care due was identified.  [facility] Embedded Care Due Messages. Reference number: [id number].   7/18/2025 12:44:42 AM CDT

## 2025-07-19 ENCOUNTER — PATIENT MESSAGE (OUTPATIENT)
Dept: FAMILY MEDICINE | Facility: CLINIC | Age: 55
End: 2025-07-19
Payer: COMMERCIAL

## 2025-07-21 ENCOUNTER — PATIENT MESSAGE (OUTPATIENT)
Dept: FAMILY MEDICINE | Facility: CLINIC | Age: 55
End: 2025-07-21
Payer: COMMERCIAL

## 2025-07-21 DIAGNOSIS — E11.9 DIABETES MELLITUS WITHOUT COMPLICATION: Primary | ICD-10-CM

## 2025-07-21 NOTE — TELEPHONE ENCOUNTER
No care due was identified.  [facility] Embedded Care Due Messages. Reference number: [id number].   7/21/2025 2:09:08 PM CDT

## 2025-08-17 DIAGNOSIS — E11.9 DIABETES MELLITUS WITHOUT COMPLICATION: ICD-10-CM

## 2025-08-17 RX ORDER — SEMAGLUTIDE 1.34 MG/ML
1 INJECTION, SOLUTION SUBCUTANEOUS
Qty: 3 EACH | Refills: 1 | Status: SHIPPED | OUTPATIENT
Start: 2025-08-17

## 2025-08-26 ENCOUNTER — PATIENT MESSAGE (OUTPATIENT)
Dept: DIABETES | Facility: CLINIC | Age: 55
End: 2025-08-26
Payer: COMMERCIAL

## (undated) DEVICE — DRAPE PLASTIC U 60X72

## (undated) DEVICE — DRAPE STERI U-SHAPED 47X51IN

## (undated) DEVICE — SEE MEDLINE ITEM 157173

## (undated) DEVICE — Device

## (undated) DEVICE — DRESSING N ADH OIL EMUL 3X3

## (undated) DEVICE — DRAPE STERI-DRAPE 1000 17X11IN

## (undated) DEVICE — PAD CAST SPECIALIST STRL 4

## (undated) DEVICE — ELECTRODE REM PLYHSV RETURN 9

## (undated) DEVICE — GLOVE BIOGEL SZ 8 1/2

## (undated) DEVICE — SEE MEDLINE ITEM 146231

## (undated) DEVICE — LINER GLOVE POWDERFREE SZ 8.5

## (undated) DEVICE — SEE MEDLINE ITEM 157128

## (undated) DEVICE — NEPTUNE 4 PORT MANIFOLD

## (undated) DEVICE — GAUZE SPONGE 4X4 12PLY

## (undated) DEVICE — SEE MEDLINE ITEM 157131

## (undated) DEVICE — APPLICATOR CHLORAPREP ORN 26ML

## (undated) DEVICE — PAD K-THERMIA 24IN X 60IN

## (undated) DEVICE — GOWN X-LG STERILE BACK

## (undated) DEVICE — BLADE SURG #15 CARBON STEEL

## (undated) DEVICE — BLADE SURG CARBON STEEL #10

## (undated) DEVICE — PENCIL ROCKER SWITCH 10FT CORD

## (undated) DEVICE — DRAPE C ARM 42 X 120 10/BX

## (undated) DEVICE — SLING ARM LARGE

## (undated) DEVICE — SEE MEDLINE ITEM 152622

## (undated) DEVICE — SEE MEDLINE ITEM 152530

## (undated) DEVICE — SLING ORTHOPEDIC MEDIUM

## (undated) DEVICE — SEE MEDLINE ITEM 146313

## (undated) DEVICE — SLEEVE SCD EXPRESS CALF MEDIUM

## (undated) DEVICE — PAD CAST SPECIALIST STRL 6

## (undated) DEVICE — GOWN SURG 2XL DISP TIE BACK

## (undated) DEVICE — BANDAGE ACE ELASTIC 6"

## (undated) DEVICE — SPLINT PLASTER F.S 4INX15IN